# Patient Record
Sex: FEMALE | Race: WHITE | Employment: OTHER | URBAN - METROPOLITAN AREA
[De-identification: names, ages, dates, MRNs, and addresses within clinical notes are randomized per-mention and may not be internally consistent; named-entity substitution may affect disease eponyms.]

---

## 2017-11-27 PROBLEM — I25.10 CORONARY ARTERY DISEASE INVOLVING NATIVE CORONARY ARTERY OF NATIVE HEART WITHOUT ANGINA PECTORIS: Status: ACTIVE | Noted: 2017-11-27

## 2017-11-27 PROBLEM — I50.32 CHRONIC DIASTOLIC CONGESTIVE HEART FAILURE (HCC): Status: ACTIVE | Noted: 2017-11-27

## 2018-03-19 ENCOUNTER — HOSPITAL ENCOUNTER (OUTPATIENT)
Dept: CARDIAC REHAB | Age: 68
Setting detail: THERAPIES SERIES
Discharge: HOME | End: 2018-03-19
Payer: MEDICARE

## 2018-03-19 PROCEDURE — 93798 PHYS/QHP OP CAR RHAB W/ECG: CPT

## 2018-03-21 ENCOUNTER — HOSPITAL ENCOUNTER (OUTPATIENT)
Dept: CARDIAC REHAB | Age: 68
Setting detail: THERAPIES SERIES
Discharge: HOME | End: 2018-03-21
Payer: MEDICARE

## 2018-03-21 PROCEDURE — 93798 PHYS/QHP OP CAR RHAB W/ECG: CPT

## 2018-03-28 ENCOUNTER — HOSPITAL ENCOUNTER (OUTPATIENT)
Age: 68
Discharge: HOME | End: 2018-03-28
Payer: MEDICARE

## 2018-03-28 LAB
ANION GAP SERPL CALCULATED.3IONS-SCNC: 12 MMOL/L (ref 7–16)
BUN BLDV-MCNC: 29 MG/DL (ref 8–23)
CALCIUM SERPL-MCNC: 9.8 MG/DL (ref 8.6–10.2)
CHLORIDE BLD-SCNC: 103 MMOL/L (ref 98–107)
CO2: 27 MMOL/L (ref 22–29)
CREAT SERPL-MCNC: 1.1 MG/DL (ref 0.5–1)
CREATININE URINE: 38 MG/DL (ref 29–226)
GFR AFRICAN AMERICAN: >60
GFR NON-AFRICAN AMERICAN: 50 ML/MIN/1.73
GLUCOSE BLD-MCNC: 82 MG/DL (ref 74–109)
HBA1C MFR BLD: 8.7 % (ref 4.8–5.9)
HCT VFR BLD CALC: 40.5 % (ref 34–48)
HEMOGLOBIN: 12.8 G/DL (ref 11.5–15.5)
MCH RBC QN AUTO: 27.5 PG (ref 26–35)
MCHC RBC AUTO-ENTMCNC: 31.6 % (ref 32–34.5)
MCV RBC AUTO: 87.1 FL (ref 80–99.9)
MICROALBUMIN UR-MCNC: <12 MG/L
MICROALBUMIN/CREAT UR-RTO: ABNORMAL (ref 0–30)
PARATHYROID HORMONE INTACT: 32 PG/ML (ref 15–65)
PDW BLD-RTO: 15.9 FL (ref 11.5–15)
PLATELET # BLD: 247 E9/L (ref 130–450)
PMV BLD AUTO: 10 FL (ref 7–12)
POTASSIUM SERPL-SCNC: 4.5 MMOL/L (ref 3.5–5)
RBC # BLD: 4.65 E12/L (ref 3.5–5.5)
SODIUM BLD-SCNC: 142 MMOL/L (ref 132–146)
VITAMIN D 25-HYDROXY: 29 NG/ML (ref 30–100)
WBC # BLD: 8.7 E9/L (ref 4.5–11.5)

## 2018-03-28 PROCEDURE — 83036 HEMOGLOBIN GLYCOSYLATED A1C: CPT

## 2018-03-28 PROCEDURE — 85027 COMPLETE CBC AUTOMATED: CPT

## 2018-03-28 PROCEDURE — 82570 ASSAY OF URINE CREATININE: CPT

## 2018-03-28 PROCEDURE — 82306 VITAMIN D 25 HYDROXY: CPT

## 2018-03-28 PROCEDURE — 82044 UR ALBUMIN SEMIQUANTITATIVE: CPT

## 2018-03-28 PROCEDURE — 83970 ASSAY OF PARATHORMONE: CPT

## 2018-03-28 PROCEDURE — 80048 BASIC METABOLIC PNL TOTAL CA: CPT

## 2018-03-28 PROCEDURE — 36415 COLL VENOUS BLD VENIPUNCTURE: CPT

## 2018-04-30 ENCOUNTER — HOSPITAL ENCOUNTER (OUTPATIENT)
Dept: CARDIAC REHAB | Age: 68
Setting detail: THERAPIES SERIES
Discharge: HOME | End: 2018-04-30
Payer: MEDICARE

## 2018-05-24 ENCOUNTER — OFFICE VISIT (OUTPATIENT)
Dept: CARDIOLOGY CLINIC | Age: 68
End: 2018-05-24
Payer: MEDICARE

## 2018-05-24 VITALS
RESPIRATION RATE: 20 BRPM | WEIGHT: 225 LBS | DIASTOLIC BLOOD PRESSURE: 62 MMHG | HEART RATE: 70 BPM | SYSTOLIC BLOOD PRESSURE: 136 MMHG | HEIGHT: 65 IN | BODY MASS INDEX: 37.49 KG/M2

## 2018-05-24 DIAGNOSIS — I25.10 CORONARY ARTERY DISEASE INVOLVING NATIVE CORONARY ARTERY OF NATIVE HEART WITHOUT ANGINA PECTORIS: Primary | ICD-10-CM

## 2018-05-24 DIAGNOSIS — I10 ESSENTIAL HYPERTENSION: ICD-10-CM

## 2018-05-24 DIAGNOSIS — Z95.1 S/P CABG (CORONARY ARTERY BYPASS GRAFT): ICD-10-CM

## 2018-05-24 PROCEDURE — G8427 DOCREV CUR MEDS BY ELIG CLIN: HCPCS | Performed by: INTERNAL MEDICINE

## 2018-05-24 PROCEDURE — 1123F ACP DISCUSS/DSCN MKR DOCD: CPT | Performed by: INTERNAL MEDICINE

## 2018-05-24 PROCEDURE — 3017F COLORECTAL CA SCREEN DOC REV: CPT | Performed by: INTERNAL MEDICINE

## 2018-05-24 PROCEDURE — G8400 PT W/DXA NO RESULTS DOC: HCPCS | Performed by: INTERNAL MEDICINE

## 2018-05-24 PROCEDURE — 4040F PNEUMOC VAC/ADMIN/RCVD: CPT | Performed by: INTERNAL MEDICINE

## 2018-05-24 PROCEDURE — G8598 ASA/ANTIPLAT THER USED: HCPCS | Performed by: INTERNAL MEDICINE

## 2018-05-24 PROCEDURE — 93000 ELECTROCARDIOGRAM COMPLETE: CPT | Performed by: INTERNAL MEDICINE

## 2018-05-24 PROCEDURE — G8417 CALC BMI ABV UP PARAM F/U: HCPCS | Performed by: INTERNAL MEDICINE

## 2018-05-24 PROCEDURE — 1090F PRES/ABSN URINE INCON ASSESS: CPT | Performed by: INTERNAL MEDICINE

## 2018-05-24 PROCEDURE — 1036F TOBACCO NON-USER: CPT | Performed by: INTERNAL MEDICINE

## 2018-05-24 PROCEDURE — 99213 OFFICE O/P EST LOW 20 MIN: CPT | Performed by: INTERNAL MEDICINE

## 2018-06-04 RX ORDER — LISINOPRIL 2.5 MG/1
2.5 TABLET ORAL DAILY
Qty: 90 TABLET | Refills: 3 | Status: SHIPPED | OUTPATIENT
Start: 2018-06-04 | End: 2018-06-11 | Stop reason: SDUPTHER

## 2018-06-04 RX ORDER — PRAVASTATIN SODIUM 40 MG
40 TABLET ORAL NIGHTLY
Qty: 90 TABLET | Refills: 3 | Status: SHIPPED | OUTPATIENT
Start: 2018-06-04 | End: 2018-06-11 | Stop reason: SDUPTHER

## 2018-06-11 RX ORDER — PRAVASTATIN SODIUM 40 MG
40 TABLET ORAL NIGHTLY
Qty: 90 TABLET | Refills: 3 | Status: SHIPPED | OUTPATIENT
Start: 2018-06-11 | End: 2019-07-26 | Stop reason: SDUPTHER

## 2018-06-11 RX ORDER — LISINOPRIL 2.5 MG/1
2.5 TABLET ORAL DAILY
Qty: 90 TABLET | Refills: 3 | Status: SHIPPED | OUTPATIENT
Start: 2018-06-11 | End: 2019-03-21 | Stop reason: SDUPTHER

## 2018-06-26 LAB — PAP SMEAR, EXTERNAL: NEGATIVE

## 2018-07-12 ENCOUNTER — APPOINTMENT (OUTPATIENT)
Dept: GENERAL RADIOLOGY | Age: 68
End: 2018-07-12
Payer: COMMERCIAL

## 2018-07-12 ENCOUNTER — HOSPITAL ENCOUNTER (OUTPATIENT)
Dept: CARDIOLOGY | Age: 68
Discharge: HOME | End: 2018-07-12
Payer: COMMERCIAL

## 2018-07-12 ENCOUNTER — HOSPITAL ENCOUNTER (EMERGENCY)
Age: 68
Discharge: HOME | End: 2018-07-12
Payer: COMMERCIAL

## 2018-07-12 VITALS
WEIGHT: 225 LBS | HEART RATE: 71 BPM | BODY MASS INDEX: 37.49 KG/M2 | SYSTOLIC BLOOD PRESSURE: 124 MMHG | HEIGHT: 65 IN | DIASTOLIC BLOOD PRESSURE: 73 MMHG | OXYGEN SATURATION: 96 % | TEMPERATURE: 97.2 F | RESPIRATION RATE: 16 BRPM
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
## 2018-07-12 DIAGNOSIS — I10 ESSENTIAL HYPERTENSION: ICD-10-CM

## 2018-07-12 DIAGNOSIS — I25.10 CORONARY ARTERY DISEASE INVOLVING NATIVE CORONARY ARTERY OF NATIVE HEART WITHOUT ANGINA PECTORIS: ICD-10-CM

## 2018-07-12 DIAGNOSIS — S46.912A SHOULDER STRAIN, LEFT, INITIAL ENCOUNTER: Primary | ICD-10-CM

## 2018-07-12 DIAGNOSIS — Z95.1 S/P CABG (CORONARY ARTERY BYPASS GRAFT): ICD-10-CM

## 2018-07-12 LAB
LV EF: 55 %
LVEF MODALITY: NORMAL

## 2018-07-12 PROCEDURE — 99283 EMERGENCY DEPT VISIT LOW MDM: CPT

## 2018-07-12 PROCEDURE — 71101 X-RAY EXAM UNILAT RIBS/CHEST: CPT

## 2018-07-12 PROCEDURE — 73030 X-RAY EXAM OF SHOULDER: CPT

## 2018-07-12 PROCEDURE — 6370000000 HC RX 637 (ALT 250 FOR IP): Performed by: NURSE PRACTITIONER

## 2018-07-12 PROCEDURE — 93306 TTE W/DOPPLER COMPLETE: CPT

## 2018-07-12 RX ORDER — HYDROCODONE BITARTRATE AND ACETAMINOPHEN 5; 325 MG/1; MG/1
1 TABLET ORAL ONCE
Status: COMPLETED | OUTPATIENT
Start: 2018-07-12 | End: 2018-07-12

## 2018-07-12 RX ORDER — HYDROCODONE BITARTRATE AND ACETAMINOPHEN 5; 325 MG/1; MG/1
1 TABLET ORAL EVERY 8 HOURS PRN
Qty: 12 TABLET | Refills: 0 | Status: SHIPPED | OUTPATIENT
Start: 2018-07-12 | End: 2018-07-15

## 2018-07-12 RX ADMIN — HYDROCODONE BITARTRATE AND ACETAMINOPHEN 1 TABLET: 5; 325 TABLET ORAL at 09:42

## 2018-07-12 ASSESSMENT — PAIN DESCRIPTION - FREQUENCY: FREQUENCY: CONTINUOUS

## 2018-07-12 ASSESSMENT — PAIN SCALES - GENERAL
PAINLEVEL_OUTOF10: 8
PAINLEVEL_OUTOF10: 8

## 2018-07-12 ASSESSMENT — PAIN DESCRIPTION - PAIN TYPE: TYPE: ACUTE PAIN

## 2018-07-12 ASSESSMENT — PAIN DESCRIPTION - PROGRESSION: CLINICAL_PROGRESSION: GRADUALLY WORSENING

## 2018-07-12 ASSESSMENT — PAIN DESCRIPTION - DESCRIPTORS: DESCRIPTORS: ACHING

## 2018-07-12 ASSESSMENT — PAIN DESCRIPTION - ORIENTATION: ORIENTATION: LEFT

## 2018-07-16 ENCOUNTER — TELEPHONE (OUTPATIENT)
Dept: CARDIOLOGY CLINIC | Age: 68
End: 2018-07-16

## 2018-08-28 ENCOUNTER — HOSPITAL ENCOUNTER (OUTPATIENT)
Dept: GENERAL RADIOLOGY | Age: 68
Discharge: HOME | End: 2018-08-30
Payer: COMMERCIAL

## 2018-08-28 DIAGNOSIS — C50.911 MALIGNANT NEOPLASM OF RIGHT FEMALE BREAST, UNSPECIFIED ESTROGEN RECEPTOR STATUS, UNSPECIFIED SITE OF BREAST (HCC): ICD-10-CM

## 2018-08-28 PROCEDURE — G0279 TOMOSYNTHESIS, MAMMO: HCPCS

## 2018-09-10 ENCOUNTER — HOSPITAL ENCOUNTER (OUTPATIENT)
Age: 68
Discharge: HOME | End: 2018-09-10
Payer: COMMERCIAL

## 2018-09-10 LAB
ANION GAP SERPL CALCULATED.3IONS-SCNC: 12 MMOL/L (ref 7–16)
BASOPHILS ABSOLUTE: 0.04 E9/L (ref 0–0.2)
BASOPHILS RELATIVE PERCENT: 0.5 % (ref 0–2)
BUN BLDV-MCNC: 19 MG/DL (ref 8–23)
CALCIUM SERPL-MCNC: 9.7 MG/DL (ref 8.6–10.2)
CHLORIDE BLD-SCNC: 97 MMOL/L (ref 98–107)
CHOLESTEROL, TOTAL: 159 MG/DL (ref 0–199)
CO2: 31 MMOL/L (ref 22–29)
CREAT SERPL-MCNC: 1.3 MG/DL (ref 0.5–1)
EOSINOPHILS ABSOLUTE: 0.05 E9/L (ref 0.05–0.5)
EOSINOPHILS RELATIVE PERCENT: 0.6 % (ref 0–6)
GFR AFRICAN AMERICAN: 50
GFR NON-AFRICAN AMERICAN: 41 ML/MIN/1.73
GLUCOSE BLD-MCNC: 177 MG/DL (ref 74–109)
HBA1C MFR BLD: 9 % (ref 4–5.6)
HCT VFR BLD CALC: 45 % (ref 34–48)
HDLC SERPL-MCNC: 51 MG/DL
HEMOGLOBIN: 14.3 G/DL (ref 11.5–15.5)
IMMATURE GRANULOCYTES #: 0.04 E9/L
IMMATURE GRANULOCYTES %: 0.5 % (ref 0–5)
LDL CHOLESTEROL CALCULATED: 73 MG/DL (ref 0–99)
LYMPHOCYTES ABSOLUTE: 1.26 E9/L (ref 1.5–4)
LYMPHOCYTES RELATIVE PERCENT: 14.3 % (ref 20–42)
MCH RBC QN AUTO: 28.9 PG (ref 26–35)
MCHC RBC AUTO-ENTMCNC: 31.8 % (ref 32–34.5)
MCV RBC AUTO: 90.9 FL (ref 80–99.9)
MONOCYTES ABSOLUTE: 0.59 E9/L (ref 0.1–0.95)
MONOCYTES RELATIVE PERCENT: 6.7 % (ref 2–12)
NEUTROPHILS ABSOLUTE: 6.85 E9/L (ref 1.8–7.3)
NEUTROPHILS RELATIVE PERCENT: 77.4 % (ref 43–80)
PARATHYROID HORMONE INTACT: 63 PG/ML (ref 15–65)
PDW BLD-RTO: 13.4 FL (ref 11.5–15)
PHOSPHORUS: 3.2 MG/DL (ref 2.5–4.5)
PLATELET # BLD: 251 E9/L (ref 130–450)
PMV BLD AUTO: 10.2 FL (ref 7–12)
POTASSIUM SERPL-SCNC: 4.2 MMOL/L (ref 3.5–5)
RBC # BLD: 4.95 E12/L (ref 3.5–5.5)
SODIUM BLD-SCNC: 140 MMOL/L (ref 132–146)
TRIGL SERPL-MCNC: 174 MG/DL (ref 0–149)
VITAMIN D 25-HYDROXY: 36 NG/ML (ref 30–100)
VLDLC SERPL CALC-MCNC: 35 MG/DL
WBC # BLD: 8.8 E9/L (ref 4.5–11.5)

## 2018-09-10 PROCEDURE — 36415 COLL VENOUS BLD VENIPUNCTURE: CPT

## 2018-09-10 PROCEDURE — 80061 LIPID PANEL: CPT

## 2018-09-10 PROCEDURE — 85025 COMPLETE CBC W/AUTO DIFF WBC: CPT

## 2018-09-10 PROCEDURE — 80048 BASIC METABOLIC PNL TOTAL CA: CPT

## 2018-09-10 PROCEDURE — 83036 HEMOGLOBIN GLYCOSYLATED A1C: CPT

## 2018-09-10 PROCEDURE — 82306 VITAMIN D 25 HYDROXY: CPT

## 2018-09-10 PROCEDURE — 83970 ASSAY OF PARATHORMONE: CPT

## 2018-09-10 PROCEDURE — 84100 ASSAY OF PHOSPHORUS: CPT

## 2018-12-08 RX ORDER — FUROSEMIDE 20 MG/1
20 TABLET ORAL DAILY
Qty: 30 TABLET | Refills: 11 | Status: SHIPPED | OUTPATIENT
Start: 2018-12-08 | End: 2019-09-06 | Stop reason: SDUPTHER

## 2018-12-08 RX ORDER — METOPROLOL TARTRATE 50 MG/1
50 TABLET, FILM COATED ORAL 2 TIMES DAILY
Qty: 60 TABLET | Refills: 11 | Status: SHIPPED | OUTPATIENT
Start: 2018-12-08 | End: 2019-12-06 | Stop reason: SDUPTHER

## 2018-12-08 RX ORDER — POTASSIUM CHLORIDE 750 MG/1
10 TABLET, EXTENDED RELEASE ORAL EVERY OTHER DAY
Qty: 15 TABLET | Refills: 11 | Status: SHIPPED | OUTPATIENT
Start: 2018-12-08 | End: 2019-10-29 | Stop reason: SDUPTHER

## 2019-02-01 ENCOUNTER — HOSPITAL ENCOUNTER (OUTPATIENT)
Age: 69
Discharge: HOME | End: 2019-02-01
Payer: COMMERCIAL

## 2019-02-01 LAB
ANION GAP SERPL CALCULATED.3IONS-SCNC: 11 MMOL/L (ref 7–16)
BASOPHILS ABSOLUTE: 0.06 E9/L (ref 0–0.2)
BASOPHILS RELATIVE PERCENT: 0.8 % (ref 0–2)
BUN BLDV-MCNC: 16 MG/DL (ref 8–23)
CALCIUM SERPL-MCNC: 9.6 MG/DL (ref 8.6–10.2)
CHLORIDE BLD-SCNC: 98 MMOL/L (ref 98–107)
CO2: 32 MMOL/L (ref 22–29)
CREAT SERPL-MCNC: 1.2 MG/DL (ref 0.5–1)
EOSINOPHILS ABSOLUTE: 0.1 E9/L (ref 0.05–0.5)
EOSINOPHILS RELATIVE PERCENT: 1.3 % (ref 0–6)
GFR AFRICAN AMERICAN: 54
GFR NON-AFRICAN AMERICAN: 45 ML/MIN/1.73
GLUCOSE BLD-MCNC: 180 MG/DL (ref 74–99)
HBA1C MFR BLD: 10.1 % (ref 4–5.6)
HCT VFR BLD CALC: 41.5 % (ref 34–48)
HEMOGLOBIN: 13.4 G/DL (ref 11.5–15.5)
IMMATURE GRANULOCYTES #: 0.08 E9/L
IMMATURE GRANULOCYTES %: 1 % (ref 0–5)
LYMPHOCYTES ABSOLUTE: 1.41 E9/L (ref 1.5–4)
LYMPHOCYTES RELATIVE PERCENT: 18.4 % (ref 20–42)
MCH RBC QN AUTO: 29.3 PG (ref 26–35)
MCHC RBC AUTO-ENTMCNC: 32.3 % (ref 32–34.5)
MCV RBC AUTO: 90.8 FL (ref 80–99.9)
MONOCYTES ABSOLUTE: 0.67 E9/L (ref 0.1–0.95)
MONOCYTES RELATIVE PERCENT: 8.7 % (ref 2–12)
NEUTROPHILS ABSOLUTE: 5.35 E9/L (ref 1.8–7.3)
NEUTROPHILS RELATIVE PERCENT: 69.8 % (ref 43–80)
PDW BLD-RTO: 13.2 FL (ref 11.5–15)
PHOSPHORUS: 3.1 MG/DL (ref 2.5–4.5)
PLATELET # BLD: 238 E9/L (ref 130–450)
PMV BLD AUTO: 10 FL (ref 7–12)
POTASSIUM SERPL-SCNC: 4.4 MMOL/L (ref 3.5–5)
RBC # BLD: 4.57 E12/L (ref 3.5–5.5)
SODIUM BLD-SCNC: 141 MMOL/L (ref 132–146)
WBC # BLD: 7.7 E9/L (ref 4.5–11.5)

## 2019-02-01 PROCEDURE — 83036 HEMOGLOBIN GLYCOSYLATED A1C: CPT

## 2019-02-01 PROCEDURE — 84100 ASSAY OF PHOSPHORUS: CPT

## 2019-02-01 PROCEDURE — 80048 BASIC METABOLIC PNL TOTAL CA: CPT

## 2019-02-01 PROCEDURE — 36415 COLL VENOUS BLD VENIPUNCTURE: CPT

## 2019-02-01 PROCEDURE — 85025 COMPLETE CBC W/AUTO DIFF WBC: CPT

## 2019-03-21 RX ORDER — LISINOPRIL 2.5 MG/1
2.5 TABLET ORAL DAILY
Qty: 30 TABLET | Refills: 11 | Status: SHIPPED | OUTPATIENT
Start: 2019-03-21 | End: 2020-03-04 | Stop reason: SDUPTHER

## 2019-05-31 ENCOUNTER — OFFICE VISIT (OUTPATIENT)
Dept: CARDIOLOGY CLINIC | Age: 69
End: 2019-05-31
Payer: COMMERCIAL

## 2019-05-31 VITALS
HEIGHT: 65 IN | SYSTOLIC BLOOD PRESSURE: 100 MMHG | BODY MASS INDEX: 37.99 KG/M2 | RESPIRATION RATE: 16 BRPM | DIASTOLIC BLOOD PRESSURE: 70 MMHG | WEIGHT: 228 LBS | HEART RATE: 69 BPM

## 2019-05-31 DIAGNOSIS — I25.10 CORONARY ARTERY DISEASE INVOLVING NATIVE CORONARY ARTERY OF NATIVE HEART WITHOUT ANGINA PECTORIS: Primary | ICD-10-CM

## 2019-05-31 DIAGNOSIS — I50.32 CHRONIC DIASTOLIC CONGESTIVE HEART FAILURE (HCC): ICD-10-CM

## 2019-05-31 DIAGNOSIS — I10 ESSENTIAL HYPERTENSION: ICD-10-CM

## 2019-05-31 PROCEDURE — G8599 NO ASA/ANTIPLAT THER USE RNG: HCPCS | Performed by: INTERNAL MEDICINE

## 2019-05-31 PROCEDURE — G8417 CALC BMI ABV UP PARAM F/U: HCPCS | Performed by: INTERNAL MEDICINE

## 2019-05-31 PROCEDURE — G8400 PT W/DXA NO RESULTS DOC: HCPCS | Performed by: INTERNAL MEDICINE

## 2019-05-31 PROCEDURE — G8427 DOCREV CUR MEDS BY ELIG CLIN: HCPCS | Performed by: INTERNAL MEDICINE

## 2019-05-31 PROCEDURE — 1036F TOBACCO NON-USER: CPT | Performed by: INTERNAL MEDICINE

## 2019-05-31 PROCEDURE — 3017F COLORECTAL CA SCREEN DOC REV: CPT | Performed by: INTERNAL MEDICINE

## 2019-05-31 PROCEDURE — 99213 OFFICE O/P EST LOW 20 MIN: CPT | Performed by: INTERNAL MEDICINE

## 2019-05-31 PROCEDURE — 1123F ACP DISCUSS/DSCN MKR DOCD: CPT | Performed by: INTERNAL MEDICINE

## 2019-05-31 PROCEDURE — 1090F PRES/ABSN URINE INCON ASSESS: CPT | Performed by: INTERNAL MEDICINE

## 2019-05-31 PROCEDURE — 4040F PNEUMOC VAC/ADMIN/RCVD: CPT | Performed by: INTERNAL MEDICINE

## 2019-05-31 PROCEDURE — 93000 ELECTROCARDIOGRAM COMPLETE: CPT | Performed by: INTERNAL MEDICINE

## 2019-05-31 RX ORDER — MIRABEGRON 25 MG/1
25 TABLET, FILM COATED, EXTENDED RELEASE ORAL DAILY
Refills: 0 | COMMUNITY
Start: 2019-05-24 | End: 2022-11-02

## 2019-05-31 RX ORDER — CITALOPRAM 30 MG/1
30 CAPSULE ORAL DAILY
COMMUNITY
Start: 2019-04-04 | End: 2022-11-02

## 2019-05-31 NOTE — PROGRESS NOTES
[facility] Cardiology  MD Dillon Bahena MD Sherley Crystal, MD Rusty Painter. MD Sobia Verdugo   1950  Gloria Martin MD     Mrs. Christal Chavez was in the outpatient office on 5/31/2019  for follow up of her CAD. This 46-year-old obese female had CABG x 2 by Dr. Sondra Maza in October 2017 in the setting of her non-STEMI. Rosa Jose Post operative course was complicated by respiratory failure and acute kidney injury. She presented to our office today for a 12-month follow-up visit. Echocardiogram last year revealing normal EF. She walks with boots on both lower extremities secondary to Charcot Joints. She uses a wheelchair to ambulate long distances. With routine activity, she notes no chest pain or shortness of breath. She denies any dizziness, presyncope or syncope. no recent hospitalizations or emergency room visits.      Past medical history:   1. Obesity. BMI: 37.  2. Allergies to penicillin. 3. 10 pack year smoking history. Quit 12/1/2017. 4. Dyslipidemia. 5. Hypertension. 6. Diabetes with peripheral neuropathy. 7. Bilateral Charcot foot. Wears braces on both lower extremities while ambulating. Is otherwise wheelchair bound. 8. Depression. 9. History of breast CA, S/P lumpectomy with lymph node resection and radiation. 2015. 10. Legally blind. 11. Hypothyroidism. 12. Echocardiogram, 3/1/2016, EF 65%. Stage 1 diastolic dysfunction, trace MR.    13. Lexiscan MPS, 1/3/2016, normal perfusion. EF 70%. 14. Admission with non STEMI 10/19/2017. 15. Left heart catheterization, Dr. Gabbie Latif, 10/22/2017. Left main 40% calcific distal stenosis. LAD 95% calcific ostial stenosis and 95% proximal stenosis, circumflex 90% proximal stenosis, RCA dominant, mid 60% stenosis, EF 60%. 16. CABG x 2, Dr. Sondra Maza, 10/23/2017, LIMA to the LAD, SVG to the OM. 17. Post procedure course complicated by respiratory failure, acute kidney injury.   Creatinine peaked at inversions that are chronic. Mrs. Alvarez Pulse presented to our office for follow up of her CAD. She notes no anginal complaints. Her Charcot joints are not very functional, but she is able to ambulate with no significant limiting complaints. She notes compliance with her medical therapy. She requires no further cardiac workup or change in her medications. Her medical therapies as follows:    OP Meds:  Prior to Admission medications    Medication Sig Start Date End Date Taking? Authorizing Provider   MYRBETRIQ 25 MG TB24 Take by mouth daily  5/24/19  Yes Historical Provider, MD   LYRICA 75 MG capsule Take 75 mg by mouth daily. 5/3/19  Yes Historical Provider, MD   citalopram (CELEXA) 40 MG tablet Take 40 mg by mouth daily  4/4/19  Yes Historical Provider, MD   lisinopril (PRINIVIL;ZESTRIL) 2.5 MG tablet Take 1 tablet by mouth daily 3/21/19  Yes Simran Mathis MD   metoprolol tartrate (LOPRESSOR) 50 MG tablet Take 1 tablet by mouth 2 times daily 12/8/18  Yes Simran Mathis MD   furosemide (LASIX) 20 MG tablet Take 1 tablet by mouth daily 12/8/18  Yes Simran Mathis MD   potassium chloride (KLOR-CON M) 10 MEQ extended release tablet Take 1 tablet by mouth every other day 12/8/18  Yes Simran Mathis MD   pravastatin (PRAVACHOL) 40 MG tablet Take 1 tablet by mouth nightly 6/11/18  Yes Simran Mathis MD   ezetimibe (ZETIA) 10 MG tablet Take 1 tablet by mouth daily 12/13/17  Yes Simran Mathis MD   aspirin EC 81 MG EC tablet Take 1 tablet by mouth nightly LD 3/15/2015 12/13/17  Yes Simran Mathis MD   insulin aspart protamine-insulin aspart (NOVOLOG MIX 70/30 FLEXPEN) (70-30) 100 UNIT/ML injection Inject 60 Units into the skin 2 times daily (with meals)    Yes Historical Provider, MD   Liraglutide (VICTOZA) 18 MG/3ML SOPN SC injection Inject 1.8 mg into the skin daily. 7/21/14  Yes Sarah Clinton MD   cyanocobalamin ([facility] VITAMIN B12) 1000 MCG tablet Take 1 tablet by mouth daily.  4/18/14  Yes

## 2019-07-11 ENCOUNTER — HOSPITAL ENCOUNTER (OUTPATIENT)
Age: 69
Discharge: HOME | End: 2019-07-13
Payer: COMMERCIAL

## 2019-07-11 PROCEDURE — 87070 CULTURE OTHR SPECIMN AEROBIC: CPT

## 2019-07-11 PROCEDURE — 87205 SMEAR GRAM STAIN: CPT

## 2019-07-13 LAB — GRAM STAIN ORDERABLE: NORMAL

## 2019-07-14 LAB
ORGANISM: ABNORMAL
WOUND/ABSCESS: ABNORMAL
WOUND/ABSCESS: ABNORMAL

## 2019-07-26 RX ORDER — EZETIMIBE 10 MG/1
10 TABLET ORAL DAILY
Qty: 30 TABLET | Refills: 11 | OUTPATIENT
Start: 2019-07-26 | End: 2019-09-06 | Stop reason: SDUPTHER

## 2019-07-26 RX ORDER — ASPIRIN 81 MG/1
81 TABLET ORAL NIGHTLY
Qty: 30 TABLET | Refills: 11 | OUTPATIENT
Start: 2019-07-26 | End: 2022-01-12 | Stop reason: SDUPTHER

## 2019-07-26 RX ORDER — PRAVASTATIN SODIUM 40 MG
40 TABLET ORAL NIGHTLY
Qty: 90 TABLET | Refills: 3 | OUTPATIENT
Start: 2019-07-26 | End: 2019-09-06 | Stop reason: SDUPTHER

## 2019-08-13 ENCOUNTER — HOSPITAL ENCOUNTER (OUTPATIENT)
Age: 69
Discharge: HOME | End: 2019-08-13
Payer: COMMERCIAL

## 2019-08-13 LAB
ALBUMIN SERPL-MCNC: 3.9 G/DL (ref 3.5–5.2)
ALP BLD-CCNC: 54 U/L (ref 35–104)
ALT SERPL-CCNC: 16 U/L (ref 0–32)
ANION GAP SERPL CALCULATED.3IONS-SCNC: 13 MMOL/L (ref 7–16)
AST SERPL-CCNC: 15 U/L (ref 0–31)
BASOPHILS ABSOLUTE: 0.04 E9/L (ref 0–0.2)
BASOPHILS RELATIVE PERCENT: 0.6 % (ref 0–2)
BILIRUB SERPL-MCNC: 0.4 MG/DL (ref 0–1.2)
BUN BLDV-MCNC: 23 MG/DL (ref 8–23)
CALCIUM SERPL-MCNC: 9.7 MG/DL (ref 8.6–10.2)
CHLORIDE BLD-SCNC: 102 MMOL/L (ref 98–107)
CHOLESTEROL, TOTAL: 155 MG/DL (ref 0–199)
CO2: 28 MMOL/L (ref 22–29)
CREAT SERPL-MCNC: 1.1 MG/DL (ref 0.5–1)
EOSINOPHILS ABSOLUTE: 0.09 E9/L (ref 0.05–0.5)
EOSINOPHILS RELATIVE PERCENT: 1.3 % (ref 0–6)
GFR AFRICAN AMERICAN: 60
GFR NON-AFRICAN AMERICAN: 50 ML/MIN/1.73
GLUCOSE BLD-MCNC: 239 MG/DL (ref 74–99)
HBA1C MFR BLD: 9.2 % (ref 4–5.6)
HCT VFR BLD CALC: 41.7 % (ref 34–48)
HDLC SERPL-MCNC: 46 MG/DL
HEMOGLOBIN: 13.6 G/DL (ref 11.5–15.5)
IMMATURE GRANULOCYTES #: 0.03 E9/L
IMMATURE GRANULOCYTES %: 0.4 % (ref 0–5)
LDL CHOLESTEROL CALCULATED: 70 MG/DL (ref 0–99)
LYMPHOCYTES ABSOLUTE: 1.12 E9/L (ref 1.5–4)
LYMPHOCYTES RELATIVE PERCENT: 16.7 % (ref 20–42)
MCH RBC QN AUTO: 29.4 PG (ref 26–35)
MCHC RBC AUTO-ENTMCNC: 32.6 % (ref 32–34.5)
MCV RBC AUTO: 90.3 FL (ref 80–99.9)
MONOCYTES ABSOLUTE: 0.43 E9/L (ref 0.1–0.95)
MONOCYTES RELATIVE PERCENT: 6.4 % (ref 2–12)
NEUTROPHILS ABSOLUTE: 4.99 E9/L (ref 1.8–7.3)
NEUTROPHILS RELATIVE PERCENT: 74.6 % (ref 43–80)
PARATHYROID HORMONE INTACT: 37 PG/ML (ref 15–65)
PDW BLD-RTO: 13.2 FL (ref 11.5–15)
PHOSPHORUS: 2.4 MG/DL (ref 2.5–4.5)
PLATELET # BLD: 210 E9/L (ref 130–450)
PMV BLD AUTO: 9.9 FL (ref 7–12)
POTASSIUM SERPL-SCNC: 3.9 MMOL/L (ref 3.5–5)
RBC # BLD: 4.62 E12/L (ref 3.5–5.5)
SODIUM BLD-SCNC: 143 MMOL/L (ref 132–146)
TOTAL PROTEIN: 6.9 G/DL (ref 6.4–8.3)
TRIGL SERPL-MCNC: 196 MG/DL (ref 0–149)
VITAMIN D 25-HYDROXY: 30 NG/ML (ref 30–100)
VLDLC SERPL CALC-MCNC: 39 MG/DL
WBC # BLD: 6.7 E9/L (ref 4.5–11.5)

## 2019-08-13 PROCEDURE — 82306 VITAMIN D 25 HYDROXY: CPT

## 2019-08-13 PROCEDURE — 36415 COLL VENOUS BLD VENIPUNCTURE: CPT

## 2019-08-13 PROCEDURE — 85025 COMPLETE CBC W/AUTO DIFF WBC: CPT

## 2019-08-13 PROCEDURE — 83970 ASSAY OF PARATHORMONE: CPT

## 2019-08-13 PROCEDURE — 80061 LIPID PANEL: CPT

## 2019-08-13 PROCEDURE — 83036 HEMOGLOBIN GLYCOSYLATED A1C: CPT

## 2019-08-13 PROCEDURE — 80053 COMPREHEN METABOLIC PANEL: CPT

## 2019-08-13 PROCEDURE — 84100 ASSAY OF PHOSPHORUS: CPT

## 2019-09-06 RX ORDER — FUROSEMIDE 20 MG/1
20 TABLET ORAL DAILY
Qty: 30 TABLET | Refills: 11 | Status: SHIPPED
Start: 2019-09-06 | End: 2022-11-02

## 2019-09-06 RX ORDER — EZETIMIBE 10 MG/1
10 TABLET ORAL DAILY
Qty: 30 TABLET | Refills: 11 | Status: SHIPPED
Start: 2019-09-06 | End: 2022-01-12 | Stop reason: SDUPTHER

## 2019-09-06 RX ORDER — PRAVASTATIN SODIUM 40 MG
40 TABLET ORAL NIGHTLY
Qty: 90 TABLET | Refills: 3 | Status: SHIPPED
Start: 2019-09-06 | End: 2022-01-12 | Stop reason: SDUPTHER

## 2019-10-25 ENCOUNTER — HOSPITAL ENCOUNTER (OUTPATIENT)
Dept: GENERAL RADIOLOGY | Age: 69
Discharge: HOME | End: 2019-10-27
Payer: COMMERCIAL

## 2019-10-25 DIAGNOSIS — C50.011 MALIGNANT NEOPLASM OF NIPPLE OF RIGHT BREAST IN FEMALE, UNSPECIFIED ESTROGEN RECEPTOR STATUS (HCC): ICD-10-CM

## 2019-10-25 PROCEDURE — 77063 BREAST TOMOSYNTHESIS BI: CPT

## 2019-10-29 RX ORDER — POTASSIUM CHLORIDE 750 MG/1
10 TABLET, EXTENDED RELEASE ORAL EVERY OTHER DAY
Qty: 15 TABLET | Refills: 11 | Status: SHIPPED
Start: 2019-10-29 | End: 2020-11-16 | Stop reason: SDUPTHER

## 2019-11-11 ENCOUNTER — HOSPITAL ENCOUNTER (OUTPATIENT)
Dept: GENERAL RADIOLOGY | Age: 69
Discharge: HOME | End: 2019-11-13
Payer: COMMERCIAL

## 2019-11-11 ENCOUNTER — APPOINTMENT (OUTPATIENT)
Dept: GENERAL RADIOLOGY | Age: 69
End: 2019-11-11
Payer: COMMERCIAL

## 2019-11-11 DIAGNOSIS — R92.8 ABNORMAL MAMMOGRAM OF RIGHT BREAST: ICD-10-CM

## 2019-11-11 PROCEDURE — 77065 DX MAMMO INCL CAD UNI: CPT

## 2019-11-20 ENCOUNTER — HOSPITAL ENCOUNTER (OUTPATIENT)
Dept: GENERAL RADIOLOGY | Age: 69
Discharge: HOME | End: 2019-11-22
Payer: COMMERCIAL

## 2019-11-20 ENCOUNTER — HOSPITAL ENCOUNTER (OUTPATIENT)
Age: 69
Discharge: HOME | End: 2019-11-22
Payer: COMMERCIAL

## 2019-11-20 DIAGNOSIS — R52 PAIN: ICD-10-CM

## 2019-11-20 PROCEDURE — 72110 X-RAY EXAM L-2 SPINE 4/>VWS: CPT

## 2019-11-27 ENCOUNTER — OFFICE VISIT (OUTPATIENT)
Dept: SURGERY | Age: 69
End: 2019-11-27
Payer: COMMERCIAL

## 2019-11-27 VITALS
HEIGHT: 65 IN | OXYGEN SATURATION: 91 % | TEMPERATURE: 97.7 F | RESPIRATION RATE: 18 BRPM | WEIGHT: 238 LBS | HEART RATE: 78 BPM | BODY MASS INDEX: 39.65 KG/M2 | DIASTOLIC BLOOD PRESSURE: 67 MMHG | SYSTOLIC BLOOD PRESSURE: 117 MMHG

## 2019-11-27 DIAGNOSIS — R92.0 MICROCALCIFICATION OF RIGHT BREAST ON MAMMOGRAM: Primary | ICD-10-CM

## 2019-11-27 PROCEDURE — G8400 PT W/DXA NO RESULTS DOC: HCPCS | Performed by: SURGERY

## 2019-11-27 PROCEDURE — G8427 DOCREV CUR MEDS BY ELIG CLIN: HCPCS | Performed by: SURGERY

## 2019-11-27 PROCEDURE — G8417 CALC BMI ABV UP PARAM F/U: HCPCS | Performed by: SURGERY

## 2019-11-27 PROCEDURE — 1123F ACP DISCUSS/DSCN MKR DOCD: CPT | Performed by: SURGERY

## 2019-11-27 PROCEDURE — G8484 FLU IMMUNIZE NO ADMIN: HCPCS | Performed by: SURGERY

## 2019-11-27 PROCEDURE — 4040F PNEUMOC VAC/ADMIN/RCVD: CPT | Performed by: SURGERY

## 2019-11-27 PROCEDURE — 99204 OFFICE O/P NEW MOD 45 MIN: CPT | Performed by: SURGERY

## 2019-11-27 PROCEDURE — 1036F TOBACCO NON-USER: CPT | Performed by: SURGERY

## 2019-11-27 PROCEDURE — 3017F COLORECTAL CA SCREEN DOC REV: CPT | Performed by: SURGERY

## 2019-11-27 PROCEDURE — G8598 ASA/ANTIPLAT THER USED: HCPCS | Performed by: SURGERY

## 2019-11-27 PROCEDURE — 1090F PRES/ABSN URINE INCON ASSESS: CPT | Performed by: SURGERY

## 2019-11-27 RX ORDER — PEN NEEDLE, DIABETIC 31 GX5/16"
NEEDLE, DISPOSABLE MISCELLANEOUS
Refills: 0 | COMMUNITY
Start: 2019-11-11

## 2019-11-27 RX ORDER — CYCLOSPORINE 0.5 MG/ML
EMULSION OPHTHALMIC
Refills: 0 | COMMUNITY
Start: 2019-10-10

## 2019-11-27 RX ORDER — INSULIN ASPART 100 [IU]/ML
INJECTION, SOLUTION INTRAVENOUS; SUBCUTANEOUS
Refills: 0 | COMMUNITY
Start: 2019-10-07 | End: 2022-02-16 | Stop reason: ALTCHOICE

## 2019-12-06 RX ORDER — METOPROLOL TARTRATE 50 MG/1
50 TABLET, FILM COATED ORAL 2 TIMES DAILY
Qty: 60 TABLET | Refills: 3 | Status: SHIPPED
Start: 2019-12-06 | End: 2020-04-02

## 2020-01-23 ENCOUNTER — HOSPITAL ENCOUNTER (OUTPATIENT)
Age: 70
Discharge: HOME | End: 2020-01-23
Payer: COMMERCIAL

## 2020-01-23 LAB
ANION GAP SERPL CALCULATED.3IONS-SCNC: 14 MMOL/L (ref 7–16)
BASOPHILS ABSOLUTE: 0.04 E9/L (ref 0–0.2)
BASOPHILS RELATIVE PERCENT: 0.5 % (ref 0–2)
BUN BLDV-MCNC: 22 MG/DL (ref 8–23)
CALCIUM SERPL-MCNC: 9.8 MG/DL (ref 8.6–10.2)
CHLORIDE BLD-SCNC: 101 MMOL/L (ref 98–107)
CHOLESTEROL, TOTAL: 149 MG/DL (ref 0–199)
CO2: 27 MMOL/L (ref 22–29)
CREAT SERPL-MCNC: 1.2 MG/DL (ref 0.5–1)
EOSINOPHILS ABSOLUTE: 0.06 E9/L (ref 0.05–0.5)
EOSINOPHILS RELATIVE PERCENT: 0.8 % (ref 0–6)
GFR AFRICAN AMERICAN: 54
GFR NON-AFRICAN AMERICAN: 45 ML/MIN/1.73
GLUCOSE BLD-MCNC: 220 MG/DL (ref 74–99)
HBA1C MFR BLD: 8.6 % (ref 4–5.6)
HCT VFR BLD CALC: 44.9 % (ref 34–48)
HDLC SERPL-MCNC: 51 MG/DL
HEMOGLOBIN: 13.9 G/DL (ref 11.5–15.5)
IMMATURE GRANULOCYTES #: 0.02 E9/L
IMMATURE GRANULOCYTES %: 0.3 % (ref 0–5)
LDL CHOLESTEROL CALCULATED: 64 MG/DL (ref 0–99)
LYMPHOCYTES ABSOLUTE: 1.54 E9/L (ref 1.5–4)
LYMPHOCYTES RELATIVE PERCENT: 20.8 % (ref 20–42)
MCH RBC QN AUTO: 28.7 PG (ref 26–35)
MCHC RBC AUTO-ENTMCNC: 31 % (ref 32–34.5)
MCV RBC AUTO: 92.8 FL (ref 80–99.9)
MONOCYTES ABSOLUTE: 0.62 E9/L (ref 0.1–0.95)
MONOCYTES RELATIVE PERCENT: 8.4 % (ref 2–12)
NEUTROPHILS ABSOLUTE: 5.14 E9/L (ref 1.8–7.3)
NEUTROPHILS RELATIVE PERCENT: 69.2 % (ref 43–80)
PDW BLD-RTO: 13.3 FL (ref 11.5–15)
PHOSPHORUS: 2.8 MG/DL (ref 2.5–4.5)
PLATELET # BLD: 206 E9/L (ref 130–450)
PMV BLD AUTO: 9.9 FL (ref 7–12)
POTASSIUM SERPL-SCNC: 4 MMOL/L (ref 3.5–5)
RBC # BLD: 4.84 E12/L (ref 3.5–5.5)
SODIUM BLD-SCNC: 142 MMOL/L (ref 132–146)
TRIGL SERPL-MCNC: 170 MG/DL (ref 0–149)
VITAMIN D 25-HYDROXY: 24 NG/ML (ref 30–100)
VLDLC SERPL CALC-MCNC: 34 MG/DL
WBC # BLD: 7.4 E9/L (ref 4.5–11.5)

## 2020-01-23 PROCEDURE — 80048 BASIC METABOLIC PNL TOTAL CA: CPT

## 2020-01-23 PROCEDURE — 80061 LIPID PANEL: CPT

## 2020-01-23 PROCEDURE — 85025 COMPLETE CBC W/AUTO DIFF WBC: CPT

## 2020-01-23 PROCEDURE — 83036 HEMOGLOBIN GLYCOSYLATED A1C: CPT

## 2020-01-23 PROCEDURE — 36415 COLL VENOUS BLD VENIPUNCTURE: CPT

## 2020-01-23 PROCEDURE — 84100 ASSAY OF PHOSPHORUS: CPT

## 2020-01-23 PROCEDURE — 82306 VITAMIN D 25 HYDROXY: CPT

## 2020-03-04 RX ORDER — LISINOPRIL 2.5 MG/1
2.5 TABLET ORAL DAILY
Qty: 30 TABLET | Refills: 2 | Status: SHIPPED
Start: 2020-03-04 | End: 2020-05-22 | Stop reason: SDUPTHER

## 2020-04-02 RX ORDER — METOPROLOL TARTRATE 50 MG/1
TABLET, FILM COATED ORAL
Qty: 60 TABLET | Refills: 5 | Status: SHIPPED
Start: 2020-04-02 | End: 2020-09-17 | Stop reason: SDUPTHER

## 2020-05-11 ENCOUNTER — HOSPITAL ENCOUNTER (OUTPATIENT)
Dept: GENERAL RADIOLOGY | Age: 70
Discharge: HOME | End: 2020-05-13
Payer: COMMERCIAL

## 2020-05-11 PROCEDURE — G0279 TOMOSYNTHESIS, MAMMO: HCPCS

## 2020-05-20 ENCOUNTER — OFFICE VISIT (OUTPATIENT)
Dept: SURGERY | Age: 70
End: 2020-05-20
Payer: COMMERCIAL

## 2020-05-20 VITALS
WEIGHT: 239 LBS | RESPIRATION RATE: 14 BRPM | SYSTOLIC BLOOD PRESSURE: 112 MMHG | HEIGHT: 61 IN | TEMPERATURE: 97.6 F | DIASTOLIC BLOOD PRESSURE: 57 MMHG | HEART RATE: 75 BPM | BODY MASS INDEX: 45.12 KG/M2

## 2020-05-20 PROCEDURE — 1090F PRES/ABSN URINE INCON ASSESS: CPT | Performed by: SURGERY

## 2020-05-20 PROCEDURE — G8427 DOCREV CUR MEDS BY ELIG CLIN: HCPCS | Performed by: SURGERY

## 2020-05-20 PROCEDURE — 1123F ACP DISCUSS/DSCN MKR DOCD: CPT | Performed by: SURGERY

## 2020-05-20 PROCEDURE — 99214 OFFICE O/P EST MOD 30 MIN: CPT | Performed by: SURGERY

## 2020-05-20 PROCEDURE — 4040F PNEUMOC VAC/ADMIN/RCVD: CPT | Performed by: SURGERY

## 2020-05-20 PROCEDURE — G8400 PT W/DXA NO RESULTS DOC: HCPCS | Performed by: SURGERY

## 2020-05-20 PROCEDURE — G8417 CALC BMI ABV UP PARAM F/U: HCPCS | Performed by: SURGERY

## 2020-05-20 PROCEDURE — 1036F TOBACCO NON-USER: CPT | Performed by: SURGERY

## 2020-05-20 PROCEDURE — 3017F COLORECTAL CA SCREEN DOC REV: CPT | Performed by: SURGERY

## 2020-05-20 NOTE — PROGRESS NOTES
Patient's Name/Date of Birth: Niru Mcbride / 1950    Date: 5/20/2020    PCP: Toney Cardenas MD    Chief Complaint   Patient presents with    Other     breast check mammogram 5/11/12. denies nipple discharge, lumps, discomfort        HPI:  FU right breast microcalcifications. Hx of rt breast invasive ca, s/p rt breast lumpectomy with radiation 11 years ago. Patient with no complaints. Repeat mammogram showed stable microcalcifications. probably benign. Patient's medications, allergies, past medical, surgical, social and family histories were reviewed and updated as appropriate. Allergies   Allergen Reactions    Penicillins      Unknown reaction tolerates cefazolin       Past Medical History:   Diagnosis Date    Cancer [facility])     breast ca, right    Charcot foot due to diabetes mellitus ([address] 1/29/2012    Charcot foot due to diabetes mellitus ([address]     right, wears boot.  Chronic diastolic congestive heart failure ([address] 11/27/2017    Depression     Essential hypertension 5/24/2018    Hyperlipidemia     Hypertension     Osteomyelitis of foot, left, acute (HCC)     Other disorders of kidney and ureter in diseases classified elsewhere     S/P CABG (coronary artery bypass graft) 5/24/2018    Thyroid disease     Type II or unspecified type diabetes mellitus without mention of complication, not stated as uncontrolled     Ulcer of foot ([address]     right, not infected        Past Surgical History:   Procedure Laterality Date    BREAST LUMPECTOMY      right breast 2009, with lymph nodes.    [address]    COLONOSCOPY  2011    COLONOSCOPY N/A 3/20/15    COLONOSCOPY    CORONARY ARTERY BYPASS GRAFT  10/23/2017    x2   calvin    DIAGNOSTIC CARDIAC CATH LAB PROCEDURE      EYE SURGERY  2009    several ,cataracts,legally blind    EYE SURGERY      FOOT SURGERY  03/05/2016    ulcer, bone debridement    OTHER SURGICAL HISTORY Left 3/2/16    bone biopsy left foot        Social History (ZYLOPRIM) 100 MG tablet Take 100 mg by mouth daily.  therapeutic multivitamin-minerals (THERAGRAN-M) tablet Take 1 tablet by mouth daily. LD 3/18/2015      citalopram (CELEXA) 20 MG tablet Take 20 mg by mouth daily Instructed to take with sip water am of procedure      latanoprost (XALATAN) 0.005 % ophthalmic solution Place 1 drop into both eyes nightly. No current facility-administered medications for this visit. Review of Systems  Constitutional: negative  Eyes: negative  Ears, nose, mouth, throat, and face: negative  Respiratory: negative  Cardiovascular: negative  Gastrointestinal: negative  Genitourinary:negative  Integument/breast: negative  Hematologic/lymphatic: negative  Musculoskeletal:negative  Neurological: negative  Allergic/Immunologic: negative    Physical exam:  BP (!) 112/57 (Site: Left Upper Arm, Position: Sitting, Cuff Size: Large Adult)   Pulse 75   Temp 97.6 °F (36.4 °C) (Oral)   Resp 14   Ht 5' 1\" (1.549 m)   Wt 239 lb (108.4 kg)   BMI 45.16 kg/m²   General appearance: no acute distress  Head:NCAT, EOMI, PERRLA, conjunctiva pink  Neck: no masses, supple  Lungs: CTABL  Heart: RRR  Abdomen: soft, nondistended, nontender, no guarding, no peritoneal signs, normoactive bowel sounds  Extremities:no edema  Breast exam: no palpable masses,  no nipple discharge. Neuro exam: normal  Skin: no lesions, no rashes    Assessment/Plan:  .repeat mammogram in 6 months    Return in about 6 months (around 11/20/2020).     Spike Page MD      Send copy of H&P to PCP, Bryan Reed MD

## 2020-05-22 RX ORDER — LISINOPRIL 2.5 MG/1
2.5 TABLET ORAL DAILY
Qty: 30 TABLET | Refills: 2 | Status: SHIPPED
Start: 2020-05-22 | End: 2020-08-27 | Stop reason: SDUPTHER

## 2020-05-26 ENCOUNTER — TELEPHONE (OUTPATIENT)
Dept: CARDIOLOGY CLINIC | Age: 70
End: 2020-05-26

## 2020-06-01 ENCOUNTER — VIRTUAL VISIT (OUTPATIENT)
Dept: CARDIOLOGY CLINIC | Age: 70
End: 2020-06-01
Payer: COMMERCIAL

## 2020-06-01 VITALS — HEIGHT: 65 IN | BODY MASS INDEX: 37.99 KG/M2 | WEIGHT: 228 LBS

## 2020-06-01 PROCEDURE — 99441 PR PHYS/QHP TELEPHONE EVALUATION 5-10 MIN: CPT | Performed by: INTERNAL MEDICINE

## 2020-06-01 ASSESSMENT — ENCOUNTER SYMPTOMS
COUGH: 0
BLOOD IN STOOL: 0
NAUSEA: 0
SHORTNESS OF BREATH: 0
BACK PAIN: 1
ABDOMINAL PAIN: 0
VOMITING: 0
CONSTIPATION: 0
DIARRHEA: 0
WHEEZING: 0

## 2020-06-01 NOTE — PROGRESS NOTES
physical therapy. She denies chest discomfort, dyspnea, dyspnea on exertion, palpitations, dizziness, syncope, lower extremity edema, orthopnea or PND. Review of Systems:   Review of Systems   Constitutional: Negative for chills, fatigue and fever. HENT: Negative for nosebleeds. Respiratory: Negative for cough, shortness of breath and wheezing. Gastrointestinal: Negative for abdominal pain, blood in stool, constipation, diarrhea, nausea and vomiting. Genitourinary: Negative for dysuria and hematuria. Musculoskeletal: Positive for back pain. Negative for joint swelling and myalgias. Neurological: Negative for syncope and light-headedness. Psychiatric/Behavioral: The patient is not nervous/anxious. Due to this being a TeleHealth encounter, evaluation of the following organ systems is limited: Vitals/Constitutional/EENT/Resp/CV/GI//MS/Neuro/Skin/Heme-Lymph-Imm. Past Medical History:  Past Medical History:   Diagnosis Date    Cancer [facility])     breast ca, right    Charcot foot due to diabetes mellitus [address] 1/29/2012    Charcot foot due to diabetes mellitus ([address]     right, wears boot.  Chronic diastolic congestive heart failure ([address] 11/27/2017    Depression     Essential hypertension 5/24/2018    Hyperlipidemia     Hypertension     Osteomyelitis of foot, left, acute (HCC)     Other disorders of kidney and ureter in diseases classified elsewhere     S/P CABG (coronary artery bypass graft) 5/24/2018    Thyroid disease     Type II or unspecified type diabetes mellitus without mention of complication, not stated as uncontrolled     Ulcer of foot ([address]     right, not infected       Past Surgical History:  Past Surgical History:   Procedure Laterality Date    BREAST LUMPECTOMY      right breast 2009, with lymph nodes.    [address]    COLONOSCOPY  2011    COLONOSCOPY N/A 3/20/15    COLONOSCOPY    CORONARY ARTERY BYPASS GRAFT  10/23/2017    x2   David Parker    DIAGNOSTIC CARDIAC CATH LAB PROCEDURE      EYE SURGERY  2009    several ,cataracts,legally blind    EYE SURGERY      FOOT SURGERY  2016    ulcer, bone debridement    OTHER SURGICAL HISTORY Left 3/2/16    bone biopsy left foot       Family History:  Family History   Problem Relation Age of Onset    Diabetes Mother     High Blood Pressure Mother     High Cholesterol Mother     Diabetes Father     High Blood Pressure Father     High Cholesterol Father     High Blood Pressure Maternal Aunt     High Blood Pressure Maternal Uncle        Social History:  Social History     Socioeconomic History    Marital status:      Spouse name: Not on file    Number of children: Not on file    Years of education: Not on file    Highest education level: Not on file   Occupational History    Not on file   Social Needs    Financial resource strain: Not on file    Food insecurity     Worry: Not on file     Inability: Not on file    Transportation needs     Medical: Not on file     Non-medical: Not on file   Tobacco Use    Smoking status: Former Smoker     Packs/day: 1.00     Years: 10.00     Pack years: 10.00     Last attempt to quit: 1987     Years since quittin.5    Smokeless tobacco: Never Used   Substance and Sexual Activity    Alcohol use: No     Alcohol/week: 0.0 standard drinks    Drug use: No    Sexual activity: Never   Lifestyle    Physical activity     Days per week: Not on file     Minutes per session: Not on file    Stress: Not on file   Relationships    Social connections     Talks on phone: Not on file     Gets together: Not on file     Attends Holiness service: Not on file     Active member of club or organization: Not on file     Attends meetings of clubs or organizations: Not on file     Relationship status: Not on file    Intimate partner violence     Fear of current or ex partner: Not on file     Emotionally abused: Not on file     Physically abused: Not on file Forced sexual activity: Not on file   Other Topics Concern    Not on file   Social History Narrative    2 cups coffee/day; no pop       Allergies: Allergies   Allergen Reactions    Penicillins      Unknown reaction tolerates cefazolin       Current Medications:  Current Outpatient Medications   Medication Sig Dispense Refill    lisinopril (PRINIVIL;ZESTRIL) 2.5 MG tablet Take 1 tablet by mouth daily 30 tablet 2    metoprolol tartrate (LOPRESSOR) 50 MG tablet TAKE 1 TABLET BY MOUTH TWICE A DAY 60 tablet 5    RESTASIS 0.05 % ophthalmic emulsion place 1 drop into both eyes twice a day  0    NOVOLOG FLEXPEN 100 UNIT/ML injection pen INJECT 6 UNITS subcutaneously WITH BLOOD SUGAR OVER 250  0    potassium chloride (KLOR-CON M) 10 MEQ extended release tablet Take 1 tablet by mouth every other day 15 tablet 11    ezetimibe (ZETIA) 10 MG tablet Take 1 tablet by mouth daily 30 tablet 11    pravastatin (PRAVACHOL) 40 MG tablet Take 1 tablet by mouth nightly 90 tablet 3    aspirin EC 81 MG EC tablet Take 1 tablet by mouth nightly LD 3/15/2015 30 tablet 11    MYRBETRIQ 25 MG TB24 Take by mouth daily   0    LYRICA 75 MG capsule Take 75 mg by mouth daily.  citalopram (CELEXA) 40 MG tablet Take 40 mg by mouth daily       insulin aspart protamine-insulin aspart (NOVOLOG MIX 70/30 FLEXPEN) (70-30) 100 UNIT/ML injection Inject 74 Units into the skin 2 times daily (with meals)       Liraglutide (VICTOZA) 18 MG/3ML SOPN SC injection Inject 1.8 mg into the skin daily. 9 Pen 0    cyanocobalamin ([facility]S VITAMIN B12) 1000 MCG tablet Take 1 tablet by mouth daily. 90 tablet 0    Calcium Carbonate-Vitamin D (CALTRATE 600+D PO) Take 1 tablet by mouth daily.  latanoprost (XALATAN) 0.005 % ophthalmic solution Place 1 drop into both eyes nightly.  Ascorbic Acid (VITAMIN C) 500 MG tablet Take 500 mg by mouth daily. LD 3/18/2015      allopurinol (ZYLOPRIM) 100 MG tablet Take 100 mg by mouth daily.        

## 2020-08-11 ENCOUNTER — HOSPITAL ENCOUNTER (OUTPATIENT)
Age: 70
Discharge: HOME | End: 2020-08-11
Payer: COMMERCIAL

## 2020-08-11 LAB
ALBUMIN SERPL-MCNC: 3.8 G/DL (ref 3.5–5.2)
ALP BLD-CCNC: 55 U/L (ref 35–104)
ALT SERPL-CCNC: 15 U/L (ref 0–32)
ANION GAP SERPL CALCULATED.3IONS-SCNC: 13 MMOL/L (ref 7–16)
ANION GAP SERPL CALCULATED.3IONS-SCNC: 13 MMOL/L (ref 7–16)
AST SERPL-CCNC: 16 U/L (ref 0–31)
BASOPHILS ABSOLUTE: 0.04 E9/L (ref 0–0.2)
BASOPHILS RELATIVE PERCENT: 0.5 % (ref 0–2)
BILIRUB SERPL-MCNC: 0.4 MG/DL (ref 0–1.2)
BUN BLDV-MCNC: 18 MG/DL (ref 8–23)
BUN BLDV-MCNC: 18 MG/DL (ref 8–23)
CALCIUM SERPL-MCNC: 10 MG/DL (ref 8.6–10.2)
CALCIUM SERPL-MCNC: 10.1 MG/DL (ref 8.6–10.2)
CHLORIDE BLD-SCNC: 100 MMOL/L (ref 98–107)
CHLORIDE BLD-SCNC: 98 MMOL/L (ref 98–107)
CHOLESTEROL, FASTING: 169 MG/DL (ref 0–199)
CO2: 30 MMOL/L (ref 22–29)
CO2: 31 MMOL/L (ref 22–29)
CREAT SERPL-MCNC: 1.2 MG/DL (ref 0.5–1)
CREAT SERPL-MCNC: 1.2 MG/DL (ref 0.5–1)
EOSINOPHILS ABSOLUTE: 0.07 E9/L (ref 0.05–0.5)
EOSINOPHILS RELATIVE PERCENT: 0.8 % (ref 0–6)
GFR AFRICAN AMERICAN: 54
GFR AFRICAN AMERICAN: 54
GFR NON-AFRICAN AMERICAN: 45 ML/MIN/1.73
GFR NON-AFRICAN AMERICAN: 45 ML/MIN/1.73
GLUCOSE BLD-MCNC: 160 MG/DL (ref 74–99)
GLUCOSE BLD-MCNC: 162 MG/DL (ref 74–99)
HBA1C MFR BLD: 9.5 % (ref 4–5.6)
HCT VFR BLD CALC: 42.6 % (ref 34–48)
HDLC SERPL-MCNC: 53 MG/DL
HEMOGLOBIN: 13.7 G/DL (ref 11.5–15.5)
IMMATURE GRANULOCYTES #: 0.04 E9/L
IMMATURE GRANULOCYTES %: 0.5 % (ref 0–5)
LDL CHOLESTEROL CALCULATED: 81 MG/DL (ref 0–99)
LYMPHOCYTES ABSOLUTE: 1.56 E9/L (ref 1.5–4)
LYMPHOCYTES RELATIVE PERCENT: 18.3 % (ref 20–42)
MCH RBC QN AUTO: 29.6 PG (ref 26–35)
MCHC RBC AUTO-ENTMCNC: 32.2 % (ref 32–34.5)
MCV RBC AUTO: 92 FL (ref 80–99.9)
MONOCYTES ABSOLUTE: 0.8 E9/L (ref 0.1–0.95)
MONOCYTES RELATIVE PERCENT: 9.4 % (ref 2–12)
NEUTROPHILS ABSOLUTE: 6 E9/L (ref 1.8–7.3)
NEUTROPHILS RELATIVE PERCENT: 70.5 % (ref 43–80)
PDW BLD-RTO: 13.3 FL (ref 11.5–15)
PHOSPHORUS: 2.5 MG/DL (ref 2.5–4.5)
PLATELET # BLD: 227 E9/L (ref 130–450)
PMV BLD AUTO: 9.8 FL (ref 7–12)
POTASSIUM SERPL-SCNC: 4.2 MMOL/L (ref 3.5–5)
POTASSIUM SERPL-SCNC: 4.3 MMOL/L (ref 3.5–5)
RBC # BLD: 4.63 E12/L (ref 3.5–5.5)
SODIUM BLD-SCNC: 141 MMOL/L (ref 132–146)
SODIUM BLD-SCNC: 144 MMOL/L (ref 132–146)
TOTAL PROTEIN: 6.8 G/DL (ref 6.4–8.3)
TRIGLYCERIDE, FASTING: 173 MG/DL (ref 0–149)
VLDLC SERPL CALC-MCNC: 35 MG/DL
WBC # BLD: 8.5 E9/L (ref 4.5–11.5)

## 2020-08-11 PROCEDURE — 36415 COLL VENOUS BLD VENIPUNCTURE: CPT

## 2020-08-11 PROCEDURE — 80048 BASIC METABOLIC PNL TOTAL CA: CPT

## 2020-08-11 PROCEDURE — 83036 HEMOGLOBIN GLYCOSYLATED A1C: CPT

## 2020-08-11 PROCEDURE — 80061 LIPID PANEL: CPT

## 2020-08-11 PROCEDURE — 85025 COMPLETE CBC W/AUTO DIFF WBC: CPT

## 2020-08-11 PROCEDURE — 80053 COMPREHEN METABOLIC PANEL: CPT

## 2020-08-11 PROCEDURE — 84100 ASSAY OF PHOSPHORUS: CPT

## 2020-08-27 RX ORDER — LISINOPRIL 2.5 MG/1
2.5 TABLET ORAL DAILY
Qty: 30 TABLET | Refills: 2 | Status: SHIPPED
Start: 2020-08-27 | End: 2020-11-16 | Stop reason: SDUPTHER

## 2020-09-09 ENCOUNTER — HOSPITAL ENCOUNTER (OUTPATIENT)
Dept: DIABETES SERVICES | Age: 70
Setting detail: THERAPIES SERIES
Discharge: HOME | End: 2020-09-09
Payer: COMMERCIAL

## 2020-09-09 VITALS — WEIGHT: 235 LBS | HEIGHT: 65 IN | BODY MASS INDEX: 39.15 KG/M2 | TEMPERATURE: 97.7 F

## 2020-09-09 PROCEDURE — 97802 MEDICAL NUTRITION INDIV IN: CPT | Performed by: DIETITIAN, REGISTERED

## 2020-09-09 SDOH — ECONOMIC STABILITY: FOOD INSECURITY: ADDITIONAL INFORMATION: NO

## 2020-09-09 ASSESSMENT — PATIENT HEALTH QUESTIONNAIRE - PHQ9
SUM OF ALL RESPONSES TO PHQ9 QUESTIONS 1 & 2: 1
SUM OF ALL RESPONSES TO PHQ QUESTIONS 1-9: 1
2. FEELING DOWN, DEPRESSED OR HOPELESS: 1
SUM OF ALL RESPONSES TO PHQ QUESTIONS 1-9: 1
1. LITTLE INTEREST OR PLEASURE IN DOING THINGS: 0

## 2020-09-09 NOTE — LETTER
[facility]) - Diabetes Education    2020     Re:     Johnathon Crooks  :  1950    Dear Dr. Poornima Rodriguez; Thank you for referring your patient, Johnathon Crooks, to Diabetes Education Medical Nutrition Therapy. Your patient was here on 20, and the following were addressed:        [x] Nutrition as Related to Diabetes    [x] Carbohydrate Counting     [] Free Food List    [] Combination Foods    [] Fast Foods    [x] Weighing and measuring    [x] Meal Planning    [x] Using Food Labels - Label Reading  [] Diabetes Education Class Schedule    [x] Diet Instruction       [x]  Diet instructed provided on: 1400 calorie meal plan, 3 meals and 1 snack    Upon completion of this session, the following evaluation of your patients progress was made:    ASSESSMENT:  [x]  verbalizes understanding of diet principles  [x]  understands the relationship between diet and health  [x]  identifies proper food choices  [x]  identifies needed diet changes  [x]  expresses intentions to comply with diet guidelines  [x]  able to provide correct answers when asked    GOAL:  [x]  to follow individual meal plan  [x]  to weigh and measure food portions  [x]  to call with further questions  []  to attend diabetes education class sessions 1 and/or 2    PATIENT EDUCATION MATERIALS PROVIDED:  [x]  plate carb counting meal plan  [x]  low fat guidelines  [x]  carb counting sheet  [x]  low sodium guidelines  [x]  Other:  Meal Planning food list phillip    PHQ- 9 Depression Screen Score:  1     0-4: Minimal Depression 15-19: Moderately Severe Depression   5-9: Mild Depression  20-27: Severe Depression           10-14: Moderate Depression    COMMENTS:  Pt scheduled follow up appointment on Oct 14th @ 1PM    Patient is encouraged to call with further questions or call and re-schedule other sessions within a year from original date.     Thank you for referring this patient to our program.  Please do not hesitate to call if you have any questions at ( (SEY or ABHIJIT) or [phone] ([address] [place] [address]).         Sincerely,         Registered Dietitian Nutritionists:          [x]  JOHNATHON Wyatt CDE          []   Grisel HUFFMAN  []  JOHNATHON Sauer  []   JOHNATHON Piper CDE          Diabetes

## 2020-09-09 NOTE — PROGRESS NOTES
diabetes medication taken, action & side effects 2    9/9/20 Victoza 1.8 mg daily   Insulin/Injectables  -Name appropriate injection sites; proper storage; supplies needed;  2    9/9/20 Humalog 70/30 78 units before breakfast and 78 units before dinner. Demonstrate proper technique        Monitoring blood glucose, interpreting and using results:   -Identify recommended & personal blood glucose targets & HgbA1C target levels  -State the Importance of logging blood glucose levels for pattern recognition;   -State benefits of reading/using pt generated health data  -Verbalize safe lancet disposal 2    9/9/20 Pt is monitoring occasionally, usually once a day. Provided blood sugar record sheet to take readings to physician appointments   -Demonstrate proper testing technique        Incorporating physical activity into lifestyle:   -State effect of exercise on blood glucose levels;   -State benefits of regular exercise;   -Define safety considerations;  -Describe contraindications/maintenance of activity. 1       Incorporating nutritional management into lifestyle:   -Describe effect of type, amount & timing of food on blood glucose  -Describe methods for preparing and planning healthy meals  Correctly read food labels 1 9/9/20-JA  3 9/9/20 discussed food groups highest in carbohydrate, portion control and timing of meals. Discussed the plate method. Plan personal carbohydrate-controlled meal based on individualized meal plan  Demonstrate CHO counting/portion control  1 9/9/20-JA  3 9/9/20 Instructed on 1400 calorie meal plan, 3 carbohydrate choices at each meal and 1 at night. 7 oz protein, 4 fat servings daily. Provided sample meals and snacks   Developing strategies for problem solving to promote health/change behavior. -Identify 7 self-care behaviors; Personal health risk factors; Benefits, challenges & strategies for behavioral change and set an individualized goal selection.  1    Goal: Consistent carbohydrate at each meal and snack     Identified Barriers to learning/adherence to self management plan:    Physical, Visual and Financial   []  other    Instruction Method:  Lecture/Discussion and Handouts    Supporting Education Materials/Equipment Provided: Meal Plan and Nutritional Packet   []Georgian materials       [] services     []Other:      Encounter Type Date Attended Start Time End Time Comments No Show Dates   Assessment 9/9/2020-FLOR 13:00 13:30       Session 1         Session 2        Session 3         In person Follow-up         Gestational Diabetes         Individual MNT 9/9/2020-FLOR 13:30 14:00     Meter Instrx        Insulin Instrx           Additional Comments: Letter sent to DR via fax, pt scheduled follow up appointment on Oct 14th @ 1 PM    Date:           DSMES Follow-up plan based on patients DSMES goal    Dr Notified by [] EMR []Fax        [x]HgbA1C   []Weight   []Hypertension  []Follow-up with Physician  []Medication compliance   []Plate method/meal plan compliance   []Self-Foot Exam Frequency   []Monitoring Frequency   []Exercise Routine   []Goal Attainment       []Patient lost to follow-up   Notified by []EMR []Fax     Personal Support Plan:      [x] Keep all scheduled doctor appointments   [] Make and keep appointments with specialists (foot, eye, dentist) as recommended   [] Consult my pharmacist about all new medications or to ask any medication questions   [] Get tested for sleep apnea   [] Seek help for:   [] Make an appointment with:   [] Attend smoking cessation classes or call 1-800-QUIT-NOW  [] Attend Diabetes Support Group   [] Use diabetes magazines, books, or credible web-sites like the ADA for more information  [] Increase exercise at home or join an exercise program:   [] Other:

## 2020-09-18 RX ORDER — METOPROLOL TARTRATE 50 MG/1
TABLET, FILM COATED ORAL
Qty: 60 TABLET | Refills: 5 | OUTPATIENT
Start: 2020-09-18 | End: 2021-03-04 | Stop reason: SDUPTHER

## 2020-11-10 LAB
ALBUMIN SERPL-MCNC: NORMAL G/DL
ALP BLD-CCNC: NORMAL U/L
ALT SERPL-CCNC: NORMAL U/L
ANION GAP SERPL CALCULATED.3IONS-SCNC: NORMAL MMOL/L
AST SERPL-CCNC: NORMAL U/L
AVERAGE GLUCOSE: 189
BILIRUB SERPL-MCNC: NORMAL MG/DL
BUN BLDV-MCNC: NORMAL MG/DL
CALCIUM SERPL-MCNC: NORMAL MG/DL
CHLORIDE BLD-SCNC: NORMAL MMOL/L
CHOLESTEROL, TOTAL: NORMAL
CHOLESTEROL/HDL RATIO: NORMAL
CO2: NORMAL
CREAT SERPL-MCNC: NORMAL MG/DL
GFR CALCULATED: NORMAL
GLUCOSE BLD-MCNC: NORMAL MG/DL
HBA1C MFR BLD: 8.2 %
HDLC SERPL-MCNC: NORMAL MG/DL
LDL CHOLESTEROL CALCULATED: NORMAL
NONHDLC SERPL-MCNC: NORMAL MG/DL
POTASSIUM SERPL-SCNC: NORMAL MMOL/L
SODIUM BLD-SCNC: NORMAL MMOL/L
TOTAL PROTEIN: NORMAL
TRIGL SERPL-MCNC: NORMAL MG/DL
VLDLC SERPL CALC-MCNC: NORMAL MG/DL

## 2020-11-16 RX ORDER — POTASSIUM CHLORIDE 750 MG/1
10 TABLET, EXTENDED RELEASE ORAL EVERY OTHER DAY
Qty: 15 TABLET | Refills: 11 | Status: SHIPPED
Start: 2020-11-16 | End: 2022-01-12 | Stop reason: SDUPTHER

## 2020-11-16 RX ORDER — LISINOPRIL 2.5 MG/1
2.5 TABLET ORAL DAILY
Qty: 30 TABLET | Refills: 5 | Status: SHIPPED
Start: 2020-11-16 | End: 2021-04-22 | Stop reason: SDUPTHER

## 2021-01-08 ENCOUNTER — OFFICE VISIT (OUTPATIENT)
Dept: CARDIOLOGY CLINIC | Age: 71
End: 2021-01-08
Payer: COMMERCIAL

## 2021-01-08 VITALS
DIASTOLIC BLOOD PRESSURE: 70 MMHG | RESPIRATION RATE: 16 BRPM | WEIGHT: 235.5 LBS | SYSTOLIC BLOOD PRESSURE: 122 MMHG | BODY MASS INDEX: 39.24 KG/M2 | HEIGHT: 65 IN | HEART RATE: 70 BPM

## 2021-01-08 DIAGNOSIS — I25.10 CORONARY ARTERY DISEASE INVOLVING NATIVE CORONARY ARTERY OF NATIVE HEART WITHOUT ANGINA PECTORIS: Primary | ICD-10-CM

## 2021-01-08 PROCEDURE — G8427 DOCREV CUR MEDS BY ELIG CLIN: HCPCS | Performed by: INTERNAL MEDICINE

## 2021-01-08 PROCEDURE — 93000 ELECTROCARDIOGRAM COMPLETE: CPT | Performed by: INTERNAL MEDICINE

## 2021-01-08 PROCEDURE — 1036F TOBACCO NON-USER: CPT | Performed by: INTERNAL MEDICINE

## 2021-01-08 PROCEDURE — 4040F PNEUMOC VAC/ADMIN/RCVD: CPT | Performed by: INTERNAL MEDICINE

## 2021-01-08 PROCEDURE — G8484 FLU IMMUNIZE NO ADMIN: HCPCS | Performed by: INTERNAL MEDICINE

## 2021-01-08 PROCEDURE — 99213 OFFICE O/P EST LOW 20 MIN: CPT | Performed by: INTERNAL MEDICINE

## 2021-01-08 PROCEDURE — 1090F PRES/ABSN URINE INCON ASSESS: CPT | Performed by: INTERNAL MEDICINE

## 2021-01-08 PROCEDURE — 3017F COLORECTAL CA SCREEN DOC REV: CPT | Performed by: INTERNAL MEDICINE

## 2021-01-08 PROCEDURE — 1123F ACP DISCUSS/DSCN MKR DOCD: CPT | Performed by: INTERNAL MEDICINE

## 2021-01-08 PROCEDURE — G8417 CALC BMI ABV UP PARAM F/U: HCPCS | Performed by: INTERNAL MEDICINE

## 2021-01-08 PROCEDURE — G8400 PT W/DXA NO RESULTS DOC: HCPCS | Performed by: INTERNAL MEDICINE

## 2021-01-08 ASSESSMENT — ENCOUNTER SYMPTOMS
COUGH: 0
VOMITING: 0
BLOOD IN STOOL: 0
ABDOMINAL PAIN: 0
SHORTNESS OF BREATH: 0
BACK PAIN: 1
NAUSEA: 0
WHEEZING: 0
DIARRHEA: 0
CONSTIPATION: 0

## 2021-01-08 NOTE — PROGRESS NOTES
Past Medical History:  Past Medical History:   Diagnosis Date    Cancer [facility])     breast ca, right    Charcot foot due to diabetes mellitus ([facility] 2012    Charcot foot due to diabetes mellitus ([facility] [address]     right, wears boot.  Chronic diastolic congestive heart failure ([address] 2017    Depression     Essential hypertension 2018    Hyperlipidemia     Hypertension     Osteomyelitis of foot, left, acute (HCC)     Other disorders of kidney and ureter in diseases classified elsewhere     S/P CABG (coronary artery bypass graft) 2018    Thyroid disease     Type II or unspecified type diabetes mellitus without mention of complication, not stated as uncontrolled     Ulcer of foot ([facility] [address]     right, not infected       Past Surgical History:  Past Surgical History:   Procedure Laterality Date    BREAST LUMPECTOMY      right breast , with lymph nodes.      SECTION      COLONOSCOPY      COLONOSCOPY N/A 3/20/15    COLONOSCOPY    CORONARY ARTERY BYPASS GRAFT  10/23/2017    x2   [place]    DIAGNOSTIC CARDIAC CATH LAB PROCEDURE      EYE SURGERY      several ,cataracts,legally blind    EYE SURGERY      FOOT SURGERY  2016    ulcer, bone debridement    OTHER SURGICAL HISTORY Left 3/2/16    bone biopsy left foot       Family History:  Family History   Problem Relation Age of Onset    Diabetes Mother     High Blood Pressure Mother     High Cholesterol Mother     Diabetes Father     High Blood Pressure Father     High Cholesterol Father     High Blood Pressure Maternal Aunt     High Blood Pressure Maternal Uncle        Social History:  Social History     Socioeconomic History    Marital status:      Spouse name: Casht Jurist Number of children: 1    Years of education: 15    Highest education level: Not on file   Occupational History    Not on file   Social Needs    Financial resource strain: Not on file    Food insecurity     Worry: Not on file  citalopram (CELEXA) 40 MG tablet Take 40 mg by mouth daily       insulin aspart protamine-insulin aspart (NOVOLOG MIX 70/30 FLEXPEN) (70-30) 100 UNIT/ML injection Inject 78 Units into the skin 2 times daily (with meals)       Liraglutide (VICTOZA) 18 MG/3ML SOPN SC injection Inject 1.8 mg into the skin daily. 9 Pen 0    cyanocobalamin ([employer]S VITAMIN B12) 1000 MCG tablet Take 1 tablet by mouth daily. 90 tablet 0    Calcium Carbonate-Vitamin D (CALTRATE 600+D PO) Take 1 tablet by mouth daily.  latanoprost (XALATAN) 0.005 % ophthalmic solution Place 1 drop into both eyes nightly.  Ascorbic Acid (VITAMIN C) 500 MG tablet Take 500 mg by mouth daily. LD 3/18/2015      allopurinol (ZYLOPRIM) 100 MG tablet Take 100 mg by mouth daily.  citalopram (CELEXA) 20 MG tablet Take 20 mg by mouth daily Instructed to take with sip water am of procedure      potassium chloride (KLOR-CON M) 10 MEQ extended release tablet Take 1 tablet by mouth every other day 15 tablet 11    RESTASIS 0.05 % ophthalmic emulsion place 1 drop into both eyes twice a day  0    pravastatin (PRAVACHOL) 40 MG tablet Take 1 tablet by mouth nightly 90 tablet 3    furosemide (LASIX) 20 MG tablet Take 1 tablet by mouth daily (Patient not taking: Reported on 6/1/2020) 30 tablet 11    therapeutic multivitamin-minerals (THERAGRAN-M) tablet Take 1 tablet by mouth daily. LD 3/18/2015       No current facility-administered medications for this visit. Physical Exam:  Resp 16   Ht 5' 5\" (1.651 m)   Wt 235 lb 8 oz (106.8 kg)   BMI 39.19 kg/m²   Wt Readings from Last 3 Encounters:   01/08/21 235 lb 8 oz (106.8 kg)   09/09/20 235 lb (106.6 kg)   06/01/20 228 lb (103.4 kg)       Physical Exam  Constitutional:       General: She is not in acute distress. Appearance: She is well-developed. She is obese. HENT:      Head: Normocephalic and atraumatic. Neck:      Musculoskeletal: Neck supple. Vascular: No carotid bruit or JVD. Cardiovascular:      Rate and Rhythm: Normal rate and regular rhythm. Heart sounds: No murmur. No friction rub. No gallop. Comments: Distant heart sounds. Pulmonary:      Breath sounds: Normal breath sounds. No wheezing or rales. Chest:      Chest wall: No tenderness. Abdominal:      General: Bowel sounds are normal. There is no distension. Palpations: Abdomen is soft. There is no mass. Tenderness: There is no abdominal tenderness. Comments: No abdominal bruit. Musculoskeletal:      Comments: She is wearing medical boots. I could not assess her pedal pulses. Skin:     General: Skin is warm and dry. Neurological:      Mental Status: She is alert and oriented to person, place, and time.            Laboratory Tests:  Lab Results   Component Value Date    CREATININE 1.2 (H) 08/11/2020    CREATININE 1.2 (H) 08/11/2020    BUN 18 08/11/2020    BUN 18 08/11/2020     08/11/2020     08/11/2020    K 4.2 08/11/2020    K 4.3 08/11/2020    CL 98 08/11/2020     08/11/2020    CO2 30 (H) 08/11/2020    CO2 31 (H) 08/11/2020     Lab Results   Component Value Date    MG 1.8 10/27/2017     Lab Results   Component Value Date    WBC 8.5 08/11/2020    HGB 13.7 08/11/2020    HCT 42.6 08/11/2020    MCV 92.0 08/11/2020     08/11/2020     Lab Results   Component Value Date    ALT 15 08/11/2020    AST 16 08/11/2020    ALKPHOS 55 08/11/2020    BILITOT 0.4 08/11/2020     Lab Results   Component Value Date    CKTOTAL 56 10/19/2017    CKMB 1.9 10/19/2017    TROPONINI 0.07 (H) 10/19/2017    TROPONINI 0.06 (H) 10/18/2017    TROPONINI 0.07 (H) 10/18/2017     Lab Results   Component Value Date    INR 1.2 10/23/2017    INR 1.0 10/22/2017    INR 1.0 01/07/2013    PROTIME 13.4 (H) 10/23/2017    PROTIME 11.1 10/22/2017    PROTIME 11.3 01/07/2013     Lab Results   Component Value Date    TSH 0.487 07/26/2017     Lab Results   Component Value Date    LABA1C 9.5 (H) 08/11/2020     No results

## 2021-02-23 LAB
ALBUMIN SERPL-MCNC: NORMAL G/DL
ALP BLD-CCNC: NORMAL U/L
ALT SERPL-CCNC: NORMAL U/L
ANION GAP SERPL CALCULATED.3IONS-SCNC: NORMAL MMOL/L
AST SERPL-CCNC: NORMAL U/L
AVERAGE GLUCOSE: 183
BILIRUB SERPL-MCNC: NORMAL MG/DL
BUN BLDV-MCNC: NORMAL MG/DL
CALCIUM SERPL-MCNC: NORMAL MG/DL
CHLORIDE BLD-SCNC: NORMAL MMOL/L
CHOLESTEROL, TOTAL: NORMAL
CHOLESTEROL/HDL RATIO: NORMAL
CO2: NORMAL
CREAT SERPL-MCNC: NORMAL MG/DL
GFR CALCULATED: NORMAL
GLUCOSE BLD-MCNC: NORMAL MG/DL
HBA1C MFR BLD: 8 %
HDLC SERPL-MCNC: NORMAL MG/DL
LDL CHOLESTEROL CALCULATED: NORMAL
NONHDLC SERPL-MCNC: NORMAL MG/DL
POTASSIUM SERPL-SCNC: NORMAL MMOL/L
SODIUM BLD-SCNC: NORMAL MMOL/L
TOTAL PROTEIN: NORMAL
TRIGL SERPL-MCNC: NORMAL MG/DL
VLDLC SERPL CALC-MCNC: NORMAL MG/DL

## 2021-03-04 RX ORDER — METOPROLOL TARTRATE 50 MG/1
TABLET, FILM COATED ORAL
Qty: 60 TABLET | Refills: 5 | Status: SHIPPED
Start: 2021-03-04 | End: 2022-01-12 | Stop reason: SDUPTHER

## 2021-03-25 ENCOUNTER — IMMUNIZATION (OUTPATIENT)
Dept: PRIMARY CARE CLINIC | Age: 71
End: 2021-03-25
Payer: COMMERCIAL

## 2021-03-25 PROCEDURE — 0001A COVID-19, PFIZER VACCINE 30MCG/0.3ML DOSE: CPT | Performed by: INTERNAL MEDICINE

## 2021-03-25 PROCEDURE — 91300 COVID-19, PFIZER VACCINE 30MCG/0.3ML DOSE: CPT | Performed by: INTERNAL MEDICINE

## 2021-03-30 ENCOUNTER — HOSPITAL ENCOUNTER (OUTPATIENT)
Dept: GENERAL RADIOLOGY | Age: 71
Discharge: HOME | End: 2021-04-01
Payer: COMMERCIAL

## 2021-03-30 DIAGNOSIS — Z12.31 VISIT FOR SCREENING MAMMOGRAM: ICD-10-CM

## 2021-03-30 PROCEDURE — 77063 BREAST TOMOSYNTHESIS BI: CPT

## 2021-04-13 ENCOUNTER — HOSPITAL ENCOUNTER (OUTPATIENT)
Age: 71
Discharge: HOME | End: 2021-04-13
Payer: COMMERCIAL

## 2021-04-13 LAB
ALBUMIN SERPL-MCNC: 3.8 G/DL (ref 3.5–5.2)
ALP BLD-CCNC: 59 U/L (ref 35–104)
ALT SERPL-CCNC: 15 U/L (ref 0–32)
ANION GAP SERPL CALCULATED.3IONS-SCNC: 11 MMOL/L (ref 7–16)
AST SERPL-CCNC: 14 U/L (ref 0–31)
BASOPHILS ABSOLUTE: 0.06 E9/L (ref 0–0.2)
BASOPHILS RELATIVE PERCENT: 0.7 % (ref 0–2)
BILIRUB SERPL-MCNC: 0.4 MG/DL (ref 0–1.2)
BUN BLDV-MCNC: 18 MG/DL (ref 8–23)
CALCIUM SERPL-MCNC: 9.9 MG/DL (ref 8.6–10.2)
CHLORIDE BLD-SCNC: 103 MMOL/L (ref 98–107)
CHOLESTEROL, TOTAL: 159 MG/DL (ref 0–199)
CO2: 30 MMOL/L (ref 22–29)
CREAT SERPL-MCNC: 1.2 MG/DL (ref 0.5–1)
EOSINOPHILS ABSOLUTE: 0.1 E9/L (ref 0.05–0.5)
EOSINOPHILS RELATIVE PERCENT: 1.1 % (ref 0–6)
GFR AFRICAN AMERICAN: 54
GFR NON-AFRICAN AMERICAN: 45 ML/MIN/1.73
GLUCOSE BLD-MCNC: 41 MG/DL (ref 74–99)
HBA1C MFR BLD: 7.9 % (ref 4–5.6)
HCT VFR BLD CALC: 46.4 % (ref 34–48)
HDLC SERPL-MCNC: 56 MG/DL
HEMOGLOBIN: 14.6 G/DL (ref 11.5–15.5)
IMMATURE GRANULOCYTES #: 0.03 E9/L
IMMATURE GRANULOCYTES %: 0.3 % (ref 0–5)
LDL CHOLESTEROL CALCULATED: 78 MG/DL (ref 0–99)
LYMPHOCYTES ABSOLUTE: 1.56 E9/L (ref 1.5–4)
LYMPHOCYTES RELATIVE PERCENT: 17.7 % (ref 20–42)
MCH RBC QN AUTO: 28.9 PG (ref 26–35)
MCHC RBC AUTO-ENTMCNC: 31.5 % (ref 32–34.5)
MCV RBC AUTO: 91.9 FL (ref 80–99.9)
MONOCYTES ABSOLUTE: 0.84 E9/L (ref 0.1–0.95)
MONOCYTES RELATIVE PERCENT: 9.5 % (ref 2–12)
NEUTROPHILS ABSOLUTE: 6.23 E9/L (ref 1.8–7.3)
NEUTROPHILS RELATIVE PERCENT: 70.7 % (ref 43–80)
PARATHYROID HORMONE INTACT: 36 PG/ML (ref 15–65)
PDW BLD-RTO: 13.2 FL (ref 11.5–15)
PHOSPHORUS: 2.9 MG/DL (ref 2.5–4.5)
PLATELET # BLD: 238 E9/L (ref 130–450)
PMV BLD AUTO: 9.9 FL (ref 7–12)
POTASSIUM SERPL-SCNC: 4.2 MMOL/L (ref 3.5–5)
RBC # BLD: 5.05 E12/L (ref 3.5–5.5)
SODIUM BLD-SCNC: 144 MMOL/L (ref 132–146)
TOTAL PROTEIN: 7 G/DL (ref 6.4–8.3)
TRIGL SERPL-MCNC: 124 MG/DL (ref 0–149)
VITAMIN D 25-HYDROXY: 31 NG/ML (ref 30–100)
VLDLC SERPL CALC-MCNC: 25 MG/DL
WBC # BLD: 8.8 E9/L (ref 4.5–11.5)

## 2021-04-13 PROCEDURE — 36415 COLL VENOUS BLD VENIPUNCTURE: CPT

## 2021-04-13 PROCEDURE — 85025 COMPLETE CBC W/AUTO DIFF WBC: CPT

## 2021-04-13 PROCEDURE — 80053 COMPREHEN METABOLIC PANEL: CPT

## 2021-04-13 PROCEDURE — 80061 LIPID PANEL: CPT

## 2021-04-13 PROCEDURE — 84100 ASSAY OF PHOSPHORUS: CPT

## 2021-04-13 PROCEDURE — 83970 ASSAY OF PARATHORMONE: CPT

## 2021-04-13 PROCEDURE — 83036 HEMOGLOBIN GLYCOSYLATED A1C: CPT

## 2021-04-13 PROCEDURE — 82306 VITAMIN D 25 HYDROXY: CPT

## 2021-04-19 ENCOUNTER — IMMUNIZATION (OUTPATIENT)
Dept: PRIMARY CARE CLINIC | Age: 71
End: 2021-04-19
Payer: COMMERCIAL

## 2021-04-19 PROCEDURE — 0002A COVID-19, PFIZER VACCINE 30MCG/0.3ML DOSE: CPT | Performed by: INTERNAL MEDICINE

## 2021-04-19 PROCEDURE — 91300 COVID-19, PFIZER VACCINE 30MCG/0.3ML DOSE: CPT | Performed by: INTERNAL MEDICINE

## 2021-04-22 RX ORDER — LISINOPRIL 2.5 MG/1
2.5 TABLET ORAL DAILY
Qty: 30 TABLET | Refills: 5 | Status: SHIPPED
Start: 2021-04-22 | End: 2021-12-01 | Stop reason: SDUPTHER

## 2021-10-05 LAB
ALBUMIN SERPL-MCNC: NORMAL G/DL
ALP BLD-CCNC: NORMAL U/L
ALT SERPL-CCNC: NORMAL U/L
ANION GAP SERPL CALCULATED.3IONS-SCNC: NORMAL MMOL/L
AST SERPL-CCNC: NORMAL U/L
AVERAGE GLUCOSE: 166
BILIRUB SERPL-MCNC: NORMAL MG/DL
BUN BLDV-MCNC: NORMAL MG/DL
CALCIUM SERPL-MCNC: NORMAL MG/DL
CHLORIDE BLD-SCNC: NORMAL MMOL/L
CHOLESTEROL, TOTAL: NORMAL
CHOLESTEROL/HDL RATIO: NORMAL
CO2: NORMAL
CREAT SERPL-MCNC: NORMAL MG/DL
CREATININE, URINE: 91.8
GFR CALCULATED: NORMAL
GLUCOSE BLD-MCNC: NORMAL MG/DL
HBA1C MFR BLD: 7.4 %
HDLC SERPL-MCNC: NORMAL MG/DL
LDL CHOLESTEROL CALCULATED: NORMAL
MICROALBUMIN/CREAT 24H UR: 63.8 MG/G{CREAT}
MICROALBUMIN/CREAT UR-RTO: 69
NONHDLC SERPL-MCNC: NORMAL MG/DL
POTASSIUM SERPL-SCNC: NORMAL MMOL/L
SODIUM BLD-SCNC: NORMAL MMOL/L
TOTAL PROTEIN: NORMAL
TRIGL SERPL-MCNC: NORMAL MG/DL
URIC ACID: NORMAL
VITAMIN D 25-HYDROXY: NORMAL
VITAMIN D2, 25 HYDROXY: NORMAL
VITAMIN D3,25 HYDROXY: NORMAL
VLDLC SERPL CALC-MCNC: NORMAL MG/DL

## 2021-10-21 ENCOUNTER — HOSPITAL ENCOUNTER (OUTPATIENT)
Age: 71
Discharge: HOME | End: 2021-10-21
Payer: COMMERCIAL

## 2021-10-21 LAB
ANION GAP SERPL CALCULATED.3IONS-SCNC: 10 MMOL/L (ref 7–16)
BUN BLDV-MCNC: 22 MG/DL (ref 6–23)
CALCIUM SERPL-MCNC: 9.2 MG/DL (ref 8.6–10.2)
CHLORIDE BLD-SCNC: 102 MMOL/L (ref 98–107)
CO2: 28 MMOL/L (ref 22–29)
CREAT SERPL-MCNC: 1.1 MG/DL (ref 0.5–1)
GFR AFRICAN AMERICAN: 60
GFR NON-AFRICAN AMERICAN: 49 ML/MIN/1.73
GLUCOSE BLD-MCNC: 69 MG/DL (ref 74–99)
POTASSIUM SERPL-SCNC: 4.4 MMOL/L (ref 3.5–5)
SODIUM BLD-SCNC: 140 MMOL/L (ref 132–146)

## 2021-10-21 PROCEDURE — 36415 COLL VENOUS BLD VENIPUNCTURE: CPT

## 2021-10-21 PROCEDURE — 80048 BASIC METABOLIC PNL TOTAL CA: CPT

## 2021-12-02 RX ORDER — LISINOPRIL 2.5 MG/1
2.5 TABLET ORAL DAILY
Qty: 30 TABLET | Refills: 5 | Status: SHIPPED
Start: 2021-12-02 | End: 2022-11-02

## 2022-01-04 ENCOUNTER — HOSPITAL ENCOUNTER (OUTPATIENT)
Age: 72
Discharge: HOME | End: 2022-01-04
Payer: COMMERCIAL

## 2022-01-04 LAB
ANION GAP SERPL CALCULATED.3IONS-SCNC: 11 MMOL/L (ref 7–16)
BASOPHILS ABSOLUTE: 0.05 E9/L (ref 0–0.2)
BASOPHILS RELATIVE PERCENT: 0.6 % (ref 0–2)
BUN BLDV-MCNC: 21 MG/DL (ref 6–23)
CALCIUM SERPL-MCNC: 9.7 MG/DL (ref 8.6–10.2)
CHLORIDE BLD-SCNC: 101 MMOL/L (ref 98–107)
CO2: 30 MMOL/L (ref 22–29)
CREAT SERPL-MCNC: 1.4 MG/DL (ref 0.5–1)
EOSINOPHILS ABSOLUTE: 0.09 E9/L (ref 0.05–0.5)
EOSINOPHILS RELATIVE PERCENT: 1.1 % (ref 0–6)
GFR AFRICAN AMERICAN: 45
GFR NON-AFRICAN AMERICAN: 37 ML/MIN/1.73
GLUCOSE BLD-MCNC: 83 MG/DL (ref 74–99)
HCT VFR BLD CALC: 41.1 % (ref 34–48)
HEMOGLOBIN: 13.1 G/DL (ref 11.5–15.5)
IMMATURE GRANULOCYTES #: 0.02 E9/L
IMMATURE GRANULOCYTES %: 0.2 % (ref 0–5)
LYMPHOCYTES ABSOLUTE: 1.5 E9/L (ref 1.5–4)
LYMPHOCYTES RELATIVE PERCENT: 18 % (ref 20–42)
MCH RBC QN AUTO: 28.9 PG (ref 26–35)
MCHC RBC AUTO-ENTMCNC: 31.9 % (ref 32–34.5)
MCV RBC AUTO: 90.7 FL (ref 80–99.9)
MONOCYTES ABSOLUTE: 0.9 E9/L (ref 0.1–0.95)
MONOCYTES RELATIVE PERCENT: 10.8 % (ref 2–12)
NEUTROPHILS ABSOLUTE: 5.79 E9/L (ref 1.8–7.3)
NEUTROPHILS RELATIVE PERCENT: 69.3 % (ref 43–80)
PDW BLD-RTO: 13.3 FL (ref 11.5–15)
PHOSPHORUS: 2.9 MG/DL (ref 2.5–4.5)
PLATELET # BLD: 200 E9/L (ref 130–450)
PMV BLD AUTO: 9.9 FL (ref 7–12)
POTASSIUM SERPL-SCNC: 4.3 MMOL/L (ref 3.5–5)
RBC # BLD: 4.53 E12/L (ref 3.5–5.5)
SODIUM BLD-SCNC: 142 MMOL/L (ref 132–146)
WBC # BLD: 8.4 E9/L (ref 4.5–11.5)

## 2022-01-04 PROCEDURE — 36415 COLL VENOUS BLD VENIPUNCTURE: CPT

## 2022-01-04 PROCEDURE — 84100 ASSAY OF PHOSPHORUS: CPT

## 2022-01-04 PROCEDURE — 85025 COMPLETE CBC W/AUTO DIFF WBC: CPT

## 2022-01-04 PROCEDURE — 80048 BASIC METABOLIC PNL TOTAL CA: CPT

## 2022-01-13 RX ORDER — ASPIRIN 81 MG/1
81 TABLET ORAL NIGHTLY
Qty: 90 TABLET | Refills: 3 | Status: SHIPPED
Start: 2022-01-13 | End: 2022-11-02 | Stop reason: SDUPTHER

## 2022-01-13 RX ORDER — METOPROLOL TARTRATE 50 MG/1
TABLET, FILM COATED ORAL
Qty: 180 TABLET | Refills: 3 | Status: SHIPPED
Start: 2022-01-13 | End: 2022-11-02

## 2022-01-13 RX ORDER — PRAVASTATIN SODIUM 40 MG
40 TABLET ORAL NIGHTLY
Qty: 90 TABLET | Refills: 3 | Status: SHIPPED | OUTPATIENT
Start: 2022-01-13

## 2022-01-13 RX ORDER — EZETIMIBE 10 MG/1
10 TABLET ORAL DAILY
Qty: 90 TABLET | Refills: 3 | Status: SHIPPED
Start: 2022-01-13 | End: 2022-11-02 | Stop reason: SDUPTHER

## 2022-01-13 RX ORDER — POTASSIUM CHLORIDE 750 MG/1
10 TABLET, EXTENDED RELEASE ORAL EVERY OTHER DAY
Qty: 45 TABLET | Refills: 3 | Status: SHIPPED | OUTPATIENT
Start: 2022-01-13

## 2022-01-14 LAB
ALBUMIN SERPL-MCNC: NORMAL G/DL
ALP BLD-CCNC: NORMAL U/L
ALT SERPL-CCNC: NORMAL U/L
ANION GAP SERPL CALCULATED.3IONS-SCNC: NORMAL MMOL/L
AST SERPL-CCNC: NORMAL U/L
AVERAGE GLUCOSE: 171
BILIRUB SERPL-MCNC: NORMAL MG/DL
BUN BLDV-MCNC: NORMAL MG/DL
CALCIUM SERPL-MCNC: NORMAL MG/DL
CHLORIDE BLD-SCNC: NORMAL MMOL/L
CHOLESTEROL, TOTAL: NORMAL
CHOLESTEROL/HDL RATIO: NORMAL
CO2: NORMAL
CREAT SERPL-MCNC: NORMAL MG/DL
GFR CALCULATED: NORMAL
GLUCOSE BLD-MCNC: NORMAL MG/DL
HBA1C MFR BLD: 7.6 %
HDLC SERPL-MCNC: NORMAL MG/DL
LDL CHOLESTEROL CALCULATED: NORMAL
NONHDLC SERPL-MCNC: NORMAL MG/DL
POTASSIUM SERPL-SCNC: NORMAL MMOL/L
SARS-COV-2: NORMAL
SODIUM BLD-SCNC: NORMAL MMOL/L
TOTAL PROTEIN: NORMAL
TRIGL SERPL-MCNC: NORMAL MG/DL
VLDLC SERPL CALC-MCNC: NORMAL MG/DL

## 2022-02-09 ENCOUNTER — TELEPHONE (OUTPATIENT)
Dept: SURGERY | Age: 72
End: 2022-02-09

## 2022-02-09 ENCOUNTER — OFFICE VISIT (OUTPATIENT)
Dept: SURGERY | Age: 72
End: 2022-02-09
Payer: MEDICARE

## 2022-02-09 VITALS
HEIGHT: 66 IN | HEART RATE: 73 BPM | DIASTOLIC BLOOD PRESSURE: 63 MMHG | RESPIRATION RATE: 16 BRPM | SYSTOLIC BLOOD PRESSURE: 114 MMHG | BODY MASS INDEX: 38.25 KG/M2 | TEMPERATURE: 97.3 F | WEIGHT: 238 LBS

## 2022-02-09 DIAGNOSIS — R10.9 ABDOMINAL PAIN OF MULTIPLE SITES: ICD-10-CM

## 2022-02-09 DIAGNOSIS — R19.4 CHANGE IN BOWEL HABITS: Primary | ICD-10-CM

## 2022-02-09 PROCEDURE — G8417 CALC BMI ABV UP PARAM F/U: HCPCS | Performed by: SURGERY

## 2022-02-09 PROCEDURE — 3017F COLORECTAL CA SCREEN DOC REV: CPT | Performed by: SURGERY

## 2022-02-09 PROCEDURE — 4040F PNEUMOC VAC/ADMIN/RCVD: CPT | Performed by: SURGERY

## 2022-02-09 PROCEDURE — G8400 PT W/DXA NO RESULTS DOC: HCPCS | Performed by: SURGERY

## 2022-02-09 PROCEDURE — 1123F ACP DISCUSS/DSCN MKR DOCD: CPT | Performed by: SURGERY

## 2022-02-09 PROCEDURE — 1036F TOBACCO NON-USER: CPT | Performed by: SURGERY

## 2022-02-09 PROCEDURE — G8484 FLU IMMUNIZE NO ADMIN: HCPCS | Performed by: SURGERY

## 2022-02-09 PROCEDURE — 1090F PRES/ABSN URINE INCON ASSESS: CPT | Performed by: SURGERY

## 2022-02-09 PROCEDURE — 99204 OFFICE O/P NEW MOD 45 MIN: CPT | Performed by: SURGERY

## 2022-02-09 PROCEDURE — G8427 DOCREV CUR MEDS BY ELIG CLIN: HCPCS | Performed by: SURGERY

## 2022-02-09 NOTE — TELEPHONE ENCOUNTER
Prior Authorization Form:      DEMOGRAPHICS:                     Patient Name:  Puja Stewart  Patient :  1950            Insurance:  Payor: [employer] / Plan: Genella Chamber / Product Type: *No Product type* /   Insurance ID Number:    Payor/Plan Subscr  Sex Relation Sub.  Ins. ID Effective Group Num   1. SHAMAR ZAPATA* PERFECTO RIVERA 1950 Female Self [id number]                                    [address]         DIAGNOSIS & PROCEDURE:                       Procedure/Operation: COLONOSCOPY           CPT Code: 95520    Diagnosis:  CHANGE IN BOWEL HABITS     ICD10 Code: R19.4    Location:  [facility]    Surgeon:  Kalen Corbin INFORMATION:                          Date: 22   Time: 9AM             Anesthesia:  MAC/TIVA                                                       Status:  Outpatient        Special Comments:         Electronically signed by Johnson Qureshi MA on 2022 at 2:16 PM

## 2022-02-09 NOTE — PROGRESS NOTES
Patient's Name/Date of Birth: Valdez Cannon / 1950    Date: 2/9/2022    PCP: Jeffrey Johns MD    Chief Complaint   Patient presents with    GI Problem     change in bowel habits       HPI:  Patient seen for evaluation of recent onset of abdominal camping, bloatind and diarrhea. No rectal bleeding. No N&V. Patient's medications, allergies, past medical, surgical, social and family histories were reviewed and updated as appropriate. Allergies   Allergen Reactions    Penicillins      Unknown reaction tolerates cefazolin       Past Medical History:   Diagnosis Date    Cancer [facility])     breast ca, right    Charcot foot due to diabetes mellitus ([address] 1/29/2012    Charcot foot due to diabetes mellitus ([address]     right, wears boot.  Chronic diastolic congestive heart failure ([address] 11/27/2017    Depression     Essential hypertension 5/24/2018    Hyperlipidemia     Hypertension     Osteomyelitis of foot, left, acute (HCC)     Other disorders of kidney and ureter in diseases classified elsewhere     S/P CABG (coronary artery bypass graft) 5/24/2018    Thyroid disease     Type II or unspecified type diabetes mellitus without mention of complication, not stated as uncontrolled     Ulcer of foot ([address]     right, not infected        Past Surgical History:   Procedure Laterality Date    BREAST LUMPECTOMY      right breast 2009, with lymph nodes.    [address]    COLONOSCOPY  2011    COLONOSCOPY N/A 3/20/15    COLONOSCOPY    CORONARY ARTERY BYPASS GRAFT  10/23/2017    x2   [place]    DIAGNOSTIC CARDIAC CATH LAB PROCEDURE      EYE SURGERY  2009    several ,cataracts,legally blind    EYE SURGERY      FOOT SURGERY  03/05/2016    ulcer, bone debridement    OTHER SURGICAL HISTORY Left 3/2/16    bone biopsy left foot        Social History     Tobacco Use    Smoking status: Former Smoker     Packs/day: 1.00     Years: 10.00     Pack years: 10.00     Quit date: 12/1/1987     Years since quittin.2    Smokeless tobacco: Never Used   Substance Use Topics    Alcohol use: No     Alcohol/week: 0.0 standard drinks       Current Outpatient Medications   Medication Sig Dispense Refill    aspirin EC 81 MG EC tablet Take 1 tablet by mouth nightly LD 3/15/2015 90 tablet 3    ezetimibe (ZETIA) 10 MG tablet Take 1 tablet by mouth daily 90 tablet 3    metoprolol tartrate (LOPRESSOR) 50 MG tablet TAKE 1 TABLET BY MOUTH TWICE A  tablet 3    potassium chloride (KLOR-CON M) 10 MEQ extended release tablet Take 1 tablet by mouth every other day 45 tablet 3    pravastatin (PRAVACHOL) 40 MG tablet Take 1 tablet by mouth nightly 90 tablet 3    lisinopril (PRINIVIL;ZESTRIL) 2.5 MG tablet Take 1 tablet by mouth daily 30 tablet 5    RESTASIS 0.05 % ophthalmic emulsion place 1 drop into both eyes twice a day  0    B-D UF III MINI PEN NEEDLES 31G X 5 MM MISC   0    NOVOLOG FLEXPEN 100 UNIT/ML injection pen INJECT 6 UNITS subcutaneously WITH BLOOD SUGAR OVER 250  0    furosemide (LASIX) 20 MG tablet Take 1 tablet by mouth daily 30 tablet 11    MYRBETRIQ 25 MG TB24 Take by mouth daily   0    LYRICA 75 MG capsule Take 75 mg by mouth daily.  citalopram (CELEXA) 40 MG tablet Take 40 mg by mouth daily       insulin aspart protamine-insulin aspart (NOVOLOG MIX 70/30 FLEXPEN) (70-30) 100 UNIT/ML injection Inject 78 Units into the skin 2 times daily (with meals)       Liraglutide (VICTOZA) 18 MG/3ML SOPN SC injection Inject 1.8 mg into the skin daily. 9 Pen 0    cyanocobalamin (CVS VITAMIN B12) 1000 MCG tablet Take 1 tablet by mouth daily. 90 tablet 0    Calcium Carbonate-Vitamin D (CALTRATE 600+D PO) Take 1 tablet by mouth daily.  latanoprost (XALATAN) 0.005 % ophthalmic solution Place 1 drop into both eyes nightly.  Ascorbic Acid (VITAMIN C) 500 MG tablet Take 500 mg by mouth daily. LD 3/18/2015      allopurinol (ZYLOPRIM) 100 MG tablet Take 100 mg by mouth daily.         therapeutic multivitamin-minerals (THERAGRAN-M) tablet Take 1 tablet by mouth daily. LD 3/18/2015      citalopram (CELEXA) 20 MG tablet Take 20 mg by mouth daily Instructed to take with sip water am of procedure       No current facility-administered medications for this visit. Review of Systems  Constitutional: negative  Eyes: negative  Ears, nose, mouth, throat, and face: negative  Respiratory: negative  Cardiovascular: negative  Gastrointestinal: negative  Genitourinary:negative  Integument/breast: negative  Hematologic/lymphatic: negative  Musculoskeletal:negative  Neurological: negative  Allergic/Immunologic: negative    Physical exam:  /63   Pulse 73   Temp 97.3 °F (36.3 °C)   Resp 16   Ht 5' 5.5\" (1.664 m)   Wt 238 lb (108 kg)   BMI 39.00 kg/m²   General appearance: no acute distress  Head:NCAT, EOMI, PERRLA, conjunctiva pink  Neck: no masses, supple  Lungs: CTABL  Heart: RRR  Abdomen: soft, nondistended, nontender, no guarding, no peritoneal signs, normoactive bowel sounds  Extremities:no edema  Neuro exam: normal  Skin: no lesions, no rashes    Assessment/Plan:  .proceed with colonoscopy  The procedure risks, benfits, possible complications and alternative options where explained to the patient, she understands and agrees to proceed with surgery. No follow-ups on file.     Derek Andrews MD      Send copy of H&P to PCP, Jeffrey Johns MD

## 2022-02-10 DIAGNOSIS — J33.9 NASAL POLYPS: Primary | ICD-10-CM

## 2022-02-16 RX ORDER — LANOLIN ALCOHOL/MO/W.PET/CERES
500 CREAM (GRAM) TOPICAL NIGHTLY
COMMUNITY
End: 2022-10-21 | Stop reason: SDUPTHER

## 2022-02-16 RX ORDER — SEMAGLUTIDE 1.34 MG/ML
INJECTION, SOLUTION SUBCUTANEOUS WEEKLY
COMMUNITY
End: 2022-11-02 | Stop reason: SDUPTHER

## 2022-02-16 NOTE — PROGRESS NOTES
Have you been tested for COVID  Yes           Have you been told you were positive for COVID No  Have you had any known exposure to someone that is positive for COVID No  Do you have a cough                   No              Do you have shortness of breath No                 Do you have a sore throat            No                Are you having chills                    No                Are you having muscle aches. No                    Please come to the hospital wearing a mask and have your significant other wear a mask as well. Both of you should check your temperature before leaving to come here,  if it is 100 or higher please call [phone] for instruction.

## 2022-02-16 NOTE — PROGRESS NOTES
[facility] PRE-ADMISSION TESTING INSTRUCTIONS    The Preadmission Testing patient is instructed accordingly using the following criteria (check applicable):    ARRIVAL INSTRUCTIONS:  [x] Parking the day of Surgery is located in the Main Entrance lot. Upon entering the door, make an immediate right to the surgery reception desk    [x] Bring photo ID and insurance card    [] Bring in a copy of Living will or Durable Power of  papers. [x] Please be sure to arrange for responsible adult to provide transportation to and from the hospital    [x] Please arrange for responsible adult to be with you for the 24 hour period post procedure due to having anesthesia      GENERAL INSTRUCTIONS:    [x] Nothing by mouth after midnight, including gum, candy, mints or water    [x] You may brush your teeth, but do not swallow any water    [x] Take medications as instructed with 1-2 oz of water    [x] Stop herbal supplements and vitamins 5 days prior to procedure    [x] Follow preop dosing of blood thinners per physician instructions    [x] Take 1/2 dose of evening insulin, but no insulin after midnight    [x] No oral diabetic medications after midnight    [x] If diabetic and have low blood sugar or feel symptomatic, take 1-2oz apple juice only    [] Bring inhalers day of surgery    [] Bring C-PAP/ Bi-Pap day of surgery    [] Bring urine specimen day of surgery    [x] Shower or bath with soap, lather and rinse well, AM of Surgery, no lotion, powders or creams to surgical site    [x] Follow bowel prep as instructed per surgeon    [x] No tobacco products within 24 hours of surgery     [x] No alcohol or illegal drug use within 24 hours of surgery.     [x] Jewelry, body piercing's, eyeglasses, contact lenses and dentures are not permitted into surgery (bring cases)      [x] Please do not wear any nail polish, make up or hair products on the day of surgery    [x] You can expect a call the business day prior to procedure to notify you if your arrival time changes    [x] If you receive a survey after surgery we would greatly appreciate your comments    [] Parent/guardian of a minor must accompany their child and remain on the premises  the entire time they are under our care     [] Pediatric patients may bring favorite toy, blanket or comfort item with them    [] A caregiver or family member must remain with the patient during their stay if they are mentally handicapped, have dementia, disoriented or unable to use a call light or would be a safety concern if left unattended    [x] Please notify surgeon if you develop any illness between now and time of surgery (cold, cough, sore throat, fever, nausea, vomiting) or any signs of infections  including skin, wounds, and dental.    [x]  The Outpatient Pharmacy is available to fill your prescription here on your day of surgery, ask your preop nurse for details    [] Other instructions    EDUCATIONAL MATERIALS PROVIDED:    [] PAT Preoperative Education Packet/Booklet     [] Medication List    [] Transfusion bracelet applied with instructions    [] Shower with soap, lather and rinse well, and use CHG wipes provided the evening before surgery as instructed    [] Incentive spirometer with instructions

## 2022-02-18 NOTE — PROGRESS NOTES
Spoke with pt. Has history of cad with cabg.  Denies any current issues and states waiting for Dr. Vangie Slaughter office to call and schedule her yearly checkup

## 2022-02-21 ENCOUNTER — ANESTHESIA EVENT (OUTPATIENT)
Dept: ENDOSCOPY | Age: 72
End: 2022-02-21
Payer: MEDICARE

## 2022-02-21 ENCOUNTER — ANESTHESIA (OUTPATIENT)
Dept: ENDOSCOPY | Age: 72
End: 2022-02-21
Payer: MEDICARE

## 2022-02-21 ENCOUNTER — HOSPITAL ENCOUNTER (OUTPATIENT)
Age: 72
Setting detail: OUTPATIENT SURGERY
Discharge: HOME | End: 2022-02-21
Attending: SURGERY | Admitting: SURGERY
Payer: MEDICARE

## 2022-02-21 VITALS
BODY MASS INDEX: 38.25 KG/M2 | RESPIRATION RATE: 16 BRPM | HEART RATE: 72 BPM | TEMPERATURE: 98.1 F | OXYGEN SATURATION: 96 % | HEIGHT: 66 IN | WEIGHT: 238 LBS | SYSTOLIC BLOOD PRESSURE: 142 MMHG | DIASTOLIC BLOOD PRESSURE: 63 MMHG

## 2022-02-21 VITALS
RESPIRATION RATE: 60 BRPM | DIASTOLIC BLOOD PRESSURE: 60 MMHG | SYSTOLIC BLOOD PRESSURE: 125 MMHG | OXYGEN SATURATION: 97 %

## 2022-02-21 LAB — METER GLUCOSE: 155 MG/DL (ref 74–99)

## 2022-02-21 PROCEDURE — 2709999900 HC NON-CHARGEABLE SUPPLY: Performed by: SURGERY

## 2022-02-21 PROCEDURE — 88305 TISSUE EXAM BY PATHOLOGIST: CPT

## 2022-02-21 PROCEDURE — 6360000002 HC RX W HCPCS: Performed by: NURSE ANESTHETIST, CERTIFIED REGISTERED

## 2022-02-21 PROCEDURE — 7100000010 HC PHASE II RECOVERY - FIRST 15 MIN: Performed by: SURGERY

## 2022-02-21 PROCEDURE — 3609010600 HC COLONOSCOPY POLYPECTOMY SNARE/COLD BIOPSY: Performed by: SURGERY

## 2022-02-21 PROCEDURE — 82962 GLUCOSE BLOOD TEST: CPT

## 2022-02-21 PROCEDURE — 3700000001 HC ADD 15 MINUTES (ANESTHESIA): Performed by: SURGERY

## 2022-02-21 PROCEDURE — 2580000003 HC RX 258: Performed by: NURSE ANESTHETIST, CERTIFIED REGISTERED

## 2022-02-21 PROCEDURE — 45385 COLONOSCOPY W/LESION REMOVAL: CPT | Performed by: SURGERY

## 2022-02-21 PROCEDURE — 3700000000 HC ANESTHESIA ATTENDED CARE: Performed by: SURGERY

## 2022-02-21 PROCEDURE — 2500000003 HC RX 250 WO HCPCS: Performed by: NURSE ANESTHETIST, CERTIFIED REGISTERED

## 2022-02-21 PROCEDURE — 7100000011 HC PHASE II RECOVERY - ADDTL 15 MIN: Performed by: SURGERY

## 2022-02-21 RX ORDER — PROPOFOL 10 MG/ML
INJECTION, EMULSION INTRAVENOUS PRN
Status: DISCONTINUED | OUTPATIENT
Start: 2022-02-21 | End: 2022-02-21 | Stop reason: SDUPTHER

## 2022-02-21 RX ORDER — SODIUM CHLORIDE 0.9 % (FLUSH) 0.9 %
5-40 SYRINGE (ML) INJECTION EVERY 12 HOURS SCHEDULED
Status: DISCONTINUED | OUTPATIENT
Start: 2022-02-21 | End: 2022-02-21 | Stop reason: HOSPADM

## 2022-02-21 RX ORDER — SODIUM CHLORIDE 9 MG/ML
25 INJECTION, SOLUTION INTRAVENOUS PRN
Status: DISCONTINUED | OUTPATIENT
Start: 2022-02-21 | End: 2022-02-21 | Stop reason: HOSPADM

## 2022-02-21 RX ORDER — SODIUM CHLORIDE 9 MG/ML
INJECTION, SOLUTION INTRAVENOUS CONTINUOUS PRN
Status: DISCONTINUED | OUTPATIENT
Start: 2022-02-21 | End: 2022-02-21 | Stop reason: SDUPTHER

## 2022-02-21 RX ORDER — SODIUM CHLORIDE 0.9 % (FLUSH) 0.9 %
5-40 SYRINGE (ML) INJECTION PRN
Status: DISCONTINUED | OUTPATIENT
Start: 2022-02-21 | End: 2022-02-21 | Stop reason: HOSPADM

## 2022-02-21 RX ORDER — SODIUM CHLORIDE 9 MG/ML
INJECTION, SOLUTION INTRAVENOUS CONTINUOUS
Status: DISCONTINUED | OUTPATIENT
Start: 2022-02-21 | End: 2022-02-21 | Stop reason: HOSPADM

## 2022-02-21 RX ADMIN — SODIUM CHLORIDE: 9 INJECTION, SOLUTION INTRAVENOUS at 09:09

## 2022-02-21 RX ADMIN — GLUCAGON HYDROCHLORIDE 1 MG: KIT at 09:19

## 2022-02-21 RX ADMIN — PROPOFOL 240 MG: 10 INJECTION, EMULSION INTRAVENOUS at 09:13

## 2022-02-21 ASSESSMENT — PAIN SCALES - GENERAL
PAINLEVEL_OUTOF10: 0

## 2022-02-21 ASSESSMENT — PAIN - FUNCTIONAL ASSESSMENT: PAIN_FUNCTIONAL_ASSESSMENT: 0-10

## 2022-02-21 NOTE — BRIEF OP NOTE
Brief Postoperative Note      Patient: Autumn Sanchez  YOB: 1950  MRN: [id number]    Date of Procedure: 2/21/2022    Pre-Op Diagnosis: CHANGE IN BOWEL HABITS    Post-Op Diagnosis: Same       Procedure(s):  COLONOSCOPY POLYPECTOMY SNARE/COLD BIOPSY    Surgeon(s):  Shayna Mills MD    Assistant:  * No surgical staff found *    Anesthesia: Monitor Anesthesia Care    Estimated Blood Loss (mL): Minimal    Complications: None    Specimens:   ID Type Source Tests Collected by Time Destination   A : 50 CM POLYPECTOMY Tissue Colon SURGICAL PATHOLOGY Shayna Mills MD 2/21/2022 7309        Implants:  * No implants in log *      Drains: * No LDAs found *    Findings:     Electronically signed by Shayna Mills MD on 2/21/2022 at 9:31 AM

## 2022-02-21 NOTE — H&P
Ander Stevens MD   Physician   Specialty:  General Surgery   Progress Notes       Signed   Encounter Date:  2022                 Signed        Expand All Collapse All           Patient's Name/Date of Birth: Hedy Zimmerman / 1950     Date: 2022     PCP: Berta Dasilva MD          Chief Complaint   Patient presents with    GI Problem       change in bowel habits         HPI:  Patient seen for evaluation of recent onset of abdominal camping, bloatind and diarrhea. No rectal bleeding. No N&V.     Patient's medications, allergies, past medical, surgical, social and family histories were reviewed and updated as appropriate.           Allergies   Allergen Reactions    Penicillins         Unknown reaction tolerates cefazolin         Past Medical History        Past Medical History:   Diagnosis Date    Cancer [facility])       breast ca, right    Charcot foot due to diabetes mellitus ([address] 2012    Charcot foot due to diabetes mellitus ([address]       right, wears boot.  Chronic diastolic congestive heart failure ([address] 2017    Depression      Essential hypertension 2018    Hyperlipidemia      Hypertension      Osteomyelitis of foot, left, acute ([address]      Other disorders of kidney and ureter in diseases classified elsewhere      S/P CABG (coronary artery bypass graft) 2018    Thyroid disease      Type II or unspecified type diabetes mellitus without mention of complication, not stated as uncontrolled      Ulcer of foot ([address]       right, not infected            Past Surgical History         Past Surgical History:   Procedure Laterality Date    BREAST LUMPECTOMY         right breast , with lymph nodes.      SECTION       COLONOSCOPY       COLONOSCOPY N/A 3/20/15     COLONOSCOPY    CORONARY ARTERY BYPASS GRAFT   10/23/2017     x2   [facility]    DIAGNOSTIC CARDIAC CATH LAB PROCEDURE        EYE SURGERY   2009     several ,cataracts,legally blind    EYE SURGERY      FOOT SURGERY   2016     ulcer, bone debridement    OTHER SURGICAL HISTORY Left 3/2/16     bone biopsy left foot            Social History            Tobacco Use    Smoking status: Former Smoker       Packs/day: 1.00       Years: 10.00       Pack years: 10.00       Quit date: 1987       Years since quittin.2    Smokeless tobacco: Never Used   Substance Use Topics    Alcohol use: No       Alcohol/week: 0.0 standard drinks         Current Facility-Administered Medications          Current Outpatient Medications   Medication Sig Dispense Refill    aspirin EC 81 MG EC tablet Take 1 tablet by mouth nightly LD 3/15/2015 90 tablet 3    ezetimibe (ZETIA) 10 MG tablet Take 1 tablet by mouth daily 90 tablet 3    metoprolol tartrate (LOPRESSOR) 50 MG tablet TAKE 1 TABLET BY MOUTH TWICE A  tablet 3    potassium chloride (KLOR-CON M) 10 MEQ extended release tablet Take 1 tablet by mouth every other day 45 tablet 3    pravastatin (PRAVACHOL) 40 MG tablet Take 1 tablet by mouth nightly 90 tablet 3    lisinopril (PRINIVIL;ZESTRIL) 2.5 MG tablet Take 1 tablet by mouth daily 30 tablet 5    RESTASIS 0.05 % ophthalmic emulsion place 1 drop into both eyes twice a day   0    B-D UF III MINI PEN NEEDLES 31G X 5 MM MISC     0    NOVOLOG FLEXPEN 100 UNIT/ML injection pen INJECT 6 UNITS subcutaneously WITH BLOOD SUGAR OVER 250   0    furosemide (LASIX) 20 MG tablet Take 1 tablet by mouth daily 30 tablet 11    MYRBETRIQ 25 MG TB24 Take by mouth daily    0    LYRICA 75 MG capsule Take 75 mg by mouth daily.         citalopram (CELEXA) 40 MG tablet Take 40 mg by mouth daily         insulin aspart protamine-insulin aspart (NOVOLOG MIX 70/30 FLEXPEN) (70-30) 100 UNIT/ML injection Inject 78 Units into the skin 2 times daily (with meals)         Liraglutide (VICTOZA) 18 MG/3ML SOPN SC injection Inject 1.8 mg into the skin daily.  9 Pen 0    cyanocobalamin (CVS VITAMIN B12) 1000 MCG tablet Take 1 tablet by mouth daily. 90 tablet 0    Calcium Carbonate-Vitamin D (CALTRATE 600+D PO) Take 1 tablet by mouth daily.          latanoprost (XALATAN) 0.005 % ophthalmic solution Place 1 drop into both eyes nightly.          Ascorbic Acid (VITAMIN C) 500 MG tablet Take 500 mg by mouth daily. LD 3/18/2015        allopurinol (ZYLOPRIM) 100 MG tablet Take 100 mg by mouth daily.          therapeutic multivitamin-minerals (THERAGRAN-M) tablet Take 1 tablet by mouth daily.  LD 3/18/2015        citalopram (CELEXA) 20 MG tablet Take 20 mg by mouth daily Instructed to take with sip water am of procedure          No current facility-administered medications for this visit.            Review of Systems  Constitutional: negative  Eyes: negative  Ears, nose, mouth, throat, and face: negative  Respiratory: negative  Cardiovascular: negative  Gastrointestinal: negative  Genitourinary:negative  Integument/breast: negative  Hematologic/lymphatic: negative  Musculoskeletal:negative  Neurological: negative  Allergic/Immunologic: negative     Physical exam:  /63   Pulse 73   Temp 97.3 °F (36.3 °C)   Resp 16   Ht 5' 5.5\" (1.664 m)   Wt 238 lb (108 kg)   BMI 39.00 kg/m²   General appearance: no acute distress  Head:NCAT, EOMI, PERRLA, conjunctiva pink  Neck: no masses, supple  Lungs: CTABL  Heart: RRR  Abdomen: soft, nondistended, nontender, no guarding, no peritoneal signs, normoactive bowel sounds  Extremities:no edema  Neuro exam: normal  Skin: no lesions, no rashes     Assessment/Plan:  .proceed with colonoscopy  The procedure risks, benfits, possible complications and alternative options where explained to the patient, she understands and agrees to proceed with surgery.     No follow-ups on file.  Jakob Patel MD        Send copy of H&P to PCP, Jeremy Yanez MD                        Office Visit on 2/9/2022           Office Visit on 2/9/2022                Detailed Report            Note shared with patient        Progress Notes Info    Author Note Status Last Update User Last Update Date/Time   Nayla Mares MD Signed Nayla Mares MD 2/9/2022 11:50 AM     Chart Review Routing History    No routing history on file. Patient's History and Physical was reviewed. Patient examined. There has been no change.

## 2022-02-21 NOTE — ANESTHESIA PRE PROCEDURE
Department of Anesthesiology  Preprocedure Note       Name:  Martín Toribio   Age:  73 y.o.  :  1950                                          MRN:  [id number]         Date:  2022      Surgeon: Santi Bradford):  Rosa Navarrete MD    Procedure: Procedure(s):  COLONOSCOPY DIAGNOSTIC    Medications prior to admission:   Prior to Admission medications    Medication Sig Start Date End Date Taking? Authorizing Provider   insulin lispro protamine & lispro (HUMALOG MIX 75/25) (75-25) 100 UNIT per ML SUSP injection vial Inject 78 Units into the skin 2 times daily (with meals)   Yes Historical Provider, MD   Semaglutide,0.25 or 0.5MG/DOS, (OZEMPIC, 0.25 OR 0.5 MG/DOSE,) 2 MG/1.5ML SOPN Inject into the skin once a week   Yes Historical Provider, MD   niacin (SLO-NIACIN) 500 MG extended release tablet Take 500 mg by mouth nightly   Yes Historical Provider, MD   aspirin EC 81 MG EC tablet Take 1 tablet by mouth nightly LD 3/15/2015  Patient taking differently: Take 81 mg by mouth nightly  22  Yes Indra Iverson MD   ezetimibe (ZETIA) 10 MG tablet Take 1 tablet by mouth daily 22  Yes Indra Iverson MD   metoprolol tartrate (LOPRESSOR) 50 MG tablet TAKE 1 TABLET BY MOUTH TWICE A DAY 22  Yes Indra Iverson MD   potassium chloride (KLOR-CON M) 10 MEQ extended release tablet Take 1 tablet by mouth every other day 22  Yes Indra Iversno MD   pravastatin (PRAVACHOL) 40 MG tablet Take 1 tablet by mouth nightly 22  Yes Indra Iverson MD   lisinopril (PRINIVIL;ZESTRIL) 2.5 MG tablet Take 1 tablet by mouth daily 21  Yes Indra Iverson MD   RESTASIS 0.05 % ophthalmic emulsion place 1 drop into both eyes twice a day 10/10/19  Yes Historical Provider, MD   MYRBETRIQ 25 MG TB24 Take 25 mg by mouth daily  19  Yes Historical Provider, MD   LYRICA 75 MG capsule Take 75 mg by mouth daily.   5/3/19  Yes Historical Provider, MD   citalopram (CELEXA) 40 MG tablet Take 40 mg by mouth daily  19  Yes Historical Provider, MD cyanocobalamin ([employer] VITAMIN B12) 1000 MCG tablet Take 1 tablet by mouth daily. 4/18/14  Yes Barrera Higginbotham MD   Calcium Carbonate-Vitamin D (CALTRATE 600+D PO) Take 1 tablet by mouth daily. Yes Historical Provider, MD   latanoprost (XALATAN) 0.005 % ophthalmic solution Place 1 drop into both eyes nightly. Yes Historical Provider, MD   Ascorbic Acid (VITAMIN C) 500 MG tablet Take 500 mg by mouth daily    Yes Historical Provider, MD   allopurinol (ZYLOPRIM) 100 MG tablet Take 100 mg by mouth daily. Yes Historical Provider, MD   therapeutic multivitamin-minerals (THERAGRAN-M) tablet Take 1 tablet by mouth daily    Yes Historical Provider, MD   B-MEGAN UF III MINI PEN NEEDLES 31G X 5 MM MISC  11/11/19   Historical Provider, MD   furosemide (LASIX) 20 MG tablet Take 1 tablet by mouth daily 9/6/19   Shawna Morin MD       Current medications:    Current Facility-Administered Medications   Medication Dose Route Frequency Provider Last Rate Last Admin    0.9 % sodium chloride infusion   IntraVENous Continuous Annabel Almaraz MD        sodium chloride flush 0.9 % injection 5-40 mL  5-40 mL IntraVENous 2 times per day Annabel Almaraz MD        sodium chloride flush 0.9 % injection 5-40 mL  5-40 mL IntraVENous PRN Annabel Almaraz MD        0.9 % sodium chloride infusion  25 mL IntraVENous PRN Annabel Almaraz MD           Allergies:     Allergies   Allergen Reactions    Penicillins      Unknown reaction tolerates cefazolin       Problem List:    Patient Active Problem List   Diagnosis Code    Hand pain, right M79.641    Hand pain, left M79.642    Chronic kidney disease, stage III (moderate) ([facility]) N18.30    Diabetic foot ulcer with osteomyelitis ([facility] E11.621, E11.69, L97.509, M86.9    Diabetic ulcer of left foot associated with type 2 diabetes mellitus ([facility] [address] E11.621, L97.529    Chest pain R07.9    Coronary artery disease involving native coronary artery of native heart without angina pectoris I25.10    Chronic diastolic congestive heart failure (HCC) I50.32    S/P CABG (coronary artery bypass graft) Z95.1    Essential hypertension I10       Past Medical History:        Diagnosis Date    CAD (coronary artery disease)     Cancer ([facility] [address]     breast ca, right    Charcot foot due to diabetes mellitus ([address] 2012    Charcot foot due to diabetes mellitus ([address]     right, wears boot.  Chronic diastolic congestive heart failure ([address] 2017    Depression     Diarrhea     Essential hypertension 2018    Hyperlipidemia     Hypertension     Thyroid disease     Type II or unspecified type diabetes mellitus without mention of complication, not stated as uncontrolled     Ulcer of foot ([address]     left covered with dry dressing       Past Surgical History:        Procedure Laterality Date    BREAST LUMPECTOMY      right breast , with lymph nodes.    [address]    COLONOSCOPY      COLONOSCOPY N/A 3/20/15    COLONOSCOPY    CORONARY ARTERY BYPASS GRAFT  10/23/2017    x2   [place]    DIAGNOSTIC CARDIAC CATH LAB PROCEDURE      EYE SURGERY      several ,cataracts,legally blind    EYE SURGERY      FOOT SURGERY  2016    ulcer, bone debridement    OTHER SURGICAL HISTORY Left 3/2/16    bone biopsy left foot       Social History:    Social History     Tobacco Use    Smoking status: Former Smoker     Packs/day: 1.00     Years: 10.00     Pack years: 10.00     Quit date: 1987     Years since quittin.2    Smokeless tobacco: Never Used   Substance Use Topics    Alcohol use: No     Alcohol/week: 0.0 standard drinks                                Counseling given: Not Answered      Vital Signs (Current):   Vitals:    22 1528   Weight: 238 lb (108 kg)   Height: 5' 5.5\" (1.664 m)                                              BP Readings from Last 3 Encounters:   22 114/63   21 122/70   20 (!) 112/57       NPO Status: Time of last liquid consumption: 2300 Time of last solid consumption: 0800                        Date of last liquid consumption: 02/20/22                        Date of last solid food consumption: 02/20/22    BMI:   Wt Readings from Last 3 Encounters:   02/16/22 238 lb (108 kg)   02/09/22 238 lb (108 kg)   01/08/21 235 lb 8 oz (106.8 kg)     Body mass index is 39 kg/m². CBC:   Lab Results   Component Value Date    WBC 8.4 01/04/2022    RBC 4.53 01/04/2022    HGB 13.1 01/04/2022    HCT 41.1 01/04/2022    MCV 90.7 01/04/2022    RDW 13.3 01/04/2022     01/04/2022       CMP:   Lab Results   Component Value Date     01/04/2022    K 4.3 01/04/2022     01/04/2022    CO2 30 01/04/2022    BUN 21 01/04/2022    CREATININE 1.4 01/04/2022    GFRAA 45 01/04/2022    LABGLOM 37 01/04/2022    GLUCOSE 83 01/04/2022    GLUCOSE 113 03/07/2012    PROT 7.0 04/13/2021    CALCIUM 9.7 01/04/2022    BILITOT 0.4 04/13/2021    ALKPHOS 59 04/13/2021    AST 14 04/13/2021    ALT 15 04/13/2021       POC Tests: No results for input(s): POCGLU, POCNA, POCK, POCCL, POCBUN, POCHEMO, POCHCT in the last 72 hours.     Coags:   Lab Results   Component Value Date    PROTIME 13.4 10/23/2017    PROTIME 10.0 10/19/2010    INR 1.2 10/23/2017    APTT 32.3 10/23/2017       HCG (If Applicable): No results found for: PREGTESTUR, PREGSERUM, HCG, HCGQUANT     ABGs: No results found for: PHART, PO2ART, MUR7FWU, UDV8SAC, BEART, Q0IZSOFX     Type & Screen (If Applicable):  No results found for: LABABO, LABRH    Drug/Infectious Status (If Applicable):  No results found for: HIV, HEPCAB    COVID-19 Screening (If Applicable): No results found for: COVID19        Anesthesia Evaluation  Patient summary reviewed no history of anesthetic complications:   Airway: Mallampati: III  TM distance: >3 FB   Neck ROM: full  Mouth opening: > = 3 FB Dental:      Comment: Missing teeth  Denies loose teeth    Pulmonary:Negative Pulmonary ROS breath sounds clear to auscultation                             Cardiovascular:    (+) hypertension:, CAD:, CABG/stent:, CHF:, hyperlipidemia        Rhythm: regular             Beta Blocker:  Dose within 24 Hrs         Neuro/Psych:   (+) neuromuscular disease:, depression/anxiety    (-) psychiatric history           GI/Hepatic/Renal:   (+) renal disease:, bowel prep,          ROS comment: Change in bowel habits. Endo/Other:    (+) DiabetesType II DM, using insulin, : arthritis:., .                 Abdominal:             Vascular: negative vascular ROS. Other Findings:           Anesthesia Plan      MAC     ASA 3       Induction: intravenous. MIPS: Prophylactic antiemetics administered. Anesthetic plan and risks discussed with patient. Plan discussed with CRNA. 304 Art Muniz DO   2/21/2022    Chart reviewed, patient seen and agree with above evaluation.   Rex Goldmann, APRN - CRNA

## 2022-02-21 NOTE — ANESTHESIA POSTPROCEDURE EVALUATION
Department of Anesthesiology  Postprocedure Note    Patient: Ilene Chapman  MRN: [id number]  YOB: 1950  Date of evaluation: 2/21/2022  Time:  9:34 AM     Procedure Summary     Date: 02/21/22 Room / Location: [address] / [facility]    Anesthesia Start: 1961 Anesthesia Stop: 3890    Procedure: COLONOSCOPY POLYPECTOMY SNARE/COLD BIOPSY (N/A ) Diagnosis: (CHANGE IN BOWEL HABITS)    Surgeons: Suellen Wilkinson MD Responsible Provider: Tamanna Zimmer DO    Anesthesia Type: MAC ASA Status: 3          Anesthesia Type: MAC    Asher Phase I: Asher Score: 10    Asher Phase II:      Last vitals: Reviewed and per EMR flowsheets.        Anesthesia Post Evaluation    Patient location during evaluation: PACU  Patient participation: complete - patient participated  Level of consciousness: awake and alert  Airway patency: patent  Nausea & Vomiting: no vomiting and no nausea  Complications: no  Cardiovascular status: hemodynamically stable  Respiratory status: room air and spontaneous ventilation  Hydration status: stable

## 2022-02-22 LAB — DIABETIC RETINOPATHY: POSITIVE

## 2022-02-22 NOTE — OP NOTE
[address] [address]                                OPERATIVE REPORT    PATIENT NAME: Marychuy Pinedo                       :        1950  MED REC NO:   [id number]                            ROOM:  ACCOUNT NO:   [de-identified]                           ADMIT DATE: 2022  PROVIDER:     Irma Dave MD    DATE OF PROCEDURE:  2022    PREOPERATIVE DIAGNOSES:  1. Abdominal pain. 2.  Stool incontinence. 3.  Change in bowel habits. POSTOPERATIVE DIAGNOSIS:  Polyp, descending colon at 50 cm. PROCEDURE PERFORMED:  Total colonoscopy with snare polypectomy. SURGEON:  Irma Dave M.D.    ESTIMATED BLOOD LOSS:  Minimal.    INDICATIONS:  A 30-year-old female patient seen in consultation because  of recent change in bowel habits with abdominal cramping and bouts of  diarrhea and constipation as well as urinary and stool incontinence. DESCRIPTION OF PROCEDURE:  With the patient in the left lateral position  on the operating table, after adequate sedation was administered by  Anesthesia, a digital rectal examination and dilatation was performed,  showed evidence of good sphincter tone. There were no palpable masses  and no blood on the examining finger. A standard Olympus colonoscope  was introduced through the anal opening and advanced into the  rectosigmoid. The anus and rectum were carefully examined by  retroflexing the end of the scope and showed minimal internal  hemorrhoids with no ulceration or active bleeding. The scope was  advanced into the sigmoid colon revealing few diverticula with no other  Pathology. At about 50 cm from anal verge, a 1-cm sessile  benign-looking polyp was noted. It was removed with a cold snare and  retrieved.   Remainder of the left colon visualized was normal.   Transverse colon, ascending colon, and cecum as well as ileocecal valve  and appendiceal opening were reached, visualized, and were normal.   Photos were taken. The scope was slowly withdrawn. Further observation  of the colon on the way out revealed no other pathology. The scope was  totally removed. The patient tolerated the procedure well. RECOMMENDATIONS:  1. Low-residue diet. 2.  We will consult Gynecology for possible rectal/cystocele.         Marii Garrett MD    D: 02/21/2022 9:36:33       T: 02/21/2022 9:38:45     [email or web address]  Job#: [id number]     Doc#: [id number]    CC:  MD Shayna Duncan MD

## 2022-03-02 ENCOUNTER — OFFICE VISIT (OUTPATIENT)
Dept: SURGERY | Age: 72
End: 2022-03-02
Payer: COMMERCIAL

## 2022-03-02 VITALS
RESPIRATION RATE: 16 BRPM | HEART RATE: 72 BPM | BODY MASS INDEX: 38.18 KG/M2 | WEIGHT: 233 LBS | SYSTOLIC BLOOD PRESSURE: 134 MMHG | DIASTOLIC BLOOD PRESSURE: 61 MMHG

## 2022-03-02 DIAGNOSIS — D12.5 ADENOMATOUS POLYP OF SIGMOID COLON: ICD-10-CM

## 2022-03-02 DIAGNOSIS — R19.4 CHANGE IN BOWEL HABITS: Primary | ICD-10-CM

## 2022-03-02 PROCEDURE — 3017F COLORECTAL CA SCREEN DOC REV: CPT | Performed by: SURGERY

## 2022-03-02 PROCEDURE — G8417 CALC BMI ABV UP PARAM F/U: HCPCS | Performed by: SURGERY

## 2022-03-02 PROCEDURE — G8427 DOCREV CUR MEDS BY ELIG CLIN: HCPCS | Performed by: SURGERY

## 2022-03-02 PROCEDURE — 99213 OFFICE O/P EST LOW 20 MIN: CPT | Performed by: SURGERY

## 2022-03-02 PROCEDURE — 4040F PNEUMOC VAC/ADMIN/RCVD: CPT | Performed by: SURGERY

## 2022-03-02 PROCEDURE — 1090F PRES/ABSN URINE INCON ASSESS: CPT | Performed by: SURGERY

## 2022-03-02 PROCEDURE — G8400 PT W/DXA NO RESULTS DOC: HCPCS | Performed by: SURGERY

## 2022-03-02 PROCEDURE — 1123F ACP DISCUSS/DSCN MKR DOCD: CPT | Performed by: SURGERY

## 2022-03-02 PROCEDURE — G8484 FLU IMMUNIZE NO ADMIN: HCPCS | Performed by: SURGERY

## 2022-03-02 PROCEDURE — 1036F TOBACCO NON-USER: CPT | Performed by: SURGERY

## 2022-03-02 NOTE — PROGRESS NOTES
Patient's Name/Date of Birth: Anuradha Ort / 1950    Date: 3/2/2022    PCP: Duane Philippe MD    Chief Complaint   Patient presents with    Results     colonoscopy       HPI:  Post-op follow up  Patient presents to the clinic 2 weeks following colonoscopy and polypectomy. Eating a regular diet without difficulty. Bowel movements are Normal. The patient is not having any pain. no problems with eating, bowel movements, voiding, or their wound    Patient's medications, allergies, past medical, surgical, social and family histories were reviewed and updated as appropriate. Review of Systems  Constitutional: negative  Eyes: negative  Ears, nose, mouth, throat, and face: negative  Respiratory: negative  Cardiovascular: negative  Gastrointestinal: negative  Genitourinary:negative  Integument/breast: negative  Hematologic/lymphatic: negative  Musculoskeletal:negative  Neurological: negative  Allergic/Immunologic: negative      Physical Exam:  /61   Pulse 72   Resp 16   Wt 233 lb (105.7 kg)   BMI 38.18 kg/m²   General Appearance: alert and oriented to person, place and time, well-developed and well-nourished, in no acute distress  Oropharynx:  lips, mucosa, and tongue normal; teeth and gums normal   Neck:  no adenopathy, no carotid bruit, no JVD, supple, symmetrical, trachea midline and thyroid not enlarged, symmetric, no tenderness/mass/nodules   Lung:  clear to auscultation bilaterally   Heart:   regular rate and rhythm, S1, S2 normal, no murmur, click, rub or gallop   Abdomen:  soft, non-tender; bowel sounds normal; no masses,  no organomegaly   Extremities:  extremities normal, atraumatic, no cyanosis or edema   Skin:  warm and dry, no hyperpigmentation, vitiligo, or suspicious lesions   Genitourinary:  defer exam     Data Reviewed: surgical pathology results and endoscopy results    Assessment/Plan:  Eduardo Winters was seen today for results.     Diagnoses and all orders for this visit:    Change in bowel habits    Adenomatous polyp of sigmoid colon    High fiber diet    No follow-ups on file.     Katy Islas MD

## 2022-05-13 ENCOUNTER — OFFICE VISIT (OUTPATIENT)
Dept: CARDIOLOGY CLINIC | Age: 72
End: 2022-05-13
Payer: COMMERCIAL

## 2022-05-13 VITALS
WEIGHT: 238.2 LBS | BODY MASS INDEX: 38.28 KG/M2 | RESPIRATION RATE: 14 BRPM | SYSTOLIC BLOOD PRESSURE: 128 MMHG | DIASTOLIC BLOOD PRESSURE: 60 MMHG | HEART RATE: 69 BPM | OXYGEN SATURATION: 93 % | HEIGHT: 66 IN

## 2022-05-13 DIAGNOSIS — I25.10 CORONARY ARTERY DISEASE INVOLVING NATIVE CORONARY ARTERY OF NATIVE HEART WITHOUT ANGINA PECTORIS: Primary | ICD-10-CM

## 2022-05-13 PROCEDURE — 1123F ACP DISCUSS/DSCN MKR DOCD: CPT | Performed by: INTERNAL MEDICINE

## 2022-05-13 PROCEDURE — 99213 OFFICE O/P EST LOW 20 MIN: CPT | Performed by: INTERNAL MEDICINE

## 2022-05-13 PROCEDURE — 4040F PNEUMOC VAC/ADMIN/RCVD: CPT | Performed by: INTERNAL MEDICINE

## 2022-05-13 PROCEDURE — 1090F PRES/ABSN URINE INCON ASSESS: CPT | Performed by: INTERNAL MEDICINE

## 2022-05-13 PROCEDURE — 3017F COLORECTAL CA SCREEN DOC REV: CPT | Performed by: INTERNAL MEDICINE

## 2022-05-13 PROCEDURE — 93000 ELECTROCARDIOGRAM COMPLETE: CPT | Performed by: INTERNAL MEDICINE

## 2022-05-13 PROCEDURE — 1036F TOBACCO NON-USER: CPT | Performed by: INTERNAL MEDICINE

## 2022-05-13 PROCEDURE — G8417 CALC BMI ABV UP PARAM F/U: HCPCS | Performed by: INTERNAL MEDICINE

## 2022-05-13 PROCEDURE — G8427 DOCREV CUR MEDS BY ELIG CLIN: HCPCS | Performed by: INTERNAL MEDICINE

## 2022-05-13 PROCEDURE — G8400 PT W/DXA NO RESULTS DOC: HCPCS | Performed by: INTERNAL MEDICINE

## 2022-05-13 RX ORDER — FESOTERODINE FUMARATE 4 MG/1
4 TABLET, FILM COATED, EXTENDED RELEASE ORAL DAILY
COMMUNITY
End: 2022-11-02

## 2022-05-13 RX ORDER — PANTOPRAZOLE SODIUM 20 MG/1
20 TABLET, DELAYED RELEASE ORAL DAILY
COMMUNITY
End: 2022-10-04

## 2022-05-13 ASSESSMENT — ENCOUNTER SYMPTOMS
NAUSEA: 0
ABDOMINAL PAIN: 0
DIARRHEA: 0
SHORTNESS OF BREATH: 0
COUGH: 0
VOMITING: 0
BACK PAIN: 1
WHEEZING: 0
CONSTIPATION: 0
BLOOD IN STOOL: 0

## 2022-05-13 NOTE — PROGRESS NOTES
OUTPATIENT CARDIOLOGY FOLLOW-UP    HPI:    Name: Flavio Pack    Age: 81 y.o. Primary Care Physician: Juani Metzger MD    Date of Service: 5/13/2022    Chief Complaint:   Chief Complaint   Patient presents with    Coronary Artery Disease     16 mo OV. Denies cardiac complaints.  Congestive Heart Failure    Hypertension    Hyperlipidemia        History of present illness :   68-year-old morbidly obese ex-smoker female comes today for follow-up visit. Was seen at the office on 1/8/2021. She has history of CAD, status post two-vessel CABG in October 2017 with LIMA to LAD and SVG to obtuse marginal branch, surgery complicated by acute kidney injury and respiratory failure, chronic diastolic CHF, hypertension, diabetes, chronic kidney disease, diabetic ulcer of the left foot, depression, legally blind, history of breast cancer, status post lumpectomy, lymph node resection and XRT in 2015, hypothyroidism and bilateral Charcot foot. She is not active due to her Charcot disease. She will with boot in her lower extremities. She denies chest discomfort or dyspnea at her level of activity. She denies palpitations, dizziness or syncope. She denies lower extremity edema. EKG done today revealed sinus rhythm at 69 bpm, left atrial enlargement, low voltage, poor wave progression and nonspecific T wave changes    Review of Systems:   Review of Systems   Constitutional: Negative for chills, fatigue and fever. HENT: Negative for nosebleeds. Respiratory: Negative for cough, shortness of breath and wheezing. She snores at night but denies gasping for air. Gastrointestinal: Negative for abdominal pain, blood in stool, constipation, diarrhea, nausea and vomiting. GERD. Genitourinary: Negative for dysuria and hematuria. Musculoskeletal: Positive for back pain. Negative for joint swelling and myalgias. Neurological: Negative for syncope and light-headedness.    Psychiatric/Behavioral: The patient is not nervous/anxious. Past Medical History:  Past Medical History:   Diagnosis Date    CAD (coronary artery disease)     Cancer ([facility]     breast ca, right    Charcot foot due to diabetes mellitus ([facility] 1/29/2012    Charcot foot due to diabetes mellitus ([address]     right, wears boot.  Chronic diastolic congestive heart failure ([facility] 11/27/2017    Depression     Diarrhea     Essential hypertension 5/24/2018    Hyperlipidemia     Hypertension     Thyroid disease     Type II or unspecified type diabetes mellitus without mention of complication, not stated as uncontrolled     Ulcer of foot ([facility]     left covered with dry dressing       Past Surgical History:  Past Surgical History:   Procedure Laterality Date    BREAST LUMPECTOMY      right breast 2009, with lymph nodes.    [address] 59    COLONOSCOPY  2011    COLONOSCOPY N/A 3/20/15    COLONOSCOPY    COLONOSCOPY N/A 2/21/2022    COLONOSCOPY POLYPECTOMY SNARE/COLD BIOPSY performed by Manny Bonner MD at [address] [place] [address] Dr VALENCIA  10/23/2017    x2   calvin    DIAGNOSTIC CARDIAC CATH LAB PROCEDURE      EYE SURGERY  2009    several ,cataracts,legally blind    EYE SURGERY      FOOT SURGERY  03/05/2016    ulcer, bone debridement    OTHER SURGICAL HISTORY Left 3/2/16    bone biopsy left foot       Family History:  Family History   Problem Relation Age of Onset    Diabetes Mother     High Blood Pressure Mother     High Cholesterol Mother     Diabetes Father     High Blood Pressure Father     High Cholesterol Father     High Blood Pressure Maternal Aunt     High Blood Pressure Maternal Uncle        Social History:  Social History     Socioeconomic History    Marital status:      Spouse name: Seymour Ospina Number of children: 1    Years of education: 15    Highest education level: Not on file   Occupational History    Not on file   Tobacco Use    Smoking status: Former Smoker Packs/day: 1.00     Years: 10.00     Pack years: 10.00     Quit date: 1987     Years since quittin.4    Smokeless tobacco: Never Used   Vaping Use    Vaping Use: Never used   Substance and Sexual Activity    Alcohol use: No     Alcohol/week: 0.0 standard drinks    Drug use: No    Sexual activity: Never   Other Topics Concern    Not on file   Social History Narrative    2 cups coffee/day; no pop     Social Determinants of Health     Financial Resource Strain:     Difficulty of Paying Living Expenses: Not on file   Food Insecurity:     Worried About Running Out of Food in the Last Year: Not on file    Jasen of Food in the Last Year: Not on file   Transportation Needs:     Lack of Transportation (Medical): Not on file    Lack of Transportation (Non-Medical): Not on file   Physical Activity:     Days of Exercise per Week: Not on file    Minutes of Exercise per Session: Not on file   Stress:     Feeling of Stress : Not on file   Social Connections:     Frequency of Communication with Friends and Family: Not on file    Frequency of Social Gatherings with Friends and Family: Not on file    Attends [employer] Services: Not on file    Active Member of [address] [place] [address] [employer] or Organizations: Not on file    Attends Club or Organization Meetings: Not on file    Marital Status: Not on file   Intimate Partner Violence:     Fear of Current or Ex-Partner: Not on file    Emotionally Abused: Not on file    Physically Abused: Not on file    Sexually Abused: Not on file   Housing Stability:     Unable to Pay for Housing in the Last Year: Not on file    Number of [place] in the Last Year: Not on file    Unstable Housing in the Last Year: Not on file       Allergies:   Allergies   Allergen Reactions    Penicillins      Unknown reaction tolerates cefazolin       Current Medications:  Current Outpatient Medications   Medication Sig Dispense Refill    pantoprazole (PROTONIX) 20 MG tablet Take 20 mg by mouth daily  fesoterodine (TOVIAZ) 4 MG TB24 ER tablet Take 4 mg by mouth daily      NONFORMULARY Take 20 mg by mouth daily Vit D 3      insulin lispro protamine & lispro (HUMALOG MIX 75/25) (75-25) 100 UNIT per ML SUSP injection vial Inject 78 Units into the skin 2 times daily (with meals)      Semaglutide,0.25 or 0.5MG/DOS, (OZEMPIC, 0.25 OR 0.5 MG/DOSE,) 2 MG/1.5ML SOPN Inject into the skin once a week      niacin (SLO-NIACIN) 500 MG extended release tablet Take 500 mg by mouth nightly      aspirin EC 81 MG EC tablet Take 1 tablet by mouth nightly LD 3/15/2015 (Patient taking differently: Take 81 mg by mouth nightly ) 90 tablet 3    ezetimibe (ZETIA) 10 MG tablet Take 1 tablet by mouth daily 90 tablet 3    metoprolol tartrate (LOPRESSOR) 50 MG tablet TAKE 1 TABLET BY MOUTH TWICE A DAY (Patient taking differently: daily TAKE 1 TABLET BY MOUTH TWICE A DAY) 180 tablet 3    potassium chloride (KLOR-CON M) 10 MEQ extended release tablet Take 1 tablet by mouth every other day 45 tablet 3    pravastatin (PRAVACHOL) 40 MG tablet Take 1 tablet by mouth nightly 90 tablet 3    lisinopril (PRINIVIL;ZESTRIL) 2.5 MG tablet Take 1 tablet by mouth daily 30 tablet 5    RESTASIS 0.05 % ophthalmic emulsion place 1 drop into both eyes twice a day  0    B-D UF III MINI PEN NEEDLES 31G X 5 MM MISC   0    LYRICA 100 MG capsule Take 100 mg by mouth 2 times daily.  Citalopram Hydrobromide 30 MG CAPS Take 30 mg by mouth daily       cyanocobalamin ([employer]S VITAMIN B12) 1000 MCG tablet Take 1 tablet by mouth daily. 90 tablet 0    Calcium Carbonate-Vitamin D (CALTRATE 600+D PO) Take 1 tablet by mouth daily.  latanoprost (XALATAN) 0.005 % ophthalmic solution Place 1 drop into both eyes nightly.  Ascorbic Acid (VITAMIN C) 500 MG tablet Take 500 mg by mouth daily       allopurinol (ZYLOPRIM) 100 MG tablet Take 100 mg by mouth daily.         therapeutic multivitamin-minerals (THERAGRAN-M) tablet Take 1 tablet by mouth daily       furosemide (LASIX) 20 MG tablet Take 1 tablet by mouth daily (Patient not taking: Reported on 5/13/2022) 30 tablet 11    MYRBETRIQ 25 MG TB24 Take 25 mg by mouth daily   0     No current facility-administered medications for this visit. Physical Exam:  /60 (Site: Left Upper Arm, Position: Sitting, Cuff Size: Medium Adult)   Pulse 69   Resp 14   Ht 5' 5.5\" (1.664 m)   Wt 238 lb 3.2 oz (108 kg)   SpO2 93%   BMI 39.04 kg/m²   Wt Readings from Last 3 Encounters:   05/13/22 238 lb 3.2 oz (108 kg)   03/02/22 233 lb (105.7 kg)   02/21/22 238 lb (108 kg)       Physical Exam  Constitutional:       General: She is not in acute distress. Appearance: She is well-developed. She is obese. HENT:      Head: Normocephalic and atraumatic. Neck:      Vascular: No carotid bruit or JVD. Cardiovascular:      Rate and Rhythm: Normal rate and regular rhythm. Heart sounds: No murmur heard. No friction rub. No gallop. Comments: 1/6 systolic murmur best heard at the second right intercostal space. Pulmonary:      Breath sounds: Normal breath sounds. No wheezing or rales. Chest:      Chest wall: No tenderness. Abdominal:      General: Bowel sounds are normal. There is no distension. Palpations: Abdomen is soft. There is no mass. Tenderness: There is no abdominal tenderness. Comments: No abdominal bruit. Musculoskeletal:      Cervical back: Neck supple. Right lower leg: No edema. Left lower leg: No edema. Skin:     General: Skin is warm and dry. Neurological:      Mental Status: She is alert and oriented to person, place, and time.            Laboratory Tests:  Lab Results   Component Value Date    CREATININE 1.4 (H) 01/04/2022    BUN 21 01/04/2022     01/04/2022    K 4.3 01/04/2022     01/04/2022    CO2 30 (H) 01/04/2022     Lab Results   Component Value Date    MG 1.8 10/27/2017     Lab Results   Component Value Date    WBC 8.4 01/04/2022 HGB 13.1 01/04/2022    HCT 41.1 01/04/2022    MCV 90.7 01/04/2022     01/04/2022     Lab Results   Component Value Date    ALT 15 04/13/2021    AST 14 04/13/2021    ALKPHOS 59 04/13/2021    BILITOT 0.4 04/13/2021     Lab Results   Component Value Date    CKTOTAL 56 10/19/2017    CKMB 1.9 10/19/2017    TROPONINI 0.07 (H) 10/19/2017    TROPONINI 0.06 (H) 10/18/2017    TROPONINI 0.07 (H) 10/18/2017     Lab Results   Component Value Date    INR 1.2 10/23/2017    INR 1.0 10/22/2017    INR 1.0 01/07/2013    PROTIME 13.4 (H) 10/23/2017    PROTIME 11.1 10/22/2017    PROTIME 11.3 01/07/2013     Lab Results   Component Value Date    TSH 0.487 07/26/2017     Lab Results   Component Value Date    LABA1C 7.9 (H) 04/13/2021     No results found for: EAG  Lab Results   Component Value Date    CHOL 159 04/13/2021    CHOL 149 01/23/2020    CHOL 155 08/13/2019     Lab Results   Component Value Date    TRIG 124 04/13/2021    TRIG 170 (H) 01/23/2020    TRIG 196 (H) 08/13/2019     Lab Results   Component Value Date    HDL 56 04/13/2021    HDL 53 08/11/2020    HDL 51 01/23/2020     Lab Results   Component Value Date    LDLCALC 78 04/13/2021    LDLCALC 81 08/11/2020    LDLCALC 64 01/23/2020     Lab Results   Component Value Date    LABVLDL 25 04/13/2021    LABVLDL 35 08/11/2020    LABVLDL 34 01/23/2020       Cardiac Tests:    Echocardiogram done on July 12, 2019:  Absence of significant valvular heart disease. Grade 1 diastolic dysfunction. Ejection fraction 60%. ASSESSMENT / PLAN:  -CAD: Stable with no angina at her level of activity.  -Chronic diastolic CHF: Clinically compensated.    -Hypertension: Controlled. -Diabetes. -Hyperlipidemia: On Pravachol and Zetia. -Chronic kidney disease.  -History of breast cancer, status post surgery and XRT. -Hypothyroidism.  -Charcot disease of the feet. -Depression.  -Legally blind.  -Morbid obesity. Will continue current cardiac medications.   Will follow up at the office in 1 year.       The patient's current medication list, allergies, problem list and results of all previously ordered testing were reviewed at today's visit. {  Doug Mckeon MD , [facility].   [facility]) Cardiology

## 2022-05-27 DIAGNOSIS — C50.011 MALIGNANT NEOPLASM OF NIPPLE OF RIGHT BREAST IN FEMALE, UNSPECIFIED ESTROGEN RECEPTOR STATUS (HCC): ICD-10-CM

## 2022-05-27 DIAGNOSIS — Z12.31 SCREENING MAMMOGRAM FOR HIGH-RISK PATIENT: Primary | ICD-10-CM

## 2022-05-31 ENCOUNTER — HOSPITAL ENCOUNTER (OUTPATIENT)
Dept: GENERAL RADIOLOGY | Age: 72
Discharge: HOME | End: 2022-06-02
Payer: COMMERCIAL

## 2022-05-31 DIAGNOSIS — Z12.31 SCREENING MAMMOGRAM FOR HIGH-RISK PATIENT: ICD-10-CM

## 2022-05-31 DIAGNOSIS — C50.011 MALIGNANT NEOPLASM OF NIPPLE OF RIGHT BREAST IN FEMALE, UNSPECIFIED ESTROGEN RECEPTOR STATUS (HCC): ICD-10-CM

## 2022-05-31 PROCEDURE — 77067 SCR MAMMO BI INCL CAD: CPT

## 2022-07-13 LAB
AVERAGE GLUCOSE: NORMAL
HBA1C MFR BLD: 7 %

## 2022-08-30 ENCOUNTER — HOSPITAL ENCOUNTER (OUTPATIENT)
Age: 72
Discharge: HOME | End: 2022-08-30
Payer: COMMERCIAL

## 2022-08-30 LAB
ALBUMIN SERPL-MCNC: 3.7 G/DL (ref 3.5–5.2)
ALP BLD-CCNC: 51 U/L (ref 35–104)
ALT SERPL-CCNC: 16 U/L (ref 0–32)
ANION GAP SERPL CALCULATED.3IONS-SCNC: 8 MMOL/L (ref 7–16)
AST SERPL-CCNC: 19 U/L (ref 0–31)
BASOPHILS ABSOLUTE: 0.04 E9/L (ref 0–0.2)
BASOPHILS RELATIVE PERCENT: 0.7 % (ref 0–2)
BILIRUB SERPL-MCNC: 0.5 MG/DL (ref 0–1.2)
BUN BLDV-MCNC: 21 MG/DL (ref 6–23)
CALCIUM SERPL-MCNC: 9.4 MG/DL (ref 8.6–10.2)
CHLORIDE BLD-SCNC: 102 MMOL/L (ref 98–107)
CHOLESTEROL, TOTAL: 135 MG/DL (ref 0–199)
CO2: 30 MMOL/L (ref 22–29)
CREAT SERPL-MCNC: 1.2 MG/DL (ref 0.5–1)
CREATININE URINE: 63 MG/DL (ref 29–226)
EOSINOPHILS ABSOLUTE: 0.07 E9/L (ref 0.05–0.5)
EOSINOPHILS RELATIVE PERCENT: 1.2 % (ref 0–6)
GFR AFRICAN AMERICAN: 54
GFR NON-AFRICAN AMERICAN: 44 ML/MIN/1.73
GLUCOSE BLD-MCNC: 112 MG/DL (ref 74–99)
HBA1C MFR BLD: 7.4 % (ref 4–5.6)
HCT VFR BLD CALC: 43.7 % (ref 34–48)
HDLC SERPL-MCNC: 54 MG/DL
HEMOGLOBIN: 14.2 G/DL (ref 11.5–15.5)
IMMATURE GRANULOCYTES #: 0.03 E9/L
IMMATURE GRANULOCYTES %: 0.5 % (ref 0–5)
LDL CHOLESTEROL CALCULATED: 65 MG/DL (ref 0–99)
LYMPHOCYTES ABSOLUTE: 1.03 E9/L (ref 1.5–4)
LYMPHOCYTES RELATIVE PERCENT: 17 % (ref 20–42)
MCH RBC QN AUTO: 30.1 PG (ref 26–35)
MCHC RBC AUTO-ENTMCNC: 32.5 % (ref 32–34.5)
MCV RBC AUTO: 92.6 FL (ref 80–99.9)
MICROALBUMIN UR-MCNC: 379.3 MG/L
MICROALBUMIN/CREAT UR-RTO: 602.1 (ref 0–30)
MONOCYTES ABSOLUTE: 0.53 E9/L (ref 0.1–0.95)
MONOCYTES RELATIVE PERCENT: 8.8 % (ref 2–12)
NEUTROPHILS ABSOLUTE: 4.35 E9/L (ref 1.8–7.3)
NEUTROPHILS RELATIVE PERCENT: 71.8 % (ref 43–80)
PARATHYROID HORMONE INTACT: 42 PG/ML (ref 15–65)
PDW BLD-RTO: 13.6 FL (ref 11.5–15)
PHOSPHORUS: 3.2 MG/DL (ref 2.5–4.5)
PLATELET # BLD: 152 E9/L (ref 130–450)
PMV BLD AUTO: 9.5 FL (ref 7–12)
POTASSIUM SERPL-SCNC: 4.3 MMOL/L (ref 3.5–5)
RBC # BLD: 4.72 E12/L (ref 3.5–5.5)
SODIUM BLD-SCNC: 140 MMOL/L (ref 132–146)
TOTAL PROTEIN: 6.4 G/DL (ref 6.4–8.3)
TRIGL SERPL-MCNC: 78 MG/DL (ref 0–149)
VITAMIN D 25-HYDROXY: 29 NG/ML (ref 30–100)
VLDLC SERPL CALC-MCNC: 16 MG/DL
WBC # BLD: 6.1 E9/L (ref 4.5–11.5)

## 2022-08-30 PROCEDURE — 80053 COMPREHEN METABOLIC PANEL: CPT

## 2022-08-30 PROCEDURE — 83970 ASSAY OF PARATHORMONE: CPT

## 2022-08-30 PROCEDURE — 82306 VITAMIN D 25 HYDROXY: CPT

## 2022-08-30 PROCEDURE — 82570 ASSAY OF URINE CREATININE: CPT

## 2022-08-30 PROCEDURE — 85025 COMPLETE CBC W/AUTO DIFF WBC: CPT

## 2022-08-30 PROCEDURE — 83036 HEMOGLOBIN GLYCOSYLATED A1C: CPT

## 2022-08-30 PROCEDURE — 82044 UR ALBUMIN SEMIQUANTITATIVE: CPT

## 2022-08-30 PROCEDURE — 80061 LIPID PANEL: CPT

## 2022-08-30 PROCEDURE — 84100 ASSAY OF PHOSPHORUS: CPT

## 2022-08-30 PROCEDURE — 36415 COLL VENOUS BLD VENIPUNCTURE: CPT

## 2022-09-22 RX ORDER — FLASH GLUCOSE SENSOR
KIT MISCELLANEOUS
Qty: 2 EACH | Refills: 5 | Status: SHIPPED | OUTPATIENT
Start: 2022-09-22

## 2022-09-22 NOTE — TELEPHONE ENCOUNTER
Refill forPatches for Aris Marco 2 need sent to [facility] on [address]

## 2022-10-04 RX ORDER — PANTOPRAZOLE SODIUM 40 MG/1
TABLET, DELAYED RELEASE ORAL
Qty: 90 TABLET | Refills: 0 | Status: SHIPPED
Start: 2022-10-04 | End: 2022-11-02 | Stop reason: SDUPTHER

## 2022-10-06 RX ORDER — FLUTICASONE PROPIONATE 50 MCG
1 SPRAY, SUSPENSION (ML) NASAL 2 TIMES DAILY
COMMUNITY

## 2022-10-20 RX ORDER — CAL/D3/MAG11/ZINC/COP/MANG/BOR 600 MG-800
TABLET ORAL
COMMUNITY
Start: 2022-07-14 | End: 2022-10-21 | Stop reason: SDUPTHER

## 2022-10-21 RX ORDER — CAL/D3/MAG11/ZINC/COP/MANG/BOR 600 MG-800
1 TABLET ORAL DAILY
Qty: 90 TABLET | Refills: 0 | Status: SHIPPED
Start: 2022-10-21 | End: 2022-10-23 | Stop reason: SDUPTHER

## 2022-10-21 RX ORDER — LANOLIN ALCOHOL/MO/W.PET/CERES
500 CREAM (GRAM) TOPICAL NIGHTLY
Qty: 90 TABLET | Refills: 0 | Status: SHIPPED
Start: 2022-10-21 | End: 2022-10-23 | Stop reason: SDUPTHER

## 2022-10-23 RX ORDER — CAL/D3/MAG11/ZINC/COP/MANG/BOR 600 MG-800
1 TABLET ORAL DAILY
Qty: 90 TABLET | Refills: 0 | Status: SHIPPED | OUTPATIENT
Start: 2022-10-23

## 2022-10-23 RX ORDER — LANOLIN ALCOHOL/MO/W.PET/CERES
500 CREAM (GRAM) TOPICAL NIGHTLY
Qty: 90 TABLET | Refills: 0 | Status: SHIPPED
Start: 2022-10-23 | End: 2022-11-02 | Stop reason: SDUPTHER

## 2022-11-02 ENCOUNTER — OFFICE VISIT (OUTPATIENT)
Dept: PRIMARY CARE CLINIC | Age: 72
End: 2022-11-02
Payer: COMMERCIAL

## 2022-11-02 VITALS
HEIGHT: 66 IN | BODY MASS INDEX: 37.28 KG/M2 | OXYGEN SATURATION: 96 % | HEART RATE: 71 BPM | TEMPERATURE: 97.3 F | WEIGHT: 232 LBS | SYSTOLIC BLOOD PRESSURE: 122 MMHG | DIASTOLIC BLOOD PRESSURE: 58 MMHG

## 2022-11-02 DIAGNOSIS — E11.610 TYPE 2 DIABETES MELLITUS WITH DIABETIC NEUROPATHIC ARTHROPATHY, WITH LONG-TERM CURRENT USE OF INSULIN (HCC): ICD-10-CM

## 2022-11-02 DIAGNOSIS — E78.5 SERUM LIPIDS HIGH: ICD-10-CM

## 2022-11-02 DIAGNOSIS — R39.81 FUNCTIONAL URINARY INCONTINENCE: ICD-10-CM

## 2022-11-02 DIAGNOSIS — K21.9 GASTROESOPHAGEAL REFLUX DISEASE WITHOUT ESOPHAGITIS: ICD-10-CM

## 2022-11-02 DIAGNOSIS — Z95.1 S/P CABG (CORONARY ARTERY BYPASS GRAFT): Primary | ICD-10-CM

## 2022-11-02 DIAGNOSIS — I10 ESSENTIAL HYPERTENSION: ICD-10-CM

## 2022-11-02 DIAGNOSIS — Z79.4 TYPE 2 DIABETES MELLITUS WITH DIABETIC NEUROPATHIC ARTHROPATHY, WITH LONG-TERM CURRENT USE OF INSULIN (HCC): ICD-10-CM

## 2022-11-02 DIAGNOSIS — N18.30 STAGE 3 CHRONIC KIDNEY DISEASE, UNSPECIFIED WHETHER STAGE 3A OR 3B CKD (HCC): ICD-10-CM

## 2022-11-02 DIAGNOSIS — E11.610 CHARCOT FOOT DUE TO DIABETES MELLITUS (HCC): ICD-10-CM

## 2022-11-02 DIAGNOSIS — F34.1 PERSISTENT DEPRESSIVE DISORDER: ICD-10-CM

## 2022-11-02 PROBLEM — E11.618 TYPE 2 DIABETES MELLITUS WITH DIABETIC ARTHROPATHY, WITH LONG-TERM CURRENT USE OF INSULIN (HCC): Status: ACTIVE | Noted: 2022-11-02

## 2022-11-02 PROBLEM — I50.32 CHRONIC DIASTOLIC CONGESTIVE HEART FAILURE (HCC): Status: RESOLVED | Noted: 2017-11-27 | Resolved: 2022-11-02

## 2022-11-02 LAB
ALBUMIN SERPL-MCNC: 3.7 G/DL (ref 3.5–5.2)
ALP BLD-CCNC: 61 U/L (ref 35–104)
ALT SERPL-CCNC: 18 U/L (ref 0–32)
ANION GAP SERPL CALCULATED.3IONS-SCNC: 11 MMOL/L (ref 7–16)
AST SERPL-CCNC: 21 U/L (ref 0–31)
BILIRUB SERPL-MCNC: 0.6 MG/DL (ref 0–1.2)
BUN BLDV-MCNC: 26 MG/DL (ref 6–23)
CALCIUM SERPL-MCNC: 10.1 MG/DL (ref 8.6–10.2)
CHLORIDE BLD-SCNC: 99 MMOL/L (ref 98–107)
CHOLESTEROL, TOTAL: 139 MG/DL (ref 0–199)
CO2: 31 MMOL/L (ref 22–29)
CREAT SERPL-MCNC: 1.2 MG/DL (ref 0.5–1)
CREATININE URINE POCT: ABNORMAL
GFR SERPL CREATININE-BSD FRML MDRD: 49 ML/MIN/1.73
GLUCOSE FASTING: 154 MG/DL (ref 74–99)
HBA1C MFR BLD: 6.9 %
HDLC SERPL-MCNC: 54 MG/DL
LDL CHOLESTEROL CALCULATED: 65 MG/DL (ref 0–99)
MICROALBUMIN/CREAT 24H UR: ABNORMAL MG/G{CREAT}
MICROALBUMIN/CREAT UR-RTO: ABNORMAL
POTASSIUM SERPL-SCNC: 4.3 MMOL/L (ref 3.5–5)
SODIUM BLD-SCNC: 141 MMOL/L (ref 132–146)
TOTAL PROTEIN: 6.4 G/DL (ref 6.4–8.3)
TRIGL SERPL-MCNC: 98 MG/DL (ref 0–149)
VLDLC SERPL CALC-MCNC: 20 MG/DL

## 2022-11-02 PROCEDURE — G8427 DOCREV CUR MEDS BY ELIG CLIN: HCPCS | Performed by: INTERNAL MEDICINE

## 2022-11-02 PROCEDURE — G8400 PT W/DXA NO RESULTS DOC: HCPCS | Performed by: INTERNAL MEDICINE

## 2022-11-02 PROCEDURE — 3017F COLORECTAL CA SCREEN DOC REV: CPT | Performed by: INTERNAL MEDICINE

## 2022-11-02 PROCEDURE — 3051F HG A1C>EQUAL 7.0%<8.0%: CPT | Performed by: INTERNAL MEDICINE

## 2022-11-02 PROCEDURE — 99214 OFFICE O/P EST MOD 30 MIN: CPT | Performed by: INTERNAL MEDICINE

## 2022-11-02 PROCEDURE — 1123F ACP DISCUSS/DSCN MKR DOCD: CPT | Performed by: INTERNAL MEDICINE

## 2022-11-02 PROCEDURE — G8417 CALC BMI ABV UP PARAM F/U: HCPCS | Performed by: INTERNAL MEDICINE

## 2022-11-02 PROCEDURE — 3078F DIAST BP <80 MM HG: CPT | Performed by: INTERNAL MEDICINE

## 2022-11-02 PROCEDURE — 1036F TOBACCO NON-USER: CPT | Performed by: INTERNAL MEDICINE

## 2022-11-02 PROCEDURE — 1090F PRES/ABSN URINE INCON ASSESS: CPT | Performed by: INTERNAL MEDICINE

## 2022-11-02 PROCEDURE — 82044 UR ALBUMIN SEMIQUANTITATIVE: CPT | Performed by: INTERNAL MEDICINE

## 2022-11-02 PROCEDURE — 3074F SYST BP LT 130 MM HG: CPT | Performed by: INTERNAL MEDICINE

## 2022-11-02 PROCEDURE — 2022F DILAT RTA XM EVC RTNOPTHY: CPT | Performed by: INTERNAL MEDICINE

## 2022-11-02 PROCEDURE — G8484 FLU IMMUNIZE NO ADMIN: HCPCS | Performed by: INTERNAL MEDICINE

## 2022-11-02 RX ORDER — CITALOPRAM 30 MG/1
CAPSULE ORAL
COMMUNITY

## 2022-11-02 RX ORDER — LISINOPRIL 40 MG/1
40 TABLET ORAL DAILY
Qty: 90 TABLET | Refills: 0 | Status: SHIPPED | OUTPATIENT
Start: 2022-11-02

## 2022-11-02 RX ORDER — ASCORBIC ACID 500 MG
500 TABLET ORAL DAILY
Qty: 90 TABLET | Refills: 0 | Status: SHIPPED
Start: 2022-11-02 | End: 2022-11-08 | Stop reason: SDUPTHER

## 2022-11-02 RX ORDER — PREGABALIN 100 MG/1
100 CAPSULE ORAL 2 TIMES DAILY
COMMUNITY

## 2022-11-02 RX ORDER — FAMOTIDINE 40 MG/1
40 TABLET, FILM COATED ORAL EVERY EVENING
Qty: 30 TABLET | Refills: 3 | Status: SHIPPED | OUTPATIENT
Start: 2022-11-02

## 2022-11-02 RX ORDER — ASPIRIN 81 MG/1
81 TABLET ORAL NIGHTLY
Qty: 90 TABLET | Refills: 0 | Status: SHIPPED | OUTPATIENT
Start: 2022-11-02

## 2022-11-02 RX ORDER — PANTOPRAZOLE SODIUM 40 MG/1
TABLET, DELAYED RELEASE ORAL
Qty: 90 TABLET | Refills: 0 | Status: CANCELLED | OUTPATIENT
Start: 2022-11-02

## 2022-11-02 RX ORDER — CYANOCOBALAMIN/FOLIC AC/VIT B6 1-2.5-25MG
1 TABLET ORAL
COMMUNITY
Start: 2022-10-11

## 2022-11-02 RX ORDER — PRAVASTATIN SODIUM 40 MG
40 TABLET ORAL NIGHTLY
Qty: 90 TABLET | Refills: 0 | Status: CANCELLED | OUTPATIENT
Start: 2022-11-02

## 2022-11-02 RX ORDER — LANOLIN ALCOHOL/MO/W.PET/CERES
500 CREAM (GRAM) TOPICAL NIGHTLY
Qty: 90 TABLET | Refills: 0 | Status: SHIPPED | OUTPATIENT
Start: 2022-11-02

## 2022-11-02 RX ORDER — PREDNISOLONE ACETATE 10 MG/ML
1 SUSPENSION/ DROPS OPHTHALMIC 2 TIMES DAILY
COMMUNITY

## 2022-11-02 RX ORDER — LANOLIN ALCOHOL/MO/W.PET/CERES
500 CREAM (GRAM) TOPICAL NIGHTLY
Qty: 90 TABLET | Refills: 0 | Status: CANCELLED | OUTPATIENT
Start: 2022-11-02

## 2022-11-02 RX ORDER — ALLOPURINOL 100 MG/1
100 TABLET ORAL DAILY
COMMUNITY

## 2022-11-02 RX ORDER — SEMAGLUTIDE 1.34 MG/ML
0.5 INJECTION, SOLUTION SUBCUTANEOUS WEEKLY
Qty: 4 ADJUSTABLE DOSE PRE-FILLED PEN SYRINGE | Refills: 2 | Status: SHIPPED | OUTPATIENT
Start: 2022-11-02

## 2022-11-02 RX ORDER — FESOTERODINE FUMARATE 4 MG/1
4 TABLET, EXTENDED RELEASE ORAL DAILY
COMMUNITY

## 2022-11-02 RX ORDER — EZETIMIBE 10 MG/1
10 TABLET ORAL DAILY
Qty: 90 TABLET | Refills: 0 | Status: SHIPPED | OUTPATIENT
Start: 2022-11-02

## 2022-11-02 RX ORDER — FLUTICASONE PROPIONATE 50 MCG
1 SPRAY, SUSPENSION (ML) NASAL 2 TIMES DAILY
Qty: 16 G | Refills: 2 | Status: CANCELLED | OUTPATIENT
Start: 2022-11-02

## 2022-11-02 RX ORDER — METOPROLOL TARTRATE 50 MG/1
50 TABLET, FILM COATED ORAL 2 TIMES DAILY
COMMUNITY

## 2022-11-02 RX ORDER — PANTOPRAZOLE SODIUM 40 MG/1
40 TABLET, DELAYED RELEASE ORAL DAILY
Qty: 90 TABLET | Refills: 0 | Status: SHIPPED | OUTPATIENT
Start: 2022-11-02

## 2022-11-02 RX ORDER — LISINOPRIL 2.5 MG/1
2.5 TABLET ORAL DAILY
COMMUNITY

## 2022-11-02 SDOH — ECONOMIC STABILITY: FOOD INSECURITY: WITHIN THE PAST 12 MONTHS, YOU WORRIED THAT YOUR FOOD WOULD RUN OUT BEFORE YOU GOT MONEY TO BUY MORE.: OFTEN TRUE

## 2022-11-02 SDOH — ECONOMIC STABILITY: FOOD INSECURITY: WITHIN THE PAST 12 MONTHS, THE FOOD YOU BOUGHT JUST DIDN'T LAST AND YOU DIDN'T HAVE MONEY TO GET MORE.: OFTEN TRUE

## 2022-11-02 ASSESSMENT — ENCOUNTER SYMPTOMS
TROUBLE SWALLOWING: 0
BLOOD IN STOOL: 0
ABDOMINAL PAIN: 0
SORE THROAT: 0
ABDOMINAL DISTENTION: 0
CONSTIPATION: 0
RHINORRHEA: 0
ALLERGIC/IMMUNOLOGIC NEGATIVE: 1
BACK PAIN: 0
DIARRHEA: 0
VOMITING: 0
NAUSEA: 0
COLOR CHANGE: 0
SINUS PRESSURE: 0

## 2022-11-02 ASSESSMENT — PATIENT HEALTH QUESTIONNAIRE - PHQ9
SUM OF ALL RESPONSES TO PHQ9 QUESTIONS 1 & 2: 2
SUM OF ALL RESPONSES TO PHQ QUESTIONS 1-9: 4
3. TROUBLE FALLING OR STAYING ASLEEP: 1
7. TROUBLE CONCENTRATING ON THINGS, SUCH AS READING THE NEWSPAPER OR WATCHING TELEVISION: 0
SUM OF ALL RESPONSES TO PHQ QUESTIONS 1-9: 4
6. FEELING BAD ABOUT YOURSELF - OR THAT YOU ARE A FAILURE OR HAVE LET YOURSELF OR YOUR FAMILY DOWN: 0
1. LITTLE INTEREST OR PLEASURE IN DOING THINGS: 1
5. POOR APPETITE OR OVEREATING: 0
SUM OF ALL RESPONSES TO PHQ QUESTIONS 1-9: 4
SUM OF ALL RESPONSES TO PHQ QUESTIONS 1-9: 4
9. THOUGHTS THAT YOU WOULD BE BETTER OFF DEAD, OR OF HURTING YOURSELF: 0
10. IF YOU CHECKED OFF ANY PROBLEMS, HOW DIFFICULT HAVE THESE PROBLEMS MADE IT FOR YOU TO DO YOUR WORK, TAKE CARE OF THINGS AT HOME, OR GET ALONG WITH OTHER PEOPLE: 1
2. FEELING DOWN, DEPRESSED OR HOPELESS: 1
4. FEELING TIRED OR HAVING LITTLE ENERGY: 1
8. MOVING OR SPEAKING SO SLOWLY THAT OTHER PEOPLE COULD HAVE NOTICED. OR THE OPPOSITE, BEING SO FIGETY OR RESTLESS THAT YOU HAVE BEEN MOVING AROUND A LOT MORE THAN USUAL: 0

## 2022-11-02 ASSESSMENT — SOCIAL DETERMINANTS OF HEALTH (SDOH): HOW HARD IS IT FOR YOU TO PAY FOR THE VERY BASICS LIKE FOOD, HOUSING, MEDICAL CARE, AND HEATING?: VERY HARD

## 2022-11-02 NOTE — PROGRESS NOTES
Ricky Christopher presents today for follow up of diabetes, HTN, CKD, Peripheral Diabetic Neuropathy,GERD, High chol, Depression, Urinary incontinence    Current Outpatient Medications   Medication Sig Dispense Refill    Semaglutide,0.25 or 0.5MG/DOS, (OZEMPIC, 0.25 OR 0.5 MG/DOSE,) 2 MG/1.5ML SOPN Inject 0.5 mg into the skin once a week 4 Adjustable Dose Pre-filled Pen Syringe 2    lisinopril (PRINIVIL;ZESTRIL) 40 MG tablet Take 1 tablet by mouth daily 90 tablet 0    ezetimibe (ZETIA) 10 MG tablet Take 1 tablet by mouth daily 90 tablet 0    aspirin EC 81 MG EC tablet Take 1 tablet by mouth nightly 90 tablet 0    insulin lispro protamine & lispro (HUMALOG MIX 75/25) (75-25) 100 UNIT per ML SUSP injection vial Inject 78 Units into the skin 2 times daily (with meals) 4 each 2    allopurinol (ZYLOPRIM) 100 MG tablet Take 100 mg by mouth daily      Citalopram Hydrobromide 30 MG CAPS Take by mouth      lisinopril (PRINIVIL;ZESTRIL) 2.5 MG tablet Take 2.5 mg by mouth daily      pregabalin (LYRICA) 100 MG capsule Take 100 mg by mouth 2 times daily.       prednisoLONE acetate (PRED FORTE) 1 % ophthalmic suspension Place 1 drop into both eyes in the morning and at bedtime      fesoterodine (TOVIAZ) 4 MG TB24 ER tablet Take 4 mg by mouth daily      metoprolol tartrate (LOPRESSOR) 50 MG tablet Take 50 mg by mouth 2 times daily      pantoprazole (PROTONIX) 40 MG tablet Take 1 tablet by mouth daily 90 tablet 0    niacin (SLO-NIACIN) 500 MG extended release tablet Take 1 tablet by mouth nightly 90 tablet 0    vitamin C (ASCORBIC ACID) 500 MG tablet Take 1 tablet by mouth daily 90 tablet 0    famotidine (PEPCID) 40 MG tablet Take 1 tablet by mouth every evening 30 tablet 3    Caltrate 600+D Plus Minerals (CALTRATE) 600-800 MG-UNIT TABS tablet Take 1 tablet by mouth daily 90 tablet 0    Continuous Blood Gluc Sensor (FREESTYLE MAGALIS 2 SENSOR) MISC apply 1 SENSOR to back OF UPPER ARM REMOVE AND REPLACE every 14 days use with DEVICE to MONITOR BLOOD SUGAR 2 each 5    NONFORMULARY Take 20 mg by mouth daily Vit D 3      potassium chloride (KLOR-CON M) 10 MEQ extended release tablet Take 1 tablet by mouth every other day 45 tablet 3    pravastatin (PRAVACHOL) 40 MG tablet Take 1 tablet by mouth nightly 90 tablet 3    B-D UF III MINI PEN NEEDLES 31G X 5 MM MISC   0    therapeutic multivitamin-minerals (THERAGRAN-M) tablet Take 1 tablet by mouth daily       FOLBEE 2.5-25-1 MG TABS tablet 1 tablet      fluticasone (FLONASE) 50 MCG/ACT nasal spray 1 spray by Each Nostril route 2 times daily      RESTASIS 0.05 % ophthalmic emulsion place 1 drop into both eyes twice a day (Patient not taking: Reported on 11/2/2022)  0    cyanocobalamin ([employer]S VITAMIN B12) 1000 MCG tablet Take 1 tablet by mouth daily. (Patient not taking: Reported on 11/2/2022) 90 tablet 0    Calcium Carbonate-Vitamin D (CALTRATE 600+D PO) Take 1 tablet by mouth daily. (Patient not taking: Reported on 11/2/2022)       No current facility-administered medications for this visit. Past Medical History:   Diagnosis Date    ASHD (arteriosclerotic heart disease)     CAD (coronary artery disease)     Cancer ([address]     breast ca, right    Charcot foot due to diabetes mellitus ([address] 01/29/2012    Charcot foot due to diabetes mellitus ([address]     right, wears boot. Chronic diastolic congestive heart failure ([address] 11/27/2017    Chronic kidney disease     sees nephrology    Depression     Diabetic peripheral neuropathy ([address]     Diarrhea     Essential hypertension 05/24/2018    GERD (gastroesophageal reflux disease)     History of therapeutic radiation     Hyperlipidemia     Hypertension     Lactose intolerance     Thyroid disease     Type II or unspecified type diabetes mellitus without mention of complication, not stated as uncontrolled     Ulcer of foot ([address]     left covered with dry dressing    Urinary incontinence           Subjective:  As above.   Having low bs in the middle of the night, several times a week. Has been having to cut back on bed time Insulin. Few episodes through the day. Does eat regular meals and tries to follow a diabetic diet. Heartburn in the middle of the nigh. No nausea, vomiting or black stools. Is on GERD med q am. No chest pain or swelling. Review of Systems   Constitutional:  Negative for activity change, appetite change and chills. HENT:  Negative for congestion, ear pain, mouth sores, postnasal drip, rhinorrhea, sinus pressure, sneezing, sore throat and trouble swallowing. Eyes:  Negative for visual disturbance. Cardiovascular:  Negative for chest pain, palpitations and leg swelling. Gastrointestinal:  Negative for abdominal distention, abdominal pain, blood in stool, constipation, diarrhea, nausea and vomiting. Heartburn   Endocrine: Negative for cold intolerance, heat intolerance, polydipsia and polyuria. Genitourinary:  Negative for difficulty urinating, dysuria, flank pain, frequency and urgency. Musculoskeletal:  Negative for arthralgias, back pain, gait problem, neck pain and neck stiffness. Skin: Negative. Negative for color change. Allergic/Immunologic: Negative. Neurological:  Negative for dizziness, tremors, speech difficulty, weakness, light-headedness and headaches. Hematological: Negative. Psychiatric/Behavioral: Negative. Objective:  BP (!) 122/58 (Site: Left Upper Arm, Position: Sitting, Cuff Size: Medium Adult)   Pulse 71   Temp 97.3 °F (36.3 °C)   Ht 5' 5.5\" (1.664 m)   Wt 232 lb (105.2 kg)   SpO2 96%   BMI 38.02 kg/m²      Physical Exam  Constitutional:       Appearance: She is obese. HENT:      Head: Normocephalic. Right Ear: Tympanic membrane and external ear normal. There is no impacted cerumen. Left Ear: Tympanic membrane and external ear normal. There is no impacted cerumen. Nose: Nose normal.      Mouth/Throat:      Pharynx: Oropharynx is clear. No oropharyngeal exudate. Eyes:      Extraocular Movements: Extraocular movements intact. Conjunctiva/sclera: Conjunctivae normal.      Pupils: Pupils are equal, round, and reactive to light. Cardiovascular:      Rate and Rhythm: Normal rate and regular rhythm. Heart sounds: Murmur (janeth lsb and rt 2nd ics) heard. No friction rub. No gallop. Pulmonary:      Effort: Pulmonary effort is normal.      Breath sounds: Normal breath sounds. Abdominal:      General: Bowel sounds are normal. There is no distension. Palpations: Abdomen is soft. There is no mass. Tenderness: There is no abdominal tenderness. There is no guarding or rebound. Musculoskeletal:         General: No swelling, tenderness or deformity. Normal range of motion. Cervical back: Normal range of motion and neck supple. No tenderness. Lymphadenopathy:      Cervical: No cervical adenopathy. Skin:     General: Skin is warm. Coloration: Skin is not pale. Findings: No rash. Neurological:      General: No focal deficit present. Mental Status: She is alert and oriented to person, place, and time. Medications reconciled. Assessment:  Kim was seen today for diabetes. Diagnoses and all orders for this visit:    S/P CABG (coronary artery bypass graft)  Comments:  asymptomatic    Stage 3 chronic kidney disease, unspecified whether stage 3a or 3b CKD ([facility]    Essential hypertension  Comments:  controlled    Type 2 diabetes mellitus with diabetic neuropathic arthropathy, with long-term current use of insulin ([facility])  Comments:  Hypoglycemic episodes in the middle of the night. Decrease insuline to 70 bid, keep dropping by 2 units until bs never drops under 100. Orders:  -     Comprehensive Metabolic Panel, Fasting; Future  -     POCT glycosylated hemoglobin (Hb A1C)  -     POCT Microalbumin    Serum lipids high  Comments: On statin, rechecked today  Orders:  -     LIPID PANEL;  Future    Gastroesophageal reflux disease without esophagitis  Comments:  Heartburn in the middle of the night. Discussed dietary restrictions, started on Pepcid at HS    Charcot foot due to diabetes mellitus ([address]    Persistent depressive disorder  Comments:  Controlled on med    Functional urinary incontinence    Other orders  -     Semaglutide,0.25 or 0.5MG/DOS, (OZEMPIC, 0.25 OR 0.5 MG/DOSE,) 2 MG/1.5ML SOPN; Inject 0.5 mg into the skin once a week  -     lisinopril (PRINIVIL;ZESTRIL) 40 MG tablet; Take 1 tablet by mouth daily  -     ezetimibe (ZETIA) 10 MG tablet; Take 1 tablet by mouth daily  -     aspirin EC 81 MG EC tablet; Take 1 tablet by mouth nightly  -     insulin lispro protamine & lispro (HUMALOG MIX 75/25) (75-25) 100 UNIT per ML SUSP injection vial; Inject 78 Units into the skin 2 times daily (with meals)  -     pantoprazole (PROTONIX) 40 MG tablet; Take 1 tablet by mouth daily  -     niacin (SLO-NIACIN) 500 MG extended release tablet; Take 1 tablet by mouth nightly  -     vitamin C (ASCORBIC ACID) 500 MG tablet; Take 1 tablet by mouth daily  -     famotidine (PEPCID) 40 MG tablet;  Take 1 tablet by mouth every evening

## 2022-11-07 ENCOUNTER — TELEPHONE (OUTPATIENT)
Dept: PRIMARY CARE CLINIC | Age: 72
End: 2022-11-07

## 2022-11-08 RX ORDER — ASCORBIC ACID 500 MG
500 TABLET ORAL DAILY
Qty: 90 TABLET | Refills: 0 | Status: SHIPPED | OUTPATIENT
Start: 2022-11-08

## 2022-11-11 ENCOUNTER — TELEPHONE (OUTPATIENT)
Dept: PRIMARY CARE CLINIC | Age: 72
End: 2022-11-11

## 2022-11-29 ENCOUNTER — TELEPHONE (OUTPATIENT)
Dept: PRIMARY CARE CLINIC | Age: 72
End: 2022-11-29

## 2022-11-29 NOTE — TELEPHONE ENCOUNTER
Patient requesting something be sent into [address] [place] [address] on [address] For cough and drainage. She has tested positive for Covid and is also requesting a prescription for a home Covid test if her insurance will cover it. Thank you.

## 2022-11-30 RX ORDER — BENZONATATE 100 MG/1
100 CAPSULE ORAL 3 TIMES DAILY PRN
Qty: 30 CAPSULE | Refills: 0 | Status: SHIPPED | OUTPATIENT
Start: 2022-11-30 | End: 2022-12-07

## 2022-12-08 NOTE — TELEPHONE ENCOUNTER
Patient requesting a refill of her Humalog 75-25 sent into [facility] on [address] Thank you.

## 2023-01-03 RX ORDER — INSULIN LISPRO 100 [IU]/ML
INJECTION, SUSPENSION SUBCUTANEOUS
Qty: 12 ML | OUTPATIENT
Start: 2023-01-03

## 2023-01-04 ENCOUNTER — TELEPHONE (OUTPATIENT)
Dept: PRIMARY CARE CLINIC | Age: 73
End: 2023-01-04

## 2023-01-10 ENCOUNTER — NURSE ONLY (OUTPATIENT)
Dept: PRIMARY CARE CLINIC | Age: 73
End: 2023-01-10

## 2023-01-10 DIAGNOSIS — R30.0 DYSURIA: Primary | ICD-10-CM

## 2023-01-10 DIAGNOSIS — R30.0 DYSURIA: ICD-10-CM

## 2023-01-10 LAB
BILIRUBIN, POC: ABNORMAL
BLOOD URINE, POC: ABNORMAL
CLARITY, POC: CLEAR
COLOR, POC: YELLOW
GLUCOSE URINE, POC: ABNORMAL
KETONES, POC: ABNORMAL
LEUKOCYTE EST, POC: ABNORMAL
NITRITE, POC: POSITIVE
PH, POC: 5.5
PROTEIN, POC: ABNORMAL
SPECIFIC GRAVITY, POC: 1.02
UROBILINOGEN, POC: ABNORMAL

## 2023-01-10 RX ORDER — SULFAMETHOXAZOLE AND TRIMETHOPRIM 800; 160 MG/1; MG/1
1 TABLET ORAL 2 TIMES DAILY
Qty: 20 TABLET | Refills: 0 | Status: SHIPPED | OUTPATIENT
Start: 2023-01-10 | End: 2023-01-20

## 2023-01-13 LAB
ORGANISM: ABNORMAL
URINE CULTURE, ROUTINE: ABNORMAL

## 2023-01-24 RX ORDER — EZETIMIBE 10 MG/1
TABLET ORAL
Qty: 90 TABLET | Refills: 0 | OUTPATIENT
Start: 2023-01-24

## 2023-01-24 RX ORDER — LISINOPRIL 40 MG/1
TABLET ORAL
Qty: 90 TABLET | Refills: 0 | OUTPATIENT
Start: 2023-01-24

## 2023-02-01 RX ORDER — PRAVASTATIN SODIUM 40 MG
40 TABLET ORAL NIGHTLY
Qty: 90 TABLET | Refills: 3 | Status: SHIPPED | OUTPATIENT
Start: 2023-02-01

## 2023-02-01 RX ORDER — METOPROLOL TARTRATE 50 MG/1
50 TABLET, FILM COATED ORAL 2 TIMES DAILY
Qty: 180 TABLET | Refills: 3 | Status: SHIPPED | OUTPATIENT
Start: 2023-02-01

## 2023-02-01 RX ORDER — EZETIMIBE 10 MG/1
10 TABLET ORAL DAILY
Qty: 90 TABLET | Refills: 3 | Status: SHIPPED | OUTPATIENT
Start: 2023-02-01

## 2023-02-03 ENCOUNTER — OFFICE VISIT (OUTPATIENT)
Dept: PRIMARY CARE CLINIC | Age: 73
End: 2023-02-03
Payer: COMMERCIAL

## 2023-02-03 VITALS
DIASTOLIC BLOOD PRESSURE: 62 MMHG | SYSTOLIC BLOOD PRESSURE: 122 MMHG | HEIGHT: 66 IN | OXYGEN SATURATION: 93 % | BODY MASS INDEX: 37.77 KG/M2 | TEMPERATURE: 97.7 F | WEIGHT: 235 LBS | HEART RATE: 73 BPM

## 2023-02-03 DIAGNOSIS — E11.69 TYPE 2 DIABETES MELLITUS WITH OTHER SPECIFIED COMPLICATION, WITH LONG-TERM CURRENT USE OF INSULIN (HCC): Primary | ICD-10-CM

## 2023-02-03 DIAGNOSIS — J40 BRONCHITIS: ICD-10-CM

## 2023-02-03 DIAGNOSIS — Z79.4 TYPE 2 DIABETES MELLITUS WITH OTHER SPECIFIED COMPLICATION, WITH LONG-TERM CURRENT USE OF INSULIN (HCC): Primary | ICD-10-CM

## 2023-02-03 DIAGNOSIS — E78.00 HIGH CHOLESTEROL: ICD-10-CM

## 2023-02-03 LAB
ALBUMIN SERPL-MCNC: 3.6 G/DL (ref 3.5–5.2)
ALP BLD-CCNC: 48 U/L (ref 35–104)
ALT SERPL-CCNC: 21 U/L (ref 0–32)
ANION GAP SERPL CALCULATED.3IONS-SCNC: 11 MMOL/L (ref 7–16)
AST SERPL-CCNC: 33 U/L (ref 0–31)
BILIRUB SERPL-MCNC: 0.3 MG/DL (ref 0–1.2)
BUN BLDV-MCNC: 27 MG/DL (ref 6–23)
CALCIUM SERPL-MCNC: 9.3 MG/DL (ref 8.6–10.2)
CHLORIDE BLD-SCNC: 100 MMOL/L (ref 98–107)
CHOLESTEROL, TOTAL: 147 MG/DL (ref 0–199)
CO2: 27 MMOL/L (ref 22–29)
CREAT SERPL-MCNC: 1.3 MG/DL (ref 0.5–1)
GFR SERPL CREATININE-BSD FRML MDRD: 44 ML/MIN/1.73
GLUCOSE FASTING: 97 MG/DL (ref 74–99)
HBA1C MFR BLD: 7 %
HDLC SERPL-MCNC: 55 MG/DL
LDL CHOLESTEROL CALCULATED: 73 MG/DL (ref 0–99)
POTASSIUM SERPL-SCNC: 4.4 MMOL/L (ref 3.5–5)
SODIUM BLD-SCNC: 138 MMOL/L (ref 132–146)
TOTAL PROTEIN: 6.3 G/DL (ref 6.4–8.3)
TRIGL SERPL-MCNC: 94 MG/DL (ref 0–149)
VLDLC SERPL CALC-MCNC: 19 MG/DL

## 2023-02-03 PROCEDURE — G8400 PT W/DXA NO RESULTS DOC: HCPCS | Performed by: INTERNAL MEDICINE

## 2023-02-03 PROCEDURE — G8427 DOCREV CUR MEDS BY ELIG CLIN: HCPCS | Performed by: INTERNAL MEDICINE

## 2023-02-03 PROCEDURE — 3078F DIAST BP <80 MM HG: CPT | Performed by: INTERNAL MEDICINE

## 2023-02-03 PROCEDURE — 1090F PRES/ABSN URINE INCON ASSESS: CPT | Performed by: INTERNAL MEDICINE

## 2023-02-03 PROCEDURE — 3074F SYST BP LT 130 MM HG: CPT | Performed by: INTERNAL MEDICINE

## 2023-02-03 PROCEDURE — 2022F DILAT RTA XM EVC RTNOPTHY: CPT | Performed by: INTERNAL MEDICINE

## 2023-02-03 PROCEDURE — 3051F HG A1C>EQUAL 7.0%<8.0%: CPT | Performed by: INTERNAL MEDICINE

## 2023-02-03 PROCEDURE — 1123F ACP DISCUSS/DSCN MKR DOCD: CPT | Performed by: INTERNAL MEDICINE

## 2023-02-03 PROCEDURE — 83036 HEMOGLOBIN GLYCOSYLATED A1C: CPT | Performed by: INTERNAL MEDICINE

## 2023-02-03 PROCEDURE — 3017F COLORECTAL CA SCREEN DOC REV: CPT | Performed by: INTERNAL MEDICINE

## 2023-02-03 PROCEDURE — G8417 CALC BMI ABV UP PARAM F/U: HCPCS | Performed by: INTERNAL MEDICINE

## 2023-02-03 PROCEDURE — G8484 FLU IMMUNIZE NO ADMIN: HCPCS | Performed by: INTERNAL MEDICINE

## 2023-02-03 PROCEDURE — 99214 OFFICE O/P EST MOD 30 MIN: CPT | Performed by: INTERNAL MEDICINE

## 2023-02-03 PROCEDURE — 1036F TOBACCO NON-USER: CPT | Performed by: INTERNAL MEDICINE

## 2023-02-03 RX ORDER — GINGER ROOT/GINGER ROOT EXT 262.5 MG
1 CAPSULE ORAL DAILY
COMMUNITY

## 2023-02-03 RX ORDER — ALBUTEROL SULFATE 90 UG/1
2 AEROSOL, METERED RESPIRATORY (INHALATION) EVERY 6 HOURS PRN
Qty: 18 G | Refills: 3 | Status: SHIPPED | OUTPATIENT
Start: 2023-02-03

## 2023-02-03 RX ORDER — PANTOPRAZOLE SODIUM 40 MG/1
40 TABLET, DELAYED RELEASE ORAL DAILY
Qty: 90 TABLET | Refills: 0 | Status: SHIPPED | OUTPATIENT
Start: 2023-02-03

## 2023-02-03 RX ORDER — AZITHROMYCIN 250 MG/1
250 TABLET, FILM COATED ORAL SEE ADMIN INSTRUCTIONS
Qty: 6 TABLET | Refills: 0 | Status: SHIPPED | OUTPATIENT
Start: 2023-02-03 | End: 2023-02-08

## 2023-02-03 SDOH — ECONOMIC STABILITY: HOUSING INSECURITY
IN THE LAST 12 MONTHS, WAS THERE A TIME WHEN YOU DID NOT HAVE A STEADY PLACE TO SLEEP OR SLEPT IN A SHELTER (INCLUDING NOW)?: NO

## 2023-02-03 SDOH — ECONOMIC STABILITY: FOOD INSECURITY: WITHIN THE PAST 12 MONTHS, YOU WORRIED THAT YOUR FOOD WOULD RUN OUT BEFORE YOU GOT MONEY TO BUY MORE.: OFTEN TRUE

## 2023-02-03 SDOH — ECONOMIC STABILITY: FOOD INSECURITY: WITHIN THE PAST 12 MONTHS, THE FOOD YOU BOUGHT JUST DIDN'T LAST AND YOU DIDN'T HAVE MONEY TO GET MORE.: OFTEN TRUE

## 2023-02-03 SDOH — ECONOMIC STABILITY: INCOME INSECURITY: HOW HARD IS IT FOR YOU TO PAY FOR THE VERY BASICS LIKE FOOD, HOUSING, MEDICAL CARE, AND HEATING?: VERY HARD

## 2023-02-03 ASSESSMENT — PATIENT HEALTH QUESTIONNAIRE - PHQ9
3. TROUBLE FALLING OR STAYING ASLEEP: 1
4. FEELING TIRED OR HAVING LITTLE ENERGY: 3
7. TROUBLE CONCENTRATING ON THINGS, SUCH AS READING THE NEWSPAPER OR WATCHING TELEVISION: 0
8. MOVING OR SPEAKING SO SLOWLY THAT OTHER PEOPLE COULD HAVE NOTICED. OR THE OPPOSITE, BEING SO FIGETY OR RESTLESS THAT YOU HAVE BEEN MOVING AROUND A LOT MORE THAN USUAL: 0
6. FEELING BAD ABOUT YOURSELF - OR THAT YOU ARE A FAILURE OR HAVE LET YOURSELF OR YOUR FAMILY DOWN: 0
SUM OF ALL RESPONSES TO PHQ QUESTIONS 1-9: 5
5. POOR APPETITE OR OVEREATING: 0
1. LITTLE INTEREST OR PLEASURE IN DOING THINGS: 1
2. FEELING DOWN, DEPRESSED OR HOPELESS: 0
SUM OF ALL RESPONSES TO PHQ QUESTIONS 1-9: 5
SUM OF ALL RESPONSES TO PHQ QUESTIONS 1-9: 5
10. IF YOU CHECKED OFF ANY PROBLEMS, HOW DIFFICULT HAVE THESE PROBLEMS MADE IT FOR YOU TO DO YOUR WORK, TAKE CARE OF THINGS AT HOME, OR GET ALONG WITH OTHER PEOPLE: 0
9. THOUGHTS THAT YOU WOULD BE BETTER OFF DEAD, OR OF HURTING YOURSELF: 0
SUM OF ALL RESPONSES TO PHQ9 QUESTIONS 1 & 2: 1
SUM OF ALL RESPONSES TO PHQ QUESTIONS 1-9: 5

## 2023-02-03 NOTE — PROGRESS NOTES
Leo Caldwell presents today for follow up of Diabetes, HTN, High chol, Renal Diasease    Current Outpatient Medications   Medication Sig Dispense Refill    calcium carb-cholecalciferol (HM CALCIUM-VITAMIN D) 600-20 MG-MCG TABS Take 1 tablet by mouth daily      Dulaglutide 0.75 MG/0.5ML SOPN Inject 0.75 mg into the skin once a week 4 Adjustable Dose Pre-filled Pen Syringe 2    pantoprazole (PROTONIX) 40 MG tablet Take 1 tablet by mouth daily 90 tablet 0    insulin lispro protamine & lispro (HUMALOG MIX 75/25) (75-25) 100 UNIT per ML SUSP injection vial Inject 78 Units into the skin 2 times daily (with meals) 4 each 2    albuterol sulfate HFA (PROVENTIL HFA) 108 (90 Base) MCG/ACT inhaler Inhale 2 puffs into the lungs every 6 hours as needed for Wheezing 18 g 3    azithromycin (ZITHROMAX) 250 MG tablet Take 1 tablet by mouth See Admin Instructions for 5 days 500mg on day 1 followed by 250mg on days 2 - 5 6 tablet 0    ezetimibe (ZETIA) 10 MG tablet Take 1 tablet by mouth daily 90 tablet 3    metoprolol tartrate (LOPRESSOR) 50 MG tablet Take 1 tablet by mouth 2 times daily 180 tablet 3    pravastatin (PRAVACHOL) 40 MG tablet Take 1 tablet by mouth nightly 90 tablet 3    vitamin C (ASCORBIC ACID) 500 MG tablet Take 1 tablet by mouth daily 90 tablet 0    aspirin EC 81 MG EC tablet Take 1 tablet by mouth nightly 90 tablet 0    allopurinol (ZYLOPRIM) 100 MG tablet Take 100 mg by mouth daily      Citalopram Hydrobromide 30 MG CAPS Take by mouth      lisinopril (PRINIVIL;ZESTRIL) 2.5 MG tablet Take 2.5 mg by mouth daily      pregabalin (LYRICA) 100 MG capsule Take 100 mg by mouth 2 times daily.       prednisoLONE acetate (PRED FORTE) 1 % ophthalmic suspension Place 1 drop into both eyes in the morning and at bedtime      FOLBEE 2.5-25-1 MG TABS tablet 1 tablet      niacin (SLO-NIACIN) 500 MG extended release tablet Take 1 tablet by mouth nightly 90 tablet 0    Continuous Blood Gluc Sensor (FREESTYLE MAGALIS 2 SENSOR) MISC apply 1 SENSOR to back OF UPPER ARM REMOVE AND REPLACE every 14 days use with DEVICE to MONITOR BLOOD SUGAR 2 each 5    potassium chloride (KLOR-CON M) 10 MEQ extended release tablet Take 1 tablet by mouth every other day 45 tablet 3    B-D UF III MINI PEN NEEDLES 31G X 5 MM MISC   0    therapeutic multivitamin-minerals (THERAGRAN-M) tablet Take 1 tablet by mouth daily       Semaglutide,0.25 or 0.5MG/DOS, (OZEMPIC, 0.25 OR 0.5 MG/DOSE,) 2 MG/1.5ML SOPN Inject 0.5 mg into the skin once a week (Patient not taking: Reported on 2/3/2023) 4 Adjustable Dose Pre-filled Pen Syringe 2    lisinopril (PRINIVIL;ZESTRIL) 40 MG tablet Take 1 tablet by mouth daily (Patient not taking: Reported on 2/3/2023) 90 tablet 0    fesoterodine (TOVIAZ) 4 MG TB24 ER tablet Take 4 mg by mouth daily      famotidine (PEPCID) 40 MG tablet Take 1 tablet by mouth every evening (Patient not taking: Reported on 2/3/2023) 30 tablet 3    Caltrate 600+D Plus Minerals (CALTRATE) 600-800 MG-UNIT TABS tablet Take 1 tablet by mouth daily (Patient not taking: Reported on 2/3/2023) 90 tablet 0    fluticasone (FLONASE) 50 MCG/ACT nasal spray 1 spray by Each Nostril route 2 times daily (Patient not taking: Reported on 2/3/2023)      NONFORMULARY Take 20 mg by mouth daily Vit D 3 (Patient not taking: Reported on 2/3/2023)      RESTASIS 0.05 % ophthalmic emulsion place 1 drop into both eyes twice a day (Patient not taking: No sig reported)  0    cyanocobalamin ([employer] VITAMIN B12) 1000 MCG tablet Take 1 tablet by mouth daily. (Patient not taking: No sig reported) 90 tablet 0    Calcium Carbonate-Vitamin D (CALTRATE 600+D PO) Take 1 tablet by mouth daily. (Patient not taking: No sig reported)       No current facility-administered medications for this visit.       Past Medical History:   Diagnosis Date    ASHD (arteriosclerotic heart disease)     CAD (coronary artery disease)     Cancer ([facility] [address]     breast ca, right    Charcot foot due to diabetes mellitus ([address] 01/29/2012 Charcot foot due to diabetes mellitus ([facility] [address]     right, wears boot. Chronic diastolic congestive heart failure ([facility] [address] 11/27/2017    Chronic kidney disease     sees nephrology    Depression     Diabetic peripheral neuropathy ([facility]     Diarrhea     Essential hypertension 05/24/2018    GERD (gastroesophageal reflux disease)     History of therapeutic radiation     Hyperlipidemia     Hypertension     Lactose intolerance     Thyroid disease     Type II or unspecified type diabetes mellitus without mention of complication, not stated as uncontrolled     Ulcer of foot ([facility]     left covered with dry dressing    Urinary incontinence           Subjective: Went to [facility] due to cough, wheezing. Grandson had a viral infection, Jasper Pne*umo virus,  and Strep throat. On Benzonate and Doxi, today is the 3rd day on meds. Still coughing and has a lot of wheezing. Has no inhalers. No fever, does get short winded going up and down stairs,  Covid test was neg. Is fasting for regular labs. Pharmacy has been out of [facility] so she got samples of Trulicity 1.5 instead, refers bs has been up to 175 since then. Diabetes  Pertinent negatives for hypoglycemia include no dizziness, headaches, speech difficulty or tremors. Pertinent negatives for diabetes include no chest pain, no polydipsia, no polyuria and no weakness. Review of Systems   Constitutional:  Negative for activity change, appetite change and chills. HENT:  Negative for congestion, ear pain, mouth sores, postnasal drip, rhinorrhea, sinus pressure, sneezing, sore throat and trouble swallowing. Eyes:  Negative for visual disturbance. Respiratory:  Positive for cough, chest tightness and wheezing. Cardiovascular:  Negative for chest pain, palpitations and leg swelling. Gastrointestinal:  Negative for abdominal distention, abdominal pain, blood in stool, constipation, diarrhea, nausea and vomiting.    Endocrine: Negative for cold intolerance, heat intolerance, polydipsia and polyuria. Genitourinary:  Negative for difficulty urinating, dysuria, flank pain, frequency and urgency. Musculoskeletal:  Negative for arthralgias, back pain, gait problem, neck pain and neck stiffness. Skin: Negative. Negative for color change. Allergic/Immunologic: Negative. Neurological:  Negative for dizziness, tremors, speech difficulty, weakness, light-headedness and headaches. Hematological: Negative. Psychiatric/Behavioral: Negative. Objective:  /62 (Site: Left Upper Arm, Position: Sitting, Cuff Size: Medium Adult)   Pulse 73   Temp 97.7 °F (36.5 °C)   Ht 5' 5.5\" (1.664 m)   Wt 235 lb (106.6 kg)   SpO2 93%   BMI 38.51 kg/m²      Physical Exam  Vitals reviewed. Constitutional:       Comments: Coughing, wheezing   HENT:      Head: Normocephalic. Right Ear: Tympanic membrane and external ear normal. There is no impacted cerumen. Left Ear: Tympanic membrane and external ear normal. There is no impacted cerumen. Nose: Nose normal.      Mouth/Throat:      Pharynx: Oropharynx is clear. Posterior oropharyngeal erythema present. No oropharyngeal exudate. Eyes:      Extraocular Movements: Extraocular movements intact. Conjunctiva/sclera: Conjunctivae normal.      Pupils: Pupils are equal, round, and reactive to light. Cardiovascular:      Rate and Rhythm: Normal rate and regular rhythm. Heart sounds: No murmur heard. No friction rub. No gallop. Pulmonary:      Effort: Pulmonary effort is normal.      Breath sounds: Normal breath sounds. Abdominal:      General: Bowel sounds are normal. There is no distension. Palpations: Abdomen is soft. There is no mass. Tenderness: There is no abdominal tenderness. There is no guarding or rebound. Musculoskeletal:         General: No swelling, tenderness or deformity. Normal range of motion. Cervical back: Normal range of motion and neck supple. No tenderness. Lymphadenopathy:      Cervical: No cervical adenopathy. Skin:     General: Skin is warm. Coloration: Skin is not pale. Findings: No rash. Neurological:      General: No focal deficit present. Mental Status: She is alert and oriented to person, place, and time. Assessment:  Kim was seen today for diabetes. Diagnoses and all orders for this visit:    Type 2 diabetes mellitus with other specified complication, with long-term current use of insulin ([facility] [address]  Comments:  Did cmp and A1C,  Reminded of dietary restricitons,  Renewed meds  Orders:  -     POCT glycosylated hemoglobin (Hb A1C)    High cholesterol  Comments: On statin, did cmp and lipids  Orders:  -     Comprehensive Metabolic Panel, Fasting; Future  -     LIPID PANEL; Future    Bronchitis  Comments:  Received breathing tx with Albuterol by nebulizer. Some improvement of sxs. prescribed antibiotic and Albuterol inhaler. Other orders  -     Dulaglutide 0.75 MG/0.5ML SOPN; Inject 0.75 mg into the skin once a week  -     pantoprazole (PROTONIX) 40 MG tablet; Take 1 tablet by mouth daily  -     insulin lispro protamine & lispro (HUMALOG MIX 75/25) (75-25) 100 UNIT per ML SUSP injection vial; Inject 78 Units into the skin 2 times daily (with meals)  -     albuterol sulfate HFA (PROVENTIL HFA) 108 (90 Base) MCG/ACT inhaler; Inhale 2 puffs into the lungs every 6 hours as needed for Wheezing  -     azithromycin (ZITHROMAX) 250 MG tablet;  Take 1 tablet by mouth See Admin Instructions for 5 days 500mg on day 1 followed by 250mg on days 2 - 5

## 2023-02-06 RX ORDER — LISINOPRIL 40 MG/1
TABLET ORAL
Qty: 90 TABLET | Refills: 0 | OUTPATIENT
Start: 2023-02-06

## 2023-02-06 ASSESSMENT — ENCOUNTER SYMPTOMS
TROUBLE SWALLOWING: 0
RHINORRHEA: 0
ALLERGIC/IMMUNOLOGIC NEGATIVE: 1
ABDOMINAL DISTENTION: 0
CHEST TIGHTNESS: 1
VOMITING: 0
COUGH: 1
COLOR CHANGE: 0
SINUS PRESSURE: 0
NAUSEA: 0
BLOOD IN STOOL: 0
BACK PAIN: 0
ABDOMINAL PAIN: 0
CONSTIPATION: 0
DIARRHEA: 0
SORE THROAT: 0
WHEEZING: 1

## 2023-02-08 ENCOUNTER — TELEPHONE (OUTPATIENT)
Dept: PRIMARY CARE CLINIC | Age: 73
End: 2023-02-08

## 2023-02-08 DIAGNOSIS — R05.1 ACUTE COUGH: Primary | ICD-10-CM

## 2023-02-08 DIAGNOSIS — R05.2 SUBACUTE COUGH: Primary | ICD-10-CM

## 2023-02-08 NOTE — TELEPHONE ENCOUNTER
Pt called and stated she has taken antibiotic from the [facility] visit on 1/31 and just finished her second antibiotic from 2/3 appointment here. She has had no relief and has still been coughing. She would like to know if there is something more that can be sent for her.   Please advise

## 2023-02-15 ENCOUNTER — OFFICE VISIT (OUTPATIENT)
Dept: PRIMARY CARE CLINIC | Age: 73
End: 2023-02-15
Payer: COMMERCIAL

## 2023-02-15 VITALS
SYSTOLIC BLOOD PRESSURE: 120 MMHG | HEIGHT: 66 IN | DIASTOLIC BLOOD PRESSURE: 56 MMHG | OXYGEN SATURATION: 90 % | TEMPERATURE: 97.5 F | HEART RATE: 73 BPM | RESPIRATION RATE: 18 BRPM | BODY MASS INDEX: 37.77 KG/M2 | WEIGHT: 235 LBS

## 2023-02-15 DIAGNOSIS — I10 ESSENTIAL HYPERTENSION: Primary | ICD-10-CM

## 2023-02-15 DIAGNOSIS — E11.610 TYPE 2 DIABETES MELLITUS WITH DIABETIC NEUROPATHIC ARTHROPATHY, WITH LONG-TERM CURRENT USE OF INSULIN (HCC): ICD-10-CM

## 2023-02-15 DIAGNOSIS — Z79.4 TYPE 2 DIABETES MELLITUS WITH DIABETIC NEUROPATHIC ARTHROPATHY, WITH LONG-TERM CURRENT USE OF INSULIN (HCC): ICD-10-CM

## 2023-02-15 DIAGNOSIS — J40 BRONCHITIS: ICD-10-CM

## 2023-02-15 PROCEDURE — G8427 DOCREV CUR MEDS BY ELIG CLIN: HCPCS | Performed by: INTERNAL MEDICINE

## 2023-02-15 PROCEDURE — G8484 FLU IMMUNIZE NO ADMIN: HCPCS | Performed by: INTERNAL MEDICINE

## 2023-02-15 PROCEDURE — 2022F DILAT RTA XM EVC RTNOPTHY: CPT | Performed by: INTERNAL MEDICINE

## 2023-02-15 PROCEDURE — 1090F PRES/ABSN URINE INCON ASSESS: CPT | Performed by: INTERNAL MEDICINE

## 2023-02-15 PROCEDURE — 3078F DIAST BP <80 MM HG: CPT | Performed by: INTERNAL MEDICINE

## 2023-02-15 PROCEDURE — 3074F SYST BP LT 130 MM HG: CPT | Performed by: INTERNAL MEDICINE

## 2023-02-15 PROCEDURE — G8400 PT W/DXA NO RESULTS DOC: HCPCS | Performed by: INTERNAL MEDICINE

## 2023-02-15 PROCEDURE — 1036F TOBACCO NON-USER: CPT | Performed by: INTERNAL MEDICINE

## 2023-02-15 PROCEDURE — 1123F ACP DISCUSS/DSCN MKR DOCD: CPT | Performed by: INTERNAL MEDICINE

## 2023-02-15 PROCEDURE — 3051F HG A1C>EQUAL 7.0%<8.0%: CPT | Performed by: INTERNAL MEDICINE

## 2023-02-15 PROCEDURE — G8417 CALC BMI ABV UP PARAM F/U: HCPCS | Performed by: INTERNAL MEDICINE

## 2023-02-15 PROCEDURE — 3017F COLORECTAL CA SCREEN DOC REV: CPT | Performed by: INTERNAL MEDICINE

## 2023-02-15 PROCEDURE — 99213 OFFICE O/P EST LOW 20 MIN: CPT | Performed by: INTERNAL MEDICINE

## 2023-02-15 RX ORDER — PREDNISONE 10 MG/1
10 TABLET ORAL
Qty: 15 TABLET | Refills: 0 | Status: SHIPPED | OUTPATIENT
Start: 2023-02-15 | End: 2023-02-20

## 2023-02-15 RX ORDER — GANCICLOVIR 1.5 MG/G
GEL OPHTHALMIC
COMMUNITY
Start: 2023-02-09

## 2023-02-15 RX ORDER — POTASSIUM CHLORIDE 750 MG/1
10 TABLET, EXTENDED RELEASE ORAL EVERY OTHER DAY
Qty: 45 TABLET | Refills: 3 | Status: SHIPPED | OUTPATIENT
Start: 2023-02-15

## 2023-02-15 RX ORDER — BENZONATATE 100 MG/1
100 CAPSULE ORAL 3 TIMES DAILY PRN
Qty: 30 CAPSULE | Refills: 0 | Status: SHIPPED | OUTPATIENT
Start: 2023-02-15 | End: 2023-02-25

## 2023-02-15 ASSESSMENT — ENCOUNTER SYMPTOMS: COUGH: 1

## 2023-02-15 NOTE — PROGRESS NOTES
Jaz Cordero presents today for follow up of cough and chest congestion    Current Outpatient Medications   Medication Sig Dispense Refill    ZIRGAN 0.15 % GEL ophthalmic gel       potassium chloride (KLOR-CON M) 10 MEQ extended release tablet Take 1 tablet by mouth every other day 45 tablet 3    predniSONE (DELTASONE) 10 MG tablet Take 1 tablet by mouth 3 times daily (with meals) for 5 days 15 tablet 0    benzonatate (TESSALON) 100 MG capsule Take 1 capsule by mouth 3 times daily as needed for Cough 30 capsule 0    calcium carb-cholecalciferol 600-20 MG-MCG TABS Take 1 tablet by mouth daily      Dulaglutide 0.75 MG/0.5ML SOPN Inject 0.75 mg into the skin once a week 4 Adjustable Dose Pre-filled Pen Syringe 2    pantoprazole (PROTONIX) 40 MG tablet Take 1 tablet by mouth daily 90 tablet 0    insulin lispro protamine & lispro (HUMALOG MIX 75/25) (75-25) 100 UNIT per ML SUSP injection vial Inject 78 Units into the skin 2 times daily (with meals) 4 each 2    albuterol sulfate HFA (PROVENTIL HFA) 108 (90 Base) MCG/ACT inhaler Inhale 2 puffs into the lungs every 6 hours as needed for Wheezing 18 g 3    ezetimibe (ZETIA) 10 MG tablet Take 1 tablet by mouth daily 90 tablet 3    metoprolol tartrate (LOPRESSOR) 50 MG tablet Take 1 tablet by mouth 2 times daily 180 tablet 3    pravastatin (PRAVACHOL) 40 MG tablet Take 1 tablet by mouth nightly 90 tablet 3    vitamin C (ASCORBIC ACID) 500 MG tablet Take 1 tablet by mouth daily 90 tablet 0    Semaglutide,0.25 or 0.5MG/DOS, (OZEMPIC, 0.25 OR 0.5 MG/DOSE,) 2 MG/1.5ML SOPN Inject 0.5 mg into the skin once a week 4 Adjustable Dose Pre-filled Pen Syringe 2    lisinopril (PRINIVIL;ZESTRIL) 40 MG tablet Take 1 tablet by mouth daily 90 tablet 0    aspirin EC 81 MG EC tablet Take 1 tablet by mouth nightly 90 tablet 0    allopurinol (ZYLOPRIM) 100 MG tablet Take 100 mg by mouth daily      Citalopram Hydrobromide 30 MG CAPS Take by mouth      pregabalin (LYRICA) 100 MG capsule Take 100 mg by mouth 2 times daily. fesoterodine (TOVIAZ) 4 MG TB24 ER tablet Take 4 mg by mouth daily      FOLBEE 2.5-25-1 MG TABS tablet 1 tablet      niacin (SLO-NIACIN) 500 MG extended release tablet Take 1 tablet by mouth nightly 90 tablet 0    famotidine (PEPCID) 40 MG tablet Take 1 tablet by mouth every evening 30 tablet 3    therapeutic multivitamin-minerals (THERAGRAN-M) tablet Take 1 tablet by mouth daily       lisinopril (PRINIVIL;ZESTRIL) 2.5 MG tablet Take 2.5 mg by mouth daily (Patient not taking: Reported on 2/15/2023)      prednisoLONE acetate (PRED FORTE) 1 % ophthalmic suspension Place 1 drop into both eyes in the morning and at bedtime (Patient not taking: Reported on 2/15/2023)      Caltrate 600+D Plus Minerals (CALTRATE) 600-800 MG-UNIT TABS tablet Take 1 tablet by mouth daily (Patient not taking: No sig reported) 90 tablet 0    fluticasone (FLONASE) 50 MCG/ACT nasal spray 1 spray by Each Nostril route 2 times daily (Patient not taking: No sig reported)      Continuous Blood Gluc Sensor (FREESTYLE MAGALIS 2 SENSOR) MISC apply 1 SENSOR to back OF UPPER ARM REMOVE AND REPLACE every 14 days use with DEVICE to MONITOR BLOOD SUGAR 2 each 5    NONFORMULARY Take 20 mg by mouth daily Vit D 3 (Patient not taking: No sig reported)      RESTASIS 0.05 % ophthalmic emulsion place 1 drop into both eyes twice a day (Patient not taking: Reported on 2/15/2023)  0    B-D UF III MINI PEN NEEDLES 31G X 5 MM MISC   0    cyanocobalamin ([employer]S VITAMIN B12) 1000 MCG tablet Take 1 tablet by mouth daily. (Patient not taking: No sig reported) 90 tablet 0    Calcium Carbonate-Vitamin D (CALTRATE 600+D PO) Take 1 tablet by mouth daily. (Patient not taking: No sig reported)       No current facility-administered medications for this visit.       Past Medical History:   Diagnosis Date    ASHD (arteriosclerotic heart disease)     CAD (coronary artery disease)     Cancer ([facility] [address]     breast ca, right    Charcot foot due to diabetes mellitus ([facility]) 01/29/2012    Charcot foot due to diabetes mellitus ([facility])     right, wears boot.    Chronic diastolic congestive heart failure ([facility]) 11/27/2017    Chronic kidney disease     sees nephrology    Depression     Diabetic peripheral neuropathy ([facility])     Diarrhea     Essential hypertension 05/24/2018    GERD (gastroesophageal reflux disease)     History of therapeutic radiation     Hyperlipidemia     Hypertension     Lactose intolerance     Thyroid disease     Type II or unspecified type diabetes mellitus without mention of complication, not stated as uncontrolled     Ulcer of foot ([facility])     left covered with dry dressing    Urinary incontinence           Subjective:  Still coughing, not wheezing.  Phlegm comes up to her throat but can't bring it up.  Cough is worse at night.  Has taken 2 different antibiotics and Mucinex.  Mucinex seems to help, \"just feels like crab\".  Nose is also congested, no fever.  No vomiting or diarrhea. Has been like this for 2-3 weeks.     Cough    Eye Problem      Review of Systems   Respiratory:  Positive for cough.         Objective:  BP (!) 120/56   Pulse 73   Temp 97.5 °F (36.4 °C)   Resp 18   Ht 5' 5.5\" (1.664 m)   Wt 235 lb (106.6 kg)   SpO2 90%   BMI 38.51 kg/m²      Physical Exam  Vitals reviewed.   Constitutional:       Comments: Some hacking cough but no resp distress   HENT:      Head: Normocephalic.      Right Ear: Tympanic membrane and external ear normal. There is no impacted cerumen.      Left Ear: Tympanic membrane and external ear normal. There is no impacted cerumen.      Nose: Nose normal.      Mouth/Throat:      Pharynx: Oropharynx is clear. No oropharyngeal exudate.   Eyes:      Extraocular Movements: Extraocular movements intact.      Conjunctiva/sclera: Conjunctivae normal.      Pupils: Pupils are equal, round, and reactive to light.   Cardiovascular:      Rate and Rhythm: Normal rate and regular rhythm.      Heart sounds: Murmur (janeth lsb and rt 2nd ics)  heard.     No friction rub. No gallop. Pulmonary:      Effort: Pulmonary effort is normal.      Breath sounds: Normal breath sounds. Abdominal:      General: Bowel sounds are normal. There is no distension. Palpations: Abdomen is soft. There is no mass. Tenderness: There is no abdominal tenderness. There is no guarding or rebound. Musculoskeletal:         General: No swelling, tenderness or deformity. Normal range of motion. Cervical back: Normal range of motion and neck supple. No tenderness. Lymphadenopathy:      Cervical: No cervical adenopathy. Skin:     General: Skin is warm. Coloration: Skin is not pale. Findings: No rash. Neurological:      General: No focal deficit present. Mental Status: She is alert and oriented to person, place, and time. Assessment:  Kim was seen today for cough and eye problem. Diagnoses and all orders for this visit:    Essential hypertension  Comments:  Controlled on meds    Type 2 diabetes mellitus with diabetic neuropathic arthropathy, with long-term current use of insulin ([address]  Comments: Well controled since back on GLP1. Monitor bs closely while on Prednisone, call if over 300. Bronchitis  Comments:  Persistent cough, Post infection inlamated airway, added short course of Prednisone and TRelegy qd, call if no better    Other orders  -     potassium chloride (KLOR-CON M) 10 MEQ extended release tablet; Take 1 tablet by mouth every other day  -     predniSONE (DELTASONE) 10 MG tablet; Take 1 tablet by mouth 3 times daily (with meals) for 5 days  -     benzonatate (TESSALON) 100 MG capsule;  Take 1 capsule by mouth 3 times daily as needed for Cough

## 2023-02-20 RX ORDER — LISINOPRIL 40 MG/1
TABLET ORAL
Qty: 90 TABLET | Refills: 0 | Status: SHIPPED | OUTPATIENT
Start: 2023-02-20

## 2023-02-28 RX ORDER — ASCORBIC ACID 500 MG
500 TABLET ORAL DAILY
Qty: 90 TABLET | Refills: 0 | Status: SHIPPED | OUTPATIENT
Start: 2023-02-28

## 2023-02-28 RX ORDER — GINGER ROOT/GINGER ROOT EXT 262.5 MG
1 CAPSULE ORAL DAILY
Qty: 90 TABLET | Refills: 0 | Status: SHIPPED | OUTPATIENT
Start: 2023-02-28

## 2023-03-17 ENCOUNTER — OFFICE VISIT (OUTPATIENT)
Dept: PRIMARY CARE CLINIC | Age: 73
End: 2023-03-17
Payer: COMMERCIAL

## 2023-03-17 VITALS
DIASTOLIC BLOOD PRESSURE: 58 MMHG | BODY MASS INDEX: 38.25 KG/M2 | OXYGEN SATURATION: 98 % | SYSTOLIC BLOOD PRESSURE: 116 MMHG | HEIGHT: 66 IN | WEIGHT: 238 LBS | HEART RATE: 76 BPM

## 2023-03-17 DIAGNOSIS — N92.0 SPOTTING: ICD-10-CM

## 2023-03-17 DIAGNOSIS — N18.30 STAGE 3 CHRONIC KIDNEY DISEASE, UNSPECIFIED WHETHER STAGE 3A OR 3B CKD (HCC): ICD-10-CM

## 2023-03-17 DIAGNOSIS — Z79.4 TYPE 2 DIABETES MELLITUS WITH DIABETIC NEUROPATHIC ARTHROPATHY, WITH LONG-TERM CURRENT USE OF INSULIN (HCC): ICD-10-CM

## 2023-03-17 DIAGNOSIS — R79.89 LOW SERUM VITAMIN D: ICD-10-CM

## 2023-03-17 DIAGNOSIS — E11.610 TYPE 2 DIABETES MELLITUS WITH DIABETIC NEUROPATHIC ARTHROPATHY, WITH LONG-TERM CURRENT USE OF INSULIN (HCC): ICD-10-CM

## 2023-03-17 DIAGNOSIS — J32.1 FRONTAL SINUSITIS, UNSPECIFIED CHRONICITY: Primary | ICD-10-CM

## 2023-03-17 DIAGNOSIS — R31.9 HEMATURIA, UNSPECIFIED TYPE: ICD-10-CM

## 2023-03-17 LAB
Lab: NORMAL
PERFORMING INSTRUMENT: NORMAL
QC PASS/FAIL: NORMAL
SARS-COV-2, POC: NORMAL

## 2023-03-17 PROCEDURE — 1036F TOBACCO NON-USER: CPT | Performed by: INTERNAL MEDICINE

## 2023-03-17 PROCEDURE — 3078F DIAST BP <80 MM HG: CPT | Performed by: INTERNAL MEDICINE

## 2023-03-17 PROCEDURE — 99214 OFFICE O/P EST MOD 30 MIN: CPT | Performed by: INTERNAL MEDICINE

## 2023-03-17 PROCEDURE — 3074F SYST BP LT 130 MM HG: CPT | Performed by: INTERNAL MEDICINE

## 2023-03-17 PROCEDURE — G8427 DOCREV CUR MEDS BY ELIG CLIN: HCPCS | Performed by: INTERNAL MEDICINE

## 2023-03-17 PROCEDURE — 3017F COLORECTAL CA SCREEN DOC REV: CPT | Performed by: INTERNAL MEDICINE

## 2023-03-17 PROCEDURE — 2022F DILAT RTA XM EVC RTNOPTHY: CPT | Performed by: INTERNAL MEDICINE

## 2023-03-17 PROCEDURE — 3051F HG A1C>EQUAL 7.0%<8.0%: CPT | Performed by: INTERNAL MEDICINE

## 2023-03-17 PROCEDURE — G8417 CALC BMI ABV UP PARAM F/U: HCPCS | Performed by: INTERNAL MEDICINE

## 2023-03-17 PROCEDURE — 1090F PRES/ABSN URINE INCON ASSESS: CPT | Performed by: INTERNAL MEDICINE

## 2023-03-17 PROCEDURE — 1123F ACP DISCUSS/DSCN MKR DOCD: CPT | Performed by: INTERNAL MEDICINE

## 2023-03-17 PROCEDURE — G8400 PT W/DXA NO RESULTS DOC: HCPCS | Performed by: INTERNAL MEDICINE

## 2023-03-17 PROCEDURE — 87426 SARSCOV CORONAVIRUS AG IA: CPT | Performed by: INTERNAL MEDICINE

## 2023-03-17 PROCEDURE — G8484 FLU IMMUNIZE NO ADMIN: HCPCS | Performed by: INTERNAL MEDICINE

## 2023-03-17 RX ORDER — HYDROCODONE BITARTRATE AND ACETAMINOPHEN 5; 325 MG/1; MG/1
1 TABLET ORAL EVERY 6 HOURS PRN
COMMUNITY
End: 2023-03-17

## 2023-03-17 RX ORDER — CITALOPRAM 20 MG/1
1 TABLET ORAL DAILY
COMMUNITY
Start: 2023-03-03 | End: 2023-03-17

## 2023-03-17 RX ORDER — ASCORBIC ACID 500 MG
500 TABLET ORAL DAILY
Qty: 90 TABLET | Refills: 0 | Status: SHIPPED | OUTPATIENT
Start: 2023-03-17

## 2023-03-17 RX ORDER — AZITHROMYCIN 250 MG/1
250 TABLET, FILM COATED ORAL SEE ADMIN INSTRUCTIONS
Qty: 6 TABLET | Refills: 0 | Status: SHIPPED | OUTPATIENT
Start: 2023-03-17 | End: 2023-03-22

## 2023-03-17 ASSESSMENT — ENCOUNTER SYMPTOMS
CONSTIPATION: 0
TROUBLE SWALLOWING: 0
NAUSEA: 0
SINUS PRESSURE: 0
SINUS COMPLAINT: 1
SORE THROAT: 0
BACK PAIN: 0
BLOOD IN STOOL: 0
ABDOMINAL DISTENTION: 0
DIARRHEA: 0
RHINORRHEA: 0
ABDOMINAL PAIN: 0
ALLERGIC/IMMUNOLOGIC NEGATIVE: 1
COLOR CHANGE: 0
VOMITING: 0

## 2023-03-17 NOTE — PROGRESS NOTES
Ricky Christopher presents today for follow up of Diabetes, Urinary incontinence, Diabetes Peripheral neuropathy, needs clearance for dental procedures    Current Outpatient Medications   Medication Sig Dispense Refill    HYDROcodone-acetaminophen (NORCO) 5-325 MG per tablet Take 1 tablet by mouth every 6 hours as needed for Pain.       vitamin C (ASCORBIC ACID) 500 MG tablet Take 1 tablet by mouth daily 90 tablet 0    Cholecalciferol (VITAMIN D3) 20 MCG (800 UNIT) TABS Take 1 tablet by mouth daily 90 tablet 0    azithromycin (ZITHROMAX) 250 MG tablet Take 1 tablet by mouth See Admin Instructions for 5 days 500mg on day 1 followed by 250mg on days 2 - 5 6 tablet 0    calcium carb-cholecalciferol 600-20 MG-MCG TABS Take 1 tablet by mouth daily 90 tablet 0    lisinopril (PRINIVIL;ZESTRIL) 40 MG tablet take 1 tablet by mouth once daily 90 tablet 0    ZIRGAN 0.15 % GEL ophthalmic gel       potassium chloride (KLOR-CON M) 10 MEQ extended release tablet Take 1 tablet by mouth every other day 45 tablet 3    Dulaglutide 0.75 MG/0.5ML SOPN Inject 0.75 mg into the skin once a week 4 Adjustable Dose Pre-filled Pen Syringe 2    pantoprazole (PROTONIX) 40 MG tablet Take 1 tablet by mouth daily 90 tablet 0    insulin lispro protamine & lispro (HUMALOG MIX 75/25) (75-25) 100 UNIT per ML SUSP injection vial Inject 78 Units into the skin 2 times daily (with meals) 4 each 2    albuterol sulfate HFA (PROVENTIL HFA) 108 (90 Base) MCG/ACT inhaler Inhale 2 puffs into the lungs every 6 hours as needed for Wheezing 18 g 3    ezetimibe (ZETIA) 10 MG tablet Take 1 tablet by mouth daily 90 tablet 3    metoprolol tartrate (LOPRESSOR) 50 MG tablet Take 1 tablet by mouth 2 times daily 180 tablet 3    pravastatin (PRAVACHOL) 40 MG tablet Take 1 tablet by mouth nightly 90 tablet 3    Semaglutide,0.25 or 0.5MG/DOS, (OZEMPIC, 0.25 OR 0.5 MG/DOSE,) 2 MG/1.5ML SOPN Inject 0.5 mg into the skin once a week 4 Adjustable Dose Pre-filled Pen Syringe 2    aspirin EC 81 MG EC tablet Take 1 tablet by mouth nightly 90 tablet 0    allopurinol (ZYLOPRIM) 100 MG tablet Take 100 mg by mouth daily      Citalopram Hydrobromide 30 MG CAPS Take by mouth      pregabalin (LYRICA) 100 MG capsule Take 100 mg by mouth 2 times daily.  fesoterodine (TOVIAZ) 4 MG TB24 ER tablet Take 4 mg by mouth daily      FOLBEE 2.5-25-1 MG TABS tablet 1 tablet      niacin (SLO-NIACIN) 500 MG extended release tablet Take 1 tablet by mouth nightly 90 tablet 0    famotidine (PEPCID) 40 MG tablet Take 1 tablet by mouth every evening 30 tablet 3    Continuous Blood Gluc Sensor (FREESTYLE MAGALIS 2 SENSOR) MISC apply 1 SENSOR to back OF UPPER ARM REMOVE AND REPLACE every 14 days use with DEVICE to MONITOR BLOOD SUGAR 2 each 5    B-D UF III MINI PEN NEEDLES 31G X 5 MM MISC   0    therapeutic multivitamin-minerals (THERAGRAN-M) tablet Take 1 tablet by mouth daily       lisinopril (PRINIVIL;ZESTRIL) 2.5 MG tablet Take 2.5 mg by mouth daily (Patient not taking: No sig reported)      prednisoLONE acetate (PRED FORTE) 1 % ophthalmic suspension Place 1 drop into both eyes in the morning and at bedtime (Patient not taking: No sig reported)      fluticasone (FLONASE) 50 MCG/ACT nasal spray 1 spray by Each Nostril route 2 times daily (Patient not taking: No sig reported)      NONFORMULARY Take 20 mg by mouth daily Vit D 3 (Patient not taking: No sig reported)      RESTASIS 0.05 % ophthalmic emulsion place 1 drop into both eyes twice a day (Patient not taking: No sig reported)  0    cyanocobalamin ([employer] VITAMIN B12) 1000 MCG tablet Take 1 tablet by mouth daily. (Patient not taking: No sig reported) 90 tablet 0     No current facility-administered medications for this visit.       Past Medical History:   Diagnosis Date    ASHD (arteriosclerotic heart disease)     CAD (coronary artery disease)     Cancer ([facility]     breast ca, right    Charcot foot due to diabetes mellitus ([facility] 01/29/2012    Charcot foot due to diabetes mellitus ([facility]     right, wears boot.  Chronic diastolic congestive heart failure ([facility] 11/27/2017    Chronic kidney disease     sees nephrology    Depression     Diabetic peripheral neuropathy ([facility]     Diarrhea     Essential hypertension 05/24/2018    GERD (gastroesophageal reflux disease)     History of therapeutic radiation     Hyperlipidemia     Hypertension     Lactose intolerance     Thyroid disease     Type II or unspecified type diabetes mellitus without mention of complication, not stated as uncontrolled     Ulcer of foot ([facility]     left covered with dry dressing    Urinary incontinence           Subjective:  AS above. Needs clearance for dental procedure. Has been having some spotting on her Depends, also had some blood in the urine at the urologist. He is doing the needed renal/urological work up but wants us to refer her for GYN exam.  BS is not coming down from 170. Does feel like she has a Sinus Infection so wonders if that's why. Frontal headache, sinus congestion. No fever, cough or sob. Sinus Problem  Associated symptoms include congestion. Pertinent negatives include no chills, ear pain, headaches, neck pain, sinus pressure, sneezing or sore throat. Review of Systems   Constitutional:  Negative for activity change, appetite change and chills. HENT:  Positive for congestion. Negative for ear pain, mouth sores, postnasal drip, rhinorrhea, sinus pressure, sneezing, sore throat and trouble swallowing. Frontal pain   Eyes:  Negative for visual disturbance. Cardiovascular:  Negative for chest pain, palpitations and leg swelling. Gastrointestinal:  Negative for abdominal distention, abdominal pain, blood in stool, constipation, diarrhea, nausea and vomiting. Endocrine: Negative for cold intolerance, heat intolerance, polydipsia and polyuria.    Genitourinary:  Negative for difficulty urinating, dysuria, flank pain, frequency and urgency. Spotting   Musculoskeletal:  Negative for arthralgias, back pain, gait problem, neck pain and neck stiffness. Skin: Negative. Negative for color change. Allergic/Immunologic: Negative. Neurological:  Negative for dizziness, tremors, speech difficulty, weakness, light-headedness and headaches. Hematological: Negative. Psychiatric/Behavioral: Negative. Objective:  BP (!) 116/58 (Site: Left Upper Arm, Position: Sitting, Cuff Size: Medium Adult)   Pulse 76   Ht 5' 5.5\" (1.664 m)   Wt 238 lb (108 kg)   SpO2 98%   BMI 39.00 kg/m²      Physical Exam  Vitals reviewed. HENT:      Head: Normocephalic. Comments: Frontal and bilateral maxillary tenderness to touch     Right Ear: Tympanic membrane and external ear normal. There is no impacted cerumen. Left Ear: Tympanic membrane and external ear normal. There is no impacted cerumen. Nose: Congestion present. Mouth/Throat:      Pharynx: Oropharynx is clear. Posterior oropharyngeal erythema present. No oropharyngeal exudate. Eyes:      Extraocular Movements: Extraocular movements intact. Conjunctiva/sclera: Conjunctivae normal.      Pupils: Pupils are equal, round, and reactive to light. Cardiovascular:      Rate and Rhythm: Normal rate and regular rhythm. Heart sounds: Murmur (janeth lsb and rt 2nd ics) heard. No friction rub. No gallop. Pulmonary:      Effort: Pulmonary effort is normal.      Breath sounds: Normal breath sounds. Abdominal:      General: Bowel sounds are normal. There is no distension. Palpations: Abdomen is soft. There is no mass. Tenderness: There is no abdominal tenderness. There is no guarding or rebound. Musculoskeletal:         General: No swelling, tenderness or deformity. Normal range of motion. Cervical back: Normal range of motion and neck supple. No tenderness. Lymphadenopathy:      Cervical: No cervical adenopathy.    Skin:     General: Skin is warm. Coloration: Skin is not pale. Findings: No rash. Neurological:      General: No focal deficit present. Mental Status: She is alert and oriented to person, place, and time. Assessment:  Kim was seen today for referral - general and sinus problem. Diagnoses and all orders for this visit:    Frontal sinusitis, unspecified chronicity  Comments:  Z pack, checkd for COVID since all family members are sick. Hematuria, unspecified type  Comments:  Seeing urology, under workup. Spotting  Comments:  Referred to GYN    Type 2 diabetes mellitus with diabetic neuropathic arthropathy, with long-term current use of insulin ([facility] [facility]  Comments:  Slightly elevation, due to Sinus Infection? Told to keep close monitoring of it. Stage 3 chronic kidney disease, unspecified whether stage 3a or 3b CKD ([facility]  Comments:  sees nephrology    Other orders  -     vitamin C (ASCORBIC ACID) 500 MG tablet; Take 1 tablet by mouth daily  -     Cholecalciferol (VITAMIN D3) 20 MCG (800 UNIT) TABS; Take 1 tablet by mouth daily  -     POCT COVID-19, Antigen  -     azithromycin (ZITHROMAX) 250 MG tablet;  Take 1 tablet by mouth See Admin Instructions for 5 days 500mg on day 1 followed by 250mg on days 2 - 5

## 2023-03-29 ENCOUNTER — OFFICE VISIT (OUTPATIENT)
Dept: SURGERY | Age: 73
End: 2023-03-29

## 2023-03-29 VITALS
BODY MASS INDEX: 38.09 KG/M2 | DIASTOLIC BLOOD PRESSURE: 72 MMHG | SYSTOLIC BLOOD PRESSURE: 152 MMHG | HEIGHT: 66 IN | HEART RATE: 72 BPM | WEIGHT: 237 LBS | RESPIRATION RATE: 16 BRPM

## 2023-03-29 DIAGNOSIS — N64.52 BLOODY DISCHARGE FROM RIGHT NIPPLE: Primary | ICD-10-CM

## 2023-03-29 DIAGNOSIS — E66.01 SEVERE OBESITY (BMI 35.0-35.9 WITH COMORBIDITY) (HCC): ICD-10-CM

## 2023-03-29 DIAGNOSIS — Z85.3 HX: BREAST CANCER: ICD-10-CM

## 2023-03-29 NOTE — PROGRESS NOTES
US  The procedure risks, benfits, possible complications and alternative options where explained to the patient, he understands and agrees to proceed with surgery. No follow-ups on file.     Mikki Prabhakar MD      Send copy of H&P to PCP, Rocio Mcnamara MD

## 2023-03-30 RX ORDER — ASPIRIN 81 MG/1
81 TABLET ORAL NIGHTLY
Qty: 90 TABLET | Refills: 0 | Status: SHIPPED | OUTPATIENT
Start: 2023-03-30

## 2023-04-03 ENCOUNTER — HOSPITAL ENCOUNTER (OUTPATIENT)
Dept: GENERAL RADIOLOGY | Age: 73
Discharge: HOME | End: 2023-04-05
Payer: COMMERCIAL

## 2023-04-03 VITALS — WEIGHT: 228 LBS | HEIGHT: 67 IN | BODY MASS INDEX: 35.79 KG/M2

## 2023-04-03 DIAGNOSIS — N64.52 BLOODY DISCHARGE FROM RIGHT NIPPLE: ICD-10-CM

## 2023-04-03 DIAGNOSIS — Z85.3 HX: BREAST CANCER: ICD-10-CM

## 2023-04-03 PROCEDURE — 76642 ULTRASOUND BREAST LIMITED: CPT

## 2023-04-03 PROCEDURE — G0279 TOMOSYNTHESIS, MAMMO: HCPCS

## 2023-04-03 RX ORDER — PANTOPRAZOLE SODIUM 40 MG/1
TABLET, DELAYED RELEASE ORAL
Qty: 90 TABLET | Refills: 0 | Status: SHIPPED | OUTPATIENT
Start: 2023-04-03

## 2023-04-11 DIAGNOSIS — R92.8 ABNORMAL MAMMOGRAM OF RIGHT BREAST: ICD-10-CM

## 2023-04-11 DIAGNOSIS — N63.10 MASS OF RIGHT BREAST, UNSPECIFIED QUADRANT: Primary | ICD-10-CM

## 2023-04-19 ENCOUNTER — OFFICE VISIT (OUTPATIENT)
Dept: SURGERY | Age: 73
End: 2023-04-19
Payer: COMMERCIAL

## 2023-04-19 VITALS
HEART RATE: 70 BPM | RESPIRATION RATE: 18 BRPM | SYSTOLIC BLOOD PRESSURE: 119 MMHG | BODY MASS INDEX: 37.67 KG/M2 | WEIGHT: 240 LBS | HEIGHT: 67 IN | DIASTOLIC BLOOD PRESSURE: 52 MMHG

## 2023-04-19 DIAGNOSIS — C50.211 MALIGNANT NEOPLASM OF UPPER-INNER QUADRANT OF RIGHT FEMALE BREAST, UNSPECIFIED ESTROGEN RECEPTOR STATUS (HCC): Primary | ICD-10-CM

## 2023-04-19 PROCEDURE — G8417 CALC BMI ABV UP PARAM F/U: HCPCS | Performed by: SURGERY

## 2023-04-19 PROCEDURE — 1123F ACP DISCUSS/DSCN MKR DOCD: CPT | Performed by: SURGERY

## 2023-04-19 PROCEDURE — 99214 OFFICE O/P EST MOD 30 MIN: CPT | Performed by: SURGERY

## 2023-04-19 PROCEDURE — G8400 PT W/DXA NO RESULTS DOC: HCPCS | Performed by: SURGERY

## 2023-04-19 PROCEDURE — 3074F SYST BP LT 130 MM HG: CPT | Performed by: SURGERY

## 2023-04-19 PROCEDURE — 1036F TOBACCO NON-USER: CPT | Performed by: SURGERY

## 2023-04-19 PROCEDURE — 3078F DIAST BP <80 MM HG: CPT | Performed by: SURGERY

## 2023-04-19 PROCEDURE — 3017F COLORECTAL CA SCREEN DOC REV: CPT | Performed by: SURGERY

## 2023-04-19 PROCEDURE — 1090F PRES/ABSN URINE INCON ASSESS: CPT | Performed by: SURGERY

## 2023-04-19 PROCEDURE — G8427 DOCREV CUR MEDS BY ELIG CLIN: HCPCS | Performed by: SURGERY

## 2023-04-19 NOTE — PROGRESS NOTES
Patient's Name/Date of Birth: Catrachito Bradley / 1950    Date: 4/19/2023    PCP: Raghavendra Ho MD    Chief Complaint   Patient presents with    Results     Fl/u breast biopsy        HPI:  FU right breast core biopsy. Results consistent with invasive carcinama    Patient's medications, allergies, past medical, surgical, social and family histories were reviewed and updated as appropriate. Allergies   Allergen Reactions    Penicillins      Unknown reaction tolerates cefazolin       Past Medical History:   Diagnosis Date    ASHD (arteriosclerotic heart disease)     Breast cancer ([address]     rt 2009    CAD (coronary artery disease)     Cancer ([address]     breast ca, right    Charcot foot due to diabetes mellitus ([address] 01/29/2012    Charcot foot due to diabetes mellitus ([address]     right, wears boot. Chronic diastolic congestive heart failure ([address] 11/27/2017    Chronic kidney disease     sees nephrology    Depression     Diabetic peripheral neuropathy ([address]     Diarrhea     Essential hypertension 05/24/2018    GERD (gastroesophageal reflux disease)     History of therapeutic radiation     Hyperlipidemia     Hypertension     Lactose intolerance     Thyroid disease     Type II or unspecified type diabetes mellitus without mention of complication, not stated as uncontrolled     Ulcer of foot ([address]     left covered with dry dressing    Urinary incontinence         Past Surgical History:   Procedure Laterality Date    BREAST BIOPSY      2011    BREAST LUMPECTOMY      right breast 2009, with lymph nodes.     [address]    COLONOSCOPY  2011    COLONOSCOPY N/A 03/20/2015    COLONOSCOPY    COLONOSCOPY N/A 02/21/2022    COLONOSCOPY POLYPECTOMY SNARE/COLD BIOPSY performed by Chucho Piedra MD at [address] [place]  10/23/2017    x2   calvin    DIAGNOSTIC CARDIAC CATH LAB PROCEDURE      EYE SURGERY  2009    several ,cataracts,legally blind    EYE SURGERY      FOOT SURGERY  03/05/2016

## 2023-04-20 ENCOUNTER — CLINICAL DOCUMENTATION (OUTPATIENT)
Dept: GENERAL RADIOLOGY | Age: 73
End: 2023-04-20

## 2023-04-22 ENCOUNTER — HOSPITAL ENCOUNTER (OUTPATIENT)
Age: 73
Discharge: HOME | End: 2023-04-22
Payer: COMMERCIAL

## 2023-04-22 DIAGNOSIS — C50.211 MALIGNANT NEOPLASM OF UPPER-INNER QUADRANT OF RIGHT FEMALE BREAST, UNSPECIFIED ESTROGEN RECEPTOR STATUS (HCC): ICD-10-CM

## 2023-04-22 LAB
ALBUMIN SERPL-MCNC: 3.6 G/DL (ref 3.5–5.2)
ALP SERPL-CCNC: 49 U/L (ref 35–104)
ALT SERPL-CCNC: 14 U/L (ref 0–32)
ANION GAP SERPL CALCULATED.3IONS-SCNC: 12 MMOL/L (ref 7–16)
AST SERPL-CCNC: 19 U/L (ref 0–31)
BASOPHILS # BLD: 0.05 E9/L (ref 0–0.2)
BASOPHILS NFR BLD: 0.7 % (ref 0–2)
BILIRUB SERPL-MCNC: 0.4 MG/DL (ref 0–1.2)
BUN SERPL-MCNC: 27 MG/DL (ref 6–23)
CALCIUM SERPL-MCNC: 9.6 MG/DL (ref 8.6–10.2)
CEA SERPL-MCNC: 1.4 NG/ML (ref 0–5.2)
CHLORIDE SERPL-SCNC: 102 MMOL/L (ref 98–107)
CHOLESTEROL, TOTAL: 151 MG/DL (ref 0–199)
CO2 SERPL-SCNC: 27 MMOL/L (ref 22–29)
CREAT SERPL-MCNC: 1.4 MG/DL (ref 0.5–1)
CREAT UR-MCNC: 82 MG/DL (ref 29–226)
EOSINOPHIL # BLD: 0.08 E9/L (ref 0.05–0.5)
EOSINOPHIL NFR BLD: 1.1 % (ref 0–6)
ERYTHROCYTE [DISTWIDTH] IN BLOOD BY AUTOMATED COUNT: 13.2 FL (ref 11.5–15)
GLUCOSE SERPL-MCNC: 127 MG/DL (ref 74–99)
HBA1C MFR BLD: 6.9 % (ref 4–5.6)
HCT VFR BLD AUTO: 44.4 % (ref 34–48)
HDLC SERPL-MCNC: 54 MG/DL
HGB BLD-MCNC: 14 G/DL (ref 11.5–15.5)
IMM GRANULOCYTES # BLD: 0.04 E9/L
IMM GRANULOCYTES NFR BLD: 0.6 % (ref 0–5)
LDLC SERPL CALC-MCNC: 67 MG/DL (ref 0–99)
LYMPHOCYTES # BLD: 1.35 E9/L (ref 1.5–4)
LYMPHOCYTES NFR BLD: 18.7 % (ref 20–42)
MCH RBC QN AUTO: 29.5 PG (ref 26–35)
MCHC RBC AUTO-ENTMCNC: 31.5 % (ref 32–34.5)
MCV RBC AUTO: 93.5 FL (ref 80–99.9)
MICROALBUMIN UR-MCNC: 433.8 MG/L
MICROALBUMIN/CREAT UR-RTO: 529 (ref 0–30)
MONOCYTES # BLD: 0.76 E9/L (ref 0.1–0.95)
MONOCYTES NFR BLD: 10.5 % (ref 2–12)
NEUTROPHILS # BLD: 4.95 E9/L (ref 1.8–7.3)
NEUTS SEG NFR BLD: 68.4 % (ref 43–80)
PHOSPHATE SERPL-MCNC: 3.6 MG/DL (ref 2.5–4.5)
PLATELET # BLD AUTO: 184 E9/L (ref 130–450)
PMV BLD AUTO: 9.7 FL (ref 7–12)
POTASSIUM SERPL-SCNC: 4.6 MMOL/L (ref 3.5–5)
PROT SERPL-MCNC: 6.2 G/DL (ref 6.4–8.3)
PTH-INTACT SERPL-MCNC: 42 PG/ML (ref 15–65)
RBC # BLD AUTO: 4.75 E12/L (ref 3.5–5.5)
SODIUM SERPL-SCNC: 141 MMOL/L (ref 132–146)
TRIGL SERPL-MCNC: 149 MG/DL (ref 0–149)
VITAMIN D 25-HYDROXY: 29 NG/ML (ref 30–100)
VLDLC SERPL CALC-MCNC: 30 MG/DL
WBC # BLD: 7.2 E9/L (ref 4.5–11.5)

## 2023-04-22 PROCEDURE — 36415 COLL VENOUS BLD VENIPUNCTURE: CPT

## 2023-04-22 PROCEDURE — 82378 CARCINOEMBRYONIC ANTIGEN: CPT

## 2023-04-25 ENCOUNTER — HOSPITAL ENCOUNTER (OUTPATIENT)
Age: 73
Discharge: HOME | End: 2023-04-27
Payer: COMMERCIAL

## 2023-04-25 ENCOUNTER — HOSPITAL ENCOUNTER (OUTPATIENT)
Dept: GENERAL RADIOLOGY | Age: 73
Discharge: HOME | End: 2023-04-27
Payer: COMMERCIAL

## 2023-04-25 DIAGNOSIS — C50.211 MALIGNANT NEOPLASM OF UPPER-INNER QUADRANT OF RIGHT FEMALE BREAST, UNSPECIFIED ESTROGEN RECEPTOR STATUS (HCC): ICD-10-CM

## 2023-04-25 PROCEDURE — 71046 X-RAY EXAM CHEST 2 VIEWS: CPT

## 2023-04-28 ENCOUNTER — HOSPITAL ENCOUNTER (OUTPATIENT)
Age: 73
Discharge: HOME | End: 2023-04-30

## 2023-04-28 PROCEDURE — 88112 CYTOPATH CELL ENHANCE TECH: CPT

## 2023-04-28 PROCEDURE — 88305 TISSUE EXAM BY PATHOLOGIST: CPT

## 2023-04-28 RX ORDER — PANTOPRAZOLE SODIUM 40 MG/1
TABLET, DELAYED RELEASE ORAL
Qty: 90 TABLET | Refills: 0 | OUTPATIENT
Start: 2023-04-28

## 2023-05-01 RX ORDER — DULAGLUTIDE 0.75 MG/.5ML
INJECTION, SOLUTION SUBCUTANEOUS
Qty: 2 ML | Refills: 2 | Status: SHIPPED | OUTPATIENT
Start: 2023-05-01

## 2023-05-02 ENCOUNTER — APPOINTMENT (OUTPATIENT)
Dept: GENERAL RADIOLOGY | Age: 73
End: 2023-05-02
Payer: COMMERCIAL

## 2023-05-02 ENCOUNTER — APPOINTMENT (OUTPATIENT)
Dept: ULTRASOUND IMAGING | Age: 73
End: 2023-05-02
Payer: COMMERCIAL

## 2023-05-02 ENCOUNTER — HOSPITAL ENCOUNTER (EMERGENCY)
Age: 73
Discharge: HOME | End: 2023-05-02
Attending: EMERGENCY MEDICINE
Payer: COMMERCIAL

## 2023-05-02 ENCOUNTER — HOSPITAL ENCOUNTER (OUTPATIENT)
Dept: MRI IMAGING | Age: 73
Discharge: HOME | End: 2023-05-04

## 2023-05-02 VITALS
HEART RATE: 63 BPM | OXYGEN SATURATION: 97 % | BODY MASS INDEX: 36.96 KG/M2 | SYSTOLIC BLOOD PRESSURE: 153 MMHG | DIASTOLIC BLOOD PRESSURE: 60 MMHG | TEMPERATURE: 97.8 F | RESPIRATION RATE: 16 BRPM | WEIGHT: 236 LBS

## 2023-05-02 DIAGNOSIS — M79.605 LEFT LEG PAIN: Primary | ICD-10-CM

## 2023-05-02 DIAGNOSIS — M79.89 LEG SWELLING: ICD-10-CM

## 2023-05-02 DIAGNOSIS — C50.211 MALIGNANT NEOPLASM OF UPPER-INNER QUADRANT OF RIGHT FEMALE BREAST, UNSPECIFIED ESTROGEN RECEPTOR STATUS (HCC): ICD-10-CM

## 2023-05-02 PROCEDURE — 93971 EXTREMITY STUDY: CPT

## 2023-05-02 PROCEDURE — 73630 X-RAY EXAM OF FOOT: CPT

## 2023-05-02 PROCEDURE — 73610 X-RAY EXAM OF ANKLE: CPT

## 2023-05-02 PROCEDURE — 99284 EMERGENCY DEPT VISIT MOD MDM: CPT

## 2023-05-02 ASSESSMENT — PAIN SCALES - GENERAL: PAINLEVEL_OUTOF10: 4

## 2023-05-02 ASSESSMENT — PAIN DESCRIPTION - ORIENTATION: ORIENTATION: LEFT

## 2023-05-02 ASSESSMENT — PAIN DESCRIPTION - LOCATION: LOCATION: LEG

## 2023-05-02 ASSESSMENT — PAIN - FUNCTIONAL ASSESSMENT: PAIN_FUNCTIONAL_ASSESSMENT: 0-10

## 2023-05-02 NOTE — ED TRIAGE NOTES
FIRST PROVIDER CONTACT ASSESSMENT NOTE       Department of Emergency Medicine                 First Provider Note            23  5:25 PM EDT    Date of Encounter: No admission date for patient encounter. Patient Name: Cyndi Cassidy  : 1950  MRN: [id number]    Chief Complaint: Leg Pain (Left leg pain/swelling x1 week. Sent by prakash to r/o DVT)      History of Present Illness:   Cyndi Cassidy is a 81 y.o. female who presents to the ED for possible DVT. Patient is here for left leg pain. She just found out her breast cancer is returned. Denies fall or trauma. Focused Physical Exam:  VS:    ED Triage Vitals   BP Temp Temp src Pulse Resp SpO2 Height Weight   -- -- -- -- -- -- -- --        Physical Ex: Constitutional: Alert and non-toxic. Medical History:  has a past medical history of ASHD (arteriosclerotic heart disease), Breast cancer ([address] CAD (coronary artery disease), Cancer ([address] Charcot foot due to diabetes mellitus ([address] Charcot foot due to diabetes mellitus ([address] Chronic diastolic congestive heart failure ([address] Chronic kidney disease, Depression, Diabetic peripheral neuropathy ([address] Diarrhea, Essential hypertension, GERD (gastroesophageal reflux disease), History of therapeutic radiation, Hyperlipidemia, Hypertension, Lactose intolerance, Thyroid disease, Type II or unspecified type diabetes mellitus without mention of complication, not stated as uncontrolled, Ulcer of foot ([address] and Urinary incontinence. Surgical History:  has a past surgical history that includes Colonoscopy ();  section (); Eye surgery (); Breast lumpectomy; Colonoscopy (N/A, 2015); other surgical history (Left, 2016); Foot surgery (2016); eye surgery; Coronary artery bypass graft (10/23/2017); Diagnostic Cardiac Cath Lab Procedure; Colonoscopy (N/A, 2022);  Breast biopsy; US BREAST BIOPSY W LOC DEVICE 1ST LESION RIGHT (Right, 2023); and US BREAST BIOPSY W LOC

## 2023-05-02 NOTE — PROGRESS NOTES
Unable to perform mri, the patient had a difficult time laying on her stomach. She was unable to lay completely flat enough to be able to fit into the scanner. Pt was instructed to contact her ordering physician.

## 2023-05-03 ENCOUNTER — TELEPHONE (OUTPATIENT)
Dept: SURGERY | Age: 73
End: 2023-05-03

## 2023-05-03 ENCOUNTER — TELEPHONE (OUTPATIENT)
Dept: PRIMARY CARE CLINIC | Age: 73
End: 2023-05-03

## 2023-05-03 ENCOUNTER — OFFICE VISIT (OUTPATIENT)
Dept: PRIMARY CARE CLINIC | Age: 73
End: 2023-05-03
Payer: COMMERCIAL

## 2023-05-03 VITALS
WEIGHT: 236 LBS | OXYGEN SATURATION: 88 % | HEIGHT: 67 IN | TEMPERATURE: 98 F | SYSTOLIC BLOOD PRESSURE: 98 MMHG | BODY MASS INDEX: 37.04 KG/M2 | HEART RATE: 67 BPM | DIASTOLIC BLOOD PRESSURE: 48 MMHG

## 2023-05-03 DIAGNOSIS — C50.911 CARCINOMA OF RIGHT FEMALE BREAST, UNSPECIFIED ESTROGEN RECEPTOR STATUS, UNSPECIFIED SITE OF BREAST (HCC): Primary | ICD-10-CM

## 2023-05-03 DIAGNOSIS — R79.89 LOW SERUM VITAMIN D: ICD-10-CM

## 2023-05-03 DIAGNOSIS — C50.211 MALIGNANT NEOPLASM OF UPPER-INNER QUADRANT OF RIGHT FEMALE BREAST, UNSPECIFIED ESTROGEN RECEPTOR STATUS (HCC): Primary | ICD-10-CM

## 2023-05-03 DIAGNOSIS — Z79.4 TYPE 2 DIABETES MELLITUS WITH DIABETIC NEUROPATHIC ARTHROPATHY, WITH LONG-TERM CURRENT USE OF INSULIN (HCC): ICD-10-CM

## 2023-05-03 DIAGNOSIS — Z95.1 S/P CABG (CORONARY ARTERY BYPASS GRAFT): ICD-10-CM

## 2023-05-03 DIAGNOSIS — N18.30 STAGE 3 CHRONIC KIDNEY DISEASE, UNSPECIFIED WHETHER STAGE 3A OR 3B CKD (HCC): ICD-10-CM

## 2023-05-03 DIAGNOSIS — E11.610 TYPE 2 DIABETES MELLITUS WITH DIABETIC NEUROPATHIC ARTHROPATHY, WITH LONG-TERM CURRENT USE OF INSULIN (HCC): ICD-10-CM

## 2023-05-03 DIAGNOSIS — E78.5 SERUM LIPIDS HIGH: ICD-10-CM

## 2023-05-03 PROCEDURE — 3078F DIAST BP <80 MM HG: CPT | Performed by: INTERNAL MEDICINE

## 2023-05-03 PROCEDURE — 1090F PRES/ABSN URINE INCON ASSESS: CPT | Performed by: INTERNAL MEDICINE

## 2023-05-03 PROCEDURE — 3074F SYST BP LT 130 MM HG: CPT | Performed by: INTERNAL MEDICINE

## 2023-05-03 PROCEDURE — 99214 OFFICE O/P EST MOD 30 MIN: CPT | Performed by: INTERNAL MEDICINE

## 2023-05-03 PROCEDURE — 1036F TOBACCO NON-USER: CPT | Performed by: INTERNAL MEDICINE

## 2023-05-03 PROCEDURE — 3017F COLORECTAL CA SCREEN DOC REV: CPT | Performed by: INTERNAL MEDICINE

## 2023-05-03 PROCEDURE — G8400 PT W/DXA NO RESULTS DOC: HCPCS | Performed by: INTERNAL MEDICINE

## 2023-05-03 PROCEDURE — G8417 CALC BMI ABV UP PARAM F/U: HCPCS | Performed by: INTERNAL MEDICINE

## 2023-05-03 PROCEDURE — 1123F ACP DISCUSS/DSCN MKR DOCD: CPT | Performed by: INTERNAL MEDICINE

## 2023-05-03 PROCEDURE — 2022F DILAT RTA XM EVC RTNOPTHY: CPT | Performed by: INTERNAL MEDICINE

## 2023-05-03 PROCEDURE — G8427 DOCREV CUR MEDS BY ELIG CLIN: HCPCS | Performed by: INTERNAL MEDICINE

## 2023-05-03 PROCEDURE — 3044F HG A1C LEVEL LT 7.0%: CPT | Performed by: INTERNAL MEDICINE

## 2023-05-03 RX ORDER — INSULIN LISPRO 100 [IU]/ML
78 INJECTION, SUSPENSION SUBCUTANEOUS 2 TIMES DAILY
COMMUNITY
Start: 2023-04-07 | End: 2023-05-03

## 2023-05-03 RX ORDER — FAMOTIDINE 40 MG/1
40 TABLET, FILM COATED ORAL EVERY EVENING
Qty: 30 TABLET | Refills: 3 | Status: SHIPPED | OUTPATIENT
Start: 2023-05-03

## 2023-05-03 RX ORDER — CITALOPRAM 20 MG/1
1 TABLET ORAL DAILY
COMMUNITY
Start: 2023-04-28 | End: 2023-05-03

## 2023-05-03 RX ORDER — LANOLIN ALCOHOL/MO/W.PET/CERES
500 CREAM (GRAM) TOPICAL NIGHTLY
Qty: 90 TABLET | Refills: 0 | Status: SHIPPED | OUTPATIENT
Start: 2023-05-03

## 2023-05-03 RX ORDER — HYDROCODONE BITARTRATE AND ACETAMINOPHEN 5; 325 MG/1; MG/1
1 TABLET ORAL 2 TIMES DAILY
COMMUNITY
Start: 2023-03-29

## 2023-05-03 RX ORDER — GINGER ROOT/GINGER ROOT EXT 262.5 MG
1 CAPSULE ORAL DAILY
Qty: 90 TABLET | Refills: 0 | Status: SHIPPED | OUTPATIENT
Start: 2023-05-03

## 2023-05-03 RX ORDER — ASCORBIC ACID 500 MG
500 TABLET ORAL DAILY
Qty: 90 TABLET | Refills: 0 | Status: SHIPPED | OUTPATIENT
Start: 2023-05-03

## 2023-05-03 ASSESSMENT — ENCOUNTER SYMPTOMS
BACK PAIN: 0
VOMITING: 0
BLOOD IN STOOL: 0
NAUSEA: 0
SINUS PRESSURE: 0
ALLERGIC/IMMUNOLOGIC NEGATIVE: 1
ABDOMINAL DISTENTION: 0
ABDOMINAL PAIN: 0
CONSTIPATION: 0
DIARRHEA: 0
RHINORRHEA: 0
SORE THROAT: 0
COLOR CHANGE: 0
TROUBLE SWALLOWING: 0

## 2023-05-03 NOTE — TELEPHONE ENCOUNTER
Pt returned call and advised per Dr Dian Smith that she will be referred to Dr Yves Jimenez for further evaluation. Message routed to Rene.

## 2023-05-03 NOTE — PROGRESS NOTES
Conjunctiva/sclera: Conjunctivae normal.      Pupils: Pupils are equal, round, and reactive to light. Cardiovascular:      Rate and Rhythm: Normal rate and regular rhythm. Heart sounds: No murmur heard. No friction rub. No gallop. Pulmonary:      Effort: Pulmonary effort is normal.      Breath sounds: Normal breath sounds. Abdominal:      General: Bowel sounds are normal. There is no distension. Palpations: Abdomen is soft. There is no mass. Tenderness: There is no abdominal tenderness. There is no guarding or rebound. Musculoskeletal:         General: Tenderness (left heel and plantar area) present. No swelling or deformity. Normal range of motion. Cervical back: Normal range of motion and neck supple. No tenderness. Lymphadenopathy:      Cervical: No cervical adenopathy. Skin:     General: Skin is warm. Coloration: Skin is not pale. Findings: No rash. Neurological:      General: No focal deficit present. Mental Status: She is alert and oriented to person, place, and time. Assessment:  Kim was seen today for follow-up. Diagnoses and all orders for this visit:    Carcinoma of right female breast, unspecified estrogen receptor status, unspecified site of breast [facility])  Comments:  New Invasive Ductal Carcinoma, seeing surgeon and oncologist, pending MRI. Stage 3 chronic kidney disease, unspecified whether stage 3a or 3b CKD ([facility]  Comments:  sees nephrology, stable  Orders:  -     vitamin C (ASCORBIC ACID) 500 MG tablet;  Take 1 tablet by mouth daily    Low serum vitamin D  Comments:  Cont supplement  Orders:  -     Cholecalciferol (VITAMIN D3) 20 MCG (800 UNIT) TABS; Take 1 tablet by mouth daily    Type 2 diabetes mellitus with diabetic neuropathic arthropathy, with long-term current use of insulin (HCC)  Comments:  Great Control, A1C at 6.8, cont same    Serum lipids high  Comments:  Controlled on statin, cont same, reminded of dietary

## 2023-05-03 NOTE — ED NOTES
Reviewed discharge information. Patient verbalized understanding of paperwork, medication, and care instructions. Patient denied any questions.       Yamilet Salmeron RN  05/02/23 0948

## 2023-05-03 NOTE — ED PROVIDER NOTES
[address]      Pt Name: Rashi Hameed  MRN: [id number]  [place] 1950  Date of evaluation: 5/2/2023  Provider: Cassandra Cunningham DO  PCP: Donna Dickey MD  Note Started: 8:07 PM EDT 5/2/23    CHIEF COMPLAINT       Chief Complaint   Patient presents with    Leg Pain     Left leg pain/swelling x1 week. Sent by [place] to r/o DVT       HISTORY OF PRESENT ILLNESS: 1 or more Elements   History From: Patient  Limitations to history : None    Rashi Hameed is a 81 y.o. female who presents to the ED due to left leg pain. Patient has bilateral Charcot foot and wears braces on both feet. She states that she has had increased lower extremity edema on the left side over the past week. She denies any associated erythema but states tenderness mainly to the foot and ankle area. She notes that she was supposed to see her podiatrist today but was unable to as she had other obligations. She now is scheduled to see him on Thursday. She denies any associated chest pain or shortness of breath. Denies any recent fever or chills. Denies any recent travel or prolonged immobility. Nursing Notes were all reviewed and agreed with or any disagreements were addressed in the HPI. REVIEW OF SYSTEMS :    Positives and Pertinent negatives as per HPI. SURGICAL HISTORY     Past Surgical History:   Procedure Laterality Date    BREAST BIOPSY      2011    BREAST LUMPECTOMY      right breast 2009, with lymph nodes.     [address]    COLONOSCOPY  2011    COLONOSCOPY N/A 03/20/2015    COLONOSCOPY    COLONOSCOPY N/A 02/21/2022    COLONOSCOPY POLYPECTOMY SNARE/COLD BIOPSY performed by Jenifer Rodriguez MD at [address] [place]  10/23/2017    x2   calvin    DIAGNOSTIC CARDIAC CATH LAB PROCEDURE      EYE SURGERY  2009    several ,cataracts,legally blind    EYE SURGERY      FOOT SURGERY  03/05/2016    ulcer, bone

## 2023-05-03 NOTE — TELEPHONE ENCOUNTER
Patient could not complete Breast MRI due to SOB. Per Dr Dian Smith will refer to Dr Yves Jimenez for further recommendations. Left message for patient to call office.

## 2023-05-03 NOTE — TELEPHONE ENCOUNTER
Harley Alba, pharmacist called and is needing some clarification on a prescription that was sent into [employer]. She can be contacted at [phone]. Thank you.

## 2023-05-09 ENCOUNTER — HOSPITAL ENCOUNTER (OUTPATIENT)
Dept: CT IMAGING | Age: 73
Discharge: HOME | End: 2023-05-11
Payer: COMMERCIAL

## 2023-05-09 DIAGNOSIS — C50.211 MALIGNANT NEOPLASM OF UPPER-INNER QUADRANT OF RIGHT FEMALE BREAST, UNSPECIFIED ESTROGEN RECEPTOR STATUS (HCC): ICD-10-CM

## 2023-05-09 PROCEDURE — 74177 CT ABD & PELVIS W/CONTRAST: CPT

## 2023-05-09 PROCEDURE — 2580000003 HC RX 258: Performed by: RADIOLOGY

## 2023-05-09 PROCEDURE — 6360000004 HC RX CONTRAST MEDICATION: Performed by: RADIOLOGY

## 2023-05-09 RX ORDER — SODIUM CHLORIDE 0.9 % (FLUSH) 0.9 %
5-40 SYRINGE (ML) INJECTION 2 TIMES DAILY
Status: DISCONTINUED | OUTPATIENT
Start: 2023-05-09 | End: 2023-05-12 | Stop reason: HOSPADM

## 2023-05-09 RX ADMIN — IOPAMIDOL 18 ML: 755 INJECTION, SOLUTION INTRAVENOUS at 14:06

## 2023-05-09 RX ADMIN — SODIUM CHLORIDE, PRESERVATIVE FREE 10 ML: 5 INJECTION INTRAVENOUS at 14:06

## 2023-05-09 RX ADMIN — IOPAMIDOL 50 ML: 755 INJECTION, SOLUTION INTRAVENOUS at 14:06

## 2023-05-10 ENCOUNTER — OFFICE VISIT (OUTPATIENT)
Dept: SURGERY | Age: 73
End: 2023-05-10
Payer: COMMERCIAL

## 2023-05-10 VITALS — DIASTOLIC BLOOD PRESSURE: 62 MMHG | SYSTOLIC BLOOD PRESSURE: 132 MMHG | RESPIRATION RATE: 18 BRPM | HEART RATE: 70 BPM

## 2023-05-10 DIAGNOSIS — E66.01 SEVERE OBESITY (BMI 35.0-35.9 WITH COMORBIDITY) (HCC): ICD-10-CM

## 2023-05-10 DIAGNOSIS — C50.211 MALIGNANT NEOPLASM OF UPPER-INNER QUADRANT OF RIGHT FEMALE BREAST, UNSPECIFIED ESTROGEN RECEPTOR STATUS (HCC): Primary | ICD-10-CM

## 2023-05-10 PROCEDURE — G8417 CALC BMI ABV UP PARAM F/U: HCPCS | Performed by: SURGERY

## 2023-05-10 PROCEDURE — 1090F PRES/ABSN URINE INCON ASSESS: CPT | Performed by: SURGERY

## 2023-05-10 PROCEDURE — 1036F TOBACCO NON-USER: CPT | Performed by: SURGERY

## 2023-05-10 PROCEDURE — 1123F ACP DISCUSS/DSCN MKR DOCD: CPT | Performed by: SURGERY

## 2023-05-10 PROCEDURE — G8400 PT W/DXA NO RESULTS DOC: HCPCS | Performed by: SURGERY

## 2023-05-10 PROCEDURE — G8427 DOCREV CUR MEDS BY ELIG CLIN: HCPCS | Performed by: SURGERY

## 2023-05-10 PROCEDURE — 99213 OFFICE O/P EST LOW 20 MIN: CPT | Performed by: SURGERY

## 2023-05-10 PROCEDURE — 3075F SYST BP GE 130 - 139MM HG: CPT | Performed by: SURGERY

## 2023-05-10 PROCEDURE — 3078F DIAST BP <80 MM HG: CPT | Performed by: SURGERY

## 2023-05-10 PROCEDURE — 3017F COLORECTAL CA SCREEN DOC REV: CPT | Performed by: SURGERY

## 2023-05-10 NOTE — PROGRESS NOTES
Patient's Name/Date of Birth: Ricardo Rutherford / 1950    Date: 5/10/2023    PCP: Oskar Pascal MD    Chief Complaint   Patient presents with    Results       HPI:  FU recently dignosed right breast cancer, recurrent. Workup in progress. Patient was unable to tolerate MRI. CT abdomen showed thickened uterus, otherwisw normal. Patient has appointment with oncology next week. She wishes to have bilateral mastectomies    Patient's medications, allergies, past medical, surgical, social and family histories were reviewed and updated as appropriate. Allergies   Allergen Reactions    Penicillins      Unknown reaction tolerates cefazolin       Past Medical History:   Diagnosis Date    ASHD (arteriosclerotic heart disease)     Breast cancer ([address]     rt 2009    CAD (coronary artery disease)     Cancer ([address]     breast ca, right    Charcot foot due to diabetes mellitus ([address] 01/29/2012    Charcot foot due to diabetes mellitus ([address]     right, wears boot. Chronic diastolic congestive heart failure ([address] 11/27/2017    Chronic kidney disease     sees nephrology    Depression     Diabetic peripheral neuropathy ([address]     Diarrhea     Essential hypertension 05/24/2018    GERD (gastroesophageal reflux disease)     History of therapeutic radiation     Hyperlipidemia     Hypertension     Lactose intolerance     Thyroid disease     Type II or unspecified type diabetes mellitus without mention of complication, not stated as uncontrolled     Ulcer of foot ([address]     left covered with dry dressing    Urinary incontinence         Past Surgical History:   Procedure Laterality Date    BREAST BIOPSY      2011    BREAST LUMPECTOMY      right breast 2009, with lymph nodes.     [address]    COLONOSCOPY  2011    COLONOSCOPY N/A 03/20/2015    COLONOSCOPY    COLONOSCOPY N/A 02/21/2022    COLONOSCOPY POLYPECTOMY SNARE/COLD BIOPSY performed by Marce Johnson MD at [address] [place]  10/23/2017    x2

## 2023-05-12 ENCOUNTER — OFFICE VISIT (OUTPATIENT)
Dept: CARDIOLOGY CLINIC | Age: 73
End: 2023-05-12

## 2023-05-12 VITALS
HEART RATE: 65 BPM | RESPIRATION RATE: 18 BRPM | SYSTOLIC BLOOD PRESSURE: 132 MMHG | HEIGHT: 67 IN | DIASTOLIC BLOOD PRESSURE: 62 MMHG | BODY MASS INDEX: 21.66 KG/M2 | WEIGHT: 138 LBS | OXYGEN SATURATION: 91 %

## 2023-05-12 DIAGNOSIS — Z01.818 PREOPERATIVE CLEARANCE: ICD-10-CM

## 2023-05-12 DIAGNOSIS — I25.10 CORONARY ARTERY DISEASE INVOLVING NATIVE CORONARY ARTERY OF NATIVE HEART WITHOUT ANGINA PECTORIS: Primary | ICD-10-CM

## 2023-05-12 ASSESSMENT — ENCOUNTER SYMPTOMS
DIARRHEA: 0
VOMITING: 0
WHEEZING: 0
ABDOMINAL PAIN: 0
BLOOD IN STOOL: 0
COUGH: 0
NAUSEA: 0
CONSTIPATION: 0
BACK PAIN: 1
SHORTNESS OF BREATH: 1

## 2023-05-12 NOTE — PROGRESS NOTES
OUTPATIENT CARDIOLOGY FOLLOW-UP    HPI:    Name: Kvng Gonzalez    Age: 72 y.o. Primary Care Physician: Delicia Carvajal MD    Date of Service: 5/12/2023    Chief Complaint:   Chief Complaint   Patient presents with    Coronary Artery Disease     Ov- Patient has no cardiac complaints        History of present illness :   21-year-old morbidly obese ex-smoker female comes today for 1 year 1 year follow-up visit. She has history of CAD, status post two-vessel CABG in October 2017 with LIMA to LAD and SVG to obtuse marginal branch, surgery complicated by acute kidney injury and respiratory failure, chronic diastolic CHF, hypertension, diabetes, chronic kidney disease, diabetic ulcer of the left foot, depression, legally blind, history of breast cancer, status post lumpectomy, lymph node resection and XRT in 2015, hypothyroidism and bilateral Charcot foot. Patient is not feeling good. She had hematuria approximately 2 months ago. She was diagnosed with recurrence of right breast cancer. She also had fracture to her left fibula. She is not active due to her Charcot disease. She is wearing boots in her lower extremities. She is not active. She denies chest discomfort but has been feeling dyspnea on exertion going up 12 steps. She denies palpitations but feels dizzy at times. She denies syncope. EKG done today revealed sinus rhythm at 65 bpm, left atrial enlargement, diffuse low voltage and anteroseptal myocardial infarction of unknown age. Review of Systems:   Review of Systems   Constitutional:  Negative for chills, fatigue and fever. HENT:  Negative for nosebleeds. Respiratory:  Positive for shortness of breath. Negative for cough and wheezing. Gastrointestinal:  Negative for abdominal pain, blood in stool, constipation, diarrhea, nausea and vomiting. Genitourinary:  Negative for dysuria and hematuria. Musculoskeletal:  Positive for back pain.  Negative for joint swelling and

## 2023-05-17 RX ORDER — LISINOPRIL 40 MG/1
TABLET ORAL
Qty: 90 TABLET | Refills: 0 | Status: SHIPPED | OUTPATIENT
Start: 2023-05-17

## 2023-05-23 ENCOUNTER — TELEPHONE (OUTPATIENT)
Dept: CARDIOLOGY | Age: 73
End: 2023-05-23

## 2023-05-24 ENCOUNTER — OFFICE VISIT (OUTPATIENT)
Dept: SURGERY | Age: 73
End: 2023-05-24
Payer: COMMERCIAL

## 2023-05-24 ENCOUNTER — TELEPHONE (OUTPATIENT)
Dept: SURGERY | Age: 73
End: 2023-05-24

## 2023-05-24 VITALS — SYSTOLIC BLOOD PRESSURE: 138 MMHG | DIASTOLIC BLOOD PRESSURE: 63 MMHG | HEART RATE: 68 BPM

## 2023-05-24 DIAGNOSIS — E66.01 SEVERE OBESITY (BMI 35.0-35.9 WITH COMORBIDITY) (HCC): ICD-10-CM

## 2023-05-24 DIAGNOSIS — C50.211 MALIGNANT NEOPLASM OF UPPER-INNER QUADRANT OF RIGHT FEMALE BREAST, UNSPECIFIED ESTROGEN RECEPTOR STATUS (HCC): Primary | ICD-10-CM

## 2023-05-24 PROCEDURE — 3075F SYST BP GE 130 - 139MM HG: CPT | Performed by: SURGERY

## 2023-05-24 PROCEDURE — 3078F DIAST BP <80 MM HG: CPT | Performed by: SURGERY

## 2023-05-24 PROCEDURE — G8400 PT W/DXA NO RESULTS DOC: HCPCS | Performed by: SURGERY

## 2023-05-24 PROCEDURE — G8420 CALC BMI NORM PARAMETERS: HCPCS | Performed by: SURGERY

## 2023-05-24 PROCEDURE — 1090F PRES/ABSN URINE INCON ASSESS: CPT | Performed by: SURGERY

## 2023-05-24 PROCEDURE — 1123F ACP DISCUSS/DSCN MKR DOCD: CPT | Performed by: SURGERY

## 2023-05-24 PROCEDURE — 3017F COLORECTAL CA SCREEN DOC REV: CPT | Performed by: SURGERY

## 2023-05-24 PROCEDURE — 99214 OFFICE O/P EST MOD 30 MIN: CPT | Performed by: SURGERY

## 2023-05-24 PROCEDURE — 1036F TOBACCO NON-USER: CPT | Performed by: SURGERY

## 2023-05-24 PROCEDURE — G8427 DOCREV CUR MEDS BY ELIG CLIN: HCPCS | Performed by: SURGERY

## 2023-05-24 NOTE — TELEPHONE ENCOUNTER
Prior Authorization Form:      DEMOGRAPHICS:                     Patient Name:  Marii Campbell  Patient :  1950            Insurance:  Payor: Mary Streeter / Plan: Eb Shields / Product Type: *No Product type* /   Insurance ID Number:    Payer/Plan Subscr  Sex Relation Sub. Ins. ID Effective Group Num   1. [employer] MY* PERFECTO RIVERA 1950 Female Self [id number] 3/1/23 [id number]                                   [address]   2.  [place]  1950 Female Self [id number] 3/1/23                                    [address]         DIAGNOSIS & PROCEDURE:                       Procedure/Operation: BILATERIAL BREAST MASTECTOMY           CPT Code: 26491    Diagnosis:  RIGHT BREAST CA    ICD10 Code: V80.011    Location:  [facility]    Surgeon:  Mariaa Walker INFORMATION:                          Date: 2023    Time: 8:00              Anesthesia:  General                                                       Status:  Outpatient        Special Comments:         Electronically signed by Branden Mayorga MA on 2023 at 11:05 AM

## 2023-05-24 NOTE — PROGRESS NOTES
Patient's Name/Date of Birth: Bari George / 1950    Date: 5/24/2023    PCP: Daniela Traore MD    Chief Complaint   Patient presents with    Other       FL/U from Dr Jose Santana       HPI:  Patient recently diagnosed with recurrent right breast cancer. Workup completed. Preop consulation obtained with oncologist. Patient wishes to proceed with bilateral mastectomies. Patient is experiencing blody nipple discharge from right breast    Patient's medications, allergies, past medical, surgical, social and family histories were reviewed and updated as appropriate. Allergies   Allergen Reactions    Penicillins      Unknown reaction tolerates cefazolin       Past Medical History:   Diagnosis Date    ASHD (arteriosclerotic heart disease)     Breast cancer ([address]     rt 2009    CAD (coronary artery disease)     Cancer ([address]     breast ca, right    Charcot foot due to diabetes mellitus ([address] 01/29/2012    Charcot foot due to diabetes mellitus ([address]     right, wears boot. Chronic diastolic congestive heart failure ([address] 11/27/2017    Chronic kidney disease     sees nephrology    Depression     Diabetic peripheral neuropathy ([address]     Diarrhea     Essential hypertension 05/24/2018    GERD (gastroesophageal reflux disease)     History of therapeutic radiation     Hyperlipidemia     Hypertension     Lactose intolerance     Thyroid disease     Type II or unspecified type diabetes mellitus without mention of complication, not stated as uncontrolled     Ulcer of foot ([address]     left covered with dry dressing    Urinary incontinence         Past Surgical History:   Procedure Laterality Date    BREAST BIOPSY      2011    BREAST LUMPECTOMY      right breast 2009, with lymph nodes.     Mendez Allen    COLONOSCOPY  2011    COLONOSCOPY N/A 03/20/2015    COLONOSCOPY    COLONOSCOPY N/A 02/21/2022    COLONOSCOPY POLYPECTOMY SNARE/COLD BIOPSY performed by Zaheer Wynne MD at [facility]

## 2023-05-26 ENCOUNTER — HOSPITAL ENCOUNTER (OUTPATIENT)
Dept: CARDIOLOGY | Age: 73
Discharge: HOME | End: 2023-05-26

## 2023-05-31 ENCOUNTER — TELEPHONE (OUTPATIENT)
Dept: CARDIOLOGY | Age: 73
End: 2023-05-31

## 2023-05-31 ENCOUNTER — TELEPHONE (OUTPATIENT)
Dept: SURGERY | Age: 73
End: 2023-05-31

## 2023-05-31 NOTE — TELEPHONE ENCOUNTER
Spoke w/ patient to confirm stress test for Friday, 5/26/23, starting at 0915. Instructions given and medications reviewed. Patient instructed to check BS the AM of test and to hold off on all DM meds the morning of the test.  All questions answered.

## 2023-05-31 NOTE — TELEPHONE ENCOUNTER
Pt called stating that she is having stress test and echo on 06/02/2023.   Message routed to [facility]

## 2023-06-02 ENCOUNTER — HOSPITAL ENCOUNTER (OUTPATIENT)
Dept: CARDIOLOGY | Age: 73
Discharge: HOME | End: 2023-06-02
Attending: INTERNAL MEDICINE
Payer: COMMERCIAL

## 2023-06-02 VITALS
HEIGHT: 67 IN | BODY MASS INDEX: 21.66 KG/M2 | SYSTOLIC BLOOD PRESSURE: 116 MMHG | RESPIRATION RATE: 16 BRPM | HEART RATE: 70 BPM | DIASTOLIC BLOOD PRESSURE: 74 MMHG | WEIGHT: 138 LBS

## 2023-06-02 DIAGNOSIS — Z01.818 PREOPERATIVE CLEARANCE: ICD-10-CM

## 2023-06-02 DIAGNOSIS — I25.10 CORONARY ARTERY DISEASE INVOLVING NATIVE CORONARY ARTERY OF NATIVE HEART WITHOUT ANGINA PECTORIS: ICD-10-CM

## 2023-06-02 DIAGNOSIS — I25.10 CORONARY ARTERY DISEASE INVOLVING NATIVE CORONARY ARTERY OF NATIVE HEART WITHOUT ANGINA PECTORIS: Primary | ICD-10-CM

## 2023-06-02 LAB
LV EF: 70 %
LVEF MODALITY: NORMAL

## 2023-06-02 PROCEDURE — A9502 TC99M TETROFOSMIN: HCPCS | Performed by: INTERNAL MEDICINE

## 2023-06-02 PROCEDURE — 78452 HT MUSCLE IMAGE SPECT MULT: CPT

## 2023-06-02 PROCEDURE — 2580000003 HC RX 258: Performed by: INTERNAL MEDICINE

## 2023-06-02 PROCEDURE — 93017 CV STRESS TEST TRACING ONLY: CPT

## 2023-06-02 PROCEDURE — 6360000002 HC RX W HCPCS: Performed by: INTERNAL MEDICINE

## 2023-06-02 PROCEDURE — 3430000000 HC RX DIAGNOSTIC RADIOPHARMACEUTICAL: Performed by: INTERNAL MEDICINE

## 2023-06-02 PROCEDURE — 93306 TTE W/DOPPLER COMPLETE: CPT

## 2023-06-02 RX ORDER — CITALOPRAM 20 MG/1
20 TABLET ORAL DAILY
COMMUNITY
Start: 2023-05-26

## 2023-06-02 RX ORDER — SODIUM CHLORIDE 0.9 % (FLUSH) 0.9 %
10 SYRINGE (ML) INJECTION PRN
Status: DISCONTINUED | OUTPATIENT
Start: 2023-06-02 | End: 2023-06-03 | Stop reason: HOSPADM

## 2023-06-02 RX ORDER — PREDNISOLONE ACETATE 10 MG/ML
1 SUSPENSION/ DROPS OPHTHALMIC 2 TIMES DAILY
COMMUNITY

## 2023-06-02 RX ADMIN — REGADENOSON 0.4 MG: 0.08 INJECTION, SOLUTION INTRAVENOUS at 10:36

## 2023-06-02 RX ADMIN — SODIUM CHLORIDE, PRESERVATIVE FREE 10 ML: 5 INJECTION INTRAVENOUS at 09:15

## 2023-06-02 RX ADMIN — TETROFOSMIN 28.1 MILLICURIE: 0.23 INJECTION, POWDER, LYOPHILIZED, FOR SOLUTION INTRAVENOUS at 10:36

## 2023-06-02 RX ADMIN — TETROFOSMIN 8.4 MILLICURIE: 0.23 INJECTION, POWDER, LYOPHILIZED, FOR SOLUTION INTRAVENOUS at 09:15

## 2023-06-02 RX ADMIN — SODIUM CHLORIDE, PRESERVATIVE FREE 10 ML: 5 INJECTION INTRAVENOUS at 10:36

## 2023-06-02 RX ADMIN — SODIUM CHLORIDE, PRESERVATIVE FREE 10 ML: 5 INJECTION INTRAVENOUS at 10:37

## 2023-06-02 NOTE — PROCEDURES
[address] [facility] - [address]. [facility], [address] [place] [address]  [phone]               Pharmacologic Stress Nuclear Gated SPECT Study    Name: ST NELLIE CHRISTOPHER Account Number: [de-identified]    :  1950          Sex: female         Date of Study:  2023    Height: 5' 7\" (170.2 cm)         Weight - Scale: 138 lb (62.6 kg)     Ordering Provider: Kary Nunez MD          PCP: Cierra Lopez MD      Cardiologist: Kary Nunez MD             Interpreting Physician: Les Okeefe MD  _________________________________________________________________________________    Indication:   Preoperative Risk Stratification    Clinical History:   Patient has prior history of coronary artery disease. Resting ECG:    SR with non specific T wave changes ( V1 - V3 )    Procedure:   Pharmacologic stress testing was performed with regadenoson 0.4 mg for 15 seconds. The heart rate was 70 at baseline and addie to 78 beats during the infusion. The blood pressure at baseline was 116/74 and blood pressure at the end of infusion was 110/68. Blood pressure response was normal during the stress procedure. The patient experienced shortness of breath and nausea during the infusion that resolved in recovery. ECG during the infusion did not change. IMAGING: Myocardial perfusion imaging was performed at rest 30-35 minutes following the intravenous injection of 8.4 mCi of (Tc-tetrofosmin) followed by 10 ml of Normal Saline. As per infusion protocol, the patient was injected intravenously with 28.1 mCi of (Tc-tetrofosmin) followed by 10 ml of Normal Saline. Gated post-stress tomographic imaging was performed 45 minutes after stress. FINDINGS: The overall quality of the study was good.      Left ventricular cavity size was noted to be normal.    Rotational analog analysis demonstrated abnormal patient motion, extracardiac radioactivity, soft tissue breast attenuation, and

## 2023-06-06 ENCOUNTER — TELEPHONE (OUTPATIENT)
Dept: SURGERY | Age: 73
End: 2023-06-06

## 2023-06-06 NOTE — TELEPHONE ENCOUNTER
Pt called inquiring about her surgery and was given the date. Pt asked if clearance was received and if she would be admitted after surgery or go home. Please call pt to advise. [phone] Message route to [facility] and Rene.

## 2023-06-15 ENCOUNTER — TELEPHONE (OUTPATIENT)
Dept: CARDIOLOGY CLINIC | Age: 73
End: 2023-06-15

## 2023-06-19 ENCOUNTER — ANESTHESIA EVENT (OUTPATIENT)
Dept: OPERATING ROOM | Age: 73
DRG: 583 | End: 2023-06-19
Payer: COMMERCIAL

## 2023-06-19 DIAGNOSIS — N18.30 STAGE 3 CHRONIC KIDNEY DISEASE, UNSPECIFIED WHETHER STAGE 3A OR 3B CKD (HCC): ICD-10-CM

## 2023-06-19 DIAGNOSIS — R79.89 LOW SERUM VITAMIN D: ICD-10-CM

## 2023-06-19 RX ORDER — DULAGLUTIDE 0.75 MG/.5ML
INJECTION, SOLUTION SUBCUTANEOUS
Qty: 2 ML | Refills: 2 | Status: SHIPPED | OUTPATIENT
Start: 2023-06-19

## 2023-06-19 RX ORDER — LISINOPRIL 40 MG/1
40 TABLET ORAL DAILY
Qty: 90 TABLET | Refills: 0 | Status: SHIPPED | OUTPATIENT
Start: 2023-06-19

## 2023-06-19 RX ORDER — ASCORBIC ACID 500 MG
500 TABLET ORAL DAILY
Qty: 90 TABLET | Refills: 0 | Status: SHIPPED | OUTPATIENT
Start: 2023-06-19

## 2023-06-19 RX ORDER — LANOLIN ALCOHOL/MO/W.PET/CERES
500 CREAM (GRAM) TOPICAL NIGHTLY
Qty: 90 TABLET | Refills: 0 | Status: SHIPPED | OUTPATIENT
Start: 2023-06-19

## 2023-06-19 RX ORDER — ASPIRIN 81 MG/1
81 TABLET, CHEWABLE ORAL DAILY
Qty: 90 TABLET | Refills: 0 | Status: SHIPPED | OUTPATIENT
Start: 2023-06-19

## 2023-06-20 ENCOUNTER — ANESTHESIA (OUTPATIENT)
Dept: OPERATING ROOM | Age: 73
DRG: 583 | End: 2023-06-20
Payer: COMMERCIAL

## 2023-06-20 ENCOUNTER — TELEPHONE (OUTPATIENT)
Dept: PRIMARY CARE CLINIC | Age: 73
End: 2023-06-20

## 2023-06-20 ENCOUNTER — HOSPITAL ENCOUNTER (INPATIENT)
Age: 73
LOS: 2 days | Discharge: HOME | DRG: 583 | End: 2023-06-22
Attending: SURGERY | Admitting: SURGERY
Payer: COMMERCIAL

## 2023-06-20 DIAGNOSIS — C77.9 RIGHT BREAST CANCER WITH MALIGNANT CELLS IN REGIONAL LYMPH NODES NO GREATER THAN 0.2 MM AND NO MORE THAN 200 CELLS (HCC): ICD-10-CM

## 2023-06-20 DIAGNOSIS — C50.911 RIGHT BREAST CANCER WITH MALIGNANT CELLS IN REGIONAL LYMPH NODES NO GREATER THAN 0.2 MM AND NO MORE THAN 200 CELLS (HCC): ICD-10-CM

## 2023-06-20 DIAGNOSIS — Z90.13 S/P BILATERAL MASTECTOMY: Primary | ICD-10-CM

## 2023-06-20 LAB
ANION GAP SERPL CALCULATED.3IONS-SCNC: 8 MMOL/L (ref 7–16)
BUN SERPL-MCNC: 23 MG/DL (ref 6–23)
CALCIUM SERPL-MCNC: 9.5 MG/DL (ref 8.6–10.2)
CHLORIDE SERPL-SCNC: 104 MMOL/L (ref 98–107)
CO2 SERPL-SCNC: 29 MMOL/L (ref 22–29)
CREAT SERPL-MCNC: 1.3 MG/DL (ref 0.5–1)
ERYTHROCYTE [DISTWIDTH] IN BLOOD BY AUTOMATED COUNT: 13.6 FL (ref 11.5–15)
GLUCOSE SERPL-MCNC: 182 MG/DL (ref 74–99)
HCT VFR BLD AUTO: 42.7 % (ref 34–48)
HGB BLD-MCNC: 13.7 G/DL (ref 11.5–15.5)
MCH RBC QN AUTO: 29.8 PG (ref 26–35)
MCHC RBC AUTO-ENTMCNC: 32.1 % (ref 32–34.5)
MCV RBC AUTO: 93 FL (ref 80–99.9)
METER GLUCOSE: 188 MG/DL (ref 74–99)
METER GLUCOSE: 362 MG/DL (ref 74–99)
PLATELET # BLD AUTO: 195 E9/L (ref 130–450)
PMV BLD AUTO: 9.9 FL (ref 7–12)
POTASSIUM SERPL-SCNC: 4.9 MMOL/L (ref 3.5–5)
RBC # BLD AUTO: 4.59 E12/L (ref 3.5–5.5)
SODIUM SERPL-SCNC: 141 MMOL/L (ref 132–146)
WBC # BLD: 9.2 E9/L (ref 4.5–11.5)

## 2023-06-20 PROCEDURE — 6360000002 HC RX W HCPCS: Performed by: ANESTHESIOLOGY

## 2023-06-20 PROCEDURE — 6360000002 HC RX W HCPCS

## 2023-06-20 PROCEDURE — 7100000000 HC PACU RECOVERY - FIRST 15 MIN: Performed by: SURGERY

## 2023-06-20 PROCEDURE — 6370000000 HC RX 637 (ALT 250 FOR IP)

## 2023-06-20 PROCEDURE — 80048 BASIC METABOLIC PNL TOTAL CA: CPT

## 2023-06-20 PROCEDURE — 6370000000 HC RX 637 (ALT 250 FOR IP): Performed by: SURGERY

## 2023-06-20 PROCEDURE — 36415 COLL VENOUS BLD VENIPUNCTURE: CPT

## 2023-06-20 PROCEDURE — 3700000001 HC ADD 15 MINUTES (ANESTHESIA): Performed by: SURGERY

## 2023-06-20 PROCEDURE — 0HTU0ZZ RESECTION OF LEFT BREAST, OPEN APPROACH: ICD-10-PCS | Performed by: SURGERY

## 2023-06-20 PROCEDURE — 2580000003 HC RX 258: Performed by: SURGERY

## 2023-06-20 PROCEDURE — 82962 GLUCOSE BLOOD TEST: CPT

## 2023-06-20 PROCEDURE — 2580000003 HC RX 258

## 2023-06-20 PROCEDURE — 1200000000 HC SEMI PRIVATE

## 2023-06-20 PROCEDURE — 7100000001 HC PACU RECOVERY - ADDTL 15 MIN: Performed by: SURGERY

## 2023-06-20 PROCEDURE — 2709999900 HC NON-CHARGEABLE SUPPLY: Performed by: SURGERY

## 2023-06-20 PROCEDURE — 3700000000 HC ANESTHESIA ATTENDED CARE: Performed by: SURGERY

## 2023-06-20 PROCEDURE — 0HBU0ZZ EXCISION OF LEFT BREAST, OPEN APPROACH: ICD-10-PCS | Performed by: SURGERY

## 2023-06-20 PROCEDURE — 85027 COMPLETE CBC AUTOMATED: CPT

## 2023-06-20 PROCEDURE — 3600000003 HC SURGERY LEVEL 3 BASE: Performed by: SURGERY

## 2023-06-20 PROCEDURE — 2060000000 HC ICU INTERMEDIATE R&B

## 2023-06-20 PROCEDURE — 2500000003 HC RX 250 WO HCPCS

## 2023-06-20 PROCEDURE — 3600000013 HC SURGERY LEVEL 3 ADDTL 15MIN: Performed by: SURGERY

## 2023-06-20 PROCEDURE — 88342 IMHCHEM/IMCYTCHM 1ST ANTB: CPT

## 2023-06-20 PROCEDURE — 6360000002 HC RX W HCPCS: Performed by: SURGERY

## 2023-06-20 PROCEDURE — 88307 TISSUE EXAM BY PATHOLOGIST: CPT

## 2023-06-20 RX ORDER — SODIUM CHLORIDE 0.9 % (FLUSH) 0.9 %
5-40 SYRINGE (ML) INJECTION EVERY 12 HOURS SCHEDULED
Status: DISCONTINUED | OUTPATIENT
Start: 2023-06-20 | End: 2023-06-22 | Stop reason: HOSPADM

## 2023-06-20 RX ORDER — SODIUM CHLORIDE 0.9 % (FLUSH) 0.9 %
5-40 SYRINGE (ML) INJECTION PRN
Status: DISCONTINUED | OUTPATIENT
Start: 2023-06-20 | End: 2023-06-20 | Stop reason: HOSPADM

## 2023-06-20 RX ORDER — SODIUM CHLORIDE 0.9 % (FLUSH) 0.9 %
5-40 SYRINGE (ML) INJECTION EVERY 12 HOURS SCHEDULED
Status: DISCONTINUED | OUTPATIENT
Start: 2023-06-20 | End: 2023-06-20 | Stop reason: HOSPADM

## 2023-06-20 RX ORDER — PROPOFOL 10 MG/ML
INJECTION, EMULSION INTRAVENOUS PRN
Status: DISCONTINUED | OUTPATIENT
Start: 2023-06-20 | End: 2023-06-20 | Stop reason: SDUPTHER

## 2023-06-20 RX ORDER — SODIUM CHLORIDE 9 MG/ML
INJECTION, SOLUTION INTRAVENOUS PRN
Status: DISCONTINUED | OUTPATIENT
Start: 2023-06-20 | End: 2023-06-22 | Stop reason: HOSPADM

## 2023-06-20 RX ORDER — LIDOCAINE HYDROCHLORIDE 20 MG/ML
INJECTION, SOLUTION EPIDURAL; INFILTRATION; INTRACAUDAL; PERINEURAL PRN
Status: DISCONTINUED | OUTPATIENT
Start: 2023-06-20 | End: 2023-06-20 | Stop reason: SDUPTHER

## 2023-06-20 RX ORDER — HYDRALAZINE HYDROCHLORIDE 20 MG/ML
10 INJECTION INTRAMUSCULAR; INTRAVENOUS
Status: DISCONTINUED | OUTPATIENT
Start: 2023-06-20 | End: 2023-06-20 | Stop reason: HOSPADM

## 2023-06-20 RX ORDER — FENTANYL CITRATE 50 UG/ML
INJECTION, SOLUTION INTRAMUSCULAR; INTRAVENOUS PRN
Status: DISCONTINUED | OUTPATIENT
Start: 2023-06-20 | End: 2023-06-20 | Stop reason: SDUPTHER

## 2023-06-20 RX ORDER — MIDAZOLAM HYDROCHLORIDE 1 MG/ML
INJECTION INTRAMUSCULAR; INTRAVENOUS PRN
Status: DISCONTINUED | OUTPATIENT
Start: 2023-06-20 | End: 2023-06-20 | Stop reason: SDUPTHER

## 2023-06-20 RX ORDER — ONDANSETRON 2 MG/ML
4 INJECTION INTRAMUSCULAR; INTRAVENOUS
Status: DISCONTINUED | OUTPATIENT
Start: 2023-06-20 | End: 2023-06-20 | Stop reason: HOSPADM

## 2023-06-20 RX ORDER — ONDANSETRON 4 MG/1
4 TABLET, ORALLY DISINTEGRATING ORAL EVERY 8 HOURS PRN
Status: DISCONTINUED | OUTPATIENT
Start: 2023-06-20 | End: 2023-06-22 | Stop reason: HOSPADM

## 2023-06-20 RX ORDER — INSULIN LISPRO 100 [IU]/ML
0-4 INJECTION, SOLUTION INTRAVENOUS; SUBCUTANEOUS
Status: CANCELLED | OUTPATIENT
Start: 2023-06-20

## 2023-06-20 RX ORDER — DEXAMETHASONE SODIUM PHOSPHATE 4 MG/ML
INJECTION, SOLUTION INTRA-ARTICULAR; INTRALESIONAL; INTRAMUSCULAR; INTRAVENOUS; SOFT TISSUE PRN
Status: DISCONTINUED | OUTPATIENT
Start: 2023-06-20 | End: 2023-06-20 | Stop reason: SDUPTHER

## 2023-06-20 RX ORDER — ONDANSETRON 2 MG/ML
4 INJECTION INTRAMUSCULAR; INTRAVENOUS EVERY 6 HOURS PRN
Status: DISCONTINUED | OUTPATIENT
Start: 2023-06-20 | End: 2023-06-22 | Stop reason: HOSPADM

## 2023-06-20 RX ORDER — METOPROLOL TARTRATE 50 MG/1
50 TABLET, FILM COATED ORAL 2 TIMES DAILY
Status: DISCONTINUED | OUTPATIENT
Start: 2023-06-20 | End: 2023-06-21

## 2023-06-20 RX ORDER — SODIUM CHLORIDE 9 MG/ML
INJECTION, SOLUTION INTRAVENOUS PRN
Status: DISCONTINUED | OUTPATIENT
Start: 2023-06-20 | End: 2023-06-20 | Stop reason: HOSPADM

## 2023-06-20 RX ORDER — ACETAMINOPHEN 325 MG/1
650 TABLET ORAL EVERY 6 HOURS
Status: DISCONTINUED | OUTPATIENT
Start: 2023-06-20 | End: 2023-06-22 | Stop reason: HOSPADM

## 2023-06-20 RX ORDER — ENOXAPARIN SODIUM 100 MG/ML
30 INJECTION SUBCUTANEOUS 2 TIMES DAILY
Status: DISCONTINUED | OUTPATIENT
Start: 2023-06-20 | End: 2023-06-22 | Stop reason: HOSPADM

## 2023-06-20 RX ORDER — SODIUM CHLORIDE 0.9 % (FLUSH) 0.9 %
5-40 SYRINGE (ML) INJECTION PRN
Status: DISCONTINUED | OUTPATIENT
Start: 2023-06-20 | End: 2023-06-22 | Stop reason: HOSPADM

## 2023-06-20 RX ORDER — SODIUM CHLORIDE 9 MG/ML
INJECTION, SOLUTION INTRAVENOUS CONTINUOUS PRN
Status: DISCONTINUED | OUTPATIENT
Start: 2023-06-20 | End: 2023-06-20 | Stop reason: SDUPTHER

## 2023-06-20 RX ORDER — IPRATROPIUM BROMIDE AND ALBUTEROL SULFATE 2.5; .5 MG/3ML; MG/3ML
1 SOLUTION RESPIRATORY (INHALATION)
Status: DISCONTINUED | OUTPATIENT
Start: 2023-06-20 | End: 2023-06-20 | Stop reason: HOSPADM

## 2023-06-20 RX ORDER — MIDAZOLAM HYDROCHLORIDE 2 MG/2ML
2 INJECTION, SOLUTION INTRAMUSCULAR; INTRAVENOUS
Status: DISCONTINUED | OUTPATIENT
Start: 2023-06-20 | End: 2023-06-20 | Stop reason: HOSPADM

## 2023-06-20 RX ORDER — SODIUM CHLORIDE 9 MG/ML
INJECTION, SOLUTION INTRAVENOUS CONTINUOUS
Status: DISCONTINUED | OUTPATIENT
Start: 2023-06-20 | End: 2023-06-21

## 2023-06-20 RX ORDER — ONDANSETRON 2 MG/ML
INJECTION INTRAMUSCULAR; INTRAVENOUS PRN
Status: DISCONTINUED | OUTPATIENT
Start: 2023-06-20 | End: 2023-06-20 | Stop reason: SDUPTHER

## 2023-06-20 RX ORDER — OXYCODONE HYDROCHLORIDE 5 MG/1
5 TABLET ORAL EVERY 4 HOURS PRN
Status: DISCONTINUED | OUTPATIENT
Start: 2023-06-20 | End: 2023-06-22 | Stop reason: HOSPADM

## 2023-06-20 RX ORDER — OXYCODONE HYDROCHLORIDE 5 MG/1
10 TABLET ORAL EVERY 4 HOURS PRN
Status: DISCONTINUED | OUTPATIENT
Start: 2023-06-20 | End: 2023-06-22 | Stop reason: HOSPADM

## 2023-06-20 RX ORDER — ROCURONIUM BROMIDE 10 MG/ML
INJECTION, SOLUTION INTRAVENOUS PRN
Status: DISCONTINUED | OUTPATIENT
Start: 2023-06-20 | End: 2023-06-20 | Stop reason: SDUPTHER

## 2023-06-20 RX ORDER — INSULIN LISPRO 100 [IU]/ML
0-4 INJECTION, SOLUTION INTRAVENOUS; SUBCUTANEOUS NIGHTLY
Status: CANCELLED | OUTPATIENT
Start: 2023-06-20

## 2023-06-20 RX ORDER — LABETALOL HYDROCHLORIDE 5 MG/ML
10 INJECTION, SOLUTION INTRAVENOUS
Status: DISCONTINUED | OUTPATIENT
Start: 2023-06-20 | End: 2023-06-20 | Stop reason: HOSPADM

## 2023-06-20 RX ADMIN — FENTANYL CITRATE 50 MCG: 50 INJECTION, SOLUTION INTRAMUSCULAR; INTRAVENOUS at 11:36

## 2023-06-20 RX ADMIN — FENTANYL CITRATE 100 MCG: 50 INJECTION, SOLUTION INTRAMUSCULAR; INTRAVENOUS at 11:04

## 2023-06-20 RX ADMIN — ROCURONIUM BROMIDE 10 MG: 50 INJECTION, SOLUTION INTRAVENOUS at 11:45

## 2023-06-20 RX ADMIN — Medication 10 ML: at 19:55

## 2023-06-20 RX ADMIN — SUGAMMADEX 250 MG: 100 INJECTION, SOLUTION INTRAVENOUS at 13:03

## 2023-06-20 RX ADMIN — ROCURONIUM BROMIDE 40 MG: 50 INJECTION, SOLUTION INTRAVENOUS at 11:04

## 2023-06-20 RX ADMIN — HYDROMORPHONE HYDROCHLORIDE 0.5 MG: 0.5 INJECTION, SOLUTION INTRAMUSCULAR; INTRAVENOUS; SUBCUTANEOUS at 15:16

## 2023-06-20 RX ADMIN — SODIUM CHLORIDE: 9 INJECTION, SOLUTION INTRAVENOUS at 15:00

## 2023-06-20 RX ADMIN — LIDOCAINE HYDROCHLORIDE 60 MG: 20 INJECTION, SOLUTION EPIDURAL; INFILTRATION; INTRACAUDAL; PERINEURAL at 11:04

## 2023-06-20 RX ADMIN — ONDANSETRON 4 MG: 2 INJECTION INTRAMUSCULAR; INTRAVENOUS at 12:53

## 2023-06-20 RX ADMIN — HYDROMORPHONE HYDROCHLORIDE 0.5 MG: 0.5 INJECTION, SOLUTION INTRAMUSCULAR; INTRAVENOUS; SUBCUTANEOUS at 13:32

## 2023-06-20 RX ADMIN — HYDROMORPHONE HYDROCHLORIDE 0.5 MG: 0.5 INJECTION, SOLUTION INTRAMUSCULAR; INTRAVENOUS; SUBCUTANEOUS at 19:55

## 2023-06-20 RX ADMIN — PROPOFOL 150 MG: 10 INJECTION, EMULSION INTRAVENOUS at 11:04

## 2023-06-20 RX ADMIN — DEXAMETHASONE SODIUM PHOSPHATE 10 MG: 4 INJECTION, SOLUTION INTRA-ARTICULAR; INTRALESIONAL; INTRAMUSCULAR; INTRAVENOUS; SOFT TISSUE at 11:13

## 2023-06-20 RX ADMIN — WATER 2000 MG: 1 INJECTION INTRAMUSCULAR; INTRAVENOUS; SUBCUTANEOUS at 11:08

## 2023-06-20 RX ADMIN — SODIUM CHLORIDE: 9 INJECTION, SOLUTION INTRAVENOUS at 10:55

## 2023-06-20 RX ADMIN — HYDROMORPHONE HYDROCHLORIDE 0.5 MG: 0.5 INJECTION, SOLUTION INTRAMUSCULAR; INTRAVENOUS; SUBCUTANEOUS at 13:27

## 2023-06-20 RX ADMIN — HYDROMORPHONE HYDROCHLORIDE 0.5 MG: 0.5 INJECTION, SOLUTION INTRAMUSCULAR; INTRAVENOUS; SUBCUTANEOUS at 13:45

## 2023-06-20 RX ADMIN — ACETAMINOPHEN 650 MG: 325 TABLET ORAL at 19:55

## 2023-06-20 RX ADMIN — METOPROLOL TARTRATE 50 MG: 50 TABLET, FILM COATED ORAL at 19:55

## 2023-06-20 RX ADMIN — MIDAZOLAM 2 MG: 1 INJECTION INTRAMUSCULAR; INTRAVENOUS at 10:55

## 2023-06-20 ASSESSMENT — PAIN DESCRIPTION - LOCATION
LOCATION: BREAST
LOCATION: CHEST;OTHER (COMMENT)
LOCATION: CHEST
LOCATION: BREAST

## 2023-06-20 ASSESSMENT — PAIN DESCRIPTION - DESCRIPTORS
DESCRIPTORS: SHARP
DESCRIPTORS: ACHING
DESCRIPTORS: SHARP
DESCRIPTORS: ACHING
DESCRIPTORS: SHARP
DESCRIPTORS: SHARP

## 2023-06-20 ASSESSMENT — PAIN SCALES - GENERAL
PAINLEVEL_OUTOF10: 9
PAINLEVEL_OUTOF10: 10
PAINLEVEL_OUTOF10: 10
PAINLEVEL_OUTOF10: 8
PAINLEVEL_OUTOF10: 9
PAINLEVEL_OUTOF10: 8

## 2023-06-20 ASSESSMENT — LIFESTYLE VARIABLES: SMOKING_STATUS: 0

## 2023-06-20 ASSESSMENT — PAIN DESCRIPTION - PAIN TYPE
TYPE: SURGICAL PAIN
TYPE: SURGICAL PAIN

## 2023-06-20 ASSESSMENT — PAIN DESCRIPTION - ORIENTATION
ORIENTATION: RIGHT;LEFT
ORIENTATION: LEFT
ORIENTATION: RIGHT;LEFT

## 2023-06-20 ASSESSMENT — PAIN DESCRIPTION - FREQUENCY: FREQUENCY: CONTINUOUS

## 2023-06-20 ASSESSMENT — PAIN DESCRIPTION - ONSET: ONSET: ON-GOING

## 2023-06-20 ASSESSMENT — PAIN - FUNCTIONAL ASSESSMENT: PAIN_FUNCTIONAL_ASSESSMENT: 0-10

## 2023-06-20 NOTE — PROGRESS NOTES
Dr. Fercho To paged regarding home medications needing reordered.  Electronically signed by Kristene Essex, RN on 6/20/2023 at 7:32 PM

## 2023-06-20 NOTE — TELEPHONE ENCOUNTER
Pharmacy called stating the 800 units of VitD3 was not covered for pt- and that we would need to send something else.

## 2023-06-20 NOTE — OP NOTE
Operative Note      Patient: Janelle Richey  YOB: 1950  MRN: [id number]    Date of Procedure: 6/20/2023    Preop Diagnosis: Right breast cancer    Post-Op Diagnosis: Same       Procedure: Left simple mastectomy    Surgeon: Con Jimenez MD    Assistant:   Resident: Jade Corbin DO    Anesthesia: General    Estimated Blood Loss (mL): less than 50      Complications: None    Specimens:   Left breast    Implants:  * No implants in log *      Drains:   Closed/Suction Drain Left Breast Bulb (Active)   Dressing Status New dressing applied 06/20/23 1231       Closed/Suction Drain Right Breast Bulb (Active)   Dressing Status New dressing applied 06/20/23 1231       PROCEDURE NOTE:  The patient was then taken to the operating room and positioned supine on the operating table. The chest and axillae were prepped with ChloraPrep and draped in the usual sterile fashion. The transverse elliptical incisions for the left simple mastectomy were then marked with a surgical marker. These were measured for length to ensure ease of approximation at the end of the procedure. A #15-blade scalpel was used to make the skin incisions. A left mastectomy was then performed by using sharp dissection with electrocautery. Dissection was carried down to the chest wall, both superiorly and inferiorly, leaving sufficient flaps. Dissection was also carried out from just lateral to the sternum to the right axilla. The breast tissue was then removed, along with the clavipectoral fascia. Care was taken to avoid injury to any significant neurovascular structures. Meticulous hemostasis was achieved using a combination of electrocautery. The specimen was removed, marked suture (long=lateral, short=superior). The surgical site was irrigated with sterile saline. Meticulous hemostasis was achieved. One closed-suction drains were placed at the left mastectomy site. It was sutured in place with a nylon drain stitch.  This was

## 2023-06-20 NOTE — PROGRESS NOTES
Nursing Transfer Note    Data:  Summary of patients progress: Dr Dylan Levine mastectomy  Reason for transfer: next level of care    Action:  Explained reason for transfer to Patient and Family. Report given to: Victor Hugo Oconnell, using RN Handoff Navigator.   Mode of transportation: bed    Response:  RN Recommendations: pain mgt, O2 mgt

## 2023-06-20 NOTE — ANESTHESIA PRE PROCEDURE
Department of Anesthesiology  Preprocedure Note       Name:  Angelic Garcia   Age:  72 y.o.  :  1950                                          MRN:  [id number]         Date:  2023      Surgeon: Danette Church):  MD Lucio Mccoy MD    Procedure: Procedure(s):  BILATERAL BREAST MASTECTOMY    Medications prior to admission:   Prior to Admission medications    Medication Sig Start Date End Date Taking? Authorizing Provider   vitamin C (ASCORBIC ACID) 500 MG tablet Take 1 tablet by mouth daily 23   Ajay Eid MD   niacin (SLO-NIACIN) 500 MG extended release tablet Take 1 tablet by mouth nightly 23   Ajay Eid MD   Cholecalciferol (VITAMIN D3) 20 MCG (800 UNIT) TABS Take 1 tablet by mouth daily 23   Ajay Eid MD   insulin lispro protamine & lispro (HUMALOG MIX 75/25) (75-25) 100 UNIT per ML SUSP injection vial Inject 78 Units into the skin 2 times daily (with meals) 23   Ajay Eid MD   Dulaglutide (TRULICITY) 6.35 AE/7.6TZ SOPN inject 0.5 milliliters ( 0.75 milligrams ) subcutaneously every week 23   Ajay Eid MD   lisinopril (PRINIVIL;ZESTRIL) 40 MG tablet Take 1 tablet by mouth daily 23   Ajay Eid MD   aspirin (ASPIRIN CHILDRENS) 81 MG chewable tablet Take 1 tablet by mouth daily 23   Ajay Eid MD   SYSTANE 0.4-0.3 % GEL ophthalmic gel Place 1 drop into both eyes 3 times daily 23   Historical Provider, MD   Varenicline Tartrate (TYRVAYA) 0.03 MG/ACT SOLN 1 spray by Nasal route in the morning and at bedtime    Historical Provider, MD   cycloSPORINE, PF, (CEQUA) 0.09 % SOLN Apply 1 drop to eye in the morning and at bedtime    Historical Provider, MD   citalopram (CELEXA) 20 MG tablet Take 1 tablet by mouth daily 23   Historical Provider, MD   HYDROcodone-acetaminophen (NORCO) 5-325 MG per tablet Take 1 tablet by mouth in the morning and at bedtime.  3/29/23   Historical Provider, MD

## 2023-06-20 NOTE — OP NOTE
[address] [address]                                OPERATIVE REPORT    PATIENT NAME: Fallon Krishnan                       :        1950  MED REC NO:   [id number]                            ROOM:  ACCOUNT NO:   [de-identified]                           ADMIT DATE: 2023  PROVIDER:     Tye Hodges MD    DATE OF PROCEDURE:  2023    PREOPERATIVE DIAGNOSIS:  Recurrent right breast cancer. POSTOPERATIVE DIAGNOSIS:  Recurrent right breast cancer. PROCEDURE PERFORMED:  Right modified radical mastectomy. SURGEON:  Tye Hodges M.D. INDICATIONS:  This is a 68-year-old female patient who previously had  right breast lumpectomy followed by radiation and chemotherapy for  breast cancer about 10 years ago. The patient was recently seen on  consultation because of bloody discharge from the right nipple. Upon  workup, the patient was diagnosed with a recurrent breast cancer, which  was a new primary at a different site of the breast.  After discussion  of therapeutic options with the patient and after referral to Oncology,  a right modified radical mastectomy was recommended. The patient opted  for a bilateral mastectomy at this time and was scheduled for surgery. I performed the right modified radical mastectomy. Dr. Desean Church performed the left simple mastectomy. ESTIMATED BLOOD LOSS:  Minimal.    DESCRIPTION OF PROCEDURE:  With the patient in the supine position on  the operating table, after adequate general endotracheal anesthesia, the  operative areas were prepped with DuraPrep and the patient was draped in  the usual sterile manner. Please refer to the left mastectomy operative  report by Dr. Desean Church, which was performed concomitantly  with my right modified radical mastectomy.     An elliptical transverse skin incision was made over the right breast to  include the nipple

## 2023-06-20 NOTE — INTERVAL H&P NOTE
Update History & Physical    The patient's History and Physical of May 24, 2023 was reviewed with the patient and I examined the patient. There was no change. The surgical site was confirmed by the patient and me. Plan: The risks, benefits, expected outcome, and alternative to the recommended procedure have been discussed with the patient. Patient understands and wants to proceed with the procedure.      Electronically signed by Jade Corbin DO on 6/20/2023 at 10:44 AM

## 2023-06-21 ENCOUNTER — APPOINTMENT (OUTPATIENT)
Dept: ULTRASOUND IMAGING | Age: 73
DRG: 583 | End: 2023-06-21
Attending: SURGERY
Payer: COMMERCIAL

## 2023-06-21 LAB
ANION GAP SERPL CALCULATED.3IONS-SCNC: 11 MMOL/L (ref 7–16)
ANION GAP SERPL CALCULATED.3IONS-SCNC: 13 MMOL/L (ref 7–16)
ANION GAP SERPL CALCULATED.3IONS-SCNC: 7 MMOL/L (ref 7–16)
BASOPHILS # BLD: 0.01 E9/L (ref 0–0.2)
BASOPHILS NFR BLD: 0.1 % (ref 0–2)
BUN SERPL-MCNC: 38 MG/DL (ref 6–23)
BUN SERPL-MCNC: 39 MG/DL (ref 6–23)
BUN SERPL-MCNC: 43 MG/DL (ref 6–23)
CALCIUM SERPL-MCNC: 8.1 MG/DL (ref 8.6–10.2)
CALCIUM SERPL-MCNC: 8.5 MG/DL (ref 8.6–10.2)
CALCIUM SERPL-MCNC: 8.5 MG/DL (ref 8.6–10.2)
CHLORIDE SERPL-SCNC: 102 MMOL/L (ref 98–107)
CHLORIDE SERPL-SCNC: 104 MMOL/L (ref 98–107)
CHLORIDE SERPL-SCNC: 98 MMOL/L (ref 98–107)
CO2 SERPL-SCNC: 22 MMOL/L (ref 22–29)
CO2 SERPL-SCNC: 23 MMOL/L (ref 22–29)
CO2 SERPL-SCNC: 24 MMOL/L (ref 22–29)
CREAT SERPL-MCNC: 1.6 MG/DL (ref 0.5–1)
CREAT SERPL-MCNC: 2 MG/DL (ref 0.5–1)
CREAT SERPL-MCNC: 2.1 MG/DL (ref 0.5–1)
EOSINOPHIL # BLD: 0 E9/L (ref 0.05–0.5)
EOSINOPHIL NFR BLD: 0 % (ref 0–6)
ERYTHROCYTE [DISTWIDTH] IN BLOOD BY AUTOMATED COUNT: 13.5 FL (ref 11.5–15)
GLUCOSE SERPL-MCNC: 277 MG/DL (ref 74–99)
GLUCOSE SERPL-MCNC: 430 MG/DL (ref 74–99)
GLUCOSE SERPL-MCNC: 430 MG/DL (ref 74–99)
HCT VFR BLD AUTO: 37.2 % (ref 34–48)
HGB BLD-MCNC: 11.5 G/DL (ref 11.5–15.5)
IMM GRANULOCYTES # BLD: 0.09 E9/L
IMM GRANULOCYTES NFR BLD: 0.6 % (ref 0–5)
LYMPHOCYTES # BLD: 0.75 E9/L (ref 1.5–4)
LYMPHOCYTES NFR BLD: 5.2 % (ref 20–42)
MCH RBC QN AUTO: 29.1 PG (ref 26–35)
MCHC RBC AUTO-ENTMCNC: 30.9 % (ref 32–34.5)
MCV RBC AUTO: 94.2 FL (ref 80–99.9)
METER GLUCOSE: 238 MG/DL (ref 74–99)
METER GLUCOSE: 249 MG/DL (ref 74–99)
METER GLUCOSE: 345 MG/DL (ref 74–99)
METER GLUCOSE: 369 MG/DL (ref 74–99)
METER GLUCOSE: 412 MG/DL (ref 74–99)
MONOCYTES # BLD: 0.54 E9/L (ref 0.1–0.95)
MONOCYTES NFR BLD: 3.8 % (ref 2–12)
NEUTROPHILS # BLD: 12.97 E9/L (ref 1.8–7.3)
NEUTS SEG NFR BLD: 90.3 % (ref 43–80)
PLATELET # BLD AUTO: 179 E9/L (ref 130–450)
PMV BLD AUTO: 9.9 FL (ref 7–12)
POTASSIUM SERPL-SCNC: 5.4 MMOL/L (ref 3.5–5)
POTASSIUM SERPL-SCNC: 5.5 MMOL/L (ref 3.5–5)
POTASSIUM SERPL-SCNC: 6.8 MMOL/L (ref 3.5–5)
RBC # BLD AUTO: 3.95 E12/L (ref 3.5–5.5)
SODIUM SERPL-SCNC: 132 MMOL/L (ref 132–146)
SODIUM SERPL-SCNC: 135 MMOL/L (ref 132–146)
SODIUM SERPL-SCNC: 137 MMOL/L (ref 132–146)
WBC # BLD: 14.4 E9/L (ref 4.5–11.5)

## 2023-06-21 PROCEDURE — 82962 GLUCOSE BLOOD TEST: CPT

## 2023-06-21 PROCEDURE — 6370000000 HC RX 637 (ALT 250 FOR IP): Performed by: STUDENT IN AN ORGANIZED HEALTH CARE EDUCATION/TRAINING PROGRAM

## 2023-06-21 PROCEDURE — 6370000000 HC RX 637 (ALT 250 FOR IP)

## 2023-06-21 PROCEDURE — 2580000003 HC RX 258

## 2023-06-21 PROCEDURE — 85025 COMPLETE CBC W/AUTO DIFF WBC: CPT

## 2023-06-21 PROCEDURE — 93971 EXTREMITY STUDY: CPT

## 2023-06-21 PROCEDURE — 36415 COLL VENOUS BLD VENIPUNCTURE: CPT

## 2023-06-21 PROCEDURE — 80048 BASIC METABOLIC PNL TOTAL CA: CPT

## 2023-06-21 PROCEDURE — 6360000002 HC RX W HCPCS

## 2023-06-21 PROCEDURE — 1200000000 HC SEMI PRIVATE

## 2023-06-21 PROCEDURE — 93005 ELECTROCARDIOGRAM TRACING: CPT | Performed by: STUDENT IN AN ORGANIZED HEALTH CARE EDUCATION/TRAINING PROGRAM

## 2023-06-21 PROCEDURE — 2700000000 HC OXYGEN THERAPY PER DAY

## 2023-06-21 PROCEDURE — 6360000002 HC RX W HCPCS: Performed by: STUDENT IN AN ORGANIZED HEALTH CARE EDUCATION/TRAINING PROGRAM

## 2023-06-21 RX ORDER — CITALOPRAM 20 MG/1
20 TABLET ORAL DAILY
Status: DISCONTINUED | OUTPATIENT
Start: 2023-06-21 | End: 2023-06-22 | Stop reason: HOSPADM

## 2023-06-21 RX ORDER — DIPHENHYDRAMINE HCL 25 MG
25 TABLET ORAL ONCE
Status: COMPLETED | OUTPATIENT
Start: 2023-06-21 | End: 2023-06-21

## 2023-06-21 RX ORDER — INSULIN GLARGINE 100 [IU]/ML
60 INJECTION, SOLUTION SUBCUTANEOUS NIGHTLY
Status: DISCONTINUED | OUTPATIENT
Start: 2023-06-21 | End: 2023-06-22 | Stop reason: HOSPADM

## 2023-06-21 RX ORDER — METOPROLOL TARTRATE 50 MG/1
50 TABLET, FILM COATED ORAL 2 TIMES DAILY
Status: DISCONTINUED | OUTPATIENT
Start: 2023-06-21 | End: 2023-06-22 | Stop reason: HOSPADM

## 2023-06-21 RX ORDER — LISINOPRIL 20 MG/1
40 TABLET ORAL DAILY
Status: DISCONTINUED | OUTPATIENT
Start: 2023-06-21 | End: 2023-06-22 | Stop reason: HOSPADM

## 2023-06-21 RX ORDER — INSULIN LISPRO 100 [IU]/ML
0-16 INJECTION, SOLUTION INTRAVENOUS; SUBCUTANEOUS
Status: DISCONTINUED | OUTPATIENT
Start: 2023-06-21 | End: 2023-06-22 | Stop reason: HOSPADM

## 2023-06-21 RX ORDER — INSULIN LISPRO 100 [IU]/ML
0-4 INJECTION, SOLUTION INTRAVENOUS; SUBCUTANEOUS NIGHTLY
Status: DISCONTINUED | OUTPATIENT
Start: 2023-06-21 | End: 2023-06-22 | Stop reason: HOSPADM

## 2023-06-21 RX ORDER — INSULIN LISPRO 100 [IU]/ML
0-4 INJECTION, SOLUTION INTRAVENOUS; SUBCUTANEOUS NIGHTLY
Status: DISCONTINUED | OUTPATIENT
Start: 2023-06-21 | End: 2023-06-21

## 2023-06-21 RX ORDER — SODIUM CHLORIDE, SODIUM LACTATE, POTASSIUM CHLORIDE, CALCIUM CHLORIDE 600; 310; 30; 20 MG/100ML; MG/100ML; MG/100ML; MG/100ML
INJECTION, SOLUTION INTRAVENOUS CONTINUOUS
Status: DISCONTINUED | OUTPATIENT
Start: 2023-06-21 | End: 2023-06-21

## 2023-06-21 RX ORDER — DEXTROSE MONOHYDRATE 100 MG/ML
INJECTION, SOLUTION INTRAVENOUS CONTINUOUS PRN
Status: DISCONTINUED | OUTPATIENT
Start: 2023-06-21 | End: 2023-06-22 | Stop reason: HOSPADM

## 2023-06-21 RX ORDER — OXYCODONE HYDROCHLORIDE AND ACETAMINOPHEN 5; 325 MG/1; MG/1
1 TABLET ORAL EVERY 6 HOURS PRN
Qty: 20 TABLET | Refills: 0 | Status: SHIPPED | OUTPATIENT
Start: 2023-06-21 | End: 2023-06-26

## 2023-06-21 RX ORDER — CALCIUM GLUCONATE 94 MG/ML
1000 INJECTION, SOLUTION INTRAVENOUS ONCE
Status: COMPLETED | OUTPATIENT
Start: 2023-06-21 | End: 2023-06-21

## 2023-06-21 RX ORDER — PREGABALIN 50 MG/1
100 CAPSULE ORAL 2 TIMES DAILY
Status: DISCONTINUED | OUTPATIENT
Start: 2023-06-21 | End: 2023-06-22 | Stop reason: HOSPADM

## 2023-06-21 RX ORDER — INSULIN LISPRO 100 [IU]/ML
0-8 INJECTION, SOLUTION INTRAVENOUS; SUBCUTANEOUS
Status: DISCONTINUED | OUTPATIENT
Start: 2023-06-21 | End: 2023-06-21

## 2023-06-21 RX ORDER — INSULIN LISPRO 100 [IU]/ML
8 INJECTION, SOLUTION INTRAVENOUS; SUBCUTANEOUS
Status: DISCONTINUED | OUTPATIENT
Start: 2023-06-21 | End: 2023-06-22 | Stop reason: HOSPADM

## 2023-06-21 RX ADMIN — ACETAMINOPHEN 650 MG: 325 TABLET ORAL at 16:15

## 2023-06-21 RX ADMIN — ACETAMINOPHEN 650 MG: 325 TABLET ORAL at 08:59

## 2023-06-21 RX ADMIN — SODIUM CHLORIDE, POTASSIUM CHLORIDE, SODIUM LACTATE AND CALCIUM CHLORIDE: 600; 310; 30; 20 INJECTION, SOLUTION INTRAVENOUS at 10:50

## 2023-06-21 RX ADMIN — DIPHENHYDRAMINE HCL 25 MG: 25 TABLET ORAL at 11:06

## 2023-06-21 RX ADMIN — LISINOPRIL 40 MG: 20 TABLET ORAL at 08:59

## 2023-06-21 RX ADMIN — INSULIN LISPRO 8 UNITS: 100 INJECTION, SOLUTION INTRAVENOUS; SUBCUTANEOUS at 11:07

## 2023-06-21 RX ADMIN — Medication 10 ML: at 21:35

## 2023-06-21 RX ADMIN — ENOXAPARIN SODIUM 30 MG: 100 INJECTION SUBCUTANEOUS at 09:04

## 2023-06-21 RX ADMIN — ACETAMINOPHEN 650 MG: 325 TABLET ORAL at 21:31

## 2023-06-21 RX ADMIN — CALCIUM GLUCONATE 1000 MG: 98 INJECTION, SOLUTION INTRAVENOUS at 06:13

## 2023-06-21 RX ADMIN — INSULIN LISPRO 8 UNITS: 100 INJECTION, SOLUTION INTRAVENOUS; SUBCUTANEOUS at 05:50

## 2023-06-21 RX ADMIN — PREGABALIN 100 MG: 50 CAPSULE ORAL at 09:00

## 2023-06-21 RX ADMIN — ENOXAPARIN SODIUM 30 MG: 100 INJECTION SUBCUTANEOUS at 21:24

## 2023-06-21 RX ADMIN — OXYCODONE HYDROCHLORIDE 10 MG: 5 TABLET ORAL at 08:59

## 2023-06-21 RX ADMIN — Medication 10 ML: at 09:44

## 2023-06-21 RX ADMIN — CITALOPRAM HYDROBROMIDE 20 MG: 20 TABLET ORAL at 08:59

## 2023-06-21 RX ADMIN — METOPROLOL TARTRATE 50 MG: 50 TABLET, FILM COATED ORAL at 21:24

## 2023-06-21 RX ADMIN — INSULIN LISPRO 8 UNITS: 100 INJECTION, SOLUTION INTRAVENOUS; SUBCUTANEOUS at 16:27

## 2023-06-21 RX ADMIN — INSULIN LISPRO 12 UNITS: 100 INJECTION, SOLUTION INTRAVENOUS; SUBCUTANEOUS at 11:06

## 2023-06-21 RX ADMIN — OXYCODONE HYDROCHLORIDE 10 MG: 5 TABLET ORAL at 00:27

## 2023-06-21 RX ADMIN — INSULIN GLARGINE 60 UNITS: 100 INJECTION, SOLUTION SUBCUTANEOUS at 21:31

## 2023-06-21 RX ADMIN — PREGABALIN 100 MG: 50 CAPSULE ORAL at 21:24

## 2023-06-21 RX ADMIN — INSULIN LISPRO 4 UNITS: 100 INJECTION, SOLUTION INTRAVENOUS; SUBCUTANEOUS at 16:23

## 2023-06-21 RX ADMIN — INSULIN LISPRO 8 UNITS: 100 INJECTION, SOLUTION INTRAVENOUS; SUBCUTANEOUS at 09:05

## 2023-06-21 RX ADMIN — ACETAMINOPHEN 650 MG: 325 TABLET ORAL at 02:30

## 2023-06-21 RX ADMIN — INSULIN LISPRO 16 UNITS: 100 INJECTION, SOLUTION INTRAVENOUS; SUBCUTANEOUS at 09:06

## 2023-06-21 RX ADMIN — METOPROLOL TARTRATE 50 MG: 50 TABLET, FILM COATED ORAL at 09:00

## 2023-06-21 ASSESSMENT — PAIN SCALES - GENERAL
PAINLEVEL_OUTOF10: 7
PAINLEVEL_OUTOF10: 6
PAINLEVEL_OUTOF10: 6
PAINLEVEL_OUTOF10: 7

## 2023-06-21 ASSESSMENT — PAIN DESCRIPTION - FREQUENCY
FREQUENCY: CONTINUOUS
FREQUENCY: INTERMITTENT

## 2023-06-21 ASSESSMENT — PAIN DESCRIPTION - PAIN TYPE
TYPE: SURGICAL PAIN
TYPE: SURGICAL PAIN

## 2023-06-21 ASSESSMENT — PAIN DESCRIPTION - LOCATION
LOCATION: CHEST
LOCATION: BREAST
LOCATION: BREAST;BACK
LOCATION: BREAST

## 2023-06-21 ASSESSMENT — PAIN DESCRIPTION - DESCRIPTORS
DESCRIPTORS: ACHING
DESCRIPTORS: ACHING

## 2023-06-21 ASSESSMENT — PAIN DESCRIPTION - ORIENTATION
ORIENTATION: LEFT;RIGHT

## 2023-06-21 ASSESSMENT — PAIN DESCRIPTION - ONSET
ONSET: ON-GOING
ONSET: ON-GOING

## 2023-06-21 ASSESSMENT — PAIN - FUNCTIONAL ASSESSMENT: PAIN_FUNCTIONAL_ASSESSMENT: ACTIVITIES ARE NOT PREVENTED

## 2023-06-21 NOTE — PROGRESS NOTES
Removed patient's oxygen. O2 saturation was at 96%. When she was dozing off, her O2 saturation dropped to 87%. While awake, it remains above 90%.

## 2023-06-21 NOTE — PROGRESS NOTES
GENERAL SURGERY  DAILY PROGRESS NOTE  6/21/2023    Subjective:  No new complaints or overnight events. Feeling better this morning compared with last night. Required Dilaudid x2 yesterday eventing    Objective:  BP (!) 108/49   Pulse 68   Temp 97.6 °F (36.4 °C) (Oral)   Resp 16   Ht 5' 5\" (1.651 m)   Wt 238 lb (108 kg)   SpO2 93%   BMI 39.61 kg/m²     General Appearance:  awake, alert, oriented, in no acute distress  Skin:  Skin color, texture, turgor normal  Head/face:  NCAT  Eyes:  No gross abnormalities. Sclera nonicteric  Lungs/Chest:  Normal expansion. No respiratory distress. On 4L NC. Surgical dressing present over bilateral breasts. Bilateral MICHEL drains serosanguinous  Heart: Warm throughout. Regular rate   Abdomen:  Soft, no tenderness, obese  Extremities: Extremities warm to touch, pink      I have personally reviewed all relevant labs and imaging.     Assessment/Plan:  72 y.o. female s/p bilateral mastectomy 6/20    ***    Electronically signed by Rabia Pak DO on 6/21/2023 at 5:59 AM

## 2023-06-21 NOTE — PROGRESS NOTES
Message sent to Dr. Jimenez Argue regarding swollen left upper extremity, new orders received will continue to monitor.

## 2023-06-21 NOTE — DISCHARGE INSTRUCTIONS
flowing back into the tubing and the incision. How often do I need to empty my drain? Usually you empty the drain when it is half full. Most find they need to empty the drain 3 times a day- when you wake up in the morning, mid day, and before bed. If the bulb fills up more than this, you may need to empty the drain more often. How much drainage should there be? The amount of drainage may vary from day to day. It should be less each day. If you increase your activity, it may increase the amount of drainage, temporarily. What color should the drainage be? The color will vary. It may go from bright red to pink, then to clear yellow. What do I do with my drainage record? Take it to your follow up visit with your surgeon. May I shower? Yes, 48 hours post-operatively. It may help to secure the drain to an old cloth/robe belt that is around your waist.     When should I call the doctor? Call your doctor if:  You have an elevated temperature (greater than 101 or higher)  The drainage increases or stops suddenly  The drain stitches come loose or break, or if the drain comes out  The area on your skin around the drain gets red, swollen, or painful  The fluid coming out of the drain changes (has pus in it, becomes bright red, or has a bad smell).

## 2023-06-21 NOTE — ACP (ADVANCE CARE PLANNING)
Advance Care Planning   Healthcare Decision Maker:    Primary Decision Maker: Luisito Yayaluke - Child - [phone]    Click here to complete Healthcare Decision Makers including selection of the Healthcare Decision Maker Relationship (ie \"Primary\"). No

## 2023-06-21 NOTE — PLAN OF CARE
Problem: Chronic Conditions and Co-morbidities  Goal: Patient's chronic conditions and co-morbidity symptoms are monitored and maintained or improved  Outcome: Progressing  Flowsheets  Taken 6/20/2023 1917 by Joelle Mattson RN  Care Plan - Patient's Chronic Conditions and Co-Morbidity Symptoms are Monitored and Maintained or Improved: Monitor and assess patient's chronic conditions and comorbid symptoms for stability, deterioration, or improvement  Taken 6/20/2023 1500 by Temo [address] - Patient's Chronic Conditions and Co-Morbidity Symptoms are Monitored and Maintained or Improved: Monitor and assess patient's chronic conditions and comorbid symptoms for stability, deterioration, or improvement     Problem: Discharge Planning  Goal: Discharge to home or other facility with appropriate resources  Outcome: Progressing  Flowsheets  Taken 6/20/2023 1917 by Joelle Mattson RN  Discharge to home or other facility with appropriate resources: Identify barriers to discharge with patient and caregiver  Taken 6/20/2023 1652 by Joelle Mattson RN  Discharge to home or other facility with appropriate resources: Identify barriers to discharge with patient and caregiver  Taken 6/20/2023 1500 by Joelle Mattson RN  Discharge to home or other facility with appropriate resources: Identify barriers to discharge with patient and caregiver     Problem: Pain  Goal: Verbalizes/displays adequate comfort level or baseline comfort level  Outcome: Progressing     Problem: Safety - Adult  Goal: Free from fall injury  Outcome: Progressing     Problem: ABCDS Injury Assessment  Goal: Absence of physical injury  Outcome: Progressing

## 2023-06-21 NOTE — CARE COORDINATION
Met with patient about diagnosis and discharge plan of care. POD#1 bilateral mastectomy. MICHEL drains bilateral. Iv fluids, pain control. Blood sugar control. ISS. Pt lives with family in duplex 1/2 bath on 1st floor. Pt sleeps in recline. Has right ankle fracture and is in a cast. Uses electric wheelchair at home. PCP is Dr Kalyan Teixeira. Plan is home with home care. Referral given to [facility] per pt choice-await acceptance. Orders completed. D/c either later today or am depending on pain control and blood sugars.  Pt has all diabetic supplies at home-o

## 2023-06-21 NOTE — ANESTHESIA POSTPROCEDURE EVALUATION
Department of Anesthesiology  Postprocedure Note    Patient: Avery Youssef  MRN: [id number]  [place]: 1950  Date of evaluation: 6/20/2023      Procedure Summary     Date: 06/20/23 Room / Location: [facility] OR 10 / [facility]    Anesthesia Start: 1055 Anesthesia Stop: 7305    Procedure: BILATERAL  MASTECTOMIES (Bilateral: Breast) Diagnosis:       Right breast cancer with malignant cells in regional lymph nodes no greater than 0.2 mm and no more than 200 cells (HCC)      (Right breast cancer with malignant cells in regional lymph nodes no greater than 0.2 mm and no more than 200 cells ([facility] [C50.911, C77.9])    Surgeons: Justin Olson MD Responsible Provider: Ab Garcia MD    Anesthesia Type: general ASA Status: 3          Anesthesia Type: No value filed.     Asher Phase I: Asher Score: 8    Asher Phase II:        Anesthesia Post Evaluation    Patient location during evaluation: PACU  Patient participation: complete - patient participated  Level of consciousness: awake  Airway patency: patent  Nausea & Vomiting: no nausea and no vomiting  Complications: no  Cardiovascular status: hemodynamically stable  Respiratory status: acceptable  Hydration status: stable

## 2023-06-21 NOTE — PLAN OF CARE
Problem: Chronic Conditions and Co-morbidities  Goal: Patient's chronic conditions and co-morbidity symptoms are monitored and maintained or improved  Outcome: Adequate for Discharge     Problem: Discharge Planning  Goal: Discharge to home or other facility with appropriate resources  Outcome: Adequate for Discharge     Problem: Pain  Goal: Verbalizes/displays adequate comfort level or baseline comfort level  Outcome: Adequate for Discharge     Problem: Safety - Adult  Goal: Free from fall injury  Outcome: Adequate for Discharge     Problem: ABCDS Injury Assessment  Goal: Absence of physical injury  Outcome: Adequate for Discharge

## 2023-06-21 NOTE — PROGRESS NOTES
[facility] Quality Flow/Interdisciplinary Rounds Progress Note        Quality Flow Rounds held on June 21, 2023    Disciplines Attending:  Bedside Nurse, , , and Nursing Unit Leadership    Gayle Jimenez was admitted on 6/20/2023  7:53 AM    Anticipated Discharge Date:       Disposition:    Darwin Score:  Darwin Scale Score: 19    Readmission Risk              Risk of Unplanned Readmission:  22           Discussed patient goal for the day, patient clinical progression, and barriers to discharge.   The following Goal(s) of the Day/Commitment(s) have been identified:   wean O2, monitor BG, drain care, pain mgmt, discharge [address] [facility], RN  June 21, 2023

## 2023-06-21 NOTE — DISCHARGE SUMMARY
Physician Discharge Summary     Patient ID:  Roxana Hutchins  [id number]  72 y.o.  1950    Admit date: 6/20/2023    Discharge date and time: No discharge date for patient encounter. Admitting Physician: Kacy Camejo MD     Admission Diagnoses: Right breast cancer with malignant cells in regional lymph nodes no greater than 0.2 mm and no more than 200 cells (HCC) [C50.911, C77.9]  S/P bilateral mastectomy [Z90.13]    Discharge Diagnoses: Principal Problem:    S/P bilateral mastectomy  Active Problems:    Right breast cancer with malignant cells in regional lymph nodes no greater than 0.2 mm and no more than 200 cells ([address]  Resolved Problems:    * No resolved hospital problems. *      Admission Condition: good    Discharged Condition: stable    Indication for Admission: Bilateral mastectomy    Hospital Course/Procedures/Operation/treatments:   Patient admitted to the hospital on 6/20/2023 for bilateral mastectomy. The patient was recently diagnosed with recurrent right breast cancer. Preop consultation was obtained with oncologist.  She was discharged home on POD 1 in good condition. Pain was controlled and she was tolerating regular diet at the time of discharge. She did require supplemental oxygen postoperatively, but was maintaining acceptable oxygen saturation on room air at the time of discharge. Follow up information and return precautions have been discussed with the patient and included with discharge information. Consults:   IP CONSULT TO HOME CARE NEEDS    Significant Diagnostic Studies:   No results found. Discharge Exam:  GENERAL:  NAD. A&Ox3. LUNGS:  No increased work of breathing. CARDIOVASCULAR: RR  ABDOMEN:  Soft, non-distended, non-tender. No guarding, rigidity, rebound. CHEST: bilateral wounds look good, CDI, w b/l MICHEL drains in and w serosanguineous drainage.      Disposition: home    In process/preliminary results:  Outstanding Order Results       Date and Time Order

## 2023-06-21 NOTE — PROGRESS NOTES
GENERAL SURGERY  DAILY PROGRESS NOTE  6/21/2023    CHIEF COMPLAINT:  No chief complaint on file. SUBJECTIVE:  Patient doing well postoperatively. Denies n/v, tolerating diet. BG very high through the night. Morning potassium initially very elevated over 6, w repeat drawn and resulting 5.5. OBJECTIVE:  BP (!) 133/54   Pulse 69   Temp 97.9 °F (36.6 °C) (Oral)   Resp 16   Ht 5' 5\" (1.651 m)   Wt 238 lb (108 kg)   SpO2 97%   BMI 39.61 kg/m²     GENERAL:  NAD. A&Ox3. LUNGS:  No increased work of breathing. CARDIOVASCULAR: RR  ABDOMEN:  Soft, non-distended, non-tender. No guarding, rigidity, rebound. CHEST: bilateral wounds look good, CDI, w b/l MICHEL drains in and w serosanguineous drainage. ASSESSMENT/PLAN:  72 y.o. female s/p bilateral mastectomy    Patient's insulin adjusted. PM BMP  If patient continues to tolerate diet and has adequate pain control, and has afternoon labs closer to normal values, patient will be ok to discharge and follow up with Dr. Eliana Westfall as an outpatient. [facility] for home drain care    Oscar Melissa DO  Surgery Resident PGY-1  6/21/2023  10:07 AM     I saw and examined the patient. I reviewed the above resident's note. All available labs and pathology were reviewed. All imaging was independently reviewed and interpreted. All provider notes were reviewed. The above was discussed with the patient at length. I agree with the assessment and plan as outlined.     Osvaldo Daly MD  General Surgery

## 2023-06-22 ENCOUNTER — HOSPITAL ENCOUNTER (EMERGENCY)
Age: 73
Discharge: HOME | End: 2023-06-22
Attending: EMERGENCY MEDICINE
Payer: COMMERCIAL

## 2023-06-22 ENCOUNTER — APPOINTMENT (OUTPATIENT)
Dept: GENERAL RADIOLOGY | Age: 73
End: 2023-06-22
Payer: COMMERCIAL

## 2023-06-22 VITALS
BODY MASS INDEX: 39.65 KG/M2 | SYSTOLIC BLOOD PRESSURE: 141 MMHG | RESPIRATION RATE: 16 BRPM | TEMPERATURE: 98.4 F | HEIGHT: 65 IN | DIASTOLIC BLOOD PRESSURE: 52 MMHG | HEART RATE: 72 BPM | WEIGHT: 238 LBS | OXYGEN SATURATION: 95 %

## 2023-06-22 VITALS
RESPIRATION RATE: 18 BRPM | WEIGHT: 238 LBS | HEART RATE: 97 BPM | DIASTOLIC BLOOD PRESSURE: 66 MMHG | OXYGEN SATURATION: 97 % | HEIGHT: 65 IN | BODY MASS INDEX: 39.65 KG/M2 | TEMPERATURE: 97.9 F | SYSTOLIC BLOOD PRESSURE: 161 MMHG

## 2023-06-22 DIAGNOSIS — T85.698A JP DRAIN, BROKEN, INITIAL ENCOUNTER: Primary | ICD-10-CM

## 2023-06-22 LAB
ANION GAP SERPL CALCULATED.3IONS-SCNC: 9 MMOL/L (ref 7–16)
BASOPHILS # BLD: 0.02 E9/L (ref 0–0.2)
BASOPHILS NFR BLD: 0.1 % (ref 0–2)
BUN SERPL-MCNC: 47 MG/DL (ref 6–23)
CALCIUM SERPL-MCNC: 8.9 MG/DL (ref 8.6–10.2)
CHLORIDE SERPL-SCNC: 103 MMOL/L (ref 98–107)
CO2 SERPL-SCNC: 26 MMOL/L (ref 22–29)
CREAT SERPL-MCNC: 1.6 MG/DL (ref 0.5–1)
EOSINOPHIL # BLD: 0 E9/L (ref 0.05–0.5)
EOSINOPHIL NFR BLD: 0 % (ref 0–6)
ERYTHROCYTE [DISTWIDTH] IN BLOOD BY AUTOMATED COUNT: 13.6 FL (ref 11.5–15)
GLUCOSE SERPL-MCNC: 234 MG/DL (ref 74–99)
HCT VFR BLD AUTO: 35.3 % (ref 34–48)
HGB BLD-MCNC: 11.1 G/DL (ref 11.5–15.5)
IMM GRANULOCYTES # BLD: 0.07 E9/L
IMM GRANULOCYTES NFR BLD: 0.5 % (ref 0–5)
LYMPHOCYTES # BLD: 1.19 E9/L (ref 1.5–4)
LYMPHOCYTES NFR BLD: 7.8 % (ref 20–42)
MCH RBC QN AUTO: 29.1 PG (ref 26–35)
MCHC RBC AUTO-ENTMCNC: 31.4 % (ref 32–34.5)
MCV RBC AUTO: 92.7 FL (ref 80–99.9)
METER GLUCOSE: 194 MG/DL (ref 74–99)
MONOCYTES # BLD: 0.98 E9/L (ref 0.1–0.95)
MONOCYTES NFR BLD: 6.4 % (ref 2–12)
NEUTROPHILS # BLD: 13.07 E9/L (ref 1.8–7.3)
NEUTS SEG NFR BLD: 85.2 % (ref 43–80)
PLATELET # BLD AUTO: 175 E9/L (ref 130–450)
PMV BLD AUTO: 10.3 FL (ref 7–12)
POTASSIUM SERPL-SCNC: 5.2 MMOL/L (ref 3.5–5)
RBC # BLD AUTO: 3.81 E12/L (ref 3.5–5.5)
SODIUM SERPL-SCNC: 138 MMOL/L (ref 132–146)
WBC # BLD: 15.3 E9/L (ref 4.5–11.5)

## 2023-06-22 PROCEDURE — 6370000000 HC RX 637 (ALT 250 FOR IP): Performed by: STUDENT IN AN ORGANIZED HEALTH CARE EDUCATION/TRAINING PROGRAM

## 2023-06-22 PROCEDURE — 99283 EMERGENCY DEPT VISIT LOW MDM: CPT

## 2023-06-22 PROCEDURE — 2700000000 HC OXYGEN THERAPY PER DAY

## 2023-06-22 PROCEDURE — 36415 COLL VENOUS BLD VENIPUNCTURE: CPT

## 2023-06-22 PROCEDURE — 80048 BASIC METABOLIC PNL TOTAL CA: CPT

## 2023-06-22 PROCEDURE — 6370000000 HC RX 637 (ALT 250 FOR IP)

## 2023-06-22 PROCEDURE — 6360000002 HC RX W HCPCS

## 2023-06-22 PROCEDURE — 85025 COMPLETE CBC W/AUTO DIFF WBC: CPT

## 2023-06-22 PROCEDURE — 71046 X-RAY EXAM CHEST 2 VIEWS: CPT

## 2023-06-22 PROCEDURE — 82962 GLUCOSE BLOOD TEST: CPT

## 2023-06-22 RX ADMIN — METOPROLOL TARTRATE 50 MG: 50 TABLET, FILM COATED ORAL at 08:44

## 2023-06-22 RX ADMIN — ENOXAPARIN SODIUM 30 MG: 100 INJECTION SUBCUTANEOUS at 08:45

## 2023-06-22 RX ADMIN — CITALOPRAM HYDROBROMIDE 20 MG: 20 TABLET ORAL at 08:45

## 2023-06-22 RX ADMIN — LISINOPRIL 40 MG: 20 TABLET ORAL at 08:45

## 2023-06-22 RX ADMIN — ACETAMINOPHEN 650 MG: 325 TABLET ORAL at 05:54

## 2023-06-22 RX ADMIN — PREGABALIN 100 MG: 50 CAPSULE ORAL at 08:44

## 2023-06-22 RX ADMIN — INSULIN LISPRO 8 UNITS: 100 INJECTION, SOLUTION INTRAVENOUS; SUBCUTANEOUS at 08:45

## 2023-06-22 ASSESSMENT — PAIN DESCRIPTION - DESCRIPTORS
DESCRIPTORS: ACHING
DESCRIPTORS: ACHING

## 2023-06-22 ASSESSMENT — PAIN DESCRIPTION - LOCATION
LOCATION: BREAST
LOCATION: CHEST

## 2023-06-22 ASSESSMENT — PAIN SCALES - GENERAL
PAINLEVEL_OUTOF10: 7
PAINLEVEL_OUTOF10: 5

## 2023-06-22 ASSESSMENT — PAIN DESCRIPTION - ORIENTATION
ORIENTATION: RIGHT
ORIENTATION: RIGHT;LEFT

## 2023-06-22 ASSESSMENT — PAIN - FUNCTIONAL ASSESSMENT: PAIN_FUNCTIONAL_ASSESSMENT: 0-10

## 2023-06-22 NOTE — PROGRESS NOTES
Spoke with Dr. Mohamud Charles concerning the negative ultrasound results for possible DVT for Kim. The patients family had already left and she requested to the hold the discharge until morning to which the attending was agreeable.

## 2023-06-22 NOTE — CARE COORDINATION
Updated plan of care.  Discharge home today with [facility]-notified and orders completed-o

## 2023-06-22 NOTE — PROGRESS NOTES
P Quality Flow/Interdisciplinary Rounds Progress Note        Quality Flow Rounds held on June 22, 2023    Disciplines Attending:  Bedside Nurse, , , and Nursing Unit Leadership    Sophia Mayfield was admitted on 6/20/2023  7:53 AM    Anticipated Discharge Date:  Expected Discharge Date: 06/22/23    Disposition:    Darwin Score:  Darwin Scale Score: 19    Readmission Risk              Risk of Unplanned Readmission:  21           Discussed patient goal for the day, patient clinical progression, and barriers to discharge.   The following Goal(s) of the Day/Commitment(s) have been identified:   Discharge planning      Sherrie Colin RN  June 22, 2023

## 2023-06-23 NOTE — ED PROVIDER NOTES
Darcy Chavez is a 72 y.o. female    HPI  Darcy Chavez is a 72 y.o. female presenting to the ED for Other (Had bilateral mastectomy on Tuesday, just discharged couple of hours ago. Pulled drainage tube out. Not completely out, no longer draining. )    History comes primarily from the patient. Patient presents to the emergency department for concern that her mastectomy drain on the right side has come out of what supposed to be. The patient had a double mastectomy 2 days ago. She had drains placed bilaterally. Today, the patient went to drain the one of the right and when she put it back in it would not maintain suction any longer. She also noticed that the drain tubing is much longer on the right. She attempted to get in touch with her surgeon's office but was unsuccessful. The patient is having some pain at the surgical incision bilaterally. No new pains since the drain seems to have fallen out. ROS  Full review of systems completed. Pertinent positives and negatives per the HPI, unless otherwise stated ROS is negative. Physical Exam  Vitals and nursing note reviewed. Constitutional:       General: She is not in acute distress. Appearance: Normal appearance. She is not ill-appearing. HENT:      Head: Normocephalic and atraumatic. Right Ear: External ear normal.      Left Ear: External ear normal.      Nose: Nose normal. No rhinorrhea. Mouth/Throat:      Mouth: Mucous membranes are moist.      Pharynx: Oropharynx is clear. Eyes:      Extraocular Movements: Extraocular movements intact. Conjunctiva/sclera: Conjunctivae normal.      Pupils: Pupils are equal, round, and reactive to light. Cardiovascular:      Rate and Rhythm: Normal rate and regular rhythm. Pulmonary:      Effort: Pulmonary effort is normal. No respiratory distress. Breath sounds: Normal breath sounds. No wheezing. Abdominal:      General: There is no distension. Palpations: Abdomen is soft.

## 2023-06-24 LAB
EKG ATRIAL RATE: 70 BPM
EKG P AXIS: 47 DEGREES
EKG P-R INTERVAL: 200 MS
EKG Q-T INTERVAL: 402 MS
EKG QRS DURATION: 102 MS
EKG QTC CALCULATION (BAZETT): 434 MS
EKG R AXIS: 26 DEGREES
EKG T AXIS: 49 DEGREES
EKG VENTRICULAR RATE: 70 BPM

## 2023-06-24 PROCEDURE — 93010 ELECTROCARDIOGRAM REPORT: CPT | Performed by: INTERNAL MEDICINE

## 2023-06-25 DIAGNOSIS — E11.9 DIABETES MELLITUS WITHOUT COMPLICATION (HCC): Primary | ICD-10-CM

## 2023-06-26 RX ORDER — FLURBIPROFEN SODIUM 0.3 MG/ML
SOLUTION/ DROPS OPHTHALMIC
Qty: 200 EACH | Refills: 2 | Status: SHIPPED | OUTPATIENT
Start: 2023-06-26

## 2023-06-27 RX ORDER — PANTOPRAZOLE SODIUM 40 MG/1
TABLET, DELAYED RELEASE ORAL
Qty: 90 TABLET | Refills: 0 | OUTPATIENT
Start: 2023-06-27

## 2023-06-28 ENCOUNTER — OFFICE VISIT (OUTPATIENT)
Dept: SURGERY | Age: 73
End: 2023-06-28

## 2023-06-28 VITALS
WEIGHT: 238 LBS | SYSTOLIC BLOOD PRESSURE: 142 MMHG | HEART RATE: 66 BPM | OXYGEN SATURATION: 91 % | DIASTOLIC BLOOD PRESSURE: 68 MMHG | HEIGHT: 65 IN | BODY MASS INDEX: 39.65 KG/M2 | RESPIRATION RATE: 16 BRPM

## 2023-06-28 DIAGNOSIS — E66.01 SEVERE OBESITY (BMI 35.0-35.9 WITH COMORBIDITY) (HCC): ICD-10-CM

## 2023-06-28 DIAGNOSIS — C50.211 MALIGNANT NEOPLASM OF UPPER-INNER QUADRANT OF RIGHT FEMALE BREAST, UNSPECIFIED ESTROGEN RECEPTOR STATUS (HCC): Primary | ICD-10-CM

## 2023-06-28 PROCEDURE — 99024 POSTOP FOLLOW-UP VISIT: CPT | Performed by: SURGERY

## 2023-07-03 ENCOUNTER — TELEPHONE (OUTPATIENT)
Dept: PRIMARY CARE CLINIC | Age: 73
End: 2023-07-03

## 2023-07-03 NOTE — TELEPHONE ENCOUNTER
Pt called stating the pharmacy contacted her about her freestyle patch and a prior auth is needed before she can retrieve it. I advised her that  Is out until Friday.      [facility]

## 2023-07-05 ENCOUNTER — OFFICE VISIT (OUTPATIENT)
Dept: SURGERY | Age: 73
End: 2023-07-05

## 2023-07-05 VITALS
HEIGHT: 65 IN | OXYGEN SATURATION: 94 % | DIASTOLIC BLOOD PRESSURE: 67 MMHG | RESPIRATION RATE: 18 BRPM | SYSTOLIC BLOOD PRESSURE: 126 MMHG | HEART RATE: 61 BPM | TEMPERATURE: 97.9 F | BODY MASS INDEX: 39.61 KG/M2

## 2023-07-05 DIAGNOSIS — C50.211 MALIGNANT NEOPLASM OF UPPER-INNER QUADRANT OF RIGHT FEMALE BREAST, UNSPECIFIED ESTROGEN RECEPTOR STATUS (HCC): ICD-10-CM

## 2023-07-05 DIAGNOSIS — T14.8XXA HEMATOMA: Primary | ICD-10-CM

## 2023-07-05 PROCEDURE — 99024 POSTOP FOLLOW-UP VISIT: CPT | Performed by: SURGERY

## 2023-07-05 NOTE — PROGRESS NOTES
Patient's Name/Date of Birth: Noemi Torres / 1950    Date: 7/5/2023    PCP: Nimo Rowland MD    Chief Complaint   Patient presents with    Post-Op Check     1 week post op for bilateral mastectomy done on 6/20/23. Pt c/o has blister under rt breast d/t surgical bra and has not worn it for few days. Pt also states right side is filling up a lot more since last week. HPI:  S/P bilateral mastectomies. C/O swelling right side mastectomy. Wound healing well. Patient's medications, allergies, past medical, surgical, social and family histories were reviewed and updated as appropriate. Allergies   Allergen Reactions    Penicillins      Unknown reaction tolerates cefazolin       Past Medical History:   Diagnosis Date    ASHD (arteriosclerotic heart disease)     Breast cancer ([address])     rt 2009    CAD (coronary artery disease)     Cancer ([address])     breast ca, right    Charcot foot due to diabetes mellitus ([address]) 01/29/2012    Chronic diastolic congestive heart failure ([address]) 11/27/2017    Chronic kidney disease     sees nephrology    Depression     Diabetic peripheral neuropathy ([address])     Diarrhea     Essential hypertension 05/24/2018    GERD (gastroesophageal reflux disease)     Hyperlipidemia     Hypertension     Lactose intolerance     Thyroid disease     Type II or unspecified type diabetes mellitus without mention of complication, not stated as uncontrolled     Urinary incontinence         Past Surgical History:   Procedure Laterality Date    BREAST BIOPSY      2011    BREAST LUMPECTOMY      right breast 2009, with lymph nodes.     [address] [facility]    COLONOSCOPY  2011    COLONOSCOPY N/A 03/20/2015    COLONOSCOPY    COLONOSCOPY N/A 02/21/2022    COLONOSCOPY POLYPECTOMY SNARE/COLD BIOPSY performed by Tegan Green MD at [facility] GRAFT  10/23/2017    x2   [place]    DIAGNOSTIC CARDIAC CATH LAB PROCEDURE      EYE SURGERY  2009    several ,cataracts,legally blind    EYE

## 2023-07-07 RX ORDER — GINGER ROOT/GINGER ROOT EXT 262.5 MG
1 CAPSULE ORAL DAILY
Qty: 90 TABLET | Refills: 0 | Status: SHIPPED | OUTPATIENT
Start: 2023-07-07

## 2023-07-07 NOTE — TELEPHONE ENCOUNTER
Pt requesting refill on   Calcium 600-20MG- MCG tabs  Sent to [facility]

## 2023-07-07 NOTE — TELEPHONE ENCOUNTER
Patient requesting a prior authorization for her Continuous Blood Gluc Sensor (FREESTYLE MAGALIS 2 SENSOR) [facility] to be sent to [facility] on [address] [place] [address] Avenue. States the pharmacy will not fill without it. Thank you.

## 2023-07-12 ENCOUNTER — PROCEDURE VISIT (OUTPATIENT)
Dept: SURGERY | Age: 73
End: 2023-07-12

## 2023-07-12 VITALS
HEART RATE: 69 BPM | HEIGHT: 65 IN | OXYGEN SATURATION: 93 % | SYSTOLIC BLOOD PRESSURE: 122 MMHG | WEIGHT: 238 LBS | BODY MASS INDEX: 39.65 KG/M2 | TEMPERATURE: 97.3 F | DIASTOLIC BLOOD PRESSURE: 58 MMHG

## 2023-07-12 DIAGNOSIS — T14.8XXA HEMATOMA: Primary | ICD-10-CM

## 2023-07-12 DIAGNOSIS — C50.211 MALIGNANT NEOPLASM OF UPPER-INNER QUADRANT OF RIGHT FEMALE BREAST, UNSPECIFIED ESTROGEN RECEPTOR STATUS (HCC): ICD-10-CM

## 2023-07-12 PROCEDURE — NBSRV NON-BILLABLE SERVICE: Performed by: SURGERY

## 2023-07-12 NOTE — PROGRESS NOTES
Patient's Name/Date of Birth: Sunny Reyes / 1950    Date: 7/12/2023    PCP: Kiki Aguirre MD    Chief Complaint   Patient presents with    Drainage     Breast needs drained       HPI:  Patient with recurrent seroma right mastectomy site. Here for drainage . Denies pain, fever or chillis        Allergies   Allergen Reactions    Penicillins      Unknown reaction tolerates cefazolin       Past Medical History:   Diagnosis Date    ASHD (arteriosclerotic heart disease)     Breast cancer ([address])     rt 2009    CAD (coronary artery disease)     Cancer ([address])     breast ca, right    Charcot foot due to diabetes mellitus ([address]) 01/29/2012    Chronic diastolic congestive heart failure ([address]) 11/27/2017    Chronic kidney disease     sees nephrology    Depression     Diabetic peripheral neuropathy ([address])     Diarrhea     Essential hypertension 05/24/2018    GERD (gastroesophageal reflux disease)     Hyperlipidemia     Hypertension     Lactose intolerance     Thyroid disease     Type II or unspecified type diabetes mellitus without mention of complication, not stated as uncontrolled     Urinary incontinence         Past Surgical History:   Procedure Laterality Date    BREAST BIOPSY      2011    BREAST LUMPECTOMY      right breast 2009, with lymph nodes.     [address] [facility]    COLONOSCOPY  2011    COLONOSCOPY N/A 03/20/2015    COLONOSCOPY    COLONOSCOPY N/A 02/21/2022    COLONOSCOPY POLYPECTOMY SNARE/COLD BIOPSY performed by Nathan No MD at [address] [facility] GRAFT  10/23/2017    x2   [place]    DIAGNOSTIC CARDIAC CATH LAB PROCEDURE      EYE SURGERY  2009    several ,cataracts,legally blind    EYE SURGERY      FOOT SURGERY  03/05/2016    ulcer, bone debridement    MASTECTOMY Bilateral 6/20/2023    BILATERAL  MASTECTOMIES performed by Nathan No MD at [facility] Left 03/02/2016    bone biopsy left foot    US BREAST BIOPSY NEEDLE ADDITIONAL RIGHT Right 4/12/2023    US

## 2023-07-13 RX ORDER — GLUCOSAMINE HCL/CHONDROITIN SU 500-400 MG
CAPSULE ORAL
Qty: 100 STRIP | Refills: 3 | Status: SHIPPED | OUTPATIENT
Start: 2023-07-13

## 2023-07-19 ENCOUNTER — OFFICE VISIT (OUTPATIENT)
Dept: SURGERY | Age: 73
End: 2023-07-19

## 2023-07-19 VITALS
DIASTOLIC BLOOD PRESSURE: 50 MMHG | HEIGHT: 65 IN | SYSTOLIC BLOOD PRESSURE: 128 MMHG | TEMPERATURE: 98 F | BODY MASS INDEX: 39.65 KG/M2 | WEIGHT: 238 LBS | HEART RATE: 68 BPM | OXYGEN SATURATION: 92 %

## 2023-07-19 DIAGNOSIS — T14.8XXA HEMATOMA: Primary | ICD-10-CM

## 2023-07-19 DIAGNOSIS — C50.211 MALIGNANT NEOPLASM OF UPPER-INNER QUADRANT OF RIGHT FEMALE BREAST, UNSPECIFIED ESTROGEN RECEPTOR STATUS (HCC): ICD-10-CM

## 2023-07-19 NOTE — PROGRESS NOTES
Patient's Name/Date of Birth: Son Levine / 1950    Date: 7/19/2023    PCP: Janiya Richard MD    Chief Complaint   Patient presents with    Post-Op Check     Post op breast       HPI:  Patient here for evaluation and management of recurrent seroma right breast mastectomy    Patient's medications, allergies, past medical, surgical, social and family histories were reviewed and updated as appropriate. Allergies   Allergen Reactions    Penicillins      Unknown reaction tolerates cefazolin       Past Medical History:   Diagnosis Date    ASHD (arteriosclerotic heart disease)     Breast cancer ([address])     rt 2009    CAD (coronary artery disease)     Cancer ([address])     breast ca, right    Charcot foot due to diabetes mellitus ([address]) 01/29/2012    Chronic diastolic congestive heart failure ([address]) 11/27/2017    Chronic kidney disease     sees nephrology    Depression     Diabetic peripheral neuropathy ([address])     Diarrhea     Essential hypertension 05/24/2018    GERD (gastroesophageal reflux disease)     Hyperlipidemia     Hypertension     Lactose intolerance     Thyroid disease     Type II or unspecified type diabetes mellitus without mention of complication, not stated as uncontrolled     Urinary incontinence         Past Surgical History:   Procedure Laterality Date    BREAST BIOPSY      2011    BREAST LUMPECTOMY      right breast 2009, with lymph nodes.     [address] [facility]    COLONOSCOPY  2011    COLONOSCOPY N/A 03/20/2015    COLONOSCOPY    COLONOSCOPY N/A 02/21/2022    COLONOSCOPY POLYPECTOMY SNARE/COLD BIOPSY performed by Hugo Maradiaga MD at [address] [place] [address] GRAFT  10/23/2017    x2   [place]    DIAGNOSTIC CARDIAC CATH LAB PROCEDURE      EYE SURGERY  2009    several ,cataracts,legally blind    EYE SURGERY      FOOT SURGERY  03/05/2016    ulcer, bone debridement    MASTECTOMY Bilateral 6/20/2023    BILATERAL  MASTECTOMIES performed by Hugo Maradiaga MD at [facility]

## 2023-07-26 ENCOUNTER — OFFICE VISIT (OUTPATIENT)
Dept: SURGERY | Age: 73
End: 2023-07-26
Payer: COMMERCIAL

## 2023-07-26 VITALS
HEART RATE: 64 BPM | TEMPERATURE: 97.7 F | DIASTOLIC BLOOD PRESSURE: 52 MMHG | HEIGHT: 65 IN | OXYGEN SATURATION: 87 % | WEIGHT: 238 LBS | SYSTOLIC BLOOD PRESSURE: 127 MMHG | BODY MASS INDEX: 39.65 KG/M2

## 2023-07-26 DIAGNOSIS — T14.8XXA HEMATOMA: Primary | ICD-10-CM

## 2023-07-26 PROCEDURE — 10140 I&D HMTMA SEROMA/FLUID COLLJ: CPT | Performed by: SURGERY

## 2023-07-26 NOTE — PROGRESS NOTES
Patient's Name/Date of Birth: Ivonne Pimentel / 1950    Date: 7/26/2023    PCP: Lionel Arevalo MD    Chief Complaint   Patient presents with    Post-Op Check     Post op mastectomy area filling up with fluid again       HPI:  Patient here for recurrent hematoma right mastectomy site    Patient's medications, allergies, past medical, surgical, social and family histories were reviewed and updated as appropriate. Allergies   Allergen Reactions    Penicillins      Unknown reaction tolerates cefazolin       Past Medical History:   Diagnosis Date    ASHD (arteriosclerotic heart disease)     Breast cancer ([address])     rt 2009    CAD (coronary artery disease)     Cancer ([address])     breast ca, right    Charcot foot due to diabetes mellitus ([address]) 01/29/2012    Chronic diastolic congestive heart failure ([address]) 11/27/2017    Chronic kidney disease     sees nephrology    Depression     Diabetic peripheral neuropathy ([address])     Diarrhea     Essential hypertension 05/24/2018    GERD (gastroesophageal reflux disease)     Hyperlipidemia     Hypertension     Lactose intolerance     Thyroid disease     Type II or unspecified type diabetes mellitus without mention of complication, not stated as uncontrolled     Urinary incontinence         Past Surgical History:   Procedure Laterality Date    BREAST BIOPSY      2011    BREAST LUMPECTOMY      right breast 2009, with lymph nodes.     [address] [facility]    COLONOSCOPY  2011    COLONOSCOPY N/A 03/20/2015    COLONOSCOPY    COLONOSCOPY N/A 02/21/2022    COLONOSCOPY POLYPECTOMY SNARE/COLD BIOPSY performed by Sathish Love MD at [address] [facility] GRAFT  10/23/2017    x2   [place]    DIAGNOSTIC CARDIAC CATH LAB PROCEDURE      EYE SURGERY  2009    several ,cataracts,legally blind    EYE SURGERY      FOOT SURGERY  03/05/2016    ulcer, bone debridement    MASTECTOMY Bilateral 6/20/2023    BILATERAL  MASTECTOMIES performed by Sathish Love MD at [facility]

## 2023-08-02 ENCOUNTER — OFFICE VISIT (OUTPATIENT)
Dept: SURGERY | Age: 73
End: 2023-08-02

## 2023-08-02 VITALS
OXYGEN SATURATION: 92 % | DIASTOLIC BLOOD PRESSURE: 57 MMHG | BODY MASS INDEX: 39.65 KG/M2 | SYSTOLIC BLOOD PRESSURE: 118 MMHG | WEIGHT: 238 LBS | TEMPERATURE: 97.9 F | HEIGHT: 65 IN | HEART RATE: 67 BPM

## 2023-08-02 DIAGNOSIS — Z09 POSTOP CHECK: Primary | ICD-10-CM

## 2023-08-02 PROCEDURE — 99024 POSTOP FOLLOW-UP VISIT: CPT | Performed by: SURGERY

## 2023-08-02 NOTE — PROGRESS NOTES
Patient's Name/Date of Birth: Son Levine / 1950    Date: 8/2/2023    PCP: Janiya Richard MD    Chief Complaint   Patient presents with    Post-Op Check     Post op breast        HPI:  FU bilateral mastectomies, post op seroma. Wounds healed well . Left chest with small hematoma, not expanding    Patient's medications, allergies, past medical, surgical, social and family histories were reviewed and updated as appropriate. Allergies   Allergen Reactions    Penicillins      Unknown reaction tolerates cefazolin       Past Medical History:   Diagnosis Date    ASHD (arteriosclerotic heart disease)     Breast cancer ([address])     rt 2009    CAD (coronary artery disease)     Cancer ([address])     breast ca, right    Charcot foot due to diabetes mellitus ([address]) 01/29/2012    Chronic diastolic congestive heart failure ([address]) 11/27/2017    Chronic kidney disease     sees nephrology    Depression     Diabetic peripheral neuropathy ([address])     Diarrhea     Essential hypertension 05/24/2018    GERD (gastroesophageal reflux disease)     Hyperlipidemia     Hypertension     Lactose intolerance     Thyroid disease     Type II or unspecified type diabetes mellitus without mention of complication, not stated as uncontrolled     Urinary incontinence         Past Surgical History:   Procedure Laterality Date    BREAST BIOPSY      2011    BREAST LUMPECTOMY      right breast 2009, with lymph nodes.     [address] [facility]    COLONOSCOPY  2011    COLONOSCOPY N/A 03/20/2015    COLONOSCOPY    COLONOSCOPY N/A 02/21/2022    COLONOSCOPY POLYPECTOMY SNARE/COLD BIOPSY performed by Hugo Maradiaga MD at [address] [place] [address] GRAFT  10/23/2017    x2   [place]    DIAGNOSTIC CARDIAC CATH LAB PROCEDURE      EYE SURGERY  2009    several ,cataracts,legally blind    EYE SURGERY      FOOT SURGERY  03/05/2016    ulcer, bone debridement    MASTECTOMY Bilateral 6/20/2023    BILATERAL  MASTECTOMIES performed by Hugo Maradiaga MD at [address] [place] [address]

## 2023-08-07 RX ORDER — LANOLIN ALCOHOL/MO/W.PET/CERES
500 CREAM (GRAM) TOPICAL NIGHTLY
Qty: 90 TABLET | Refills: 0 | Status: SHIPPED | OUTPATIENT
Start: 2023-08-07

## 2023-08-07 NOTE — TELEPHONE ENCOUNTER
Patient requesting a refill of her niacin (SLO-NIACIN) 500 MG extended release tablet to [facility]. Thank you.

## 2023-08-16 ENCOUNTER — OFFICE VISIT (OUTPATIENT)
Dept: SURGERY | Age: 73
End: 2023-08-16

## 2023-08-16 VITALS
OXYGEN SATURATION: 88 % | HEART RATE: 65 BPM | BODY MASS INDEX: 39.65 KG/M2 | DIASTOLIC BLOOD PRESSURE: 63 MMHG | TEMPERATURE: 97.4 F | SYSTOLIC BLOOD PRESSURE: 129 MMHG | WEIGHT: 238 LBS | HEIGHT: 65 IN

## 2023-08-16 DIAGNOSIS — E66.01 SEVERE OBESITY (BMI 35.0-35.9 WITH COMORBIDITY) (HCC): ICD-10-CM

## 2023-08-16 DIAGNOSIS — T81.49XA POST-OPERATIVE WOUND ABSCESS: Primary | ICD-10-CM

## 2023-08-16 DIAGNOSIS — C50.911 RIGHT BREAST CANCER WITH MALIGNANT CELLS IN REGIONAL LYMPH NODES NO GREATER THAN 0.2 MM AND NO MORE THAN 200 CELLS (HCC): ICD-10-CM

## 2023-08-16 DIAGNOSIS — C77.9 RIGHT BREAST CANCER WITH MALIGNANT CELLS IN REGIONAL LYMPH NODES NO GREATER THAN 0.2 MM AND NO MORE THAN 200 CELLS (HCC): ICD-10-CM

## 2023-08-16 PROCEDURE — 99024 POSTOP FOLLOW-UP VISIT: CPT | Performed by: SURGERY

## 2023-08-16 NOTE — PROGRESS NOTES
Patient's Name/Date of Birth: Kalee Elkins / 1950    Date: 8/16/2023    PCP: Frankey Blackwater, MD    Chief Complaint   Patient presents with    Post-Op Check     Post op check, nausea        HPI:  FU bilateral mastectomies for CA right breast. Patient has had recurrent hematoma/seroma right mastectomy site. C/o more swelling today    Patient's medications, allergies, past medical, surgical, social and family histories were reviewed and updated as appropriate. Allergies   Allergen Reactions    Penicillins      Unknown reaction tolerates cefazolin       Past Medical History:   Diagnosis Date    ASHD (arteriosclerotic heart disease)     Breast cancer ([address])     rt 2009    CAD (coronary artery disease)     Cancer ([address])     breast ca, right    Charcot foot due to diabetes mellitus ([address]) 01/29/2012    Chronic diastolic congestive heart failure ([address]) 11/27/2017    Chronic kidney disease     sees nephrology    Depression     Diabetic peripheral neuropathy ([address])     Diarrhea     Essential hypertension 05/24/2018    GERD (gastroesophageal reflux disease)     Hyperlipidemia     Hypertension     Lactose intolerance     Thyroid disease     Type II or unspecified type diabetes mellitus without mention of complication, not stated as uncontrolled     Urinary incontinence         Past Surgical History:   Procedure Laterality Date    BREAST BIOPSY      2011    BREAST LUMPECTOMY      right breast 2009, with lymph nodes.     [address] [facility]    COLONOSCOPY  2011    COLONOSCOPY N/A 03/20/2015    COLONOSCOPY    COLONOSCOPY N/A 02/21/2022    COLONOSCOPY POLYPECTOMY SNARE/COLD BIOPSY performed by Robert Coley MD at [address] [facility] [address] GRAFT  10/23/2017    x2   [place]    DIAGNOSTIC CARDIAC CATH LAB PROCEDURE      EYE SURGERY  2009    several ,cataracts,legally blind    EYE SURGERY      FOOT SURGERY  03/05/2016    ulcer, bone debridement    MASTECTOMY Bilateral 6/20/2023    BILATERAL  MASTECTOMIES performed

## 2023-08-22 DIAGNOSIS — R79.89 LOW SERUM VITAMIN D: ICD-10-CM

## 2023-08-22 RX ORDER — CHOLECALCIFEROL (VITAMIN D3) 25 MCG
CAPSULE ORAL
Qty: 90 CAPSULE | Refills: 0 | Status: SHIPPED | OUTPATIENT
Start: 2023-08-22

## 2023-08-22 RX ORDER — FAMOTIDINE 40 MG/1
40 TABLET, FILM COATED ORAL EVERY MORNING
Qty: 90 TABLET | Refills: 0 | Status: SHIPPED | OUTPATIENT
Start: 2023-08-22

## 2023-08-25 ENCOUNTER — TELEPHONE (OUTPATIENT)
Dept: PRIMARY CARE CLINIC | Age: 73
End: 2023-08-25

## 2023-08-25 RX ORDER — SULFAMETHOXAZOLE AND TRIMETHOPRIM 800; 160 MG/1; MG/1
1 TABLET ORAL 2 TIMES DAILY
Qty: 14 TABLET | Refills: 0 | Status: SHIPPED | OUTPATIENT
Start: 2023-08-25 | End: 2023-09-01

## 2023-08-25 NOTE — TELEPHONE ENCOUNTER
Patient requesting something be sent into [facility] on [address] [place] [address]. For a UTI. Thank you.

## 2023-09-22 ENCOUNTER — OFFICE VISIT (OUTPATIENT)
Dept: PRIMARY CARE CLINIC | Age: 73
End: 2023-09-22
Payer: COMMERCIAL

## 2023-09-22 VITALS
SYSTOLIC BLOOD PRESSURE: 118 MMHG | HEIGHT: 65 IN | DIASTOLIC BLOOD PRESSURE: 80 MMHG | HEART RATE: 77 BPM | TEMPERATURE: 97 F | WEIGHT: 238 LBS | BODY MASS INDEX: 39.65 KG/M2 | OXYGEN SATURATION: 93 % | RESPIRATION RATE: 16 BRPM

## 2023-09-22 DIAGNOSIS — E78.00 HIGH CHOLESTEROL: ICD-10-CM

## 2023-09-22 DIAGNOSIS — Z90.13 S/P BILATERAL MASTECTOMY: ICD-10-CM

## 2023-09-22 DIAGNOSIS — N18.30 STAGE 3 CHRONIC KIDNEY DISEASE, UNSPECIFIED WHETHER STAGE 3A OR 3B CKD (HCC): ICD-10-CM

## 2023-09-22 DIAGNOSIS — I25.10 CORONARY ARTERY DISEASE INVOLVING NATIVE CORONARY ARTERY OF NATIVE HEART WITHOUT ANGINA PECTORIS: ICD-10-CM

## 2023-09-22 DIAGNOSIS — Z79.4 TYPE 2 DIABETES MELLITUS WITH OTHER SPECIFIED COMPLICATION, WITH LONG-TERM CURRENT USE OF INSULIN (HCC): Primary | ICD-10-CM

## 2023-09-22 DIAGNOSIS — I10 ESSENTIAL HYPERTENSION: ICD-10-CM

## 2023-09-22 DIAGNOSIS — E11.610 CHARCOT FOOT DUE TO DIABETES MELLITUS (HCC): ICD-10-CM

## 2023-09-22 DIAGNOSIS — E11.69 TYPE 2 DIABETES MELLITUS WITH OTHER SPECIFIED COMPLICATION, WITH LONG-TERM CURRENT USE OF INSULIN (HCC): Primary | ICD-10-CM

## 2023-09-22 LAB
ABSOLUTE IMMATURE GRANULOCYTE: 0.05 K/UL (ref 0–0.58)
ALBUMIN SERPL-MCNC: 3.9 G/DL (ref 3.5–5.2)
ALBUMIN SERPL-MCNC: 3.9 G/DL (ref 3.5–5.2)
ALP BLD-CCNC: 57 U/L (ref 35–104)
ALP SERPL-CCNC: 57 U/L (ref 35–104)
ALT SERPL-CCNC: 14 U/L (ref 0–32)
ALT SERPL-CCNC: 14 U/L (ref 0–32)
ANION GAP SERPL CALCULATED.3IONS-SCNC: 11 MMOL/L (ref 7–16)
ANION GAP SERPL CALCULATED.3IONS-SCNC: 11 MMOL/L (ref 7–16)
AST SERPL-CCNC: 21 U/L (ref 0–31)
AST SERPL-CCNC: 21 U/L (ref 0–31)
BASOPHILS # BLD: 0.05 K/UL (ref 0–0.2)
BASOPHILS ABSOLUTE: 0.05 K/UL (ref 0–0.2)
BASOPHILS NFR BLD: 1 % (ref 0–2)
BASOPHILS RELATIVE PERCENT: 1 % (ref 0–2)
BILIRUB SERPL-MCNC: 0.2 MG/DL (ref 0–1.2)
BILIRUB SERPL-MCNC: 0.2 MG/DL (ref 0–1.2)
BUN BLDV-MCNC: 41 MG/DL (ref 6–23)
BUN SERPL-MCNC: 41 MG/DL (ref 6–23)
CALCIUM SERPL-MCNC: 9.6 MG/DL (ref 8.6–10.2)
CALCIUM SERPL-MCNC: 9.6 MG/DL (ref 8.6–10.2)
CHLORIDE BLD-SCNC: 105 MMOL/L (ref 98–107)
CHLORIDE SERPL-SCNC: 105 MMOL/L (ref 98–107)
CHOLEST SERPL-MCNC: 132 MG/DL
CHOLESTEROL: 132 MG/DL
CO2 SERPL-SCNC: 24 MMOL/L (ref 22–29)
CO2: 24 MMOL/L (ref 22–29)
CREAT SERPL-MCNC: 1.5 MG/DL (ref 0.5–1)
CREAT SERPL-MCNC: 1.5 MG/DL (ref 0.5–1)
CREAT UR-MCNC: 103.2 MG/DL (ref 29–226)
CREATININE URINE: 103.2 MG/DL (ref 29–226)
EOSINOPHIL # BLD: 0.06 K/UL (ref 0.05–0.5)
EOSINOPHILS ABSOLUTE: 0.06 K/UL (ref 0.05–0.5)
EOSINOPHILS RELATIVE PERCENT: 1 % (ref 0–6)
EOSINOPHILS RELATIVE PERCENT: 1 % (ref 0–6)
ERYTHROCYTE [DISTWIDTH] IN BLOOD BY AUTOMATED COUNT: 14.4 % (ref 11.5–15)
GFR SERPL CREATININE-BSD FRML MDRD: 36 ML/MIN/1.73M2
GFR SERPL CREATININE-BSD FRML MDRD: 36 ML/MIN/1.73M2
GLUCOSE BLD-MCNC: 119 MG/DL (ref 74–99)
GLUCOSE SERPL-MCNC: 119 MG/DL (ref 74–99)
HBA1C MFR BLD: 6.3 %
HCT VFR BLD AUTO: 37.2 % (ref 34–48)
HCT VFR BLD CALC: 37.2 % (ref 34–48)
HDLC SERPL-MCNC: 48 MG/DL
HDLC SERPL-MCNC: 48 MG/DL
HEMOGLOBIN: 11.1 G/DL (ref 11.5–15.5)
HGB BLD-MCNC: 11.1 G/DL (ref 11.5–15.5)
IMM GRANULOCYTES # BLD AUTO: 0.05 K/UL (ref 0–0.58)
IMM GRANULOCYTES NFR BLD: 1 % (ref 0–5)
IMMATURE GRANULOCYTES: 1 % (ref 0–5)
LDL CHOLESTEROL: 66 MG/DL
LDLC SERPL CALC-MCNC: 66 MG/DL
LYMPHOCYTES ABSOLUTE: 1.44 K/UL (ref 1.5–4)
LYMPHOCYTES NFR BLD: 1.44 K/UL (ref 1.5–4)
LYMPHOCYTES RELATIVE PERCENT: 16 % (ref 20–42)
LYMPHOCYTES RELATIVE PERCENT: 16 % (ref 20–42)
MCH RBC QN AUTO: 27.1 PG (ref 26–35)
MCH RBC QN AUTO: 27.1 PG (ref 26–35)
MCHC RBC AUTO-ENTMCNC: 29.8 G/DL (ref 32–34.5)
MCHC RBC AUTO-ENTMCNC: 29.8 G/DL (ref 32–34.5)
MCV RBC AUTO: 90.7 FL (ref 80–99.9)
MCV RBC AUTO: 90.7 FL (ref 80–99.9)
MICROALBUMIN UR-MCNC: 110 MG/L (ref 0–19)
MICROALBUMIN/CREAT 24H UR: 110 MG/L (ref 0–19)
MICROALBUMIN/CREAT UR-RTO: 107 MCG/MG CREAT (ref 0–30)
MICROALBUMIN/CREAT UR-RTO: 107 MCG/MG CREAT (ref 0–30)
MONOCYTES ABSOLUTE: 0.82 K/UL (ref 0.1–0.95)
MONOCYTES NFR BLD: 0.82 K/UL (ref 0.1–0.95)
MONOCYTES NFR BLD: 9 % (ref 2–12)
MONOCYTES RELATIVE PERCENT: 9 % (ref 2–12)
NEUTROPHILS ABSOLUTE: 6.89 K/UL (ref 1.8–7.3)
NEUTROPHILS NFR BLD: 74 % (ref 43–80)
NEUTROPHILS RELATIVE PERCENT: 74 % (ref 43–80)
NEUTS SEG NFR BLD: 6.89 K/UL (ref 1.8–7.3)
PDW BLD-RTO: 14.4 % (ref 11.5–15)
PHOSPHATE SERPL-MCNC: 3 MG/DL (ref 2.5–4.5)
PHOSPHORUS: 3 MG/DL (ref 2.5–4.5)
PLATELET # BLD AUTO: 280 K/UL (ref 130–450)
PLATELET # BLD: 280 K/UL (ref 130–450)
PMV BLD AUTO: 10.4 FL (ref 7–12)
PMV BLD AUTO: 10.4 FL (ref 7–12)
POTASSIUM SERPL-SCNC: 4.8 MMOL/L (ref 3.5–5)
POTASSIUM SERPL-SCNC: 4.8 MMOL/L (ref 3.5–5)
PROT SERPL-MCNC: 6.7 G/DL (ref 6.4–8.3)
RBC # BLD AUTO: 4.1 M/UL (ref 3.5–5.5)
RBC # BLD: 4.1 M/UL (ref 3.5–5.5)
SODIUM BLD-SCNC: 140 MMOL/L (ref 132–146)
SODIUM SERPL-SCNC: 140 MMOL/L (ref 132–146)
TOTAL PROTEIN: 6.7 G/DL (ref 6.4–8.3)
TRIGL SERPL-MCNC: 91 MG/DL
TRIGL SERPL-MCNC: 91 MG/DL
VLDLC SERPL CALC-MCNC: 18 MG/DL
VLDLC SERPL CALC-MCNC: 18 MG/DL
WBC # BLD: 9.3 K/UL (ref 4.5–11.5)
WBC OTHER # BLD: 9.3 K/UL (ref 4.5–11.5)

## 2023-09-22 PROCEDURE — 3044F HG A1C LEVEL LT 7.0%: CPT | Performed by: INTERNAL MEDICINE

## 2023-09-22 PROCEDURE — G8417 CALC BMI ABV UP PARAM F/U: HCPCS | Performed by: INTERNAL MEDICINE

## 2023-09-22 PROCEDURE — 90694 VACC AIIV4 NO PRSRV 0.5ML IM: CPT | Performed by: INTERNAL MEDICINE

## 2023-09-22 PROCEDURE — 1090F PRES/ABSN URINE INCON ASSESS: CPT | Performed by: INTERNAL MEDICINE

## 2023-09-22 PROCEDURE — 3017F COLORECTAL CA SCREEN DOC REV: CPT | Performed by: INTERNAL MEDICINE

## 2023-09-22 PROCEDURE — 99214 OFFICE O/P EST MOD 30 MIN: CPT | Performed by: INTERNAL MEDICINE

## 2023-09-22 PROCEDURE — G8400 PT W/DXA NO RESULTS DOC: HCPCS | Performed by: INTERNAL MEDICINE

## 2023-09-22 PROCEDURE — G8427 DOCREV CUR MEDS BY ELIG CLIN: HCPCS | Performed by: INTERNAL MEDICINE

## 2023-09-22 PROCEDURE — 1036F TOBACCO NON-USER: CPT | Performed by: INTERNAL MEDICINE

## 2023-09-22 PROCEDURE — G0008 ADMIN INFLUENZA VIRUS VAC: HCPCS | Performed by: INTERNAL MEDICINE

## 2023-09-22 PROCEDURE — 3074F SYST BP LT 130 MM HG: CPT | Performed by: INTERNAL MEDICINE

## 2023-09-22 PROCEDURE — 3078F DIAST BP <80 MM HG: CPT | Performed by: INTERNAL MEDICINE

## 2023-09-22 PROCEDURE — 83036 HEMOGLOBIN GLYCOSYLATED A1C: CPT | Performed by: INTERNAL MEDICINE

## 2023-09-22 PROCEDURE — 2022F DILAT RTA XM EVC RTNOPTHY: CPT | Performed by: INTERNAL MEDICINE

## 2023-09-22 PROCEDURE — 1123F ACP DISCUSS/DSCN MKR DOCD: CPT | Performed by: INTERNAL MEDICINE

## 2023-09-22 RX ORDER — LETROZOLE 2.5 MG/1
2.5 TABLET, FILM COATED ORAL DAILY
COMMUNITY
Start: 2023-09-18

## 2023-09-22 NOTE — PROGRESS NOTES
Annabella Haro presents today for follow up of HTN, Diabetes, ESRD, SP Bilateral total mastectomy    Current Outpatient Medications   Medication Sig Dispense Refill    letrozole (FEMARA) 2.5 MG tablet Take 1 tablet by mouth daily      famotidine (PEPCID) 40 MG tablet Take 1 tablet by mouth every morning 90 tablet 0    VITAMIN D HIGH POTENCY 25 MCG (1000 UT) CAPS take 1 capsule by mouth once daily 90 capsule 0    niacin (SLO-NIACIN) 500 MG extended release tablet Take 1 tablet by mouth nightly 90 tablet 0    blood glucose monitor strips Test 3 times a day & as needed for symptoms of irregular blood glucose. Dispense sufficient amount for indicated testing frequency plus additional to accommodate PRN testing needs. 100 strip 3    calcium carb-cholecalciferol 600-20 MG-MCG TABS Take 1 tablet by mouth daily 90 tablet 0    B-D UF III MINI PEN NEEDLES 31G X 5 MM MISC USE once daily to four times a day 200 each 2    vitamin C (ASCORBIC ACID) 500 MG tablet Take 1 tablet by mouth daily 90 tablet 0    Cholecalciferol (VITAMIN D3) 20 MCG (800 UNIT) TABS Take 1 tablet by mouth daily 90 tablet 0    insulin lispro protamine & lispro (HUMALOG MIX 75/25) (75-25) 100 UNIT per ML SUSP injection vial Inject 78 Units into the skin 2 times daily (with meals) 4 each 2    Dulaglutide (TRULICITY) 3.74 EQ/2.2JL SOPN inject 0.5 milliliters ( 0.75 milligrams ) subcutaneously every week 2 mL 2    lisinopril (PRINIVIL;ZESTRIL) 40 MG tablet Take 1 tablet by mouth daily 90 tablet 0    SYSTANE 0.4-0.3 % GEL ophthalmic gel Place 1 drop into both eyes 3 times daily      Varenicline Tartrate (TYRVAYA) 0.03 MG/ACT SOLN 1 spray by Nasal route in the morning and at bedtime      cycloSPORINE, PF, (CEQUA) 0.09 % SOLN Apply 1 drop to eye in the morning and at bedtime      citalopram (CELEXA) 20 MG tablet Take 1.5 tablets by mouth daily      HYDROcodone-acetaminophen (NORCO) 5-325 MG per tablet Take 1 tablet by mouth in the morning and at bedtime.

## 2023-09-23 LAB
ALBUMIN SERPL-MCNC: 3.8 G/DL (ref 3.5–5.2)
ALBUMIN SERPL-MCNC: 3.8 G/DL (ref 3.5–5.2)
ALP BLD-CCNC: 55 U/L (ref 35–104)
ALP SERPL-CCNC: 55 U/L (ref 35–104)
ALT SERPL-CCNC: 12 U/L (ref 0–32)
ALT SERPL-CCNC: 12 U/L (ref 0–32)
ANION GAP SERPL CALCULATED.3IONS-SCNC: 13 MMOL/L (ref 7–16)
ANION GAP SERPL CALCULATED.3IONS-SCNC: 13 MMOL/L (ref 7–16)
AST SERPL-CCNC: 20 U/L (ref 0–31)
AST SERPL-CCNC: 20 U/L (ref 0–31)
BILIRUB SERPL-MCNC: 0.2 MG/DL (ref 0–1.2)
BILIRUB SERPL-MCNC: 0.2 MG/DL (ref 0–1.2)
BUN BLDV-MCNC: 41 MG/DL (ref 6–23)
BUN SERPL-MCNC: 41 MG/DL (ref 6–23)
CALCIUM SERPL-MCNC: 9.6 MG/DL (ref 8.6–10.2)
CALCIUM SERPL-MCNC: 9.6 MG/DL (ref 8.6–10.2)
CHLORIDE BLD-SCNC: 104 MMOL/L (ref 98–107)
CHLORIDE SERPL-SCNC: 104 MMOL/L (ref 98–107)
CHOLEST SERPL-MCNC: 128 MG/DL
CHOLESTEROL: 128 MG/DL
CO2 SERPL-SCNC: 23 MMOL/L (ref 22–29)
CO2: 23 MMOL/L (ref 22–29)
CREAT SERPL-MCNC: 1.5 MG/DL (ref 0.5–1)
CREAT SERPL-MCNC: 1.5 MG/DL (ref 0.5–1)
GFR SERPL CREATININE-BSD FRML MDRD: 36 ML/MIN/1.73M2
GFR SERPL CREATININE-BSD FRML MDRD: 36 ML/MIN/1.73M2
GLUCOSE FASTING: 115 MG/DL (ref 74–99)
GLUCOSE P FAST SERPL-MCNC: 115 MG/DL (ref 74–99)
HBA1C MFR BLD: 6.8 % (ref 4–5.6)
HBA1C MFR BLD: 6.8 % (ref 4–5.6)
HDLC SERPL-MCNC: 48 MG/DL
HDLC SERPL-MCNC: 48 MG/DL
LDL CHOLESTEROL: 61 MG/DL
LDLC SERPL CALC-MCNC: 61 MG/DL
POTASSIUM SERPL-SCNC: 4.8 MMOL/L (ref 3.5–5)
POTASSIUM SERPL-SCNC: 4.8 MMOL/L (ref 3.5–5)
PROT SERPL-MCNC: 6.7 G/DL (ref 6.4–8.3)
PTH INTACT: 31.7 PG/ML (ref 15–65)
PTH-INTACT SERPL-MCNC: 31.7 PG/ML (ref 15–65)
SODIUM BLD-SCNC: 140 MMOL/L (ref 132–146)
SODIUM SERPL-SCNC: 140 MMOL/L (ref 132–146)
TOTAL PROTEIN: 6.7 G/DL (ref 6.4–8.3)
TRIGL SERPL-MCNC: 95 MG/DL
TRIGL SERPL-MCNC: 95 MG/DL
VLDLC SERPL CALC-MCNC: 19 MG/DL
VLDLC SERPL CALC-MCNC: 19 MG/DL

## 2023-09-25 LAB
1,25(OH)2D3 SERPL-MCNC: 19.4 PG/ML (ref 19.9–79.3)
VITAMIN D 1,25-DIHYDROXY: 19.4 PG/ML (ref 19.9–79.3)

## 2023-09-26 ASSESSMENT — ENCOUNTER SYMPTOMS
DIARRHEA: 0
SORE THROAT: 0
COLOR CHANGE: 0
ABDOMINAL DISTENTION: 0
TROUBLE SWALLOWING: 0
CONSTIPATION: 0
BLOOD IN STOOL: 0
RHINORRHEA: 0
ABDOMINAL PAIN: 0
BACK PAIN: 0
ALLERGIC/IMMUNOLOGIC NEGATIVE: 1
VOMITING: 0
NAUSEA: 0
SINUS PRESSURE: 0
COUGH: 1

## 2023-10-16 DIAGNOSIS — E11.42 DIABETIC PERIPHERAL NEUROPATHY (HCC): Primary | ICD-10-CM

## 2023-10-16 LAB — DIABETIC RETINOPATHY: POSITIVE

## 2023-10-16 NOTE — PROGRESS NOTES
Pended the OT and PT for you just need dx for these so I can fax them to [facility] for her bath modification

## 2023-10-20 ENCOUNTER — TELEPHONE (OUTPATIENT)
Dept: PRIMARY CARE CLINIC | Age: 73
End: 2023-10-20

## 2023-10-20 DIAGNOSIS — H35.82 OCULAR ISCHEMIC SYNDROME: Primary | ICD-10-CM

## 2023-10-20 NOTE — TELEPHONE ENCOUNTER
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  Please advise Dr Suzette Lu

## 2023-10-20 NOTE — TELEPHONE ENCOUNTER
Patient would like for Sage Gates to give her a call back at [phone] to let her know if her paperwork was sent out to the eye doctor. Thank you.

## 2023-10-30 RX ORDER — DULAGLUTIDE 0.75 MG/.5ML
INJECTION, SOLUTION SUBCUTANEOUS
Qty: 2 ML | Refills: 2 | Status: SHIPPED | OUTPATIENT
Start: 2023-10-30

## 2023-11-04 ENCOUNTER — TRANSCRIBE ORDERS (OUTPATIENT)
Dept: ADMINISTRATIVE | Age: 73
End: 2023-11-04

## 2023-11-04 DIAGNOSIS — H35.82 OCULAR ISCHEMIC SYNDROME: Primary | ICD-10-CM

## 2023-11-06 RX ORDER — NIACIN 500 MG/1
500 TABLET, EXTENDED RELEASE ORAL
Qty: 90 TABLET | Refills: 0 | Status: SHIPPED | OUTPATIENT
Start: 2023-11-06

## 2023-11-08 DIAGNOSIS — H34.02 TRANSIENT RETINAL ARTERY OCCLUSION, LEFT EYE: ICD-10-CM

## 2023-11-08 DIAGNOSIS — H34.212 HOLLENHORST PLAQUE, LEFT EYE: Primary | ICD-10-CM

## 2023-11-13 RX ORDER — LISINOPRIL 40 MG/1
40 TABLET ORAL DAILY
Qty: 90 TABLET | Refills: 0 | Status: SHIPPED | OUTPATIENT
Start: 2023-11-13

## 2023-11-27 ENCOUNTER — HOSPITAL ENCOUNTER (OUTPATIENT)
Dept: ULTRASOUND IMAGING | Age: 73
Discharge: HOME | End: 2023-11-29
Payer: COMMERCIAL

## 2023-11-27 DIAGNOSIS — H35.82 OCULAR ISCHEMIC SYNDROME: ICD-10-CM

## 2023-11-27 PROCEDURE — 93880 EXTRACRANIAL BILAT STUDY: CPT

## 2023-11-27 RX ORDER — FAMOTIDINE 40 MG/1
40 TABLET, FILM COATED ORAL EVERY MORNING
Qty: 90 TABLET | Refills: 0 | Status: SHIPPED | OUTPATIENT
Start: 2023-11-27

## 2023-12-01 DIAGNOSIS — I65.23 BILATERAL CAROTID ARTERY STENOSIS: Primary | ICD-10-CM

## 2023-12-09 DIAGNOSIS — R79.89 LOW SERUM VITAMIN D: ICD-10-CM

## 2023-12-11 RX ORDER — CHOLECALCIFEROL (VITAMIN D3) 25 MCG
CAPSULE ORAL
Qty: 90 CAPSULE | Refills: 0 | Status: SHIPPED | OUTPATIENT
Start: 2023-12-11

## 2023-12-11 RX ORDER — PANTOPRAZOLE SODIUM 40 MG/1
TABLET, DELAYED RELEASE ORAL
Qty: 90 TABLET | Refills: 0 | Status: SHIPPED | OUTPATIENT
Start: 2023-12-11

## 2023-12-21 ENCOUNTER — TELEPHONE (OUTPATIENT)
Age: 73
End: 2023-12-21

## 2023-12-22 ENCOUNTER — HOSPITAL ENCOUNTER (OUTPATIENT)
Age: 73
Discharge: HOME | End: 2023-12-24
Attending: INTERNAL MEDICINE
Payer: COMMERCIAL

## 2023-12-22 DIAGNOSIS — H34.02 TRANSIENT RETINAL ARTERY OCCLUSION, LEFT EYE: ICD-10-CM

## 2023-12-22 DIAGNOSIS — H34.212 HOLLENHORST PLAQUE, LEFT EYE: ICD-10-CM

## 2023-12-22 LAB
ECHO AV AREA PEAK VELOCITY: 1.7 CM2
ECHO AV AREA VTI: 1.9 CM2
ECHO AV CUSP MM: 1.7 CM
ECHO AV MEAN GRADIENT: 9 MMHG
ECHO AV MEAN VELOCITY: 1.4 M/S
ECHO AV PEAK GRADIENT: 15 MMHG
ECHO AV PEAK VELOCITY: 2 M/S
ECHO AV VELOCITY RATIO: 0.55
ECHO AV VTI: 45 CM
ECHO LA DIAMETER: 4 CM
ECHO LA VOL A-L A2C: 46 ML (ref 22–52)
ECHO LA VOL A-L A4C: 49 ML (ref 22–52)
ECHO LA VOL MOD A2C: 45 ML (ref 22–52)
ECHO LA VOL MOD A4C: 48 ML (ref 22–52)
ECHO LA VOLUME AREA LENGTH: 49 ML
ECHO LV FRACTIONAL SHORTENING: 35 % (ref 28–44)
ECHO LV INTERNAL DIMENSION DIASTOLIC: 4.8 CM (ref 3.9–5.3)
ECHO LV INTERNAL DIMENSION SYSTOLIC: 3.1 CM
ECHO LV IVSD: 1.3 CM (ref 0.6–0.9)
ECHO LV MASS 2D: 245.7 G (ref 67–162)
ECHO LV POSTERIOR WALL DIASTOLIC: 1.3 CM (ref 0.6–0.9)
ECHO LV RELATIVE WALL THICKNESS RATIO: 0.54
ECHO LVOT AREA: 3.1 CM2
ECHO LVOT AV VTI INDEX: 0.59
ECHO LVOT DIAM: 2 CM
ECHO LVOT MEAN GRADIENT: 2 MMHG
ECHO LVOT PEAK GRADIENT: 4 MMHG
ECHO LVOT PEAK VELOCITY: 1.1 M/S
ECHO LVOT SV: 83.8 ML
ECHO LVOT VTI: 26.7 CM
ECHO MV "A" WAVE DURATION: 107.3 MSEC
ECHO MV A VELOCITY: 0.98 M/S
ECHO MV AREA PHT: 3.4 CM2
ECHO MV AREA VTI: 2.4 CM2
ECHO MV E DECELERATION TIME (DT): 188.9 MS
ECHO MV E VELOCITY: 1.08 M/S
ECHO MV E/A RATIO: 1.1
ECHO MV LVOT VTI INDEX: 1.28
ECHO MV MAX VELOCITY: 1.2 M/S
ECHO MV MEAN GRADIENT: 2 MMHG
ECHO MV MEAN VELOCITY: 0.7 M/S
ECHO MV PEAK GRADIENT: 5 MMHG
ECHO MV PRESSURE HALF TIME (PHT): 63.8 MS
ECHO MV VTI: 34.3 CM
ECHO PV MAX VELOCITY: 0.9 M/S
ECHO PV MEAN GRADIENT: 2 MMHG
ECHO PV MEAN VELOCITY: 0.6 M/S
ECHO PV PEAK GRADIENT: 3 MMHG
ECHO PV VTI: 20.5 CM
ECHO RV INTERNAL DIMENSION: 3.4 CM

## 2023-12-22 PROCEDURE — 93306 TTE W/DOPPLER COMPLETE: CPT

## 2023-12-22 PROCEDURE — 93306 TTE W/DOPPLER COMPLETE: CPT | Performed by: INTERNAL MEDICINE

## 2023-12-26 RX ORDER — POTASSIUM CHLORIDE 750 MG/1
10 TABLET, EXTENDED RELEASE ORAL EVERY OTHER DAY
Qty: 45 TABLET | Refills: 3 | OUTPATIENT
Start: 2023-12-26

## 2024-01-02 ENCOUNTER — TELEPHONE (OUTPATIENT)
Dept: PRIMARY CARE CLINIC | Age: 74
End: 2024-01-02

## 2024-01-02 RX ORDER — BENZONATATE 200 MG/1
200 CAPSULE ORAL 3 TIMES DAILY PRN
Qty: 30 CAPSULE | Refills: 0 | Status: SHIPPED | OUTPATIENT
Start: 2024-01-02 | End: 2024-01-09

## 2024-01-02 NOTE — TELEPHONE ENCOUNTER
Patient phoned c/o a very dry cough x 2 days and drainage. Started when she lost her voice x 1 day; hasn't fully gotten it back. No fever or chils.  Allergic to PCN. Would like [facility] to [facility] on [address]

## 2024-01-08 RX ORDER — POTASSIUM CHLORIDE 750 MG/1
10 TABLET, EXTENDED RELEASE ORAL EVERY OTHER DAY
Qty: 45 TABLET | Refills: 3 | OUTPATIENT
Start: 2024-01-08

## 2024-01-11 ENCOUNTER — TELEPHONE (OUTPATIENT)
Dept: PRIMARY CARE CLINIC | Age: 74
End: 2024-01-11

## 2024-01-11 LAB
ALBUMIN SERPL-MCNC: 3.7 G/DL (ref 3.5–5.2)
ALP SERPL-CCNC: 60 U/L (ref 35–104)
ALT SERPL-CCNC: 8 U/L (ref 0–32)
ANION GAP SERPL CALCULATED.3IONS-SCNC: 11 MMOL/L (ref 7–16)
AST SERPL-CCNC: 15 U/L (ref 0–31)
BILIRUB SERPL-MCNC: 0.2 MG/DL (ref 0–1.2)
BUN SERPL-MCNC: 35 MG/DL (ref 6–23)
CALCIUM SERPL-MCNC: 9.5 MG/DL (ref 8.6–10.2)
CHLORIDE SERPL-SCNC: 106 MMOL/L (ref 98–107)
CO2 SERPL-SCNC: 26 MMOL/L (ref 22–29)
CREAT SERPL-MCNC: 1.2 MG/DL (ref 0.5–1)
GFR SERPL CREATININE-BSD FRML MDRD: 50 ML/MIN/1.73M2
GLUCOSE SERPL-MCNC: 103 MG/DL (ref 74–99)
POTASSIUM SERPL-SCNC: 4.7 MMOL/L (ref 3.5–5)
PROT SERPL-MCNC: 6.6 G/DL (ref 6.4–8.3)
SODIUM SERPL-SCNC: 143 MMOL/L (ref 132–146)

## 2024-01-13 LAB — CANCER AG15-3 SERPL-ACNC: 10 U/ML (ref 0–31)

## 2024-01-23 ENCOUNTER — HOSPITAL ENCOUNTER (OUTPATIENT)
Dept: CT IMAGING | Age: 74
Discharge: HOME | End: 2024-01-25
Attending: INTERNAL MEDICINE
Payer: COMMERCIAL

## 2024-01-23 DIAGNOSIS — I65.23 BILATERAL CAROTID ARTERY STENOSIS: ICD-10-CM

## 2024-01-23 PROCEDURE — 6360000004 HC RX CONTRAST MEDICATION: Performed by: INTERNAL MEDICINE

## 2024-01-23 PROCEDURE — 70498 CT ANGIOGRAPHY NECK: CPT

## 2024-01-23 RX ADMIN — IOPAMIDOL 75 ML: 755 INJECTION, SOLUTION INTRAVENOUS at 10:51

## 2024-01-25 DIAGNOSIS — I65.23 BILATERAL CAROTID ARTERY STENOSIS: Primary | ICD-10-CM

## 2024-01-26 ENCOUNTER — TELEPHONE (OUTPATIENT)
Dept: VASCULAR SURGERY | Age: 74
End: 2024-01-26

## 2024-01-26 DIAGNOSIS — E11.9 DIABETES MELLITUS WITHOUT COMPLICATION (HCC): ICD-10-CM

## 2024-01-26 RX ORDER — PRAVASTATIN SODIUM 40 MG
40 TABLET ORAL NIGHTLY
Qty: 90 TABLET | Refills: 3 | Status: SHIPPED | OUTPATIENT
Start: 2024-01-26

## 2024-01-26 RX ORDER — EZETIMIBE 10 MG/1
10 TABLET ORAL DAILY
Qty: 90 TABLET | Refills: 3 | Status: SHIPPED | OUTPATIENT
Start: 2024-01-26

## 2024-01-26 RX ORDER — FLURBIPROFEN SODIUM 0.3 MG/ML
SOLUTION/ DROPS OPHTHALMIC
Qty: 200 EACH | Refills: 2 | Status: SHIPPED | OUTPATIENT
Start: 2024-01-26

## 2024-01-26 RX ORDER — METOPROLOL TARTRATE 50 MG/1
50 TABLET, FILM COATED ORAL 2 TIMES DAILY
Qty: 180 TABLET | Refills: 3 | Status: SHIPPED | OUTPATIENT
Start: 2024-01-26

## 2024-01-26 NOTE — TELEPHONE ENCOUNTER
Received referral from [facility] for JOSE, left message for patient to schedule appointment to see Dr. Romero.

## 2024-01-29 RX ORDER — NIACIN 500 MG/1
500 TABLET, EXTENDED RELEASE ORAL
Qty: 90 TABLET | Refills: 0 | OUTPATIENT
Start: 2024-01-29

## 2024-01-30 ENCOUNTER — TELEPHONE (OUTPATIENT)
Dept: VASCULAR SURGERY | Age: 74
End: 2024-01-30

## 2024-01-30 ENCOUNTER — OFFICE VISIT (OUTPATIENT)
Dept: PRIMARY CARE CLINIC | Age: 74
End: 2024-01-30
Payer: COMMERCIAL

## 2024-01-30 VITALS — HEART RATE: 72 BPM | OXYGEN SATURATION: 91 % | SYSTOLIC BLOOD PRESSURE: 104 MMHG | DIASTOLIC BLOOD PRESSURE: 48 MMHG

## 2024-01-30 DIAGNOSIS — N18.30 STAGE 3 CHRONIC KIDNEY DISEASE, UNSPECIFIED WHETHER STAGE 3A OR 3B CKD (HCC): ICD-10-CM

## 2024-01-30 DIAGNOSIS — E11.610 TYPE 2 DIABETES MELLITUS WITH DIABETIC NEUROPATHIC ARTHROPATHY, WITH LONG-TERM CURRENT USE OF INSULIN (HCC): ICD-10-CM

## 2024-01-30 DIAGNOSIS — R82.90 URINE ABNORMALITY: ICD-10-CM

## 2024-01-30 DIAGNOSIS — R05.3 CHRONIC COUGH: Primary | ICD-10-CM

## 2024-01-30 DIAGNOSIS — Z79.4 TYPE 2 DIABETES MELLITUS WITH DIABETIC NEUROPATHIC ARTHROPATHY, WITH LONG-TERM CURRENT USE OF INSULIN (HCC): ICD-10-CM

## 2024-01-30 LAB
Lab: NORMAL
PERFORMING INSTRUMENT: NORMAL
QC PASS/FAIL: NORMAL
SARS-COV-2, POC: NORMAL

## 2024-01-30 PROCEDURE — 99214 OFFICE O/P EST MOD 30 MIN: CPT | Performed by: INTERNAL MEDICINE

## 2024-01-30 PROCEDURE — G8400 PT W/DXA NO RESULTS DOC: HCPCS | Performed by: INTERNAL MEDICINE

## 2024-01-30 PROCEDURE — 1090F PRES/ABSN URINE INCON ASSESS: CPT | Performed by: INTERNAL MEDICINE

## 2024-01-30 PROCEDURE — G8484 FLU IMMUNIZE NO ADMIN: HCPCS | Performed by: INTERNAL MEDICINE

## 2024-01-30 PROCEDURE — 3074F SYST BP LT 130 MM HG: CPT | Performed by: INTERNAL MEDICINE

## 2024-01-30 PROCEDURE — 3017F COLORECTAL CA SCREEN DOC REV: CPT | Performed by: INTERNAL MEDICINE

## 2024-01-30 PROCEDURE — 3046F HEMOGLOBIN A1C LEVEL >9.0%: CPT | Performed by: INTERNAL MEDICINE

## 2024-01-30 PROCEDURE — G8427 DOCREV CUR MEDS BY ELIG CLIN: HCPCS | Performed by: INTERNAL MEDICINE

## 2024-01-30 PROCEDURE — G8417 CALC BMI ABV UP PARAM F/U: HCPCS | Performed by: INTERNAL MEDICINE

## 2024-01-30 PROCEDURE — 3078F DIAST BP <80 MM HG: CPT | Performed by: INTERNAL MEDICINE

## 2024-01-30 PROCEDURE — 87426 SARSCOV CORONAVIRUS AG IA: CPT | Performed by: INTERNAL MEDICINE

## 2024-01-30 PROCEDURE — 1036F TOBACCO NON-USER: CPT | Performed by: INTERNAL MEDICINE

## 2024-01-30 PROCEDURE — 1123F ACP DISCUSS/DSCN MKR DOCD: CPT | Performed by: INTERNAL MEDICINE

## 2024-01-30 PROCEDURE — 2022F DILAT RTA XM EVC RTNOPTHY: CPT | Performed by: INTERNAL MEDICINE

## 2024-01-30 RX ORDER — ASCORBIC ACID 500 MG
500 TABLET ORAL DAILY
Qty: 90 TABLET | Refills: 0 | Status: SHIPPED | OUTPATIENT
Start: 2024-01-30

## 2024-01-30 RX ORDER — DOXYCYCLINE HYCLATE 100 MG/1
100 CAPSULE ORAL 2 TIMES DAILY
Qty: 20 CAPSULE | Refills: 0 | Status: SHIPPED | OUTPATIENT
Start: 2024-01-30 | End: 2024-02-09

## 2024-01-30 RX ORDER — CITALOPRAM 20 MG/1
120 TABLET ORAL DAILY
Qty: 120 TABLET | Refills: 0 | Status: SHIPPED | OUTPATIENT
Start: 2024-01-30

## 2024-01-30 RX ORDER — CODEINE PHOSPHATE AND GUAIFENESIN 10; 100 MG/5ML; MG/5ML
5 SOLUTION ORAL 3 TIMES DAILY PRN
Qty: 105 ML | Refills: 0 | Status: SHIPPED | OUTPATIENT
Start: 2024-01-30 | End: 2024-02-06

## 2024-01-30 RX ORDER — METHYLPREDNISOLONE 4 MG/1
TABLET ORAL
Qty: 1 KIT | Refills: 0 | Status: SHIPPED | OUTPATIENT
Start: 2024-01-30 | End: 2024-02-05

## 2024-01-30 RX ORDER — GINGER ROOT/GINGER ROOT EXT 262.5 MG
1 CAPSULE ORAL DAILY
Qty: 90 TABLET | Refills: 0 | Status: SHIPPED | OUTPATIENT
Start: 2024-01-30

## 2024-01-30 ASSESSMENT — ENCOUNTER SYMPTOMS: COUGH: 1

## 2024-01-30 ASSESSMENT — PATIENT HEALTH QUESTIONNAIRE - PHQ9: DEPRESSION UNABLE TO ASSESS: PT REFUSES

## 2024-01-30 NOTE — PROGRESS NOTES
Diarrhea     Essential hypertension 05/24/2018    GERD (gastroesophageal reflux disease)     Hyperlipidemia     Hypertension     Lactose intolerance     Thyroid disease     Type II or unspecified type diabetes mellitus without mention of complication, not stated as uncontrolled     Urinary incontinence           Subjective:  Every bone in her body hurts for past week or so.   Dry cough which she has had off and on but is bad now. NO fever. No sob.  Urine smells bad, wants checked for UTI, keeps having them off and on.     Cough       Review of Systems   Respiratory:  Positive for cough.    Genitourinary:         Bad smelling urine   Musculoskeletal:  Positive for arthralgias.        Diffuse bone pain          Objective:  BP (!) 104/48 (Site: Left Upper Arm, Position: Sitting, Cuff Size: Medium Adult)   Pulse 72   SpO2 91%      Physical Exam  Vitals reviewed.   Constitutional:       Appearance: She is obese.      Comments: Uses walker, has constant dry, hacking cough   HENT:      Head: Normocephalic.      Right Ear: Tympanic membrane and external ear normal. There is no impacted cerumen.      Left Ear: Tympanic membrane and external ear normal. There is no impacted cerumen.      Nose: Nose normal.      Mouth/Throat:      Pharynx: Oropharynx is clear. No oropharyngeal exudate.   Eyes:      Extraocular Movements: Extraocular movements intact.      Conjunctiva/sclera: Conjunctivae normal.      Pupils: Pupils are equal, round, and reactive to light.   Cardiovascular:      Rate and Rhythm: Normal rate and regular rhythm.      Heart sounds: Murmur (janeth lsb and rt 2nd ics) heard.      No friction rub. No gallop.   Pulmonary:      Effort: Pulmonary effort is normal.      Breath sounds: Normal breath sounds.   Abdominal:      General: Bowel sounds are normal. There is no distension.      Palpations: Abdomen is soft. There is no mass.      Tenderness: There is no abdominal tenderness. There is no guarding or rebound.

## 2024-02-02 DIAGNOSIS — E11.621 DIABETIC FOOT ULCER WITH OSTEOMYELITIS (HCC): Primary | ICD-10-CM

## 2024-02-02 DIAGNOSIS — R82.90 FOUL SMELLING URINE: ICD-10-CM

## 2024-02-02 DIAGNOSIS — L97.509 DIABETIC FOOT ULCER WITH OSTEOMYELITIS (HCC): Primary | ICD-10-CM

## 2024-02-02 DIAGNOSIS — R82.998 LEUKOCYTES IN URINE: Primary | ICD-10-CM

## 2024-02-02 DIAGNOSIS — R82.998 LEUKOCYTES IN URINE: ICD-10-CM

## 2024-02-02 DIAGNOSIS — E11.69 DIABETIC FOOT ULCER WITH OSTEOMYELITIS (HCC): Primary | ICD-10-CM

## 2024-02-02 DIAGNOSIS — M86.9 DIABETIC FOOT ULCER WITH OSTEOMYELITIS (HCC): Primary | ICD-10-CM

## 2024-02-02 LAB
BILIRUBIN, POC: ABNORMAL
BLOOD URINE, POC: ABNORMAL
CLARITY, POC: CLEAR
COLOR, POC: YELLOW
GLUCOSE URINE, POC: ABNORMAL
KETONES, POC: ABNORMAL
LEUKOCYTE EST, POC: ABNORMAL
NITRITE, POC: ABNORMAL
PH, POC: 0.5
PROTEIN, POC: ABNORMAL
SPECIFIC GRAVITY, POC: 1.02
UROBILINOGEN, POC: ABNORMAL

## 2024-02-02 PROCEDURE — 81002 URINALYSIS NONAUTO W/O SCOPE: CPT | Performed by: INTERNAL MEDICINE

## 2024-02-04 LAB
CULTURE: ABNORMAL
SPECIMEN DESCRIPTION: ABNORMAL

## 2024-02-05 ENCOUNTER — TELEPHONE (OUTPATIENT)
Dept: PRIMARY CARE CLINIC | Age: 74
End: 2024-02-05

## 2024-02-05 RX ORDER — CIPROFLOXACIN 500 MG/1
500 TABLET, FILM COATED ORAL 2 TIMES DAILY
Qty: 20 TABLET | Refills: 0 | Status: SHIPPED | OUTPATIENT
Start: 2024-02-05 | End: 2024-02-15

## 2024-02-05 NOTE — TELEPHONE ENCOUNTER
Vero from [employer] [employer] called and said the office note that was sent with the lift chair order does not have any details stating the pt needs a lift chair. She wants to know if the Dr can addend the note and fax it back to them so they can get this lift chair to the pt.

## 2024-02-06 ENCOUNTER — OFFICE VISIT (OUTPATIENT)
Dept: VASCULAR SURGERY | Age: 74
End: 2024-02-06
Payer: COMMERCIAL

## 2024-02-06 VITALS — HEART RATE: 83 BPM

## 2024-02-06 DIAGNOSIS — I65.23 BILATERAL CAROTID ARTERY STENOSIS: Primary | ICD-10-CM

## 2024-02-06 PROCEDURE — 1123F ACP DISCUSS/DSCN MKR DOCD: CPT | Performed by: SURGERY

## 2024-02-06 PROCEDURE — 99203 OFFICE O/P NEW LOW 30 MIN: CPT | Performed by: SURGERY

## 2024-02-06 PROCEDURE — G8484 FLU IMMUNIZE NO ADMIN: HCPCS | Performed by: SURGERY

## 2024-02-06 PROCEDURE — 3017F COLORECTAL CA SCREEN DOC REV: CPT | Performed by: SURGERY

## 2024-02-06 PROCEDURE — 1090F PRES/ABSN URINE INCON ASSESS: CPT | Performed by: SURGERY

## 2024-02-06 PROCEDURE — G8427 DOCREV CUR MEDS BY ELIG CLIN: HCPCS | Performed by: SURGERY

## 2024-02-06 PROCEDURE — G8417 CALC BMI ABV UP PARAM F/U: HCPCS | Performed by: SURGERY

## 2024-02-06 PROCEDURE — 1036F TOBACCO NON-USER: CPT | Performed by: SURGERY

## 2024-02-06 PROCEDURE — G8400 PT W/DXA NO RESULTS DOC: HCPCS | Performed by: SURGERY

## 2024-02-06 NOTE — PROGRESS NOTES
Vascular Surgery Outpatient Consultation      Chief Complaint   Patient presents with    Surgical Consult     Carotid stenosis.       Reason for Consult: Artery stenosis    Requesting Physician:  Dr. Quinn    HISTORY OF PRESENT ILLNESS:                The patient is a 73 y.o. female who is referred for evaluation of carotid artery stenosis.  The patient is accompanied by her daughter.  She was seen by ophthalmology who mentioned a plaque in a left retinal branch.  A carotid ultrasound was performed but never reported.  She underwent a CT angiogram of the neck that reported a stenosis involving the right internal carotid artery.  She denies any right eye symptoms or left eye symptoms.  She denies any symptoms of stroke or mini stroke.    Past Medical History:        Diagnosis Date    ASHD (arteriosclerotic heart disease)     Breast cancer (HCC)     rt     CAD (coronary artery disease)     Cancer (HCC)     breast ca, right    Charcot foot due to diabetes mellitus (HCC) 2012    Chronic diastolic congestive heart failure (HCC) 2017    Chronic kidney disease     sees nephrology    Depression     Diabetic peripheral neuropathy (HCC)     Diarrhea     Essential hypertension 2018    GERD (gastroesophageal reflux disease)     History of UTI     Hyperlipidemia     Hypertension     Lactose intolerance     Thyroid disease     Type II or unspecified type diabetes mellitus without mention of complication, not stated as uncontrolled     Urinary incontinence      Past Surgical History:        Procedure Laterality Date    BREAST BIOPSY          BREAST LUMPECTOMY      right breast , with lymph nodes.     SECTION  1979    COLONOSCOPY      COLONOSCOPY N/A 2015    COLONOSCOPY    COLONOSCOPY N/A 2022    COLONOSCOPY POLYPECTOMY SNARE/COLD BIOPSY performed by Tarah Hamm MD at [facility] ENDOSCOPY    CORONARY ARTERY BYPASS GRAFT  10/23/2017    x2   [place]    DIAGNOSTIC CARDIAC CATH LAB

## 2024-02-08 RX ORDER — LISINOPRIL 40 MG/1
40 TABLET ORAL DAILY
Qty: 90 TABLET | Refills: 0 | Status: SHIPPED | OUTPATIENT
Start: 2024-02-08

## 2024-02-08 NOTE — TELEPHONE ENCOUNTER
Patient phoned confused on who gave her the trelegy samples. Is she supposed to stay on it? What reason was she prescribed it? Was it bronchial? Just wanting to know why she is taking it. If she needs to continue it then she needs a refill sent to [facility]  [address]. Please let me know so I can call her back regarding questions .

## 2024-02-19 NOTE — TELEPHONE ENCOUNTER
Patient is still losing her voice and coughing a lot. Better than before but still coughing . Would like a Rx for tesselon perles sent to [facility] on [address]   Patient is also going to call her cancer Dr and see if that pill could be causing her cough.

## 2024-02-20 RX ORDER — FAMOTIDINE 40 MG/1
40 TABLET, FILM COATED ORAL EVERY MORNING
Qty: 90 TABLET | Refills: 0 | Status: SHIPPED | OUTPATIENT
Start: 2024-02-20

## 2024-02-20 RX ORDER — BENZONATATE 100 MG/1
100 CAPSULE ORAL 3 TIMES DAILY PRN
Qty: 30 CAPSULE | Refills: 0 | Status: SHIPPED | OUTPATIENT
Start: 2024-02-20 | End: 2024-03-01

## 2024-02-26 RX ORDER — POTASSIUM CHLORIDE 750 MG/1
10 TABLET, EXTENDED RELEASE ORAL EVERY OTHER DAY
Qty: 45 TABLET | Refills: 3 | Status: SHIPPED | OUTPATIENT
Start: 2024-02-26

## 2024-02-27 LAB — DIABETIC RETINOPATHY: POSITIVE

## 2024-02-29 RX ORDER — PANTOPRAZOLE SODIUM 40 MG/1
TABLET, DELAYED RELEASE ORAL
Qty: 90 TABLET | Refills: 0 | Status: SHIPPED | OUTPATIENT
Start: 2024-02-29

## 2024-02-29 NOTE — TELEPHONE ENCOUNTER
Last Appointment:  1/30/2024  Future Appointments   Date Time Provider Department Center   3/8/2024  1:15 PM Ernestina Quinn MD [facility]   5/13/2024 10:00 AM [facility]   6/19/2024  9:00 AM Ernestina Quinn MD [facility]   2/18/2025  3:00 PM MARITZA CAUSEY VAS US 1 SEYZ CARDIO [facility] Rad/Car   2/18/2025  3:30 PM Clifford Szymanski MD VASC/MED [facility]

## 2024-03-08 ENCOUNTER — OFFICE VISIT (OUTPATIENT)
Dept: PRIMARY CARE CLINIC | Age: 74
End: 2024-03-08
Payer: COMMERCIAL

## 2024-03-08 ENCOUNTER — TELEPHONE (OUTPATIENT)
Dept: PRIMARY CARE CLINIC | Age: 74
End: 2024-03-08

## 2024-03-08 VITALS
TEMPERATURE: 97.3 F | HEART RATE: 70 BPM | WEIGHT: 229 LBS | OXYGEN SATURATION: 95 % | SYSTOLIC BLOOD PRESSURE: 110 MMHG | DIASTOLIC BLOOD PRESSURE: 42 MMHG | HEIGHT: 66 IN | BODY MASS INDEX: 36.8 KG/M2

## 2024-03-08 DIAGNOSIS — I25.10 CORONARY ARTERY DISEASE INVOLVING NATIVE CORONARY ARTERY OF NATIVE HEART WITHOUT ANGINA PECTORIS: ICD-10-CM

## 2024-03-08 DIAGNOSIS — R29.898 WEAKNESS OF BOTH LOWER EXTREMITIES: ICD-10-CM

## 2024-03-08 DIAGNOSIS — Z95.1 S/P CABG (CORONARY ARTERY BYPASS GRAFT): ICD-10-CM

## 2024-03-08 DIAGNOSIS — N18.30 STAGE 3 CHRONIC KIDNEY DISEASE, UNSPECIFIED WHETHER STAGE 3A OR 3B CKD (HCC): ICD-10-CM

## 2024-03-08 DIAGNOSIS — Z79.4 TYPE 2 DIABETES MELLITUS WITH DIABETIC NEUROPATHIC ARTHROPATHY, WITH LONG-TERM CURRENT USE OF INSULIN (HCC): Primary | ICD-10-CM

## 2024-03-08 DIAGNOSIS — E11.610 TYPE 2 DIABETES MELLITUS WITH DIABETIC NEUROPATHIC ARTHROPATHY, WITH LONG-TERM CURRENT USE OF INSULIN (HCC): Primary | ICD-10-CM

## 2024-03-08 LAB — HBA1C MFR BLD: 7.1 %

## 2024-03-08 PROCEDURE — 3074F SYST BP LT 130 MM HG: CPT | Performed by: INTERNAL MEDICINE

## 2024-03-08 PROCEDURE — 3078F DIAST BP <80 MM HG: CPT | Performed by: INTERNAL MEDICINE

## 2024-03-08 PROCEDURE — 2022F DILAT RTA XM EVC RTNOPTHY: CPT | Performed by: INTERNAL MEDICINE

## 2024-03-08 PROCEDURE — 3051F HG A1C>EQUAL 7.0%<8.0%: CPT | Performed by: INTERNAL MEDICINE

## 2024-03-08 PROCEDURE — 99214 OFFICE O/P EST MOD 30 MIN: CPT | Performed by: INTERNAL MEDICINE

## 2024-03-08 PROCEDURE — 3017F COLORECTAL CA SCREEN DOC REV: CPT | Performed by: INTERNAL MEDICINE

## 2024-03-08 PROCEDURE — 1090F PRES/ABSN URINE INCON ASSESS: CPT | Performed by: INTERNAL MEDICINE

## 2024-03-08 PROCEDURE — 83036 HEMOGLOBIN GLYCOSYLATED A1C: CPT | Performed by: INTERNAL MEDICINE

## 2024-03-08 PROCEDURE — G8484 FLU IMMUNIZE NO ADMIN: HCPCS | Performed by: INTERNAL MEDICINE

## 2024-03-08 PROCEDURE — G8427 DOCREV CUR MEDS BY ELIG CLIN: HCPCS | Performed by: INTERNAL MEDICINE

## 2024-03-08 PROCEDURE — G8417 CALC BMI ABV UP PARAM F/U: HCPCS | Performed by: INTERNAL MEDICINE

## 2024-03-08 PROCEDURE — 1036F TOBACCO NON-USER: CPT | Performed by: INTERNAL MEDICINE

## 2024-03-08 PROCEDURE — 1123F ACP DISCUSS/DSCN MKR DOCD: CPT | Performed by: INTERNAL MEDICINE

## 2024-03-08 PROCEDURE — G8400 PT W/DXA NO RESULTS DOC: HCPCS | Performed by: INTERNAL MEDICINE

## 2024-03-08 RX ORDER — METOPROLOL TARTRATE 50 MG/1
50 TABLET, FILM COATED ORAL 2 TIMES DAILY
Qty: 180 TABLET | Refills: 0 | Status: SHIPPED | OUTPATIENT
Start: 2024-03-08

## 2024-03-08 RX ORDER — INSULIN LISPRO 100 [IU]/ML
78 INJECTION, SUSPENSION SUBCUTANEOUS 2 TIMES DAILY WITH MEALS
Qty: 15 ADJUSTABLE DOSE PRE-FILLED PEN SYRINGE | Refills: 1 | Status: SHIPPED | OUTPATIENT
Start: 2024-03-08

## 2024-03-08 RX ORDER — KETOCONAZOLE 20 MG/G
CREAM TOPICAL
COMMUNITY
Start: 2024-03-07

## 2024-03-08 RX ORDER — DULAGLUTIDE 0.75 MG/.5ML
INJECTION, SOLUTION SUBCUTANEOUS
Qty: 2 ML | Refills: 2 | Status: SHIPPED | OUTPATIENT
Start: 2024-03-08

## 2024-03-08 RX ORDER — ASCORBIC ACID 500 MG
500 TABLET ORAL DAILY
Qty: 90 TABLET | Refills: 0 | Status: SHIPPED | OUTPATIENT
Start: 2024-03-08

## 2024-03-08 RX ORDER — ALBUTEROL SULFATE 90 UG/1
2 AEROSOL, METERED RESPIRATORY (INHALATION) EVERY 6 HOURS PRN
COMMUNITY
End: 2024-03-08 | Stop reason: SDUPTHER

## 2024-03-08 RX ORDER — NIACIN 500 MG/1
500 TABLET, EXTENDED RELEASE ORAL NIGHTLY
Qty: 90 TABLET | Refills: 0 | Status: SHIPPED | OUTPATIENT
Start: 2024-03-08

## 2024-03-08 RX ORDER — ALBUTEROL SULFATE 90 UG/1
2 AEROSOL, METERED RESPIRATORY (INHALATION) EVERY 6 HOURS PRN
Qty: 18 G | Refills: 2 | Status: SHIPPED | OUTPATIENT
Start: 2024-03-08

## 2024-03-08 RX ORDER — GINGER ROOT/GINGER ROOT EXT 262.5 MG
1 CAPSULE ORAL DAILY
Qty: 90 TABLET | Refills: 0 | Status: SHIPPED | OUTPATIENT
Start: 2024-03-08

## 2024-03-08 RX ORDER — EZETIMIBE 10 MG/1
10 TABLET ORAL DAILY
Qty: 90 TABLET | Refills: 0 | Status: SHIPPED | OUTPATIENT
Start: 2024-03-08

## 2024-03-08 SDOH — ECONOMIC STABILITY: INCOME INSECURITY: HOW HARD IS IT FOR YOU TO PAY FOR THE VERY BASICS LIKE FOOD, HOUSING, MEDICAL CARE, AND HEATING?: NOT VERY HARD

## 2024-03-08 SDOH — ECONOMIC STABILITY: FOOD INSECURITY: WITHIN THE PAST 12 MONTHS, THE FOOD YOU BOUGHT JUST DIDN'T LAST AND YOU DIDN'T HAVE MONEY TO GET MORE.: OFTEN TRUE

## 2024-03-08 SDOH — ECONOMIC STABILITY: FOOD INSECURITY: WITHIN THE PAST 12 MONTHS, YOU WORRIED THAT YOUR FOOD WOULD RUN OUT BEFORE YOU GOT MONEY TO BUY MORE.: OFTEN TRUE

## 2024-03-08 ASSESSMENT — ENCOUNTER SYMPTOMS
SORE THROAT: 0
VOMITING: 0
CONSTIPATION: 0
BLOOD IN STOOL: 0
COLOR CHANGE: 0
DIARRHEA: 0
ALLERGIC/IMMUNOLOGIC NEGATIVE: 1
ABDOMINAL DISTENTION: 0
NAUSEA: 0
ABDOMINAL PAIN: 0
SINUS PRESSURE: 0
RHINORRHEA: 0
TROUBLE SWALLOWING: 0
BACK PAIN: 1

## 2024-03-08 ASSESSMENT — PATIENT HEALTH QUESTIONNAIRE - PHQ9
2. FEELING DOWN, DEPRESSED OR HOPELESS: 1
8. MOVING OR SPEAKING SO SLOWLY THAT OTHER PEOPLE COULD HAVE NOTICED. OR THE OPPOSITE, BEING SO FIGETY OR RESTLESS THAT YOU HAVE BEEN MOVING AROUND A LOT MORE THAN USUAL: 0
5. POOR APPETITE OR OVEREATING: 0
SUM OF ALL RESPONSES TO PHQ QUESTIONS 1-9: 3
SUM OF ALL RESPONSES TO PHQ QUESTIONS 1-9: 3
9. THOUGHTS THAT YOU WOULD BE BETTER OFF DEAD, OR OF HURTING YOURSELF: 0
SUM OF ALL RESPONSES TO PHQ QUESTIONS 1-9: 3
SUM OF ALL RESPONSES TO PHQ QUESTIONS 1-9: 3
6. FEELING BAD ABOUT YOURSELF - OR THAT YOU ARE A FAILURE OR HAVE LET YOURSELF OR YOUR FAMILY DOWN: 0
4. FEELING TIRED OR HAVING LITTLE ENERGY: 0
7. TROUBLE CONCENTRATING ON THINGS, SUCH AS READING THE NEWSPAPER OR WATCHING TELEVISION: 0
SUM OF ALL RESPONSES TO PHQ9 QUESTIONS 1 & 2: 2
1. LITTLE INTEREST OR PLEASURE IN DOING THINGS: 1
10. IF YOU CHECKED OFF ANY PROBLEMS, HOW DIFFICULT HAVE THESE PROBLEMS MADE IT FOR YOU TO DO YOUR WORK, TAKE CARE OF THINGS AT HOME, OR GET ALONG WITH OTHER PEOPLE: 0
3. TROUBLE FALLING OR STAYING ASLEEP: 1

## 2024-03-08 NOTE — TELEPHONE ENCOUNTER
Called pharmacist back and clarified that it was 78 units not .78 ml. Was right in our system so not sure why it was reading funny when they received it but fixed now on their end.

## 2024-03-08 NOTE — PROGRESS NOTES
daily  -     niacin (NIASPAN) 500 MG extended release tablet; Take 1 tablet by mouth nightly at bedtime.  -     Dulaglutide (TRULICITY) 0.75 MG/0.5ML SOPN; 0.75 weekly

## 2024-03-08 NOTE — TELEPHONE ENCOUNTER
[facility] in [place] requesting a call back to clarify prescription that was sent over for   insulin lispro protamine & lispro (HUMALOG MIX 75/25 KWIKPEN) (75-25) 100 UNIT per ML SUPN injection pen.   Pharmacist states it needs clarified as 78 units instead of .78 ml. Please call them back at [phone]. Thank you.

## 2024-03-13 RX ORDER — AZITHROMYCIN 250 MG/1
TABLET, FILM COATED ORAL
Qty: 6 TABLET | Refills: 0 | Status: SHIPPED | OUTPATIENT
Start: 2024-03-13 | End: 2024-03-23

## 2024-03-13 NOTE — TELEPHONE ENCOUNTER
Pt called in stating she is having sinus drainage, phlegm and is coughing but can't cough up any mucus. It started about 4 days ago. She wants to know if Dr Quinn can prescribe antibiotics to help with her symptoms.     [facility] [address]

## 2024-03-18 DIAGNOSIS — R79.89 LOW SERUM VITAMIN D: ICD-10-CM

## 2024-03-18 RX ORDER — CHOLECALCIFEROL (VITAMIN D3) 25 MCG
CAPSULE ORAL
Qty: 90 CAPSULE | Refills: 0 | Status: SHIPPED | OUTPATIENT
Start: 2024-03-18

## 2024-03-26 DIAGNOSIS — R29.898 WEAKNESS OF BOTH LOWER EXTREMITIES: Primary | ICD-10-CM

## 2024-03-26 DIAGNOSIS — E11.610 CHARCOT FOOT DUE TO DIABETES MELLITUS (HCC): ICD-10-CM

## 2024-03-26 DIAGNOSIS — Z79.4 TYPE 2 DIABETES MELLITUS WITH DIABETIC NEUROPATHY, WITH LONG-TERM CURRENT USE OF INSULIN (HCC): ICD-10-CM

## 2024-03-26 DIAGNOSIS — E11.40 TYPE 2 DIABETES MELLITUS WITH DIABETIC NEUROPATHY, WITH LONG-TERM CURRENT USE OF INSULIN (HCC): ICD-10-CM

## 2024-04-24 ENCOUNTER — OFFICE VISIT (OUTPATIENT)
Dept: SURGERY | Age: 74
End: 2024-04-24
Payer: COMMERCIAL

## 2024-04-24 ENCOUNTER — TELEPHONE (OUTPATIENT)
Dept: SURGERY | Age: 74
End: 2024-04-24

## 2024-04-24 VITALS
HEIGHT: 66 IN | WEIGHT: 229 LBS | BODY MASS INDEX: 36.8 KG/M2 | TEMPERATURE: 97.9 F | OXYGEN SATURATION: 98 % | HEART RATE: 72 BPM | SYSTOLIC BLOOD PRESSURE: 158 MMHG | DIASTOLIC BLOOD PRESSURE: 63 MMHG

## 2024-04-24 DIAGNOSIS — R22.32 MASS OF LEFT AXILLA: Primary | ICD-10-CM

## 2024-04-24 PROCEDURE — 1090F PRES/ABSN URINE INCON ASSESS: CPT | Performed by: SURGERY

## 2024-04-24 PROCEDURE — 3017F COLORECTAL CA SCREEN DOC REV: CPT | Performed by: SURGERY

## 2024-04-24 PROCEDURE — G8400 PT W/DXA NO RESULTS DOC: HCPCS | Performed by: SURGERY

## 2024-04-24 PROCEDURE — 99213 OFFICE O/P EST LOW 20 MIN: CPT | Performed by: SURGERY

## 2024-04-24 PROCEDURE — G8427 DOCREV CUR MEDS BY ELIG CLIN: HCPCS | Performed by: SURGERY

## 2024-04-24 PROCEDURE — 1036F TOBACCO NON-USER: CPT | Performed by: SURGERY

## 2024-04-24 PROCEDURE — 1123F ACP DISCUSS/DSCN MKR DOCD: CPT | Performed by: SURGERY

## 2024-04-24 PROCEDURE — 3077F SYST BP >= 140 MM HG: CPT | Performed by: SURGERY

## 2024-04-24 PROCEDURE — 3078F DIAST BP <80 MM HG: CPT | Performed by: SURGERY

## 2024-04-24 PROCEDURE — G8417 CALC BMI ABV UP PARAM F/U: HCPCS | Performed by: SURGERY

## 2024-04-24 NOTE — TELEPHONE ENCOUNTER
Prior Authorization Form:      DEMOGRAPHICS:                     Patient Name:  Perfecto Zarco  Patient :  1950            Insurance:  Payor: [facility] / Plan: [facility] MEDICAID / Product Type: *No Product type* /   Insurance ID Number:    Payer/Plan Subscr  Sex Relation Sub. Ins. ID Effective Group Num   1. [employer] - * PERFECTO ZARCO 1950 Female Self [id number]                                   [address]         DIAGNOSIS & PROCEDURE:                       Procedure/Operation: excision mass left axilla           CPT Code: 06133    Diagnosis:  mass left axilla    ICD10 Code: n63.32    Location:  [facility]    Surgeon:  Hansel    SCHEDULING INFORMATION:                          Date: 2024    Time: 9:00am              Anesthesia: general                                                       Status:  Outpatient        Special Comments:         Electronically signed by Michelle Andino MA on 2024 at 2:40 PM

## 2024-04-24 NOTE — PROGRESS NOTES
Patient's Name/Date of Birth: Kim Zarco / 1950    Date: 2024    PCP: Ernestina Quinn MD    Chief Complaint   Patient presents with    Surgical Consult     Discuss excess skin removal        HPI:  Patient is s/p bilateral mastectomy for CA. C/O mass right axilla    Patient's medications, allergies, past medical, surgical, social and family histories were reviewed and updated as appropriate.    Allergies   Allergen Reactions    Penicillins      Unknown reaction tolerates cefazolin       Past Medical History:   Diagnosis Date    ASHD (arteriosclerotic heart disease)     Breast cancer (HCC)     rt     CAD (coronary artery disease)     Cancer (HCC)     breast ca, right    Charcot foot due to diabetes mellitus (HCC) 2012    Chronic diastolic congestive heart failure (HCC) 2017    Chronic kidney disease     sees nephrology    Depression     Diabetic peripheral neuropathy (HCC)     Diarrhea     Essential hypertension 2018    GERD (gastroesophageal reflux disease)     History of UTI     Hyperlipidemia     Hypertension     Lactose intolerance     Thyroid disease     Type II or unspecified type diabetes mellitus without mention of complication, not stated as uncontrolled     Urinary incontinence         Past Surgical History:   Procedure Laterality Date    BREAST BIOPSY          BREAST LUMPECTOMY      right breast , with lymph nodes.     SECTION  1979    COLONOSCOPY      COLONOSCOPY N/A 2015    COLONOSCOPY    COLONOSCOPY N/A 2022    COLONOSCOPY POLYPECTOMY SNARE/COLD BIOPSY performed by Tarah Hamm MD at [facility] ENDOSCOPY    CORONARY ARTERY BYPASS GRAFT  10/23/2017    x2   calvin    DIAGNOSTIC CARDIAC CATH LAB PROCEDURE      EYE SURGERY      several ,cataracts,legally blind    EYE SURGERY      FOOT SURGERY  2016    ulcer, bone debridement    MASTECTOMY Bilateral 2023    BILATERAL  MASTECTOMIES performed by Tarah Hamm MD at [facility] OR    OTHER

## 2024-05-07 ENCOUNTER — HOSPITAL ENCOUNTER (OUTPATIENT)
Age: 74
Discharge: HOME | End: 2024-05-07
Payer: COMMERCIAL

## 2024-05-07 LAB
25(OH)D3 SERPL-MCNC: 31.6 NG/ML (ref 30–100)
ALBUMIN SERPL-MCNC: 3.9 G/DL (ref 3.5–5.2)
ALP SERPL-CCNC: 53 U/L (ref 35–104)
ALT SERPL-CCNC: 15 U/L (ref 0–32)
ANION GAP SERPL CALCULATED.3IONS-SCNC: 17 MMOL/L (ref 7–16)
AST SERPL-CCNC: 15 U/L (ref 0–31)
BASOPHILS # BLD: 0.04 K/UL (ref 0–0.2)
BASOPHILS NFR BLD: 0 % (ref 0–2)
BILIRUB SERPL-MCNC: 0.4 MG/DL (ref 0–1.2)
BUN SERPL-MCNC: 29 MG/DL (ref 6–23)
CALCIUM SERPL-MCNC: 9.7 MG/DL (ref 8.6–10.2)
CHLORIDE SERPL-SCNC: 110 MMOL/L (ref 98–107)
CHOLEST SERPL-MCNC: 125 MG/DL
CO2 SERPL-SCNC: 20 MMOL/L (ref 22–29)
CREAT SERPL-MCNC: 1.3 MG/DL (ref 0.5–1)
CREAT UR-MCNC: 109.4 MG/DL (ref 29–226)
EOSINOPHIL # BLD: 0.03 K/UL (ref 0.05–0.5)
EOSINOPHILS RELATIVE PERCENT: 0 % (ref 0–6)
ERYTHROCYTE [DISTWIDTH] IN BLOOD BY AUTOMATED COUNT: 14.6 % (ref 11.5–15)
GFR, ESTIMATED: 46 ML/MIN/1.73M2
GLUCOSE SERPL-MCNC: 120 MG/DL (ref 74–99)
HBA1C MFR BLD: 7.2 % (ref 4–5.6)
HCT VFR BLD AUTO: 40.6 % (ref 34–48)
HDLC SERPL-MCNC: 61 MG/DL
HGB BLD-MCNC: 12.8 G/DL (ref 11.5–15.5)
IMM GRANULOCYTES # BLD AUTO: 0.04 K/UL (ref 0–0.58)
IMM GRANULOCYTES NFR BLD: 0 % (ref 0–5)
LDLC SERPL CALC-MCNC: 43 MG/DL
LYMPHOCYTES NFR BLD: 1.8 K/UL (ref 1.5–4)
LYMPHOCYTES RELATIVE PERCENT: 15 % (ref 20–42)
MCH RBC QN AUTO: 28 PG (ref 26–35)
MCHC RBC AUTO-ENTMCNC: 31.5 G/DL (ref 32–34.5)
MCV RBC AUTO: 88.8 FL (ref 80–99.9)
MICROALBUMIN UR-MCNC: 604 MG/L (ref 0–19)
MICROALBUMIN/CREAT UR-RTO: 552 MCG/MG CREAT (ref 0–30)
MONOCYTES NFR BLD: 1.08 K/UL (ref 0.1–0.95)
MONOCYTES NFR BLD: 9 % (ref 2–12)
NEUTROPHILS NFR BLD: 75 % (ref 43–80)
NEUTS SEG NFR BLD: 8.89 K/UL (ref 1.8–7.3)
PHOSPHATE SERPL-MCNC: 3 MG/DL (ref 2.5–4.5)
PLATELET # BLD AUTO: 225 K/UL (ref 130–450)
PMV BLD AUTO: 10.1 FL (ref 7–12)
POTASSIUM SERPL-SCNC: 4.3 MMOL/L (ref 3.5–5)
PROT SERPL-MCNC: 6.4 G/DL (ref 6.4–8.3)
PTH-INTACT SERPL-MCNC: 44.4 PG/ML (ref 15–65)
RBC # BLD AUTO: 4.57 M/UL (ref 3.5–5.5)
SODIUM SERPL-SCNC: 147 MMOL/L (ref 132–146)
TRIGL SERPL-MCNC: 107 MG/DL
VLDLC SERPL CALC-MCNC: 21 MG/DL
WBC OTHER # BLD: 11.9 K/UL (ref 4.5–11.5)

## 2024-05-07 PROCEDURE — 83970 ASSAY OF PARATHORMONE: CPT

## 2024-05-07 PROCEDURE — 85025 COMPLETE CBC W/AUTO DIFF WBC: CPT

## 2024-05-07 PROCEDURE — 84100 ASSAY OF PHOSPHORUS: CPT

## 2024-05-07 PROCEDURE — 82306 VITAMIN D 25 HYDROXY: CPT

## 2024-05-07 PROCEDURE — 80061 LIPID PANEL: CPT

## 2024-05-07 PROCEDURE — 82043 UR ALBUMIN QUANTITATIVE: CPT

## 2024-05-07 PROCEDURE — 80053 COMPREHEN METABOLIC PANEL: CPT

## 2024-05-07 PROCEDURE — 36415 COLL VENOUS BLD VENIPUNCTURE: CPT

## 2024-05-07 PROCEDURE — 82570 ASSAY OF URINE CREATININE: CPT

## 2024-05-07 PROCEDURE — 83036 HEMOGLOBIN GLYCOSYLATED A1C: CPT

## 2024-05-09 RX ORDER — LISINOPRIL 40 MG/1
40 TABLET ORAL DAILY
Qty: 90 TABLET | Refills: 0 | Status: SHIPPED | OUTPATIENT
Start: 2024-05-09

## 2024-05-13 ENCOUNTER — OFFICE VISIT (OUTPATIENT)
Dept: CARDIOLOGY CLINIC | Age: 74
End: 2024-05-13
Payer: COMMERCIAL

## 2024-05-13 ENCOUNTER — TELEPHONE (OUTPATIENT)
Dept: SURGERY | Age: 74
End: 2024-05-13

## 2024-05-13 VITALS
WEIGHT: 222 LBS | SYSTOLIC BLOOD PRESSURE: 110 MMHG | DIASTOLIC BLOOD PRESSURE: 60 MMHG | RESPIRATION RATE: 18 BRPM | HEART RATE: 71 BPM | BODY MASS INDEX: 35.68 KG/M2 | HEIGHT: 66 IN

## 2024-05-13 DIAGNOSIS — I25.10 CORONARY ARTERY DISEASE INVOLVING NATIVE CORONARY ARTERY OF NATIVE HEART WITHOUT ANGINA PECTORIS: Primary | ICD-10-CM

## 2024-05-13 PROCEDURE — 1036F TOBACCO NON-USER: CPT | Performed by: INTERNAL MEDICINE

## 2024-05-13 PROCEDURE — 1090F PRES/ABSN URINE INCON ASSESS: CPT | Performed by: INTERNAL MEDICINE

## 2024-05-13 PROCEDURE — 99213 OFFICE O/P EST LOW 20 MIN: CPT | Performed by: INTERNAL MEDICINE

## 2024-05-13 PROCEDURE — 3017F COLORECTAL CA SCREEN DOC REV: CPT | Performed by: INTERNAL MEDICINE

## 2024-05-13 PROCEDURE — 3074F SYST BP LT 130 MM HG: CPT | Performed by: INTERNAL MEDICINE

## 2024-05-13 PROCEDURE — G8427 DOCREV CUR MEDS BY ELIG CLIN: HCPCS | Performed by: INTERNAL MEDICINE

## 2024-05-13 PROCEDURE — 93000 ELECTROCARDIOGRAM COMPLETE: CPT | Performed by: INTERNAL MEDICINE

## 2024-05-13 PROCEDURE — G8417 CALC BMI ABV UP PARAM F/U: HCPCS | Performed by: INTERNAL MEDICINE

## 2024-05-13 PROCEDURE — 3078F DIAST BP <80 MM HG: CPT | Performed by: INTERNAL MEDICINE

## 2024-05-13 PROCEDURE — G8400 PT W/DXA NO RESULTS DOC: HCPCS | Performed by: INTERNAL MEDICINE

## 2024-05-13 PROCEDURE — 1123F ACP DISCUSS/DSCN MKR DOCD: CPT | Performed by: INTERNAL MEDICINE

## 2024-05-13 ASSESSMENT — ENCOUNTER SYMPTOMS
VOMITING: 0
DIARRHEA: 0
BACK PAIN: 1
WHEEZING: 0
COUGH: 0
ABDOMINAL PAIN: 0
BLOOD IN STOOL: 0
NAUSEA: 0
SHORTNESS OF BREATH: 1
CONSTIPATION: 0

## 2024-05-13 NOTE — PROGRESS NOTES
OUTPATIENT CARDIOLOGY FOLLOW-UP    HPI:    Name: Kim Zarco    Age: 74 y.o.    Primary Care Physician: Ernestina Quinn MD    Date of Service: 5/13/2024    Chief Complaint:   Chief Complaint   Patient presents with    Coronary Artery Disease     No c/o         History of present illness :   74-year-old morbidly obese ex-smoker female comes today for 1 year 1 year follow-up visit.  She is accompanied by her .  she has history of CAD, status post two-vessel CABG in October 2017 with LIMA to LAD and SVG to obtuse marginal branch, surgery complicated by acute kidney injury and respiratory failure, chronic diastolic CHF, hypertension, diabetes, chronic kidney disease, diabetic ulcer of the left foot, depression, legally blind, history of breast cancer, status post lumpectomy, lymph node resection and XRT in 2015, and status post bilateral mastectomy in June 2023 due to recurrent cancer, hypothyroidism and bilateral Charcot foot.     Patient has not been active due to her bilateral Charcot foot.  She uses a cane and a wheelchair to ambulate.  She goes up 15 steps with no chest discomfort but feels dyspnea on exertion.  She has been complaining of chest pain while going upstairs since her breast surgery.  She denies palpitations, dizziness or syncope.  She denies lower extremity edema.    EKG done today revealed sinus rhythm at 71 bpm, left atrial enlargement, poor R wave progression, T wave inversion in the precordial leads, cannot rule ischemia.    Review of Systems:   Review of Systems   Constitutional:  Negative for chills, fatigue and fever.   HENT:  Negative for nosebleeds.    Respiratory:  Positive for shortness of breath. Negative for cough and wheezing.    Cardiovascular:  Positive for chest pain.   Gastrointestinal:  Negative for abdominal pain, blood in stool, constipation, diarrhea, nausea and vomiting.        GERD.   Genitourinary:  Negative for dysuria and hematuria.   Musculoskeletal:

## 2024-05-13 NOTE — TELEPHONE ENCOUNTER
Alannah called in and canceled her surgery for 5- - she is not feeling well - sates she will call back to latia

## 2024-05-24 RX ORDER — FAMOTIDINE 40 MG/1
40 TABLET, FILM COATED ORAL EVERY MORNING
Qty: 90 TABLET | Refills: 0 | Status: SHIPPED | OUTPATIENT
Start: 2024-05-24

## 2024-05-31 RX ORDER — DULAGLUTIDE 0.75 MG/.5ML
INJECTION, SOLUTION SUBCUTANEOUS
Qty: 2 ML | Refills: 2 | Status: SHIPPED | OUTPATIENT
Start: 2024-05-31

## 2024-05-31 RX ORDER — INSULIN LISPRO 100 [IU]/ML
INJECTION, SUSPENSION SUBCUTANEOUS
Qty: 15 ADJUSTABLE DOSE PRE-FILLED PEN SYRINGE | Refills: 0 | Status: SHIPPED | OUTPATIENT
Start: 2024-05-31

## 2024-05-31 RX ORDER — INSULIN LISPRO 100 [IU]/ML
78 INJECTION, SUSPENSION SUBCUTANEOUS 2 TIMES DAILY WITH MEALS
Qty: 15 ADJUSTABLE DOSE PRE-FILLED PEN SYRINGE | Refills: 1 | Status: SHIPPED | OUTPATIENT
Start: 2024-05-31

## 2024-05-31 NOTE — TELEPHONE ENCOUNTER
Pt called in requesting refill on     Dulaglutide (TRULICITY) 0.75 MG/0.5ML SOPN   insulin lispro protamine & lispro (HUMALOG MIX 75/25 KWIKPEN) (75-25) 100 UNIT per ML SUPN injection pen      [facility] on [address] [place]    Pt only has enough for today

## 2024-06-03 RX ORDER — NIACIN 500 MG/1
500 TABLET, EXTENDED RELEASE ORAL NIGHTLY
Qty: 90 TABLET | Refills: 0 | Status: SHIPPED | OUTPATIENT
Start: 2024-06-03

## 2024-06-03 RX ORDER — PANTOPRAZOLE SODIUM 40 MG/1
TABLET, DELAYED RELEASE ORAL
Qty: 90 TABLET | Refills: 0 | Status: SHIPPED | OUTPATIENT
Start: 2024-06-03

## 2024-06-06 RX ORDER — ASPIRIN 81 MG/1
81 TABLET ORAL DAILY
Qty: 90 TABLET | Refills: 0 | Status: SHIPPED | OUTPATIENT
Start: 2024-06-06

## 2024-06-12 DIAGNOSIS — R79.89 LOW SERUM VITAMIN D: ICD-10-CM

## 2024-06-12 RX ORDER — CHOLECALCIFEROL (VITAMIN D3) 25 MCG
CAPSULE ORAL
Qty: 90 CAPSULE | Refills: 0 | Status: SHIPPED | OUTPATIENT
Start: 2024-06-12

## 2024-06-18 ENCOUNTER — OFFICE VISIT (OUTPATIENT)
Dept: PRIMARY CARE CLINIC | Age: 74
End: 2024-06-18
Payer: COMMERCIAL

## 2024-06-18 VITALS
OXYGEN SATURATION: 96 % | HEART RATE: 72 BPM | DIASTOLIC BLOOD PRESSURE: 88 MMHG | TEMPERATURE: 96.9 F | HEIGHT: 66 IN | SYSTOLIC BLOOD PRESSURE: 152 MMHG | WEIGHT: 224 LBS | BODY MASS INDEX: 36 KG/M2

## 2024-06-18 DIAGNOSIS — E11.610 CHARCOT FOOT DUE TO DIABETES MELLITUS (HCC): ICD-10-CM

## 2024-06-18 DIAGNOSIS — I25.10 CORONARY ARTERY DISEASE INVOLVING NATIVE CORONARY ARTERY OF NATIVE HEART WITHOUT ANGINA PECTORIS: Primary | ICD-10-CM

## 2024-06-18 DIAGNOSIS — R35.0 URINARY FREQUENCY: ICD-10-CM

## 2024-06-18 DIAGNOSIS — I10 ESSENTIAL HYPERTENSION: ICD-10-CM

## 2024-06-18 DIAGNOSIS — N18.32 STAGE 3B CHRONIC KIDNEY DISEASE (HCC): ICD-10-CM

## 2024-06-18 LAB
BILIRUBIN, POC: NORMAL
BLOOD URINE, POC: NORMAL
CLARITY, POC: CLEAR
COLOR, POC: YELLOW
GLUCOSE URINE, POC: NORMAL
KETONES, POC: NORMAL
LEUKOCYTE EST, POC: NORMAL
NITRITE, POC: NORMAL
PH, POC: 5.5
PROTEIN, POC: NORMAL
SPECIFIC GRAVITY, POC: 1.02
UROBILINOGEN, POC: NORMAL

## 2024-06-18 PROCEDURE — 1123F ACP DISCUSS/DSCN MKR DOCD: CPT | Performed by: INTERNAL MEDICINE

## 2024-06-18 PROCEDURE — 81002 URINALYSIS NONAUTO W/O SCOPE: CPT | Performed by: INTERNAL MEDICINE

## 2024-06-18 PROCEDURE — 2022F DILAT RTA XM EVC RTNOPTHY: CPT | Performed by: INTERNAL MEDICINE

## 2024-06-18 PROCEDURE — 1036F TOBACCO NON-USER: CPT | Performed by: INTERNAL MEDICINE

## 2024-06-18 PROCEDURE — 99214 OFFICE O/P EST MOD 30 MIN: CPT | Performed by: INTERNAL MEDICINE

## 2024-06-18 PROCEDURE — 1090F PRES/ABSN URINE INCON ASSESS: CPT | Performed by: INTERNAL MEDICINE

## 2024-06-18 PROCEDURE — 3079F DIAST BP 80-89 MM HG: CPT | Performed by: INTERNAL MEDICINE

## 2024-06-18 PROCEDURE — 3051F HG A1C>EQUAL 7.0%<8.0%: CPT | Performed by: INTERNAL MEDICINE

## 2024-06-18 PROCEDURE — 3077F SYST BP >= 140 MM HG: CPT | Performed by: INTERNAL MEDICINE

## 2024-06-18 PROCEDURE — G8400 PT W/DXA NO RESULTS DOC: HCPCS | Performed by: INTERNAL MEDICINE

## 2024-06-18 PROCEDURE — G8427 DOCREV CUR MEDS BY ELIG CLIN: HCPCS | Performed by: INTERNAL MEDICINE

## 2024-06-18 PROCEDURE — G8417 CALC BMI ABV UP PARAM F/U: HCPCS | Performed by: INTERNAL MEDICINE

## 2024-06-18 PROCEDURE — 3017F COLORECTAL CA SCREEN DOC REV: CPT | Performed by: INTERNAL MEDICINE

## 2024-06-18 RX ORDER — PREGABALIN 50 MG/1
CAPSULE ORAL
COMMUNITY
Start: 2024-06-03

## 2024-06-18 ASSESSMENT — ENCOUNTER SYMPTOMS
BLOOD IN STOOL: 0
DIARRHEA: 0
TROUBLE SWALLOWING: 0
ALLERGIC/IMMUNOLOGIC NEGATIVE: 1
BACK PAIN: 0
SORE THROAT: 0
CONSTIPATION: 0
COLOR CHANGE: 0
ABDOMINAL DISTENTION: 0
RHINORRHEA: 0
VOMITING: 0
SINUS PRESSURE: 0
ABDOMINAL PAIN: 0
NAUSEA: 0

## 2024-06-19 ENCOUNTER — OFFICE VISIT (OUTPATIENT)
Dept: PRIMARY CARE CLINIC | Age: 74
End: 2024-06-19
Payer: COMMERCIAL

## 2024-06-19 VITALS — BODY MASS INDEX: 36 KG/M2 | HEIGHT: 66 IN | WEIGHT: 224 LBS

## 2024-06-19 DIAGNOSIS — N18.30 STAGE 3 CHRONIC KIDNEY DISEASE, UNSPECIFIED WHETHER STAGE 3A OR 3B CKD (HCC): ICD-10-CM

## 2024-06-19 DIAGNOSIS — Z00.00 MEDICARE ANNUAL WELLNESS VISIT, SUBSEQUENT: Primary | ICD-10-CM

## 2024-06-19 DIAGNOSIS — E11.9 DIABETES MELLITUS WITHOUT COMPLICATION (HCC): ICD-10-CM

## 2024-06-19 PROCEDURE — 1123F ACP DISCUSS/DSCN MKR DOCD: CPT | Performed by: INTERNAL MEDICINE

## 2024-06-19 PROCEDURE — 3017F COLORECTAL CA SCREEN DOC REV: CPT | Performed by: INTERNAL MEDICINE

## 2024-06-19 PROCEDURE — G0439 PPPS, SUBSEQ VISIT: HCPCS | Performed by: INTERNAL MEDICINE

## 2024-06-19 PROCEDURE — 3051F HG A1C>EQUAL 7.0%<8.0%: CPT | Performed by: INTERNAL MEDICINE

## 2024-06-19 RX ORDER — ASCORBIC ACID 500 MG
500 TABLET ORAL DAILY
Qty: 90 TABLET | Refills: 0 | Status: SHIPPED | OUTPATIENT
Start: 2024-06-19

## 2024-06-19 RX ORDER — CITALOPRAM 40 MG/1
40 TABLET ORAL DAILY
Qty: 90 TABLET | Refills: 0 | Status: SHIPPED | OUTPATIENT
Start: 2024-06-19

## 2024-06-19 RX ORDER — FLURBIPROFEN SODIUM 0.3 MG/ML
SOLUTION/ DROPS OPHTHALMIC
Qty: 200 EACH | Refills: 2 | Status: SHIPPED | OUTPATIENT
Start: 2024-06-19

## 2024-06-19 RX ORDER — EZETIMIBE 10 MG/1
10 TABLET ORAL DAILY
Qty: 90 TABLET | Refills: 0 | Status: SHIPPED | OUTPATIENT
Start: 2024-06-19

## 2024-06-19 RX ORDER — METOPROLOL TARTRATE 50 MG/1
50 TABLET, FILM COATED ORAL 2 TIMES DAILY
Qty: 180 TABLET | Refills: 0 | Status: SHIPPED | OUTPATIENT
Start: 2024-06-19

## 2024-06-19 RX ORDER — GINGER ROOT/GINGER ROOT EXT 262.5 MG
1 CAPSULE ORAL DAILY
Qty: 90 TABLET | Refills: 0 | Status: SHIPPED | OUTPATIENT
Start: 2024-06-19

## 2024-06-19 ASSESSMENT — PATIENT HEALTH QUESTIONNAIRE - PHQ9
5. POOR APPETITE OR OVEREATING: NOT AT ALL
SUM OF ALL RESPONSES TO PHQ QUESTIONS 1-9: 3
SUM OF ALL RESPONSES TO PHQ9 QUESTIONS 1 & 2: 0
9. THOUGHTS THAT YOU WOULD BE BETTER OFF DEAD, OR OF HURTING YOURSELF: NOT AT ALL
SUM OF ALL RESPONSES TO PHQ QUESTIONS 1-9: 3
3. TROUBLE FALLING OR STAYING ASLEEP: SEVERAL DAYS
7. TROUBLE CONCENTRATING ON THINGS, SUCH AS READING THE NEWSPAPER OR WATCHING TELEVISION: NOT AT ALL
SUM OF ALL RESPONSES TO PHQ QUESTIONS 1-9: 3
4. FEELING TIRED OR HAVING LITTLE ENERGY: SEVERAL DAYS
8. MOVING OR SPEAKING SO SLOWLY THAT OTHER PEOPLE COULD HAVE NOTICED. OR THE OPPOSITE, BEING SO FIGETY OR RESTLESS THAT YOU HAVE BEEN MOVING AROUND A LOT MORE THAN USUAL: NOT AT ALL
SUM OF ALL RESPONSES TO PHQ QUESTIONS 1-9: 3
10. IF YOU CHECKED OFF ANY PROBLEMS, HOW DIFFICULT HAVE THESE PROBLEMS MADE IT FOR YOU TO DO YOUR WORK, TAKE CARE OF THINGS AT HOME, OR GET ALONG WITH OTHER PEOPLE: NOT DIFFICULT AT ALL
2. FEELING DOWN, DEPRESSED OR HOPELESS: NOT AT ALL
6. FEELING BAD ABOUT YOURSELF - OR THAT YOU ARE A FAILURE OR HAVE LET YOURSELF OR YOUR FAMILY DOWN: SEVERAL DAYS

## 2024-06-19 ASSESSMENT — LIFESTYLE VARIABLES
HOW OFTEN DO YOU HAVE A DRINK CONTAINING ALCOHOL: NEVER
HOW MANY STANDARD DRINKS CONTAINING ALCOHOL DO YOU HAVE ON A TYPICAL DAY: PATIENT DOES NOT DRINK

## 2024-06-19 NOTE — PROGRESS NOTES
Medicare Annual Wellness Visit    Kim Zarco is here for Medicare AWV    Assessment & Plan   Medicare annual wellness visit, subsequent  Diabetes mellitus without complication (HCC)  -     Insulin Pen Needle (B-D UF III MINI PEN NEEDLES) 31G X 5 MM MISC; Disp-200 each, R-2, NormalUSE once daily to four times a day  Stage 3 chronic kidney disease, unspecified whether stage 3a or 3b CKD (HCC)  Comments:  sees nephrology, improved, GFR up to 50.  Stay hydrated, monitor, avoid nephrotoxic drugs.  Orders:  -     vitamin C (ASCORBIC ACID) 500 MG tablet; Take 1 tablet by mouth daily, Disp-90 tablet, R-0Normal    Recommendations for Preventive Services Due: see orders and patient instructions/AVS.  Recommended screening schedule for the next 5-10 years is provided to the patient in written form: see Patient Instructions/AVS.     Return for Medicare Annual Wellness Visit in 1 year.     Subjective       Patient's complete Health Risk Assessment and screening values have been reviewed and are found in Flowsheets. The following problems were reviewed today and where indicated follow up appointments were made and/or referrals ordered.    Positive Risk Factor Screenings with Interventions:            Controlled Medication Review:      Today's Pain Level: No data recorded   Opioid Risk: (Low risk score <55) Opioid risk score: 9    Patient is low risk for opioid use disorder or overdose.    Last PDMP Mc as Reviewed:  Review User Review Instant Review Result              Last Controlled Substance Monitoring Documentation      Flowsheet Row Admission (Discharged) from 6/20/2023 in [facility] 7S INTERMDIATE MED SURG   Acute Pain Prescriptions Severe pain not adequately treated with lower dose. filed at 06/21/2023 4652              General HRA Questions:  Select all that apply: (!) Anger    Anger Interventions:  Patient comments: \"just normal stuff with immediate family members\".       Activity, Diet, and Weight:  On average, how many

## 2024-06-28 DIAGNOSIS — Z79.4 TYPE 2 DIABETES MELLITUS WITH DIABETIC NEUROPATHIC ARTHROPATHY, WITH LONG-TERM CURRENT USE OF INSULIN (HCC): ICD-10-CM

## 2024-06-28 DIAGNOSIS — E11.610 TYPE 2 DIABETES MELLITUS WITH DIABETIC NEUROPATHIC ARTHROPATHY, WITH LONG-TERM CURRENT USE OF INSULIN (HCC): ICD-10-CM

## 2024-06-28 NOTE — TELEPHONE ENCOUNTER
Patient requesting a refill of her   Continuous Blood Gluc Sensor (FREESTYLE MAGALIS 2 SENSOR) [facility]   Please send to [facility] on [address] Patient states she is completely out since yesterday. Needs as soon as possible. Thank you.

## 2024-07-23 ENCOUNTER — OFFICE VISIT (OUTPATIENT)
Dept: PRIMARY CARE CLINIC | Age: 74
End: 2024-07-23
Payer: COMMERCIAL

## 2024-07-23 VITALS — BODY MASS INDEX: 37.61 KG/M2 | HEIGHT: 66 IN | WEIGHT: 234 LBS

## 2024-07-23 DIAGNOSIS — E11.42 DIABETIC PERIPHERAL NEUROPATHY (HCC): ICD-10-CM

## 2024-07-23 DIAGNOSIS — E11.610 TYPE 2 DIABETES MELLITUS WITH DIABETIC NEUROPATHIC ARTHROPATHY, WITH LONG-TERM CURRENT USE OF INSULIN (HCC): ICD-10-CM

## 2024-07-23 DIAGNOSIS — N18.30 STAGE 3 CHRONIC KIDNEY DISEASE, UNSPECIFIED WHETHER STAGE 3A OR 3B CKD (HCC): ICD-10-CM

## 2024-07-23 DIAGNOSIS — R82.998 LEUKOCYTES IN URINE: ICD-10-CM

## 2024-07-23 DIAGNOSIS — Z79.4 TYPE 2 DIABETES MELLITUS WITH DIABETIC NEUROPATHIC ARTHROPATHY, WITH LONG-TERM CURRENT USE OF INSULIN (HCC): ICD-10-CM

## 2024-07-23 DIAGNOSIS — M19.041 ARTHRITIS OF BOTH HANDS: ICD-10-CM

## 2024-07-23 DIAGNOSIS — M19.042 ARTHRITIS OF BOTH HANDS: ICD-10-CM

## 2024-07-23 DIAGNOSIS — E11.610 CHARCOT FOOT DUE TO DIABETES MELLITUS (HCC): ICD-10-CM

## 2024-07-23 DIAGNOSIS — R30.0 DYSURIA: Primary | ICD-10-CM

## 2024-07-23 DIAGNOSIS — M54.16 LUMBAR RADICULOPATHY: ICD-10-CM

## 2024-07-23 LAB
BILIRUBIN, POC: ABNORMAL
BLOOD URINE, POC: ABNORMAL
CLARITY, POC: ABNORMAL
COLOR, POC: YELLOW
GLUCOSE URINE, POC: ABNORMAL
KETONES, POC: ABNORMAL
LEUKOCYTE EST, POC: ABNORMAL
NITRITE, POC: ABNORMAL
PH, POC: 5.5
PROTEIN, POC: ABNORMAL
SPECIFIC GRAVITY, POC: 1.02
UROBILINOGEN, POC: ABNORMAL

## 2024-07-23 PROCEDURE — G8427 DOCREV CUR MEDS BY ELIG CLIN: HCPCS | Performed by: INTERNAL MEDICINE

## 2024-07-23 PROCEDURE — 2022F DILAT RTA XM EVC RTNOPTHY: CPT | Performed by: INTERNAL MEDICINE

## 2024-07-23 PROCEDURE — 1123F ACP DISCUSS/DSCN MKR DOCD: CPT | Performed by: INTERNAL MEDICINE

## 2024-07-23 PROCEDURE — 3017F COLORECTAL CA SCREEN DOC REV: CPT | Performed by: INTERNAL MEDICINE

## 2024-07-23 PROCEDURE — 3051F HG A1C>EQUAL 7.0%<8.0%: CPT | Performed by: INTERNAL MEDICINE

## 2024-07-23 PROCEDURE — 99214 OFFICE O/P EST MOD 30 MIN: CPT | Performed by: INTERNAL MEDICINE

## 2024-07-23 PROCEDURE — 1036F TOBACCO NON-USER: CPT | Performed by: INTERNAL MEDICINE

## 2024-07-23 PROCEDURE — 1090F PRES/ABSN URINE INCON ASSESS: CPT | Performed by: INTERNAL MEDICINE

## 2024-07-23 PROCEDURE — G8400 PT W/DXA NO RESULTS DOC: HCPCS | Performed by: INTERNAL MEDICINE

## 2024-07-23 PROCEDURE — G8417 CALC BMI ABV UP PARAM F/U: HCPCS | Performed by: INTERNAL MEDICINE

## 2024-07-23 PROCEDURE — 81002 URINALYSIS NONAUTO W/O SCOPE: CPT | Performed by: INTERNAL MEDICINE

## 2024-07-23 RX ORDER — INSULIN LISPRO 100 [IU]/ML
INJECTION, SUSPENSION SUBCUTANEOUS
OUTPATIENT
Start: 2024-07-23

## 2024-07-23 RX ORDER — EZETIMIBE 10 MG/1
10 TABLET ORAL DAILY
Qty: 90 TABLET | Refills: 0 | Status: SHIPPED
Start: 2024-07-23 | End: 2024-07-26 | Stop reason: SDUPTHER

## 2024-07-23 RX ORDER — CITALOPRAM 40 MG/1
40 TABLET ORAL DAILY
Qty: 90 TABLET | Refills: 0 | Status: SHIPPED | OUTPATIENT
Start: 2024-07-23

## 2024-07-23 RX ORDER — POTASSIUM CHLORIDE 750 MG/1
10 TABLET, EXTENDED RELEASE ORAL EVERY OTHER DAY
Qty: 45 TABLET | Refills: 0 | Status: SHIPPED | OUTPATIENT
Start: 2024-07-23

## 2024-07-23 RX ORDER — ALBUTEROL SULFATE 90 UG/1
2 AEROSOL, METERED RESPIRATORY (INHALATION) EVERY 6 HOURS PRN
Qty: 18 G | Refills: 2 | Status: SHIPPED | OUTPATIENT
Start: 2024-07-23

## 2024-07-23 RX ORDER — LISINOPRIL 40 MG/1
40 TABLET ORAL DAILY
Qty: 90 TABLET | Refills: 0 | Status: SHIPPED
Start: 2024-07-23 | End: 2024-07-26 | Stop reason: SDUPTHER

## 2024-07-23 RX ORDER — METOPROLOL TARTRATE 50 MG/1
50 TABLET, FILM COATED ORAL 2 TIMES DAILY
Qty: 180 TABLET | Refills: 0 | Status: SHIPPED
Start: 2024-07-23 | End: 2024-07-26 | Stop reason: SDUPTHER

## 2024-07-23 RX ORDER — INSULIN LISPRO 100 [IU]/ML
65 INJECTION, SUSPENSION SUBCUTANEOUS 2 TIMES DAILY WITH MEALS
Qty: 15 ADJUSTABLE DOSE PRE-FILLED PEN SYRINGE | Refills: 0 | Status: SHIPPED | OUTPATIENT
Start: 2024-07-23

## 2024-07-23 RX ORDER — FAMOTIDINE 40 MG/1
40 TABLET, FILM COATED ORAL EVERY MORNING
Qty: 90 TABLET | Refills: 0 | Status: SHIPPED | OUTPATIENT
Start: 2024-07-23

## 2024-07-23 RX ORDER — ASCORBIC ACID 500 MG
500 TABLET ORAL DAILY
Qty: 90 TABLET | Refills: 0 | Status: SHIPPED | OUTPATIENT
Start: 2024-07-23

## 2024-07-23 RX ORDER — PANTOPRAZOLE SODIUM 40 MG/1
40 TABLET, DELAYED RELEASE ORAL DAILY
Qty: 90 TABLET | Refills: 0 | Status: SHIPPED | OUTPATIENT
Start: 2024-07-23

## 2024-07-23 RX ORDER — ASPIRIN 81 MG/1
81 TABLET ORAL DAILY
Qty: 90 TABLET | Refills: 0 | Status: SHIPPED | OUTPATIENT
Start: 2024-07-23

## 2024-07-23 RX ORDER — NIACIN 500 MG/1
500 TABLET, EXTENDED RELEASE ORAL NIGHTLY
Qty: 90 TABLET | Refills: 0 | Status: SHIPPED | OUTPATIENT
Start: 2024-07-23

## 2024-07-23 NOTE — PROGRESS NOTES
Kim Zarco presents today for evaluation for a riding wheelchair    Current Outpatient Medications   Medication Sig Dispense Refill    Calcium Carbonate-Vit D-Min (CALCIUM-VITAMIN D-MINERALS) 600-800 MG-UNIT CHEW Take by mouth      albuterol sulfate HFA (VENTOLIN HFA) 108 (90 Base) MCG/ACT inhaler Inhale 2 puffs into the lungs every 6 hours as needed for Wheezing 18 g 2    citalopram (CELEXA) 40 MG tablet Take 1 tablet by mouth daily 90 tablet 0    Continuous Glucose Sensor (FREESTYLE MAGALIS 2 SENSOR) MISC 1 each by Does not apply route every 14 days 1 each 5    dulaglutide (TRULICITY) 1.5 MG/0.5ML SC injection Inject 0.5 mLs into the skin once a week 6 mL 1    famotidine (PEPCID) 40 MG tablet Take 1 tablet by mouth every morning 90 tablet 0    insulin lispro protamine & lispro (HUMALOG MIX 75/25 KWIKPEN) (75-25) 100 UNIT per ML SUPN injection pen Inject 0.65 mLs into the skin 2 times daily (with meals) 15 Adjustable Dose Pre-filled Pen Syringe 0    niacin (NIASPAN) 500 MG extended release tablet Take 1 tablet by mouth nightly at bedtime. 90 tablet 0    pantoprazole (PROTONIX) 40 MG tablet Take 1 tablet by mouth daily 90 tablet 0    potassium chloride (KLOR-CON M) 10 MEQ extended release tablet Take 1 tablet by mouth every other day 45 tablet 0    vitamin C (ASCORBIC ACID) 500 MG tablet Take 1 tablet by mouth daily 90 tablet 0    aspirin 81 MG EC tablet Take 1 tablet by mouth daily 90 tablet 0    Insulin Pen Needle (B-D UF III MINI PEN NEEDLES) 31G X 5 MM MISC USE once daily to four times a day 200 each 2    pregabalin (LYRICA) 50 MG capsule take 1 capsule by mouth twice a day for pain for 30 DAYS      Fesoterodine Fumarate ER 8 MG TB24 Take 1 tablet by mouth daily      letrozole (FEMARA) 2.5 MG tablet Take 1 tablet by mouth daily      HYDROcodone-acetaminophen (NORCO) 5-325 MG per tablet Take 1 tablet by mouth 3 times daily as needed for Pain.      aspirin EC 81 MG EC tablet Take 1 tablet by mouth nightly 90

## 2024-07-26 LAB
CULTURE: ABNORMAL
SPECIMEN DESCRIPTION: ABNORMAL

## 2024-07-26 RX ORDER — EZETIMIBE 10 MG/1
10 TABLET ORAL DAILY
Qty: 90 TABLET | Refills: 3 | Status: SHIPPED | OUTPATIENT
Start: 2024-07-26

## 2024-07-26 RX ORDER — PRAVASTATIN SODIUM 40 MG
40 TABLET ORAL NIGHTLY
Qty: 90 TABLET | Refills: 3 | Status: SHIPPED | OUTPATIENT
Start: 2024-07-26

## 2024-07-26 RX ORDER — METOPROLOL TARTRATE 50 MG/1
50 TABLET, FILM COATED ORAL 2 TIMES DAILY
Qty: 180 TABLET | Refills: 3 | Status: SHIPPED | OUTPATIENT
Start: 2024-07-26

## 2024-07-26 RX ORDER — LISINOPRIL 40 MG/1
40 TABLET ORAL DAILY
Qty: 90 TABLET | Refills: 3 | Status: SHIPPED | OUTPATIENT
Start: 2024-07-26

## 2024-07-30 DIAGNOSIS — N30.01 ACUTE CYSTITIS WITH HEMATURIA: Primary | ICD-10-CM

## 2024-07-30 RX ORDER — CIPROFLOXACIN 250 MG/1
250 TABLET, FILM COATED ORAL 2 TIMES DAILY
Qty: 14 TABLET | Refills: 0 | Status: SHIPPED | OUTPATIENT
Start: 2024-07-30 | End: 2024-08-06

## 2024-08-06 PROBLEM — E11.42 DIABETIC PERIPHERAL NEUROPATHY (HCC): Status: ACTIVE | Noted: 2024-08-06

## 2024-08-06 PROBLEM — M54.16 LUMBAR RADICULOPATHY: Status: ACTIVE | Noted: 2024-08-06

## 2024-08-06 ASSESSMENT — ENCOUNTER SYMPTOMS: BACK PAIN: 1

## 2024-08-18 DIAGNOSIS — N18.30 STAGE 3 CHRONIC KIDNEY DISEASE, UNSPECIFIED WHETHER STAGE 3A OR 3B CKD (HCC): ICD-10-CM

## 2024-08-19 RX ORDER — INSULIN LISPRO 100 [IU]/ML
INJECTION, SUSPENSION SUBCUTANEOUS
OUTPATIENT
Start: 2024-08-19

## 2024-08-19 RX ORDER — POTASSIUM CHLORIDE 750 MG/1
10 TABLET, EXTENDED RELEASE ORAL EVERY OTHER DAY
Qty: 45 TABLET | Refills: 0 | OUTPATIENT
Start: 2024-08-19

## 2024-08-19 RX ORDER — ASCORBIC ACID 500 MG
500 TABLET ORAL DAILY
Qty: 90 TABLET | Refills: 0 | OUTPATIENT
Start: 2024-08-19

## 2024-09-03 ENCOUNTER — OFFICE VISIT (OUTPATIENT)
Dept: PRIMARY CARE CLINIC | Age: 74
End: 2024-09-03
Payer: COMMERCIAL

## 2024-09-03 VITALS — BODY MASS INDEX: 36.16 KG/M2 | WEIGHT: 225 LBS | HEIGHT: 66 IN

## 2024-09-03 DIAGNOSIS — Z79.4 TYPE 2 DIABETES MELLITUS WITH DIABETIC NEPHROPATHY, WITH LONG-TERM CURRENT USE OF INSULIN (HCC): Primary | ICD-10-CM

## 2024-09-03 DIAGNOSIS — R82.998 LEUKOCYTES IN URINE: ICD-10-CM

## 2024-09-03 DIAGNOSIS — R80.9 PROTEINURIA, UNSPECIFIED TYPE: ICD-10-CM

## 2024-09-03 DIAGNOSIS — E11.21 TYPE 2 DIABETES MELLITUS WITH DIABETIC NEPHROPATHY, WITH LONG-TERM CURRENT USE OF INSULIN (HCC): Primary | ICD-10-CM

## 2024-09-03 DIAGNOSIS — E16.2 HYPOGLYCEMIA: ICD-10-CM

## 2024-09-03 LAB
BILIRUBIN, POC: ABNORMAL
BLOOD URINE, POC: ABNORMAL
CLARITY, POC: CLEAR
COLOR, POC: YELLOW
GLUCOSE URINE, POC: ABNORMAL MG/DL
HBA1C MFR BLD: 6.1 %
KETONES, POC: ABNORMAL MG/DL
LEUKOCYTE EST, POC: ABNORMAL
NITRITE, POC: ABNORMAL
PH, POC: 5.5
PROTEIN, POC: ABNORMAL MG/DL
SPECIFIC GRAVITY, POC: 1.02
UROBILINOGEN, POC: ABNORMAL MG/DL

## 2024-09-03 PROCEDURE — G8417 CALC BMI ABV UP PARAM F/U: HCPCS | Performed by: INTERNAL MEDICINE

## 2024-09-03 PROCEDURE — 3017F COLORECTAL CA SCREEN DOC REV: CPT | Performed by: INTERNAL MEDICINE

## 2024-09-03 PROCEDURE — 99214 OFFICE O/P EST MOD 30 MIN: CPT | Performed by: INTERNAL MEDICINE

## 2024-09-03 PROCEDURE — G8400 PT W/DXA NO RESULTS DOC: HCPCS | Performed by: INTERNAL MEDICINE

## 2024-09-03 PROCEDURE — 2022F DILAT RTA XM EVC RTNOPTHY: CPT | Performed by: INTERNAL MEDICINE

## 2024-09-03 PROCEDURE — 1090F PRES/ABSN URINE INCON ASSESS: CPT | Performed by: INTERNAL MEDICINE

## 2024-09-03 PROCEDURE — 83036 HEMOGLOBIN GLYCOSYLATED A1C: CPT | Performed by: INTERNAL MEDICINE

## 2024-09-03 PROCEDURE — 1036F TOBACCO NON-USER: CPT | Performed by: INTERNAL MEDICINE

## 2024-09-03 PROCEDURE — 1123F ACP DISCUSS/DSCN MKR DOCD: CPT | Performed by: INTERNAL MEDICINE

## 2024-09-03 PROCEDURE — 3044F HG A1C LEVEL LT 7.0%: CPT | Performed by: INTERNAL MEDICINE

## 2024-09-03 PROCEDURE — G8427 DOCREV CUR MEDS BY ELIG CLIN: HCPCS | Performed by: INTERNAL MEDICINE

## 2024-09-03 PROCEDURE — 81002 URINALYSIS NONAUTO W/O SCOPE: CPT | Performed by: INTERNAL MEDICINE

## 2024-09-03 RX ORDER — INSULIN GLARGINE 100 [IU]/ML
20 INJECTION, SOLUTION SUBCUTANEOUS NIGHTLY
Qty: 5 ADJUSTABLE DOSE PRE-FILLED PEN SYRINGE | Refills: 3 | Status: SHIPPED | OUTPATIENT
Start: 2024-09-03

## 2024-09-03 ASSESSMENT — ENCOUNTER SYMPTOMS: ROS SKIN COMMENTS: EASY BRUISING

## 2024-09-03 NOTE — PROGRESS NOTES
Kim Zarco presents today for follow up of HTN, Diabetes, ESRD    Current Outpatient Medications   Medication Sig Dispense Refill    insulin glargine (LANTUS SOLOSTAR) 100 UNIT/ML injection pen Inject 20 Units into the skin nightly 5 Adjustable Dose Pre-filled Pen Syringe 3    pravastatin (PRAVACHOL) 40 MG tablet Take 1 tablet by mouth nightly 90 tablet 3    lisinopril (PRINIVIL;ZESTRIL) 40 MG tablet Take 1 tablet by mouth daily 90 tablet 3    metoprolol tartrate (LOPRESSOR) 50 MG tablet Take 1 tablet by mouth 2 times daily 180 tablet 3    ezetimibe (ZETIA) 10 MG tablet Take 1 tablet by mouth daily 90 tablet 3    albuterol sulfate HFA (VENTOLIN HFA) 108 (90 Base) MCG/ACT inhaler Inhale 2 puffs into the lungs every 6 hours as needed for Wheezing 18 g 2    citalopram (CELEXA) 40 MG tablet Take 1 tablet by mouth daily 90 tablet 0    Continuous Glucose Sensor (FREESTYLE MAGALIS 2 SENSOR) MISC 1 each by Does not apply route every 14 days 1 each 5    famotidine (PEPCID) 40 MG tablet Take 1 tablet by mouth every morning 90 tablet 0    niacin (NIASPAN) 500 MG extended release tablet Take 1 tablet by mouth nightly at bedtime. 90 tablet 0    pantoprazole (PROTONIX) 40 MG tablet Take 1 tablet by mouth daily 90 tablet 0    potassium chloride (KLOR-CON M) 10 MEQ extended release tablet Take 1 tablet by mouth every other day 45 tablet 0    vitamin C (ASCORBIC ACID) 500 MG tablet Take 1 tablet by mouth daily 90 tablet 0    Insulin Pen Needle (B-D UF III MINI PEN NEEDLES) 31G X 5 MM MISC USE once daily to four times a day 200 each 2    pregabalin (LYRICA) 50 MG capsule take 1 capsule by mouth twice a day for pain for 30 DAYS      Fesoterodine Fumarate ER 8 MG TB24 Take 1 tablet by mouth daily      letrozole (FEMARA) 2.5 MG tablet Take 1 tablet by mouth daily      Cholecalciferol (VITAMIN D3) 20 MCG (800 UNIT) TABS Take 1 tablet by mouth daily 90 tablet 0    HYDROcodone-acetaminophen (NORCO) 5-325 MG per tablet Take 1 tablet by

## 2024-09-04 LAB
CULTURE: ABNORMAL
SPECIMEN DESCRIPTION: ABNORMAL

## 2024-09-09 ENCOUNTER — TELEPHONE (OUTPATIENT)
Dept: PRIMARY CARE CLINIC | Age: 74
End: 2024-09-09

## 2024-09-09 RX ORDER — INSULIN LISPRO 100 [IU]/ML
8 INJECTION, SOLUTION INTRAVENOUS; SUBCUTANEOUS
Qty: 5 ADJUSTABLE DOSE PRE-FILLED PEN SYRINGE | Refills: 0 | Status: SHIPPED | OUTPATIENT
Start: 2024-09-09

## 2024-09-16 ENCOUNTER — TELEPHONE (OUTPATIENT)
Dept: PRIMARY CARE CLINIC | Age: 74
End: 2024-09-16

## 2024-09-19 DIAGNOSIS — R82.998 LEUKOCYTES IN URINE: Primary | ICD-10-CM

## 2024-09-19 LAB
BILIRUBIN, POC: NORMAL
BLOOD URINE, POC: NORMAL
CLARITY, POC: NORMAL
COLOR, POC: YELLOW
GLUCOSE URINE, POC: NORMAL MG/DL
KETONES, POC: NORMAL MG/DL
LEUKOCYTE EST, POC: NORMAL
NITRITE, POC: NORMAL
PH, POC: 5.5
PROTEIN, POC: 300 MG/DL
SPECIFIC GRAVITY, POC: 1.02
UROBILINOGEN, POC: 0.2 MG/DL

## 2024-09-19 PROCEDURE — 81002 URINALYSIS NONAUTO W/O SCOPE: CPT | Performed by: INTERNAL MEDICINE

## 2024-09-24 ENCOUNTER — TELEPHONE (OUTPATIENT)
Dept: PRIMARY CARE CLINIC | Age: 74
End: 2024-09-24

## 2024-09-25 RX ORDER — LEVOFLOXACIN 500 MG/1
500 TABLET, FILM COATED ORAL DAILY
Qty: 7 TABLET | Refills: 0 | Status: SHIPPED | OUTPATIENT
Start: 2024-09-25 | End: 2024-10-02

## 2024-10-07 DIAGNOSIS — R79.89 LOW SERUM VITAMIN D: ICD-10-CM

## 2024-10-07 RX ORDER — NIACIN 500 MG/1
500 TABLET, EXTENDED RELEASE ORAL
Qty: 90 TABLET | Refills: 0 | OUTPATIENT
Start: 2024-10-07

## 2024-10-07 NOTE — TELEPHONE ENCOUNTER
Patient called in requesting refill on    Cholecalciferol (VITAMIN D3) 20 MCG (800 UNIT) TABS     [facility] [address]

## 2024-10-11 ENCOUNTER — OFFICE VISIT (OUTPATIENT)
Dept: PRIMARY CARE CLINIC | Age: 74
End: 2024-10-11
Payer: COMMERCIAL

## 2024-10-11 VITALS
SYSTOLIC BLOOD PRESSURE: 122 MMHG | HEIGHT: 66 IN | HEART RATE: 67 BPM | WEIGHT: 220 LBS | BODY MASS INDEX: 35.36 KG/M2 | DIASTOLIC BLOOD PRESSURE: 60 MMHG | TEMPERATURE: 97.8 F | OXYGEN SATURATION: 95 %

## 2024-10-11 DIAGNOSIS — R79.89 LOW SERUM VITAMIN D: ICD-10-CM

## 2024-10-11 DIAGNOSIS — E11.610 CHARCOT FOOT DUE TO DIABETES MELLITUS (HCC): ICD-10-CM

## 2024-10-11 DIAGNOSIS — E11.610 TYPE 2 DIABETES MELLITUS WITH DIABETIC NEUROPATHIC ARTHROPATHY, WITH LONG-TERM CURRENT USE OF INSULIN (HCC): ICD-10-CM

## 2024-10-11 DIAGNOSIS — Z79.4 TYPE 2 DIABETES MELLITUS WITH DIABETIC NEUROPATHIC ARTHROPATHY, WITH LONG-TERM CURRENT USE OF INSULIN (HCC): ICD-10-CM

## 2024-10-11 DIAGNOSIS — I25.10 CORONARY ARTERY DISEASE INVOLVING NATIVE CORONARY ARTERY OF NATIVE HEART WITHOUT ANGINA PECTORIS: ICD-10-CM

## 2024-10-11 DIAGNOSIS — I10 ESSENTIAL HYPERTENSION: Primary | ICD-10-CM

## 2024-10-11 DIAGNOSIS — N18.30 STAGE 3 CHRONIC KIDNEY DISEASE, UNSPECIFIED WHETHER STAGE 3A OR 3B CKD (HCC): ICD-10-CM

## 2024-10-11 PROCEDURE — 3017F COLORECTAL CA SCREEN DOC REV: CPT | Performed by: INTERNAL MEDICINE

## 2024-10-11 PROCEDURE — 2022F DILAT RTA XM EVC RTNOPTHY: CPT | Performed by: INTERNAL MEDICINE

## 2024-10-11 PROCEDURE — 3044F HG A1C LEVEL LT 7.0%: CPT | Performed by: INTERNAL MEDICINE

## 2024-10-11 PROCEDURE — 1036F TOBACCO NON-USER: CPT | Performed by: INTERNAL MEDICINE

## 2024-10-11 PROCEDURE — 1123F ACP DISCUSS/DSCN MKR DOCD: CPT | Performed by: INTERNAL MEDICINE

## 2024-10-11 PROCEDURE — G8427 DOCREV CUR MEDS BY ELIG CLIN: HCPCS | Performed by: INTERNAL MEDICINE

## 2024-10-11 PROCEDURE — G8417 CALC BMI ABV UP PARAM F/U: HCPCS | Performed by: INTERNAL MEDICINE

## 2024-10-11 PROCEDURE — 3074F SYST BP LT 130 MM HG: CPT | Performed by: INTERNAL MEDICINE

## 2024-10-11 PROCEDURE — G8484 FLU IMMUNIZE NO ADMIN: HCPCS | Performed by: INTERNAL MEDICINE

## 2024-10-11 PROCEDURE — 1090F PRES/ABSN URINE INCON ASSESS: CPT | Performed by: INTERNAL MEDICINE

## 2024-10-11 PROCEDURE — 99214 OFFICE O/P EST MOD 30 MIN: CPT | Performed by: INTERNAL MEDICINE

## 2024-10-11 PROCEDURE — G8400 PT W/DXA NO RESULTS DOC: HCPCS | Performed by: INTERNAL MEDICINE

## 2024-10-11 PROCEDURE — 3078F DIAST BP <80 MM HG: CPT | Performed by: INTERNAL MEDICINE

## 2024-10-11 RX ORDER — PRAVASTATIN SODIUM 40 MG
40 TABLET ORAL NIGHTLY
Qty: 90 TABLET | Refills: 0 | Status: SHIPPED | OUTPATIENT
Start: 2024-10-11

## 2024-10-11 RX ORDER — EZETIMIBE 10 MG/1
10 TABLET ORAL DAILY
Qty: 90 TABLET | Refills: 0 | Status: SHIPPED | OUTPATIENT
Start: 2024-10-11

## 2024-10-11 RX ORDER — PANTOPRAZOLE SODIUM 40 MG/1
40 TABLET, DELAYED RELEASE ORAL DAILY
Qty: 90 TABLET | Refills: 0 | Status: SHIPPED | OUTPATIENT
Start: 2024-10-11

## 2024-10-11 RX ORDER — CITALOPRAM HYDROBROMIDE 40 MG/1
40 TABLET ORAL DAILY
Qty: 90 TABLET | Refills: 0 | Status: SHIPPED | OUTPATIENT
Start: 2024-10-11

## 2024-10-11 RX ORDER — NIACIN 500 MG/1
500 TABLET, EXTENDED RELEASE ORAL NIGHTLY
Qty: 90 TABLET | Refills: 0 | Status: SHIPPED | OUTPATIENT
Start: 2024-10-11

## 2024-10-11 RX ORDER — ASCORBIC ACID 500 MG
500 TABLET ORAL DAILY
Qty: 90 TABLET | Refills: 0 | Status: SHIPPED | OUTPATIENT
Start: 2024-10-11

## 2024-10-11 RX ORDER — METOPROLOL TARTRATE 50 MG
50 TABLET ORAL 2 TIMES DAILY
Qty: 180 TABLET | Refills: 0 | Status: SHIPPED | OUTPATIENT
Start: 2024-10-11

## 2024-10-11 RX ORDER — CHOLECALCIFEROL (VITAMIN D3) 25 MCG
1 CAPSULE ORAL DAILY
Qty: 90 CAPSULE | Refills: 0 | Status: SHIPPED | OUTPATIENT
Start: 2024-10-11

## 2024-10-11 RX ORDER — DULAGLUTIDE 3 MG/.5ML
3 INJECTION, SOLUTION SUBCUTANEOUS WEEKLY
Qty: 6 ML | Refills: 1 | Status: SHIPPED | OUTPATIENT
Start: 2024-10-11

## 2024-10-11 RX ORDER — LISINOPRIL 40 MG/1
40 TABLET ORAL DAILY
Qty: 90 TABLET | Refills: 0 | Status: SHIPPED | OUTPATIENT
Start: 2024-10-11

## 2024-10-11 RX ORDER — FAMOTIDINE 40 MG/1
40 TABLET, FILM COATED ORAL EVERY MORNING
Qty: 90 TABLET | Refills: 0 | Status: SHIPPED | OUTPATIENT
Start: 2024-10-11

## 2024-10-11 RX ORDER — POTASSIUM CHLORIDE 750 MG/1
10 TABLET, EXTENDED RELEASE ORAL EVERY OTHER DAY
Qty: 45 TABLET | Refills: 0 | Status: SHIPPED | OUTPATIENT
Start: 2024-10-11

## 2024-10-11 ASSESSMENT — ENCOUNTER SYMPTOMS
ALLERGIC/IMMUNOLOGIC NEGATIVE: 1
ABDOMINAL PAIN: 0
SINUS PRESSURE: 0
COLOR CHANGE: 0
DIARRHEA: 0
BACK PAIN: 0
BLOOD IN STOOL: 0
TROUBLE SWALLOWING: 0
RHINORRHEA: 0
ABDOMINAL DISTENTION: 0
VOMITING: 0
SORE THROAT: 0
NAUSEA: 0
CONSTIPATION: 0

## 2024-10-11 NOTE — PROGRESS NOTES
Kim Zarco presents today for follow up of Diabetes, High chol, HTN, Charcot joints    Current Outpatient Medications   Medication Sig Dispense Refill    citalopram (CELEXA) 40 MG tablet Take 1 tablet by mouth daily 90 tablet 0    Continuous Glucose Sensor (FREESTYLE MAGALIS 2 SENSOR) MISC 1 each by Does not apply route every 14 days 1 each 5    ezetimibe (ZETIA) 10 MG tablet Take 1 tablet by mouth daily 90 tablet 0    famotidine (PEPCID) 40 MG tablet Take 1 tablet by mouth every morning 90 tablet 0    lisinopril (PRINIVIL;ZESTRIL) 40 MG tablet Take 1 tablet by mouth daily 90 tablet 0    metoprolol tartrate (LOPRESSOR) 50 MG tablet Take 1 tablet by mouth 2 times daily 180 tablet 0    niacin (NIASPAN) 500 MG extended release tablet Take 1 tablet by mouth nightly at bedtime. 90 tablet 0    pantoprazole (PROTONIX) 40 MG tablet Take 1 tablet by mouth daily 90 tablet 0    potassium chloride (KLOR-CON M) 10 MEQ extended release tablet Take 1 tablet by mouth every other day 45 tablet 0    pravastatin (PRAVACHOL) 40 MG tablet Take 1 tablet by mouth nightly 90 tablet 0    vitamin C (ASCORBIC ACID) 500 MG tablet Take 1 tablet by mouth daily 90 tablet 0    vitamin D (VITAMIN D HIGH POTENCY) 25 MCG (1000 UT) CAPS Take 1 capsule by mouth daily 90 capsule 0    Dulaglutide (TRULICITY) 3 MG/0.5ML SOPN Inject 3 mg into the skin once a week 6 mL 1    insulin lispro, 1 Unit Dial, (HUMALOG KWIKPEN) 100 UNIT/ML SOPN Inject 8 Units into the skin 3 times daily (before meals) (Patient taking differently: Inject 16 Units into the skin 3 times daily (before meals)) 5 Adjustable Dose Pre-filled Pen Syringe 0    insulin glargine (LANTUS SOLOSTAR) 100 UNIT/ML injection pen Inject 20 Units into the skin nightly (Patient taking differently: Inject 30 Units into the skin nightly) 5 Adjustable Dose Pre-filled Pen Syringe 3    albuterol sulfate HFA (VENTOLIN HFA) 108 (90 Base) MCG/ACT inhaler Inhale 2 puffs into the lungs every 6 hours as needed for

## 2024-10-17 ENCOUNTER — TELEPHONE (OUTPATIENT)
Dept: SURGERY | Age: 74
End: 2024-10-17

## 2024-10-23 DIAGNOSIS — Z79.4 TYPE 2 DIABETES MELLITUS WITH OTHER SPECIFIED COMPLICATION, WITH LONG-TERM CURRENT USE OF INSULIN (HCC): Primary | ICD-10-CM

## 2024-10-23 DIAGNOSIS — E11.69 TYPE 2 DIABETES MELLITUS WITH OTHER SPECIFIED COMPLICATION, WITH LONG-TERM CURRENT USE OF INSULIN (HCC): Primary | ICD-10-CM

## 2024-10-23 RX ORDER — INSULIN LISPRO 100 [IU]/ML
16 INJECTION, SOLUTION INTRAVENOUS; SUBCUTANEOUS
Qty: 5 ADJUSTABLE DOSE PRE-FILLED PEN SYRINGE | Refills: 3 | Status: SHIPPED | OUTPATIENT
Start: 2024-10-23

## 2024-10-23 RX ORDER — INSULIN GLARGINE 100 [IU]/ML
30 INJECTION, SOLUTION SUBCUTANEOUS NIGHTLY
Qty: 5 ADJUSTABLE DOSE PRE-FILLED PEN SYRINGE | Refills: 3 | Status: SHIPPED | OUTPATIENT
Start: 2024-10-23

## 2024-10-23 NOTE — TELEPHONE ENCOUNTER
Name of Medication(s) Requested:  Requested Prescriptions     Pending Prescriptions Disp Refills    insulin glargine (LANTUS SOLOSTAR) 100 UNIT/ML injection pen 5 Adjustable Dose Pre-filled Pen Syringe 3     Sig: Inject 20 Units into the skin nightly    insulin lispro, 1 Unit Dial, (HUMALOG KWIKPEN) 100 UNIT/ML SOPN 5 Adjustable Dose Pre-filled Pen Syringe 0     Sig: Inject 8 Units into the skin 3 times daily (before meals)       Medication is on current medication list Yes    Dosage and directions were verified? Yes    Quantity verified: 90 day supply     Pharmacy Verified?  Yes    Last Appointment:  10/11/2024    Future appts:  Future Appointments   Date Time Provider Department Center   11/20/2024 10:15 AM Ernestina Quinn MD CBURG [facility] DEP   2/18/2025  3:00 PM SEY YNG VAS US 1 SEYZ CARDIO [facility] Rad/Car   2/18/2025  3:30 PM Clifford Szymanski MD VASC/MED [facility]   6/20/2025 11:00 AM Ernestina Quinn MD CBURG [facility] DEP        (If no appt send self scheduling link. .REFILLAPPT)  Scheduling request sent?     [] Yes  [x] No    Does patient need updated?  [] Yes  [x] No

## 2024-10-23 NOTE — TELEPHONE ENCOUNTER
Patient requesting a refill of the following.      insulin glargine (LANTUS SOLOSTAR) 100 UNIT/ML injection pen      insulin lispro, 1 Unit Dial, (HUMALOG KWIKPEN) 100 UNIT/ML SOPN    States pharmacy states they do not have any refills left.  Please send to   [facility]/pharmacy #[id number] - [place], [place] - [address]    Thank you.

## 2024-10-29 ENCOUNTER — TELEPHONE (OUTPATIENT)
Dept: PRIMARY CARE CLINIC | Age: 74
End: 2024-10-29
Payer: COMMERCIAL

## 2024-10-29 DIAGNOSIS — R35.0 URINARY FREQUENCY: Primary | ICD-10-CM

## 2024-10-29 PROCEDURE — 81002 URINALYSIS NONAUTO W/O SCOPE: CPT | Performed by: INTERNAL MEDICINE

## 2024-10-29 NOTE — TELEPHONE ENCOUNTER
Patient states she has a UTI and is [facility] [address] Also is requesting a refill of her insulin lispro, 1 Unit Dial, (HUMALOG KWIKPEN) 100 UNIT/ML SOPN   States Zoraida was doing something with it because the pharmacy wanted to give her a vial instead of the pen. States she is giving her last dose to herself at noon. Thank you.

## 2024-10-30 ENCOUNTER — NURSE ONLY (OUTPATIENT)
Dept: PRIMARY CARE CLINIC | Age: 74
End: 2024-10-30

## 2024-10-30 DIAGNOSIS — Z79.4 TYPE 2 DIABETES MELLITUS WITH OTHER SPECIFIED COMPLICATION, WITH LONG-TERM CURRENT USE OF INSULIN (HCC): Primary | ICD-10-CM

## 2024-10-30 DIAGNOSIS — R30.0 DYSURIA: Primary | ICD-10-CM

## 2024-10-30 DIAGNOSIS — N39.0 URINARY TRACT INFECTION WITH HEMATURIA, SITE UNSPECIFIED: ICD-10-CM

## 2024-10-30 DIAGNOSIS — E11.69 TYPE 2 DIABETES MELLITUS WITH OTHER SPECIFIED COMPLICATION, WITH LONG-TERM CURRENT USE OF INSULIN (HCC): Primary | ICD-10-CM

## 2024-10-30 DIAGNOSIS — R31.9 URINARY TRACT INFECTION WITH HEMATURIA, SITE UNSPECIFIED: ICD-10-CM

## 2024-10-30 LAB
BILIRUBIN, POC: ABNORMAL
BLOOD URINE, POC: ABNORMAL
CLARITY, POC: ABNORMAL
COLOR, POC: ABNORMAL
GLUCOSE URINE, POC: ABNORMAL MG/DL
KETONES, POC: ABNORMAL MG/DL
LEUKOCYTE EST, POC: ABNORMAL
NITRITE, POC: ABNORMAL
PH, POC: 5.5
PROTEIN, POC: ABNORMAL MG/DL
SPECIFIC GRAVITY, POC: 1.02
UROBILINOGEN, POC: ABNORMAL MG/DL

## 2024-10-30 RX ORDER — CIPROFLOXACIN 500 MG/1
500 TABLET, FILM COATED ORAL 2 TIMES DAILY
Qty: 20 TABLET | Refills: 0 | Status: SHIPPED | OUTPATIENT
Start: 2024-10-30 | End: 2024-11-09

## 2024-10-30 RX ORDER — INSULIN LISPRO 100 [IU]/ML
INJECTION, SOLUTION INTRAVENOUS; SUBCUTANEOUS
Qty: 15 ADJUSTABLE DOSE PRE-FILLED PEN SYRINGE | Refills: 0 | OUTPATIENT
Start: 2024-10-30

## 2024-10-30 RX ORDER — INSULIN LISPRO 100 [IU]/ML
16 INJECTION, SOLUTION INTRAVENOUS; SUBCUTANEOUS
Qty: 15 ADJUSTABLE DOSE PRE-FILLED PEN SYRINGE | Refills: 0 | Status: SHIPPED | OUTPATIENT
Start: 2024-10-30

## 2024-10-30 NOTE — TELEPHONE ENCOUNTER
Patient brought in urine specimen and we sent Rx and also sent for culture.     Also gave her approval letter for her Humalog Pen.

## 2024-10-30 NOTE — TELEPHONE ENCOUNTER
Name of Medication(s) Requested:  Requested Prescriptions     Pending Prescriptions Disp Refills    HUMALOG KWIKPEN 100 UNIT/ML SOPN [Pharmacy Med Name: HUMALOG 100 UNIT/ML KWIKPEN] 15 Adjustable Dose Pre-filled Pen Syringe 0     Sig: INJECT 8 UNITS INTO THE SKIN 3 TIMES DAILY (BEFORE MEALS)       Medication is on current medication list Yes    Dosage and directions were verified? Yes    Quantity verified: 90 day supply     Pharmacy Verified?  Yes    Last Appointment:  10/11/2024    Future appts:  Future Appointments   Date Time Provider Department Center   11/20/2024 10:15 AM Ernestina Quinn MD CBURG [facility] DEP   2/18/2025  3:00 PM SEY YNG VAS US 1 SEYZ CARDIO [facility] Rad/Car   2/18/2025  3:30 PM Clifford Szymanski MD VASC/MED [facility]   6/20/2025 11:00 AM Ernestina Quinn MD CBURG [facility] DEP        (If no appt send self scheduling link. .REFILLAPPT)  Scheduling request sent?     [] Yes  [x] No    Does patient need updated?  [] Yes  [x] No

## 2024-11-01 LAB
CULTURE: ABNORMAL
SPECIMEN DESCRIPTION: ABNORMAL

## 2024-11-20 ENCOUNTER — OFFICE VISIT (OUTPATIENT)
Dept: PRIMARY CARE CLINIC | Age: 74
End: 2024-11-20

## 2024-11-20 VITALS
TEMPERATURE: 98 F | WEIGHT: 219 LBS | OXYGEN SATURATION: 96 % | SYSTOLIC BLOOD PRESSURE: 110 MMHG | HEIGHT: 66 IN | DIASTOLIC BLOOD PRESSURE: 42 MMHG | HEART RATE: 85 BPM | BODY MASS INDEX: 35.2 KG/M2

## 2024-11-20 DIAGNOSIS — N18.30 STAGE 3 CHRONIC KIDNEY DISEASE, UNSPECIFIED WHETHER STAGE 3A OR 3B CKD (HCC): Primary | ICD-10-CM

## 2024-11-20 DIAGNOSIS — I10 ESSENTIAL HYPERTENSION: ICD-10-CM

## 2024-11-20 DIAGNOSIS — E11.610 TYPE 2 DIABETES MELLITUS WITH DIABETIC NEUROPATHIC ARTHROPATHY, WITH LONG-TERM CURRENT USE OF INSULIN (HCC): ICD-10-CM

## 2024-11-20 DIAGNOSIS — Z79.4 TYPE 2 DIABETES MELLITUS WITH DIABETIC NEUROPATHIC ARTHROPATHY, WITH LONG-TERM CURRENT USE OF INSULIN (HCC): ICD-10-CM

## 2024-11-20 LAB — HBA1C MFR BLD: 7.8 %

## 2024-11-20 RX ORDER — ALLOPURINOL 100 MG/1
100 TABLET ORAL DAILY
Qty: 90 TABLET | Refills: 0 | Status: SHIPPED | OUTPATIENT
Start: 2024-11-20

## 2024-11-20 RX ORDER — ASCORBIC ACID 500 MG
500 TABLET ORAL DAILY
Qty: 90 TABLET | Refills: 0 | Status: SHIPPED | OUTPATIENT
Start: 2024-11-20

## 2024-11-20 NOTE — PROGRESS NOTES
Kim Zarco presents today for follow up of Diabetes, CKD, HTN,     Current Outpatient Medications   Medication Sig Dispense Refill    allopurinol (ZYLOPRIM) 100 MG tablet Take 1 tablet by mouth daily 90 tablet 0    vitamin C (ASCORBIC ACID) 500 MG tablet Take 1 tablet by mouth daily 90 tablet 0    empagliflozin (JARDIANCE) 10 MG tablet Take 1 tablet by mouth daily 90 tablet 0    insulin lispro, 1 Unit Dial, (HUMALOG KWIKPEN) 100 UNIT/ML SOPN Inject 16 Units into the skin 3 times daily (before meals) (Patient taking differently: Inject 18 Units into the skin 3 times daily (before meals)) 15 Adjustable Dose Pre-filled Pen Syringe 0    insulin glargine (LANTUS SOLOSTAR) 100 UNIT/ML injection pen Inject 30 Units into the skin nightly (Patient taking differently: Inject 35 Units into the skin nightly) 5 Adjustable Dose Pre-filled Pen Syringe 3    citalopram (CELEXA) 40 MG tablet Take 1 tablet by mouth daily 90 tablet 0    Continuous Glucose Sensor (FREESTYLE MAGALIS 2 SENSOR) MISC 1 each by Does not apply route every 14 days 1 each 5    ezetimibe (ZETIA) 10 MG tablet Take 1 tablet by mouth daily 90 tablet 0    famotidine (PEPCID) 40 MG tablet Take 1 tablet by mouth every morning 90 tablet 0    lisinopril (PRINIVIL;ZESTRIL) 40 MG tablet Take 1 tablet by mouth daily 90 tablet 0    metoprolol tartrate (LOPRESSOR) 50 MG tablet Take 1 tablet by mouth 2 times daily 180 tablet 0    niacin (NIASPAN) 500 MG extended release tablet Take 1 tablet by mouth nightly at bedtime. 90 tablet 0    pantoprazole (PROTONIX) 40 MG tablet Take 1 tablet by mouth daily 90 tablet 0    potassium chloride (KLOR-CON M) 10 MEQ extended release tablet Take 1 tablet by mouth every other day 45 tablet 0    pravastatin (PRAVACHOL) 40 MG tablet Take 1 tablet by mouth nightly 90 tablet 0    Dulaglutide (TRULICITY) 3 MG/0.5ML SOPN Inject 3 mg into the skin once a week 6 mL 1    Cholecalciferol (VITAMIN D3) 20 MCG (800 UNIT) TABS Take 1 tablet by mouth daily

## 2024-11-22 ENCOUNTER — TELEPHONE (OUTPATIENT)
Dept: PRIMARY CARE CLINIC | Age: 74
End: 2024-11-22

## 2024-11-22 NOTE — TELEPHONE ENCOUNTER
Patient requesting a call back at [phone], she is concerned about taking her   allopurinol (ZYLOPRIM) 100 MG tablet .  States it can affect the kidneys and also wanted to let Dr. Quinn know that she is taking 18 units of her   insulin lispro, 1 Unit Dial, (HUMALOG KWIKPEN) 100 UNIT/ML SOPN    And is taking 35 units of her   insulin glargine (LANTUS SOLOSTAR) 100 UNIT/ML injection pen. Thank you.

## 2024-11-27 ENCOUNTER — TELEPHONE (OUTPATIENT)
Dept: PRIMARY CARE CLINIC | Age: 74
End: 2024-11-27

## 2024-11-27 RX ORDER — CIPROFLOXACIN 500 MG/1
500 TABLET, FILM COATED ORAL 2 TIMES DAILY
Qty: 20 TABLET | Refills: 0 | Status: SHIPPED | OUTPATIENT
Start: 2024-11-27 | End: 2024-12-07

## 2024-11-27 NOTE — TELEPHONE ENCOUNTER
Patient called stating the the medication you put her on for diabetes has given her the worst UTI she has every had. Would like something called in for it .    She also has had diarrhea non stop since yesterday from it also. Would like a call back at [phone] with any suggestions.

## 2024-11-29 ENCOUNTER — TELEPHONE (OUTPATIENT)
Dept: PRIMARY CARE CLINIC | Age: 74
End: 2024-11-29

## 2024-11-29 NOTE — TELEPHONE ENCOUNTER
Name of Medication(s) Requested:  Requested Prescriptions     Pending Prescriptions Disp Refills    HUMALOG KWIKPEN 100 UNIT/ML SOPN [Pharmacy Med Name: HUMALOG 100 UNIT/ML KWIKPEN]       Sig: INJECT 8 UNITS INTO THE SKIN 3 TIMES DAILY (BEFORE MEALS)       Medication is on current medication list Yes    Dosage and directions were verified? Yes    Quantity verified: 90 day supply     Pharmacy Verified?  Yes    Last Appointment:  11/20/2024    Future appts:  Future Appointments   Date Time Provider Department Center   1/15/2025  2:30 PM Ernestina Quinn MD CBURG [facility] E[facility] DEP   2/18/2025  3:00 PM SEY YNG VAS US 1 SEYZ CARDIO [facility] Rad/Car   2/18/2025  3:30 PM Clifford Szymanski MD VASC/MED [facility]   6/20/2025 11:00 AM Ernestina Qunin MD CBURG [facility] E[facility] DEP        (If no appt send self scheduling link. .REFILLAPPT)  Scheduling request sent?     [] Yes  [x] No    Does patient need updated?  [] Yes  [x] No

## 2024-11-29 NOTE — TELEPHONE ENCOUNTER
Patient called in requesting refill on her insulin lispro, 1 Unit Dial, (HUMALOG KWIKPEN) 100 UNIT/ML SOPN 18 unit 3 times daily    [facility] [address]

## 2024-12-02 RX ORDER — INSULIN LISPRO 100 [IU]/ML
18 INJECTION, SOLUTION INTRAVENOUS; SUBCUTANEOUS
Qty: 15 ADJUSTABLE DOSE PRE-FILLED PEN SYRINGE | Refills: 1 | Status: SHIPPED | OUTPATIENT
Start: 2024-12-02

## 2024-12-02 RX ORDER — INSULIN LISPRO 100 [IU]/ML
18 INJECTION, SOLUTION INTRAVENOUS; SUBCUTANEOUS
Qty: 3 ADJUSTABLE DOSE PRE-FILLED PEN SYRINGE | Refills: 3 | Status: SHIPPED
Start: 2024-12-02 | End: 2024-12-02

## 2024-12-02 NOTE — TELEPHONE ENCOUNTER
Patient called in requesting refill on insulin lispro, 1 Unit Dial, (HUMALOG KWIKPEN) 100 UNIT/ML SOPN . She said she takes 18 units 3 times a day. Please send to [facility]. She has been without her insulin since Saturday.

## 2024-12-02 NOTE — TELEPHONE ENCOUNTER
Name of Medication(s) Requested:  Requested Prescriptions     Pending Prescriptions Disp Refills    insulin lispro, 1 Unit Dial, (HUMALOG KWIKPEN) 100 UNIT/ML SOPN 3 Adjustable Dose Pre-filled Pen Syringe 3     Sig: Inject 18 Units into the skin 3 times daily (before meals)       Medication is on current medication list Yes    Dosage and directions were verified? Yes    Quantity verified: 90 day supply     Pharmacy Verified?  Yes    Last Appointment:  11/20/2024    Future appts:  Future Appointments   Date Time Provider Department Center   1/15/2025  2:30 PM Ernestina Quinn MD [facility] ECC DEP   2/18/2025  3:00 PM SEY YNG VAS US 1 SEY[facility] Rad/Car   2/18/2025  3:30 PM Clifford Szymanski MD VASC/MED [facility]   6/20/2025 11:00 AM Ernestina Quinn MD [facility] E[facility] DEP        (If no appt send self scheduling link. .REFILLAPPT)  Scheduling request sent?     [] Yes  [x] No    Does patient need updated?  [] Yes  [x] No

## 2024-12-09 RX ORDER — ALBUTEROL SULFATE 90 UG/1
INHALANT RESPIRATORY (INHALATION)
Qty: 8.5 EACH | Refills: 2 | Status: SHIPPED | OUTPATIENT
Start: 2024-12-09

## 2024-12-09 NOTE — TELEPHONE ENCOUNTER
Name of Medication(s) Requested:  Requested Prescriptions     Pending Prescriptions Disp Refills    albuterol sulfate HFA (PROVENTIL;VENTOLIN;PROAIR) 108 (90 Base) MCG/ACT inhaler [Pharmacy Med Name: ALBUTEROL HFA (PROAIR) INHALER] 8.5 each 2     Sig: INHALE 2 PUFFS INTO THE LUNGS EVERY 6 HOURS AS NEEDED FOR WHEEZE       Medication is on current medication list Yes    Dosage and directions were verified? Yes    Quantity verified: 90 day supply     Pharmacy Verified?  Yes    Last Appointment:  11/20/2024    Future appts:  Future Appointments   Date Time Provider Department Center   1/15/2025  2:30 PM Ernestina Quinn MD [facility] ECC DEP   2/18/2025  3:00 PM SEY YNG VAS US 1 SE[facility] Rad/Car   2/18/2025  3:30 PM Clifford Szymanski MD VASC/MED [facility]   6/20/2025 11:00 AM Ernestina Quinn MD [facility] ECC DEP        (If no appt send self scheduling link. .REFILLAPPT)  Scheduling request sent?     [] Yes  [x] No    Does patient need updated?  [] Yes  [x] No

## 2024-12-16 ENCOUNTER — TELEPHONE (OUTPATIENT)
Dept: PRIMARY CARE CLINIC | Age: 74
End: 2024-12-16

## 2024-12-16 DIAGNOSIS — E11.610 TYPE 2 DIABETES MELLITUS WITH DIABETIC NEUROPATHIC ARTHROPATHY, WITH LONG-TERM CURRENT USE OF INSULIN (HCC): Primary | ICD-10-CM

## 2024-12-16 DIAGNOSIS — Z79.4 TYPE 2 DIABETES MELLITUS WITH DIABETIC NEUROPATHIC ARTHROPATHY, WITH LONG-TERM CURRENT USE OF INSULIN (HCC): Primary | ICD-10-CM

## 2024-12-16 NOTE — TELEPHONE ENCOUNTER
Patient phoned late Friday afternoon saying that she is just not happy with her insulin shots. Her BS has been between 240-250. Only time when it is low is when she hasn't eaten at all and then it is 118. Even with night shot, her BS is high in the mornings before she even eats.

## 2024-12-19 NOTE — TELEPHONE ENCOUNTER
We see that you have an appointment scheduled, given the current COVID-19 pandemic, Dr. Lynn Armstrong  would like to change that visit to a video visit. In order to schedule this appointment you will need a camera-enabled device (smart phone, tablet or computer) and reliable WiFi. Do you wish to schedule this appointment? YES    We want to confirm that, for purposes of billing, this is a virtual visit with your provider for which we will submit a claim for reimbursement with your insurance company. You will be responsible for any copays, coinsurance amounts or other amounts not covered by your insurance company. If you do not accept this, unfortunately we will not be able to schedule a virtual visit with the provider. Do you accept?  YES no concerns

## 2024-12-27 ENCOUNTER — HOSPITAL ENCOUNTER (INPATIENT)
Age: 74
LOS: 7 days | Discharge: HOME | End: 2025-01-03
Attending: EMERGENCY MEDICINE | Admitting: INTERNAL MEDICINE
Payer: COMMERCIAL

## 2024-12-27 ENCOUNTER — APPOINTMENT (OUTPATIENT)
Dept: GENERAL RADIOLOGY | Age: 74
End: 2024-12-27
Payer: COMMERCIAL

## 2024-12-27 DIAGNOSIS — A41.9 SEPSIS DUE TO PNEUMONIA (HCC): ICD-10-CM

## 2024-12-27 DIAGNOSIS — I50.33 ACUTE ON CHRONIC DIASTOLIC CONGESTIVE HEART FAILURE (HCC): ICD-10-CM

## 2024-12-27 DIAGNOSIS — J96.01 ACUTE RESPIRATORY FAILURE WITH HYPOXIA: Primary | ICD-10-CM

## 2024-12-27 DIAGNOSIS — J18.9 SEPSIS DUE TO PNEUMONIA (HCC): ICD-10-CM

## 2024-12-27 DIAGNOSIS — Z72.821 POOR SLEEP HYGIENE: ICD-10-CM

## 2024-12-27 DIAGNOSIS — R06.83 SNORES: ICD-10-CM

## 2024-12-27 LAB
AADO2: 588.8 MMHG
ALBUMIN SERPL-MCNC: 3.4 G/DL (ref 3.5–5.2)
ALP SERPL-CCNC: 52 U/L (ref 35–104)
ALT SERPL-CCNC: 11 U/L (ref 0–32)
ANION GAP SERPL CALCULATED.3IONS-SCNC: 13 MMOL/L (ref 7–16)
AST SERPL-CCNC: 17 U/L (ref 0–31)
B PARAP IS1001 DNA NPH QL NAA+NON-PROBE: NOT DETECTED
B PERT DNA SPEC QL NAA+PROBE: NOT DETECTED
B-OH-BUTYR SERPL-MCNC: 0.44 MMOL/L (ref 0.02–0.27)
B.E.: -3.9 MMOL/L (ref -3–3)
BACTERIA URNS QL MICRO: ABNORMAL
BASOPHILS # BLD: 0.05 K/UL (ref 0–0.2)
BASOPHILS NFR BLD: 1 % (ref 0–2)
BILIRUB SERPL-MCNC: 1.1 MG/DL (ref 0–1.2)
BILIRUB UR QL STRIP: NEGATIVE
BNP SERPL-MCNC: 9459 PG/ML (ref 0–125)
BUN SERPL-MCNC: 36 MG/DL (ref 6–23)
C PNEUM DNA NPH QL NAA+NON-PROBE: NOT DETECTED
CALCIUM SERPL-MCNC: 9.3 MG/DL (ref 8.6–10.2)
CHLORIDE SERPL-SCNC: 98 MMOL/L (ref 98–107)
CLARITY UR: CLEAR
CO2 SERPL-SCNC: 22 MMOL/L (ref 22–29)
COHB: 1 % (ref 0–1.5)
COLOR UR: YELLOW
CREAT SERPL-MCNC: 1.6 MG/DL (ref 0.5–1)
CRITICAL: ABNORMAL
DATE ANALYZED: ABNORMAL
DATE OF COLLECTION: ABNORMAL
EOSINOPHIL # BLD: 0 K/UL (ref 0.05–0.5)
EOSINOPHILS RELATIVE PERCENT: 0 % (ref 0–6)
ERYTHROCYTE [DISTWIDTH] IN BLOOD BY AUTOMATED COUNT: 13.3 % (ref 11.5–15)
FIO2: 100 %
FLUAV RNA NPH QL NAA+NON-PROBE: NOT DETECTED
FLUBV RNA NPH QL NAA+NON-PROBE: NOT DETECTED
GFR, ESTIMATED: 34 ML/MIN/1.73M2
GLUCOSE BLD-MCNC: 393 MG/DL (ref 74–99)
GLUCOSE SERPL-MCNC: 441 MG/DL (ref 74–99)
GLUCOSE UR STRIP-MCNC: >=1000 MG/DL
HADV DNA NPH QL NAA+NON-PROBE: NOT DETECTED
HCO3: 19.5 MMOL/L (ref 22–26)
HCOV 229E RNA NPH QL NAA+NON-PROBE: NOT DETECTED
HCOV HKU1 RNA NPH QL NAA+NON-PROBE: NOT DETECTED
HCOV NL63 RNA NPH QL NAA+NON-PROBE: NOT DETECTED
HCOV OC43 RNA NPH QL NAA+NON-PROBE: NOT DETECTED
HCT VFR BLD AUTO: 39.3 % (ref 34–48)
HGB BLD-MCNC: 12.6 G/DL (ref 11.5–15.5)
HGB UR QL STRIP.AUTO: ABNORMAL
HHB: 6.6 % (ref 0–5)
HMPV RNA NPH QL NAA+NON-PROBE: NOT DETECTED
HPIV1 RNA NPH QL NAA+NON-PROBE: NOT DETECTED
HPIV2 RNA NPH QL NAA+NON-PROBE: NOT DETECTED
HPIV3 RNA NPH QL NAA+NON-PROBE: NOT DETECTED
HPIV4 RNA NPH QL NAA+NON-PROBE: NOT DETECTED
IMM GRANULOCYTES # BLD AUTO: 0.07 K/UL (ref 0–0.58)
IMM GRANULOCYTES NFR BLD: 1 % (ref 0–5)
KETONES UR STRIP-MCNC: ABNORMAL MG/DL
LAB: ABNORMAL
LACTATE BLDV-SCNC: 4.2 MMOL/L (ref 0.5–1.9)
LACTATE BLDV-SCNC: 5.4 MMOL/L (ref 0.5–1.9)
LEUKOCYTE ESTERASE UR QL STRIP: NEGATIVE
LYMPHOCYTES NFR BLD: 0.62 K/UL (ref 1.5–4)
LYMPHOCYTES RELATIVE PERCENT: 6 % (ref 20–42)
Lab: 1525
M PNEUMO DNA NPH QL NAA+NON-PROBE: NOT DETECTED
MCH RBC QN AUTO: 29 PG (ref 26–35)
MCHC RBC AUTO-ENTMCNC: 32.1 G/DL (ref 32–34.5)
MCV RBC AUTO: 90.6 FL (ref 80–99.9)
METHB: 0.2 % (ref 0–1.5)
MODE: ABNORMAL
MONOCYTES NFR BLD: 0.33 K/UL (ref 0.1–0.95)
MONOCYTES NFR BLD: 3 % (ref 2–12)
NEUTROPHILS NFR BLD: 90 % (ref 43–80)
NEUTS SEG NFR BLD: 9.48 K/UL (ref 1.8–7.3)
NITRITE UR QL STRIP: NEGATIVE
O2 SATURATION: 93.3 % (ref 92–98.5)
O2HB: 92.2 % (ref 94–97)
OPERATOR ID: 1023
PATIENT TEMP: 37 C
PCO2: 30.8 MMHG (ref 35–45)
PEEP/CPAP: 8 CMH2O
PFO2: 0.68 MMHG/%
PH BLOOD GAS: 7.42 (ref 7.35–7.45)
PH UR STRIP: 5.5 [PH] (ref 5–9)
PH VENOUS: 7.37 (ref 7.35–7.45)
PLATELET # BLD AUTO: 177 K/UL (ref 130–450)
PMV BLD AUTO: 11 FL (ref 7–12)
PO2: 68.4 MMHG (ref 75–100)
POTASSIUM SERPL-SCNC: 4.6 MMOL/L (ref 3.5–5)
PROCALCITONIN SERPL-MCNC: 9.07 NG/ML (ref 0–0.08)
PROT SERPL-MCNC: 6.4 G/DL (ref 6.4–8.3)
PROT UR STRIP-MCNC: 100 MG/DL
PS: 16 CMH20
RBC # BLD AUTO: 4.34 M/UL (ref 3.5–5.5)
RBC #/AREA URNS HPF: ABNORMAL /HPF
RI(T): 8.61
RSV RNA NPH QL NAA+NON-PROBE: NOT DETECTED
RV+EV RNA NPH QL NAA+NON-PROBE: NOT DETECTED
SARS-COV-2 RNA NPH QL NAA+NON-PROBE: NOT DETECTED
SODIUM SERPL-SCNC: 133 MMOL/L (ref 132–146)
SOURCE, BLOOD GAS: ABNORMAL
SP GR UR STRIP: 1.02 (ref 1–1.03)
SPECIMEN DESCRIPTION: NORMAL
THB: 13.3 G/DL (ref 11.5–16.5)
TIME ANALYZED: 1534
TROPONIN I SERPL HS-MCNC: 52 NG/L (ref 0–9)
TROPONIN I SERPL HS-MCNC: 61 NG/L (ref 0–9)
TROPONIN I SERPL HS-MCNC: 68 NG/L (ref 0–9)
UROBILINOGEN UR STRIP-ACNC: 0.2 EU/DL (ref 0–1)
WBC #/AREA URNS HPF: ABNORMAL /HPF
WBC OTHER # BLD: 10.6 K/UL (ref 4.5–11.5)

## 2024-12-27 PROCEDURE — 94660 CPAP INITIATION&MGMT: CPT

## 2024-12-27 PROCEDURE — 6360000002 HC RX W HCPCS: Performed by: INTERNAL MEDICINE

## 2024-12-27 PROCEDURE — 5A09457 ASSISTANCE WITH RESPIRATORY VENTILATION, 24-96 CONSECUTIVE HOURS, CONTINUOUS POSITIVE AIRWAY PRESSURE: ICD-10-PCS

## 2024-12-27 PROCEDURE — 93005 ELECTROCARDIOGRAM TRACING: CPT

## 2024-12-27 PROCEDURE — 6370000000 HC RX 637 (ALT 250 FOR IP)

## 2024-12-27 PROCEDURE — 96360 HYDRATION IV INFUSION INIT: CPT

## 2024-12-27 PROCEDURE — 85025 COMPLETE CBC W/AUTO DIFF WBC: CPT

## 2024-12-27 PROCEDURE — 96374 THER/PROPH/DIAG INJ IV PUSH: CPT

## 2024-12-27 PROCEDURE — 6360000002 HC RX W HCPCS

## 2024-12-27 PROCEDURE — 96361 HYDRATE IV INFUSION ADD-ON: CPT

## 2024-12-27 PROCEDURE — 87449 NOS EACH ORGANISM AG IA: CPT

## 2024-12-27 PROCEDURE — 2140000000 HC CCU INTERMEDIATE R&B

## 2024-12-27 PROCEDURE — 2580000003 HC RX 258

## 2024-12-27 PROCEDURE — 83880 ASSAY OF NATRIURETIC PEPTIDE: CPT

## 2024-12-27 PROCEDURE — 71045 X-RAY EXAM CHEST 1 VIEW: CPT

## 2024-12-27 PROCEDURE — 6370000000 HC RX 637 (ALT 250 FOR IP): Performed by: INTERNAL MEDICINE

## 2024-12-27 PROCEDURE — 80053 COMPREHEN METABOLIC PANEL: CPT

## 2024-12-27 PROCEDURE — 82947 ASSAY GLUCOSE BLOOD QUANT: CPT

## 2024-12-27 PROCEDURE — 2500000003 HC RX 250 WO HCPCS: Performed by: INTERNAL MEDICINE

## 2024-12-27 PROCEDURE — 84484 ASSAY OF TROPONIN QUANT: CPT

## 2024-12-27 PROCEDURE — 83605 ASSAY OF LACTIC ACID: CPT

## 2024-12-27 PROCEDURE — 99291 CRITICAL CARE FIRST HOUR: CPT

## 2024-12-27 PROCEDURE — 2500000003 HC RX 250 WO HCPCS

## 2024-12-27 PROCEDURE — 0202U NFCT DS 22 TRGT SARS-COV-2: CPT

## 2024-12-27 PROCEDURE — 96375 TX/PRO/DX INJ NEW DRUG ADDON: CPT

## 2024-12-27 PROCEDURE — 87040 BLOOD CULTURE FOR BACTERIA: CPT

## 2024-12-27 PROCEDURE — 87086 URINE CULTURE/COLONY COUNT: CPT

## 2024-12-27 PROCEDURE — 99223 1ST HOSP IP/OBS HIGH 75: CPT | Performed by: INTERNAL MEDICINE

## 2024-12-27 PROCEDURE — 36415 COLL VENOUS BLD VENIPUNCTURE: CPT

## 2024-12-27 PROCEDURE — 2580000003 HC RX 258: Performed by: INTERNAL MEDICINE

## 2024-12-27 PROCEDURE — 82805 BLOOD GASES W/O2 SATURATION: CPT

## 2024-12-27 PROCEDURE — 82010 KETONE BODYS QUAN: CPT

## 2024-12-27 PROCEDURE — 82800 BLOOD PH: CPT

## 2024-12-27 PROCEDURE — 87899 AGENT NOS ASSAY W/OPTIC: CPT

## 2024-12-27 PROCEDURE — 84145 PROCALCITONIN (PCT): CPT

## 2024-12-27 PROCEDURE — 94640 AIRWAY INHALATION TREATMENT: CPT

## 2024-12-27 PROCEDURE — 81001 URINALYSIS AUTO W/SCOPE: CPT

## 2024-12-27 RX ORDER — INSULIN GLARGINE 100 [IU]/ML
35 INJECTION, SOLUTION SUBCUTANEOUS NIGHTLY
Status: DISCONTINUED | OUTPATIENT
Start: 2024-12-27 | End: 2024-12-29

## 2024-12-27 RX ORDER — PRAVASTATIN SODIUM 20 MG
40 TABLET ORAL NIGHTLY
Status: DISCONTINUED | OUTPATIENT
Start: 2024-12-27 | End: 2025-01-04 | Stop reason: HOSPADM

## 2024-12-27 RX ORDER — ACETAMINOPHEN 325 MG/1
650 TABLET ORAL ONCE
Status: DISCONTINUED | OUTPATIENT
Start: 2024-12-27 | End: 2024-12-27

## 2024-12-27 RX ORDER — BUDESONIDE 0.5 MG/2ML
0.5 INHALANT ORAL
Status: DISCONTINUED | OUTPATIENT
Start: 2024-12-27 | End: 2025-01-01

## 2024-12-27 RX ORDER — LETROZOLE 2.5 MG/1
2.5 TABLET, FILM COATED ORAL DAILY
Status: DISCONTINUED | OUTPATIENT
Start: 2024-12-28 | End: 2025-01-04 | Stop reason: HOSPADM

## 2024-12-27 RX ORDER — LANOLIN ALCOHOL/MO/W.PET/CERES
500 CREAM (GRAM) TOPICAL NIGHTLY
Status: DISCONTINUED | OUTPATIENT
Start: 2024-12-27 | End: 2025-01-04 | Stop reason: HOSPADM

## 2024-12-27 RX ORDER — ARFORMOTEROL TARTRATE 15 UG/2ML
15 SOLUTION RESPIRATORY (INHALATION)
Status: DISCONTINUED | OUTPATIENT
Start: 2024-12-27 | End: 2025-01-04 | Stop reason: HOSPADM

## 2024-12-27 RX ORDER — INSULIN LISPRO 100 [IU]/ML
10 INJECTION, SOLUTION INTRAVENOUS; SUBCUTANEOUS ONCE
Status: COMPLETED | OUTPATIENT
Start: 2024-12-27 | End: 2024-12-27

## 2024-12-27 RX ORDER — POTASSIUM CHLORIDE 1500 MG/1
40 TABLET, EXTENDED RELEASE ORAL PRN
Status: DISCONTINUED | OUTPATIENT
Start: 2024-12-27 | End: 2025-01-04 | Stop reason: HOSPADM

## 2024-12-27 RX ORDER — ACETAMINOPHEN 650 MG/1
650 SUPPOSITORY RECTAL EVERY 6 HOURS PRN
Status: DISCONTINUED | OUTPATIENT
Start: 2024-12-27 | End: 2025-01-04 | Stop reason: HOSPADM

## 2024-12-27 RX ORDER — METOPROLOL TARTRATE 50 MG
50 TABLET ORAL 2 TIMES DAILY
Status: DISCONTINUED | OUTPATIENT
Start: 2024-12-27 | End: 2025-01-04 | Stop reason: HOSPADM

## 2024-12-27 RX ORDER — MAGNESIUM SULFATE IN WATER 40 MG/ML
2000 INJECTION, SOLUTION INTRAVENOUS PRN
Status: DISCONTINUED | OUTPATIENT
Start: 2024-12-27 | End: 2025-01-04 | Stop reason: HOSPADM

## 2024-12-27 RX ORDER — 0.9 % SODIUM CHLORIDE 0.9 %
1000 INTRAVENOUS SOLUTION INTRAVENOUS ONCE
Status: COMPLETED | OUTPATIENT
Start: 2024-12-27 | End: 2024-12-27

## 2024-12-27 RX ORDER — ASPIRIN 81 MG/1
81 TABLET ORAL NIGHTLY
Status: DISCONTINUED | OUTPATIENT
Start: 2024-12-27 | End: 2025-01-04 | Stop reason: HOSPADM

## 2024-12-27 RX ORDER — HYDRALAZINE HYDROCHLORIDE 20 MG/ML
10 INJECTION INTRAMUSCULAR; INTRAVENOUS EVERY 6 HOURS PRN
Status: DISCONTINUED | OUTPATIENT
Start: 2024-12-27 | End: 2025-01-04 | Stop reason: HOSPADM

## 2024-12-27 RX ORDER — ONDANSETRON 2 MG/ML
4 INJECTION INTRAMUSCULAR; INTRAVENOUS EVERY 6 HOURS PRN
Status: DISCONTINUED | OUTPATIENT
Start: 2024-12-27 | End: 2025-01-04 | Stop reason: HOSPADM

## 2024-12-27 RX ORDER — INSULIN LISPRO 100 [IU]/ML
18 INJECTION, SOLUTION INTRAVENOUS; SUBCUTANEOUS
Status: DISCONTINUED | OUTPATIENT
Start: 2024-12-27 | End: 2024-12-29

## 2024-12-27 RX ORDER — ASCORBIC ACID 500 MG
500 TABLET ORAL DAILY
Status: DISCONTINUED | OUTPATIENT
Start: 2024-12-28 | End: 2025-01-04 | Stop reason: HOSPADM

## 2024-12-27 RX ORDER — PREGABALIN 50 MG/1
50 CAPSULE ORAL 2 TIMES DAILY
Status: DISCONTINUED | OUTPATIENT
Start: 2024-12-27 | End: 2025-01-04 | Stop reason: HOSPADM

## 2024-12-27 RX ORDER — 0.9 % SODIUM CHLORIDE 0.9 %
1000 INTRAVENOUS SOLUTION INTRAVENOUS ONCE
Status: DISCONTINUED | OUTPATIENT
Start: 2024-12-27 | End: 2024-12-27

## 2024-12-27 RX ORDER — ENOXAPARIN SODIUM 100 MG/ML
30 INJECTION SUBCUTANEOUS 2 TIMES DAILY
Status: DISCONTINUED | OUTPATIENT
Start: 2024-12-27 | End: 2025-01-04 | Stop reason: HOSPADM

## 2024-12-27 RX ORDER — ALLOPURINOL 100 MG/1
100 TABLET ORAL DAILY
Status: DISCONTINUED | OUTPATIENT
Start: 2024-12-28 | End: 2025-01-04 | Stop reason: HOSPADM

## 2024-12-27 RX ORDER — VITAMIN B COMPLEX
1 TABLET ORAL DAILY
Status: DISCONTINUED | OUTPATIENT
Start: 2024-12-28 | End: 2025-01-04 | Stop reason: HOSPADM

## 2024-12-27 RX ORDER — EZETIMIBE 10 MG/1
10 TABLET ORAL DAILY
Status: DISCONTINUED | OUTPATIENT
Start: 2024-12-28 | End: 2025-01-04 | Stop reason: HOSPADM

## 2024-12-27 RX ORDER — POTASSIUM CHLORIDE 7.45 MG/ML
10 INJECTION INTRAVENOUS PRN
Status: DISCONTINUED | OUTPATIENT
Start: 2024-12-27 | End: 2025-01-04 | Stop reason: HOSPADM

## 2024-12-27 RX ORDER — ONDANSETRON 4 MG/1
4 TABLET, ORALLY DISINTEGRATING ORAL EVERY 8 HOURS PRN
Status: DISCONTINUED | OUTPATIENT
Start: 2024-12-27 | End: 2025-01-04 | Stop reason: HOSPADM

## 2024-12-27 RX ORDER — POLYETHYLENE GLYCOL 3350 17 G/17G
17 POWDER, FOR SOLUTION ORAL DAILY PRN
Status: DISCONTINUED | OUTPATIENT
Start: 2024-12-27 | End: 2025-01-04 | Stop reason: HOSPADM

## 2024-12-27 RX ORDER — SODIUM CHLORIDE 9 MG/ML
INJECTION, SOLUTION INTRAVENOUS CONTINUOUS
Status: DISCONTINUED | OUTPATIENT
Start: 2024-12-27 | End: 2024-12-28

## 2024-12-27 RX ORDER — SODIUM CHLORIDE 0.9 % (FLUSH) 0.9 %
5-40 SYRINGE (ML) INJECTION EVERY 12 HOURS SCHEDULED
Status: DISCONTINUED | OUTPATIENT
Start: 2024-12-27 | End: 2025-01-04 | Stop reason: HOSPADM

## 2024-12-27 RX ORDER — POTASSIUM CHLORIDE 750 MG/1
10 TABLET, EXTENDED RELEASE ORAL EVERY OTHER DAY
Status: DISCONTINUED | OUTPATIENT
Start: 2024-12-28 | End: 2025-01-04 | Stop reason: HOSPADM

## 2024-12-27 RX ORDER — SODIUM CHLORIDE 0.9 % (FLUSH) 0.9 %
5-40 SYRINGE (ML) INJECTION PRN
Status: DISCONTINUED | OUTPATIENT
Start: 2024-12-27 | End: 2025-01-04 | Stop reason: HOSPADM

## 2024-12-27 RX ORDER — SODIUM CHLORIDE 9 MG/ML
INJECTION, SOLUTION INTRAVENOUS PRN
Status: DISCONTINUED | OUTPATIENT
Start: 2024-12-27 | End: 2025-01-04 | Stop reason: HOSPADM

## 2024-12-27 RX ORDER — DEXTROSE MONOHYDRATE 100 MG/ML
INJECTION, SOLUTION INTRAVENOUS CONTINUOUS PRN
Status: DISCONTINUED | OUTPATIENT
Start: 2024-12-27 | End: 2025-01-04 | Stop reason: HOSPADM

## 2024-12-27 RX ORDER — CALCIUM CARBONATE 500 MG/1
500 TABLET, CHEWABLE ORAL 3 TIMES DAILY PRN
Status: DISCONTINUED | OUTPATIENT
Start: 2024-12-27 | End: 2025-01-04 | Stop reason: HOSPADM

## 2024-12-27 RX ORDER — GLUCAGON 1 MG/ML
1 KIT INJECTION PRN
Status: DISCONTINUED | OUTPATIENT
Start: 2024-12-27 | End: 2025-01-04 | Stop reason: HOSPADM

## 2024-12-27 RX ORDER — ACETAMINOPHEN 325 MG/1
650 TABLET ORAL EVERY 6 HOURS PRN
Status: DISCONTINUED | OUTPATIENT
Start: 2024-12-27 | End: 2025-01-04 | Stop reason: HOSPADM

## 2024-12-27 RX ORDER — BENZONATATE 100 MG/1
100 CAPSULE ORAL 3 TIMES DAILY PRN
Status: DISCONTINUED | OUTPATIENT
Start: 2024-12-27 | End: 2025-01-04 | Stop reason: HOSPADM

## 2024-12-27 RX ORDER — M-VIT,TX,IRON,MINS/CALC/FOLIC 27MG-0.4MG
1 TABLET ORAL DAILY
Status: DISCONTINUED | OUTPATIENT
Start: 2024-12-28 | End: 2025-01-04 | Stop reason: HOSPADM

## 2024-12-27 RX ORDER — CITALOPRAM HYDROBROMIDE 20 MG/1
40 TABLET ORAL DAILY
Status: DISCONTINUED | OUTPATIENT
Start: 2024-12-28 | End: 2025-01-04 | Stop reason: HOSPADM

## 2024-12-27 RX ORDER — IPRATROPIUM BROMIDE AND ALBUTEROL SULFATE 2.5; .5 MG/3ML; MG/3ML
1 SOLUTION RESPIRATORY (INHALATION)
Status: DISCONTINUED | OUTPATIENT
Start: 2024-12-27 | End: 2025-01-04 | Stop reason: HOSPADM

## 2024-12-27 RX ORDER — LISINOPRIL 20 MG/1
40 TABLET ORAL DAILY
Status: DISCONTINUED | OUTPATIENT
Start: 2024-12-28 | End: 2025-01-04 | Stop reason: HOSPADM

## 2024-12-27 RX ORDER — PANTOPRAZOLE SODIUM 40 MG/1
40 TABLET, DELAYED RELEASE ORAL DAILY
Status: DISCONTINUED | OUTPATIENT
Start: 2024-12-28 | End: 2025-01-04 | Stop reason: HOSPADM

## 2024-12-27 RX ORDER — TROSPIUM CHLORIDE 20 MG/1
20 TABLET, FILM COATED ORAL
Status: DISCONTINUED | OUTPATIENT
Start: 2024-12-28 | End: 2025-01-04 | Stop reason: HOSPADM

## 2024-12-27 RX ORDER — HYDROCODONE BITARTRATE AND ACETAMINOPHEN 5; 325 MG/1; MG/1
1 TABLET ORAL 3 TIMES DAILY PRN
Status: DISCONTINUED | OUTPATIENT
Start: 2024-12-27 | End: 2025-01-04 | Stop reason: HOSPADM

## 2024-12-27 RX ORDER — ACETAMINOPHEN 650 MG/1
650 SUPPOSITORY RECTAL ONCE
Status: COMPLETED | OUTPATIENT
Start: 2024-12-27 | End: 2024-12-27

## 2024-12-27 RX ORDER — FAMOTIDINE 20 MG/1
20 TABLET, FILM COATED ORAL EVERY MORNING
Status: DISCONTINUED | OUTPATIENT
Start: 2024-12-28 | End: 2025-01-04 | Stop reason: HOSPADM

## 2024-12-27 RX ADMIN — INSULIN GLARGINE 35 UNITS: 100 INJECTION, SOLUTION SUBCUTANEOUS at 22:09

## 2024-12-27 RX ADMIN — BUDESONIDE 500 MCG: 0.5 SUSPENSION RESPIRATORY (INHALATION) at 20:35

## 2024-12-27 RX ADMIN — PREGABALIN 50 MG: 50 CAPSULE ORAL at 21:56

## 2024-12-27 RX ADMIN — INSULIN LISPRO 10 UNITS: 100 INJECTION, SOLUTION INTRAVENOUS; SUBCUTANEOUS at 22:09

## 2024-12-27 RX ADMIN — Medication 500 MG: at 23:36

## 2024-12-27 RX ADMIN — HYDROCODONE BITARTRATE AND ACETAMINOPHEN 1 TABLET: 5; 325 TABLET ORAL at 23:36

## 2024-12-27 RX ADMIN — SODIUM CHLORIDE: 9 INJECTION, SOLUTION INTRAVENOUS at 19:00

## 2024-12-27 RX ADMIN — ENOXAPARIN SODIUM 30 MG: 100 INJECTION SUBCUTANEOUS at 21:56

## 2024-12-27 RX ADMIN — ACETAMINOPHEN 650 MG: 650 SUPPOSITORY RECTAL at 15:19

## 2024-12-27 RX ADMIN — SODIUM CHLORIDE 1000 ML: 9 INJECTION, SOLUTION INTRAVENOUS at 15:22

## 2024-12-27 RX ADMIN — DOXYCYCLINE 100 MG: 100 INJECTION, POWDER, LYOPHILIZED, FOR SOLUTION INTRAVENOUS at 17:40

## 2024-12-27 RX ADMIN — WATER 2000 MG: 1 INJECTION INTRAMUSCULAR; INTRAVENOUS; SUBCUTANEOUS at 17:23

## 2024-12-27 RX ADMIN — SODIUM CHLORIDE 1000 ML: 9 INJECTION, SOLUTION INTRAVENOUS at 17:41

## 2024-12-27 RX ADMIN — ARFORMOTEROL TARTRATE 15 MCG: 15 SOLUTION RESPIRATORY (INHALATION) at 20:35

## 2024-12-27 RX ADMIN — SODIUM CHLORIDE, PRESERVATIVE FREE 10 ML: 5 INJECTION INTRAVENOUS at 21:56

## 2024-12-27 RX ADMIN — ASPIRIN 81 MG: 81 TABLET, COATED ORAL at 21:55

## 2024-12-27 RX ADMIN — METOPROLOL TARTRATE 50 MG: 50 TABLET, FILM COATED ORAL at 21:56

## 2024-12-27 RX ADMIN — PRAVASTATIN SODIUM 40 MG: 20 TABLET ORAL at 21:56

## 2024-12-27 RX ADMIN — IPRATROPIUM BROMIDE AND ALBUTEROL SULFATE 1 DOSE: 2.5; .5 SOLUTION RESPIRATORY (INHALATION) at 20:35

## 2024-12-27 ASSESSMENT — PAIN - FUNCTIONAL ASSESSMENT
PAIN_FUNCTIONAL_ASSESSMENT: ACTIVITIES ARE NOT PREVENTED
PAIN_FUNCTIONAL_ASSESSMENT: NONE - DENIES PAIN

## 2024-12-27 ASSESSMENT — PAIN DESCRIPTION - LOCATION: LOCATION: LEG;FOOT

## 2024-12-27 ASSESSMENT — PAIN SCALES - GENERAL
PAINLEVEL_OUTOF10: 0
PAINLEVEL_OUTOF10: 8

## 2024-12-27 ASSESSMENT — PAIN DESCRIPTION - DESCRIPTORS: DESCRIPTORS: ACHING;DISCOMFORT;SORE

## 2024-12-27 NOTE — H&P
PCR Not Detected Not Detected    Coronavirus OC43 PCR Not Detected Not Detected    SARS-CoV-2, PCR Not Detected Not Detected    Human Metapneumovirus PCR Not Detected Not Detected    Rhino/Enterovirus PCR Not Detected Not Detected    Influenza A by PCR Not Detected Not Detected    Influenza B by PCR Not Detected Not Detected    Parainfluenza 1 PCR Not Detected Not Detected    Parainfluenza 2 PCR Not Detected Not Detected    Parainfluenza 3 PCR Not Detected Not Detected    Parainfluenza 4 PCR Not Detected Not Detected    Resp Syncytial Virus PCR Not Detected Not Detected    Bordetella parapertussis by PCR Not Detected Not Detected    B Pertussis by PCR Not Detected Not Detected    Chlamydia pneumoniae By PCR Not Detected Not Detected    Mycoplasma pneumo by PCR Not Detected Not Detected   Urinalysis    Collection Time: 12/27/24  3:15 PM   Result Value Ref Range    Color, UA Yellow Yellow    Turbidity UA Clear Clear    Glucose, Ur >=1000 (A) NEGATIVE mg/dL    Bilirubin, Urine NEGATIVE NEGATIVE    Ketones, Urine TRACE (A) NEGATIVE mg/dL    Specific Gravity, UA 1.025 1.005 - 1.030    Urine Hgb MODERATE (A) NEGATIVE    pH, Urine 5.5 5.0 - 9.0    Protein,  (A) NEGATIVE mg/dL    Urobilinogen, Urine 0.2 0.0 - 1.0 EU/dL    Nitrite, Urine NEGATIVE NEGATIVE    Leukocyte Esterase, Urine NEGATIVE NEGATIVE   Microscopic Urinalysis    Collection Time: 12/27/24  3:15 PM   Result Value Ref Range    WBC, UA 0 TO 5 0 TO 5 /HPF    RBC, UA 0 TO 2 0 TO 2 /HPF    Bacteria, UA (A) None     UNABLE TO DETERMINE PRESENCE OF BACTERIA DUE TO LARGE AMOUNT OF AMORPHOUS SEDIMENT   Blood Gas, Arterial    Collection Time: 12/27/24  3:25 PM   Result Value Ref Range    Date Analyzed 20241227     Time Analyzed 1534     Source: Blood Arterial     pH, Blood Gas 7.420 7.350 - 7.450    PCO2 30.8 (L) 35.0 - 45.0 mmHg    PO2 68.4 (L) 75.0 - 100.0 mmHg    HCO3 19.5 (L) 22.0 - 26.0 mmol/L    B.E. -3.9 (L) -3.0 - 3.0 mmol/L    O2 Sat 93.3 92.0 - 98.5 %     Statin  CKD3- at baseline  History breast cancer  Obesity BMI 37        Routine labs in the morning.  DVT prophylaxis.  Please see orders for further management and care.      Additional work up or/and treatment plan may be added today or thereafter based on clinical progression. I am managing admission portion of patient care. Some medical issues are handled by other specialists. Additional work up and treatment should be done by my colleague hospitalist who assumes care.       Tracee Smith,     5:56 PM  12/27/2024

## 2024-12-27 NOTE — PROGRESS NOTES
Renal Dose Adjustment Policy (Generic)     This patient is on medication that requires renal, weight, and/or indication dose adjustment.      Date Body Weight IBW  Adjusted BW SCr  CrCl Dialysis status   12/27/2024 106.6 kg (235 lb) Ideal body weight: 59.3 kg (130 lb 11.7 oz)  Adjusted ideal body weight: 78.2 kg (172 lb 7 oz) Serum creatinine: 1.6 mg/dL (H) 12/27/24 1510  Estimated creatinine clearance: 39 mL/min (A) N/a       Pharmacy has dose-adjusted the following medication(s):    Date Previous Order Adjusted Order   12/27/2024 Pepcid 40 mg qd Pepcid 20 mg qd       These changes were made per protocol according to the [facility]   Automatic Renal Dose Adjustment Policy.     *Please note this dose may need readjusted if patient's condition changes.    Please contact pharmacy with any questions regarding these changes.    Alex Pond RPH  12/27/2024  5:52 PM

## 2024-12-27 NOTE — ED NOTES
Blood cultures obtained from right AC per policy.  Set one of two drawn by SS RN at this time.        Blood cultures obtained from left hand, per policy.  Set two of two drawn by SS RN at this time.

## 2024-12-27 NOTE — ED PROVIDER NOTES
Or   acetaminophen (TYLENOL) suppository 650 mg (has no administration in time range)   benzocaine-menthol (CEPACOL SORE THROAT) lozenge 1 lozenge (has no administration in time range)   benzonatate (TESSALON) capsule 100 mg (has no administration in time range)   calcium carbonate (TUMS) chewable tablet 500 mg (has no administration in time range)   hydrALAZINE (APRESOLINE) injection 10 mg (has no administration in time range)   melatonin tablet 3 mg (has no administration in time range)   Polyvinyl Alcohol-Povidone PF (REFRESH) 1.4-0.6 % ophthalmic solution 1 drop (has no administration in time range)   sodium chloride (OCEAN, BABY AYR) 0.65 % nasal spray 1 spray (has no administration in time range)   0.9 % sodium chloride infusion ( IntraVENous New Bag 12/27/24 1900)   cefTRIAXone (ROCEPHIN) 2,000 mg in sterile water 20 mL IV syringe (has no administration in time range)   sodium chloride 0.9 % bolus 1,000 mL (0 mLs IntraVENous Stopped 12/27/24 1739)   acetaminophen (TYLENOL) suppository 650 mg (650 mg Rectal Given 12/27/24 1519)   cefTRIAXone (ROCEPHIN) 2,000 mg in sterile water 20 mL IV syringe (2,000 mg IntraVENous Given 12/27/24 1723)   doxycycline (VIBRAMYCIN) 100 mg in sodium chloride 0.9 % 100 mL IVPB ([id number]) (100 mg IntraVENous New Bag 12/27/24 1740)   sodium chloride 0.9 % bolus 1,000 mL (1,000 mLs IntraVENous New Bag 12/27/24 1741)   insulin lispro (HUMALOG,ADMELOG) injection vial 10 Units (10 Units SubCUTAneous Given 12/27/24 2209)         SEP-1 CORE MEASURE DATA      Sepsis Criteria   Severe Sepsis Criteria   Septic Shock Criteria     Must be confirmed or suspected to move forward with diagnosis of sepsis.    Must meet 2:    [x] Temperature > 100.9 F (38.3 C)        or < 96.8 F (36 C)  [] HR > 90  [x] RR > 20  [] WBC > 12 or < 4 or 10% bands      AND:      [x] Infection Confirmed or        Suspected.     Must meet 1:    [] Lactate > 2       or   [] Signs of Organ Dysfunction:    - SBP < 90 or  She was transitioned to BiPAP with improvement.  Her temperature on arrival was 103 degrees.  Sepsis workup was started.  She was administered Solu-Medrol with her history of former smoker and shortness of breath.  Differential diagnosis includes pneumonia, COVID, upper respiratory illness, to name a few.  Patient's labs show no leukocytosis or anemia.  No electrolyte disturbance.  Lactic acid was 5.4, patient was administered IV fluids.  Patient was administered Rocephin and doxycycline for pneumonia coverage.  Discussed with Dr. Smith accepts patient for admission.       CONSULTS: (Who and What was discussed)  None        FINAL IMPRESSION      1. Acute respiratory failure with hypoxia    2. Sepsis due to pneumonia (HCC)          DISPOSITION/PLAN     DISPOSITION Admitted 12/27/2024 05:44:24 PM    DISPOSITION  Disposition: Admit to telemetry  Patient condition is serious    12/27/24, 3:11 PM EST.    Mateus Dunn DO  Emergency Medicine    PATIENT REFERRED TO:  No follow-up provider specified.    DISCHARGE MEDICATIONS:  Current Discharge Medication List          DISCONTINUED MEDICATIONS:  Current Discharge Medication List                 (Please note that portions of this note were completed with a voice recognition program.  Efforts were made to edit the dictations but occasionally words are mis-transcribed.)    Mateus Dunn DO (electronically signed)

## 2024-12-28 LAB
AADO2: 520.4 MMHG
ALBUMIN SERPL-MCNC: 2.5 G/DL (ref 3.5–5.2)
ALP SERPL-CCNC: 40 U/L (ref 35–104)
ALT SERPL-CCNC: 9 U/L (ref 0–32)
ANION GAP SERPL CALCULATED.3IONS-SCNC: 12 MMOL/L (ref 7–16)
AST SERPL-CCNC: 17 U/L (ref 0–31)
B-OH-BUTYR SERPL-MCNC: 0.18 MMOL/L (ref 0.02–0.27)
B.E.: -3.2 MMOL/L (ref -3–3)
BILIRUB SERPL-MCNC: 0.5 MG/DL (ref 0–1.2)
BUN SERPL-MCNC: 45 MG/DL (ref 6–23)
CALCIUM SERPL-MCNC: 8.6 MG/DL (ref 8.6–10.2)
CHLORIDE SERPL-SCNC: 106 MMOL/L (ref 98–107)
CHOLEST SERPL-MCNC: 86 MG/DL
CO2 SERPL-SCNC: 20 MMOL/L (ref 22–29)
COHB: 0 % (ref 0–1.5)
CREAT SERPL-MCNC: 1.5 MG/DL (ref 0.5–1)
CRITICAL: ABNORMAL
DATE ANALYZED: ABNORMAL
DATE OF COLLECTION: ABNORMAL
ERYTHROCYTE [DISTWIDTH] IN BLOOD BY AUTOMATED COUNT: 13.6 % (ref 11.5–15)
FIO2: 90 %
GFR, ESTIMATED: 36 ML/MIN/1.73M2
GLUCOSE BLD-MCNC: 255 MG/DL (ref 74–99)
GLUCOSE BLD-MCNC: 277 MG/DL (ref 74–99)
GLUCOSE BLD-MCNC: 351 MG/DL (ref 74–99)
GLUCOSE BLD-MCNC: 423 MG/DL (ref 74–99)
GLUCOSE SERPL-MCNC: 448 MG/DL (ref 74–99)
HBA1C MFR BLD: 8.5 % (ref 4–5.6)
HCO3: 21.8 MMOL/L (ref 22–26)
HCT VFR BLD AUTO: 33.5 % (ref 34–48)
HDLC SERPL-MCNC: 44 MG/DL
HGB BLD-MCNC: 10.7 G/DL (ref 11.5–15.5)
HHB: 4.9 % (ref 0–5)
L PNEUMO1 AG UR QL IA.RAPID: NEGATIVE
LAB: ABNORMAL
LACTATE BLDV-SCNC: 3 MMOL/L (ref 0.5–2.2)
LACTATE BLDV-SCNC: 3.7 MMOL/L (ref 0.5–2.2)
LACTATE BLDV-SCNC: 3.9 MMOL/L (ref 0.5–2.2)
LACTATE BLDV-SCNC: 5 MMOL/L (ref 0.5–2.2)
LDLC SERPL CALC-MCNC: 22 MG/DL
Lab: 628
MAGNESIUM SERPL-MCNC: 1.5 MG/DL (ref 1.6–2.6)
MCH RBC QN AUTO: 29.1 PG (ref 26–35)
MCHC RBC AUTO-ENTMCNC: 31.9 G/DL (ref 32–34.5)
MCV RBC AUTO: 91 FL (ref 80–99.9)
METHB: 0.1 % (ref 0–1.5)
MICROORGANISM SPEC CULT: NO GROWTH
MODE: ABNORMAL
O2 SATURATION: 95.1 % (ref 92–98.5)
O2HB: 95 % (ref 94–97)
OPERATOR ID: 2593
PATIENT TEMP: 37 C
PCO2: 39.1 MMHG (ref 35–45)
PFO2: 0.9 MMHG/%
PH BLOOD GAS: 7.37 (ref 7.35–7.45)
PHOSPHATE SERPL-MCNC: 2.1 MG/DL (ref 2.5–4.5)
PLATELET # BLD AUTO: 150 K/UL (ref 130–450)
PMV BLD AUTO: 11.3 FL (ref 7–12)
PO2: 81.2 MMHG (ref 75–100)
POTASSIUM SERPL-SCNC: 4.8 MMOL/L (ref 3.5–5)
PROT SERPL-MCNC: 5.5 G/DL (ref 6.4–8.3)
RBC # BLD AUTO: 3.68 M/UL (ref 3.5–5.5)
RI(T): 6.41
S PNEUM AG SPEC QL: NEGATIVE
SERVICE CMNT-IMP: NORMAL
SODIUM SERPL-SCNC: 138 MMOL/L (ref 132–146)
SOURCE, BLOOD GAS: ABNORMAL
SPECIMEN DESCRIPTION: NORMAL
SPECIMEN SOURCE: NORMAL
THB: 11.6 G/DL (ref 11.5–16.5)
TIME ANALYZED: 632
TRIGL SERPL-MCNC: 98 MG/DL
TSH SERPL DL<=0.05 MIU/L-ACNC: 0.22 UIU/ML (ref 0.27–4.2)
VLDLC SERPL CALC-MCNC: 20 MG/DL
WBC OTHER # BLD: 12.2 K/UL (ref 4.5–11.5)

## 2024-12-28 PROCEDURE — 2140000000 HC CCU INTERMEDIATE R&B

## 2024-12-28 PROCEDURE — 80061 LIPID PANEL: CPT

## 2024-12-28 PROCEDURE — 94640 AIRWAY INHALATION TREATMENT: CPT

## 2024-12-28 PROCEDURE — 83605 ASSAY OF LACTIC ACID: CPT

## 2024-12-28 PROCEDURE — 82805 BLOOD GASES W/O2 SATURATION: CPT

## 2024-12-28 PROCEDURE — 82947 ASSAY GLUCOSE BLOOD QUANT: CPT

## 2024-12-28 PROCEDURE — 99232 SBSQ HOSP IP/OBS MODERATE 35: CPT | Performed by: STUDENT IN AN ORGANIZED HEALTH CARE EDUCATION/TRAINING PROGRAM

## 2024-12-28 PROCEDURE — 2500000003 HC RX 250 WO HCPCS: Performed by: INTERNAL MEDICINE

## 2024-12-28 PROCEDURE — 82010 KETONE BODYS QUAN: CPT

## 2024-12-28 PROCEDURE — 6370000000 HC RX 637 (ALT 250 FOR IP): Performed by: STUDENT IN AN ORGANIZED HEALTH CARE EDUCATION/TRAINING PROGRAM

## 2024-12-28 PROCEDURE — 6360000002 HC RX W HCPCS: Performed by: INTERNAL MEDICINE

## 2024-12-28 PROCEDURE — 6370000000 HC RX 637 (ALT 250 FOR IP): Performed by: INTERNAL MEDICINE

## 2024-12-28 PROCEDURE — 80053 COMPREHEN METABOLIC PANEL: CPT

## 2024-12-28 PROCEDURE — 6360000002 HC RX W HCPCS: Performed by: STUDENT IN AN ORGANIZED HEALTH CARE EDUCATION/TRAINING PROGRAM

## 2024-12-28 PROCEDURE — 2580000003 HC RX 258: Performed by: INTERNAL MEDICINE

## 2024-12-28 PROCEDURE — 36415 COLL VENOUS BLD VENIPUNCTURE: CPT

## 2024-12-28 PROCEDURE — 94660 CPAP INITIATION&MGMT: CPT

## 2024-12-28 PROCEDURE — 2500000003 HC RX 250 WO HCPCS: Performed by: STUDENT IN AN ORGANIZED HEALTH CARE EDUCATION/TRAINING PROGRAM

## 2024-12-28 PROCEDURE — 83735 ASSAY OF MAGNESIUM: CPT

## 2024-12-28 PROCEDURE — 85027 COMPLETE CBC AUTOMATED: CPT

## 2024-12-28 PROCEDURE — 83036 HEMOGLOBIN GLYCOSYLATED A1C: CPT

## 2024-12-28 PROCEDURE — 84443 ASSAY THYROID STIM HORMONE: CPT

## 2024-12-28 PROCEDURE — 5A0945A ASSISTANCE WITH RESPIRATORY VENTILATION, 24-96 CONSECUTIVE HOURS, HIGH NASAL FLOW/VELOCITY: ICD-10-PCS

## 2024-12-28 PROCEDURE — 2580000003 HC RX 258: Performed by: STUDENT IN AN ORGANIZED HEALTH CARE EDUCATION/TRAINING PROGRAM

## 2024-12-28 PROCEDURE — 84100 ASSAY OF PHOSPHORUS: CPT

## 2024-12-28 RX ORDER — SODIUM CHLORIDE 9 MG/ML
INJECTION, SOLUTION INTRAVENOUS CONTINUOUS
Status: ACTIVE | OUTPATIENT
Start: 2024-12-28 | End: 2024-12-29

## 2024-12-28 RX ORDER — INSULIN LISPRO 100 [IU]/ML
0-8 INJECTION, SOLUTION INTRAVENOUS; SUBCUTANEOUS
Status: DISCONTINUED | OUTPATIENT
Start: 2024-12-28 | End: 2024-12-29

## 2024-12-28 RX ADMIN — ENOXAPARIN SODIUM 30 MG: 100 INJECTION SUBCUTANEOUS at 08:33

## 2024-12-28 RX ADMIN — SODIUM CHLORIDE, PRESERVATIVE FREE 10 ML: 5 INJECTION INTRAVENOUS at 08:40

## 2024-12-28 RX ADMIN — WATER 1000 MG: 1 INJECTION INTRAMUSCULAR; INTRAVENOUS; SUBCUTANEOUS at 17:12

## 2024-12-28 RX ADMIN — HYDROCODONE BITARTRATE AND ACETAMINOPHEN 1 TABLET: 5; 325 TABLET ORAL at 21:28

## 2024-12-28 RX ADMIN — METOPROLOL TARTRATE 50 MG: 50 TABLET, FILM COATED ORAL at 08:33

## 2024-12-28 RX ADMIN — POTASSIUM CHLORIDE 10 MEQ: 750 TABLET, EXTENDED RELEASE ORAL at 08:35

## 2024-12-28 RX ADMIN — PANTOPRAZOLE SODIUM 40 MG: 40 TABLET, DELAYED RELEASE ORAL at 08:34

## 2024-12-28 RX ADMIN — FAMOTIDINE 20 MG: 20 TABLET, FILM COATED ORAL at 08:34

## 2024-12-28 RX ADMIN — INSULIN LISPRO 18 UNITS: 100 INJECTION, SOLUTION INTRAVENOUS; SUBCUTANEOUS at 09:25

## 2024-12-28 RX ADMIN — SODIUM CHLORIDE, PRESERVATIVE FREE 10 ML: 5 INJECTION INTRAVENOUS at 21:28

## 2024-12-28 RX ADMIN — BUDESONIDE 500 MCG: 0.5 SUSPENSION RESPIRATORY (INHALATION) at 09:16

## 2024-12-28 RX ADMIN — CITALOPRAM HYDROBROMIDE 40 MG: 20 TABLET ORAL at 08:34

## 2024-12-28 RX ADMIN — PRAVASTATIN SODIUM 40 MG: 20 TABLET ORAL at 21:26

## 2024-12-28 RX ADMIN — TROSPIUM CHLORIDE 20 MG: 20 TABLET, FILM COATED ORAL at 05:59

## 2024-12-28 RX ADMIN — ENOXAPARIN SODIUM 30 MG: 100 INJECTION SUBCUTANEOUS at 21:26

## 2024-12-28 RX ADMIN — SODIUM CHLORIDE: 9 INJECTION, SOLUTION INTRAVENOUS at 14:11

## 2024-12-28 RX ADMIN — EZETIMIBE 10 MG: 10 TABLET ORAL at 08:34

## 2024-12-28 RX ADMIN — INSULIN LISPRO 4 UNITS: 100 INJECTION, SOLUTION INTRAVENOUS; SUBCUTANEOUS at 17:11

## 2024-12-28 RX ADMIN — INSULIN LISPRO 4 UNITS: 100 INJECTION, SOLUTION INTRAVENOUS; SUBCUTANEOUS at 21:32

## 2024-12-28 RX ADMIN — HYDROCODONE BITARTRATE AND ACETAMINOPHEN 1 TABLET: 5; 325 TABLET ORAL at 10:57

## 2024-12-28 RX ADMIN — INSULIN LISPRO 8 UNITS: 100 INJECTION, SOLUTION INTRAVENOUS; SUBCUTANEOUS at 09:26

## 2024-12-28 RX ADMIN — PREGABALIN 50 MG: 50 CAPSULE ORAL at 21:28

## 2024-12-28 RX ADMIN — Medication 1000 UNITS: at 08:36

## 2024-12-28 RX ADMIN — Medication 500 MG: at 08:34

## 2024-12-28 RX ADMIN — ASPIRIN 81 MG: 81 TABLET, COATED ORAL at 21:27

## 2024-12-28 RX ADMIN — ARFORMOTEROL TARTRATE 15 MCG: 15 SOLUTION RESPIRATORY (INHALATION) at 20:52

## 2024-12-28 RX ADMIN — IPRATROPIUM BROMIDE AND ALBUTEROL SULFATE 1 DOSE: 2.5; .5 SOLUTION RESPIRATORY (INHALATION) at 09:16

## 2024-12-28 RX ADMIN — INSULIN LISPRO 18 UNITS: 100 INJECTION, SOLUTION INTRAVENOUS; SUBCUTANEOUS at 17:11

## 2024-12-28 RX ADMIN — IPRATROPIUM BROMIDE AND ALBUTEROL SULFATE 1 DOSE: 2.5; .5 SOLUTION RESPIRATORY (INHALATION) at 20:52

## 2024-12-28 RX ADMIN — TROSPIUM CHLORIDE 20 MG: 20 TABLET, FILM COATED ORAL at 17:11

## 2024-12-28 RX ADMIN — INSULIN GLARGINE 35 UNITS: 100 INJECTION, SOLUTION SUBCUTANEOUS at 21:32

## 2024-12-28 RX ADMIN — LISINOPRIL 40 MG: 20 TABLET ORAL at 08:34

## 2024-12-28 RX ADMIN — METOPROLOL TARTRATE 50 MG: 50 TABLET, FILM COATED ORAL at 21:28

## 2024-12-28 RX ADMIN — Medication 1 TABLET: at 08:33

## 2024-12-28 RX ADMIN — ARFORMOTEROL TARTRATE 15 MCG: 15 SOLUTION RESPIRATORY (INHALATION) at 09:16

## 2024-12-28 RX ADMIN — PREGABALIN 50 MG: 50 CAPSULE ORAL at 08:34

## 2024-12-28 RX ADMIN — DOXYCYCLINE 100 MG: 100 INJECTION, POWDER, LYOPHILIZED, FOR SOLUTION INTRAVENOUS at 06:04

## 2024-12-28 RX ADMIN — IPRATROPIUM BROMIDE AND ALBUTEROL SULFATE 1 DOSE: 2.5; .5 SOLUTION RESPIRATORY (INHALATION) at 11:57

## 2024-12-28 RX ADMIN — BUDESONIDE 500 MCG: 0.5 SUSPENSION RESPIRATORY (INHALATION) at 20:52

## 2024-12-28 RX ADMIN — IPRATROPIUM BROMIDE AND ALBUTEROL SULFATE 1 DOSE: 2.5; .5 SOLUTION RESPIRATORY (INHALATION) at 16:02

## 2024-12-28 RX ADMIN — DOXYCYCLINE 100 MG: 100 INJECTION, POWDER, LYOPHILIZED, FOR SOLUTION INTRAVENOUS at 17:12

## 2024-12-28 RX ADMIN — ALLOPURINOL 100 MG: 100 TABLET ORAL at 08:34

## 2024-12-28 RX ADMIN — INSULIN LISPRO 18 UNITS: 100 INJECTION, SOLUTION INTRAVENOUS; SUBCUTANEOUS at 10:58

## 2024-12-28 RX ADMIN — Medication 500 MG: at 21:27

## 2024-12-28 RX ADMIN — LETROZOLE 2.5 MG: 2.5 TABLET ORAL at 08:33

## 2024-12-28 ASSESSMENT — PAIN - FUNCTIONAL ASSESSMENT: PAIN_FUNCTIONAL_ASSESSMENT: ACTIVITIES ARE NOT PREVENTED

## 2024-12-28 ASSESSMENT — PAIN SCALES - GENERAL
PAINLEVEL_OUTOF10: 4
PAINLEVEL_OUTOF10: 6

## 2024-12-28 ASSESSMENT — PAIN DESCRIPTION - DESCRIPTORS: DESCRIPTORS: ACHING;DISCOMFORT;SORE

## 2024-12-28 ASSESSMENT — PAIN DESCRIPTION - LOCATION: LOCATION: GENERALIZED

## 2024-12-28 ASSESSMENT — PAIN DESCRIPTION - ORIENTATION: ORIENTATION: MID;POSTERIOR

## 2024-12-28 NOTE — PLAN OF CARE
Problem: Chronic Conditions and Co-morbidities  Goal: Patient's chronic conditions and co-morbidity symptoms are monitored and maintained or improved  Outcome: Progressing  Flowsheets (Taken 12/27/2024 2005)  Care Plan - Patient's Chronic Conditions and Co-Morbidity Symptoms are Monitored and Maintained or Improved: Monitor and assess patient's chronic conditions and comorbid symptoms for stability, deterioration, or improvement     Problem: Discharge Planning  Goal: Discharge to home or other facility with appropriate resources  Outcome: Progressing  Flowsheets (Taken 12/27/2024 2005)  Discharge to home or other facility with appropriate resources: Identify barriers to discharge with patient and caregiver     Problem: Safety - Adult  Goal: Free from fall injury  12/28/2024 0043 by Shawna Hubbard, RN  Outcome: Progressing  12/27/2024 1926 by Larissa Mari, RN  Outcome: Progressing     Problem: Pain  Goal: Verbalizes/displays adequate comfort level or baseline comfort level  Outcome: Progressing

## 2024-12-28 NOTE — PROGRESS NOTES
4 Eyes Skin Assessment     NAME:  Kim Zarco  YOB: 1950  MEDICAL RECORD NUMBER:  [id number]    The patient is being assessed for  Admission    I agree that at least one RN has performed a thorough Head to Toe Skin Assessment on the patient. ALL assessment sites listed below have been assessed.      Areas assessed by both nurses:    Head, Face, Ears, Shoulders, Back, Chest, Arms, Elbows, Hands, Sacrum. Buttock, Coccyx, Ischium, Legs. Feet and Heels, and Under Medical Devices         Does the Patient have a Wound? No noted wound(s)       Darwin Prevention initiated by RN: No  Wound Care Orders initiated by RN: No    Pressure Injury (Stage 3,4, Unstageable, DTI, NWPT, and Complex wounds) if present, place Wound referral order by RN under : No    New Ostomies, if present place, Ostomy referral order under : No     Nurse 1 eSignature: Electronically signed by Shawna Hubbard RN on 12/27/24 at 11:44 PM EST    **SHARE this note so that the co-signing nurse can place an eSignature**    Nurse 2 eSignature: Electronically signed by Viv Muhammad RN on 12/27/24 at 11:45 PM EST

## 2024-12-28 NOTE — PROGRESS NOTES
Hospitalist Progress Note      SYNOPSIS: Patient admitted on 2024 for Acute hypoxic respiratory failure  Kim Zarco has a past medical history that includes coronary artery disease status post CABG, diabetes, hypertension, CKD, hyperlipidemia  Kim presents ER for shortness of breath for 1 day.  Saturation runs around 60s patient was placed on CPAP received breathing treatment  ER Course  Upon presentation to the ER, routine labwork was performed which revealed BUN 36, creatinine 1.6, lactic acid 5.4, glucose 441, troponin 52.  Imaging results are as outlined below in the Imaging section of this note.     Upon arrival to the ER, patient was 153/65, febrile 103.2, tachycardic 104.  The patient received Tylenol, Rocephin, Doxy, Solu-Medrol, 2 L normal saline in the emergency room and was admitted to [facility].    SUBJECTIVE:  Stable overnight. No other overnight issues reported.   Patient seen and examined at bedside today a.m. patient was compliant with her CPAP, states improvement in her shortness of breath, denies any fever, chills  Patient does not have bilateral lower extremity swelling  Discussed with daughter over phone  Records reviewed.         Temp (24hrs), Av.5 °F (36.9 °C), Min:97.5 °F (36.4 °C), Max:103.2 °F (39.6 °C)    DIET: ADULT DIET; Regular; 3 carb choices (45 gm/meal)  CODE: Full Code    Intake/Output Summary (Last 24 hours) at 2024 1050  Last data filed at 2024 0608  Gross per 24 hour   Intake 420 ml   Output 500 ml   Net -80 ml       Review of Systems  All bolded are positive; please see HPI  General:  Fever, chills, diaphoresis, fatigue, malaise, night sweats, weight loss  Psychological:  Anxiety, disorientation, hallucinations.  ENT:  Epistaxis, headaches, vertigo, visual changes.  Cardiovascular:  Chest pain, irregular heartbeats, palpitations, paroxysmal nocturnal dyspnea.  Respiratory:  Shortness of breath, coughing, sputum production, hemoptysis,  diabetes mellitus  Continue Lantus 35 units nightly with medium dose sliding scale insulin with Humalog 18 units 3 times daily with hypoglycemia protocol  I did discuss with patient, patient does have on and off episode of hypoglycemia, I will consult endocrinology for further recommendation    CKD 3 b  Avoid nephrotoxin  Monitor BMP daily             DVT Prophylaxis [] Lovenox, []  Heparin, [] SCDs, [] Ambulation   GI Prophylaxis [] PPI,  [] H2 Blocker,  [] Carafate,  [] Diet/Tube Feeds   Disposition Patient requires continued admission due to acute hypoxic respiratory failure treatment   MDM [] Low, [] Moderate,[]  High  Patient's risk as above due to        Medications:  REVIEWED DAILY    Infusion Medications    dextrose      sodium chloride      sodium chloride 75 mL/hr at 12/27/24 1900     Scheduled Medications    cefTRIAXone (ROCEPHIN) IV  1,000 mg IntraVENous Q24H    insulin lispro  0-8 Units SubCUTAneous 4x Daily AC & HS    allopurinol  100 mg Oral Daily    aspirin EC  81 mg Oral Nightly    Vitamin D  1 tablet Oral Daily    citalopram  40 mg Oral Daily    ezetimibe  10 mg Oral Daily    famotidine  20 mg Oral QAM    trospium  20 mg Oral BID AC    insulin glargine  35 Units SubCUTAneous Nightly    insulin lispro  18 Units SubCUTAneous TID AC    letrozole  2.5 mg Oral Daily    lisinopril  40 mg Oral Daily    metoprolol tartrate  50 mg Oral BID    niacin  500 mg Oral Nightly    pantoprazole  40 mg Oral Daily    potassium chloride  10 mEq Oral Every Other Day    pravastatin  40 mg Oral Nightly    pregabalin  50 mg Oral BID    therapeutic multivitamin-minerals  1 tablet Oral Daily    vitamin C  500 mg Oral Daily    ipratropium 0.5 mg-albuterol 2.5 mg  1 Dose Inhalation Q4H While awake    doxycycline (VIBRAMYCIN) IV  100 mg IntraVENous Q12H    arformoterol tartrate  15 mcg Nebulization BID RT    budesonide  0.5 mg Nebulization BID RT    sodium chloride flush  5-40 mL IntraVENous 2 times per day    enoxaparin  30 mg

## 2024-12-28 NOTE — PATIENT CARE CONFERENCE
P Quality Flow/Interdisciplinary Rounds Progress Note        Quality Flow Rounds held on December 28, 2024    Disciplines Attending:  Bedside Nurse and Nursing Unit Leadership    Kim Zarco was admitted on 12/27/2024  3:01 PM    Anticipated Discharge Date:       Disposition:    Darwin Score:  Darwin Scale Score: 19    [facility] RISK OF UNPLANNED READMISSION 2.0             16.8 Total Score        Discussed patient goal for the day, patient clinical progression, and barriers to discharge.  The following Goal(s) of the Day/Commitment(s) have been identified:  Diagnostics - Report Results and Labs - Report Results      Irene Dodson RN  December 28, 2024

## 2024-12-28 NOTE — PLAN OF CARE
Problem: Chronic Conditions and Co-morbidities  Goal: Patient's chronic conditions and co-morbidity symptoms are monitored and maintained or improved  12/28/2024 0929 by Yadira Aguilar RN  Outcome: Progressing  12/28/2024 0043 by Shawna Hubbard RN  Outcome: Progressing  Flowsheets (Taken 12/27/2024 2005)  Care Plan - Patient's Chronic Conditions and Co-Morbidity Symptoms are Monitored and Maintained or Improved: Monitor and assess patient's chronic conditions and comorbid symptoms for stability, deterioration, or improvement     Problem: Discharge Planning  Goal: Discharge to home or other facility with appropriate resources  12/28/2024 0929 by Yadira Aguilar RN  Outcome: Progressing  12/28/2024 0043 by Shawna Hubbard RN  Outcome: Progressing  Flowsheets (Taken 12/27/2024 2005)  Discharge to home or other facility with appropriate resources: Identify barriers to discharge with patient and caregiver     Problem: Safety - Adult  Goal: Free from fall injury  12/28/2024 0929 by Yadira Aguilar RN  Outcome: Progressing  12/28/2024 0043 by Shawna Hubbard RN  Outcome: Progressing     Problem: Pain  Goal: Verbalizes/displays adequate comfort level or baseline comfort level  12/28/2024 0929 by Yadira Aguilar RN  Outcome: Progressing  12/28/2024 0043 by Shawna Hubbard RN  Outcome: Progressing

## 2024-12-29 ENCOUNTER — APPOINTMENT (OUTPATIENT)
Dept: CT IMAGING | Age: 74
End: 2024-12-29
Payer: COMMERCIAL

## 2024-12-29 PROBLEM — Z91.119 DIETARY NONCOMPLIANCE: Status: ACTIVE | Noted: 2024-12-29

## 2024-12-29 PROBLEM — E11.65 POORLY CONTROLLED TYPE 2 DIABETES MELLITUS (HCC): Status: ACTIVE | Noted: 2024-12-29

## 2024-12-29 LAB
GLUCOSE BLD-MCNC: 124 MG/DL (ref 74–99)
GLUCOSE BLD-MCNC: 124 MG/DL (ref 74–99)
GLUCOSE BLD-MCNC: 322 MG/DL (ref 74–99)
GLUCOSE BLD-MCNC: 342 MG/DL (ref 74–99)
LACTATE BLDV-SCNC: 1.7 MMOL/L (ref 0.5–2.2)
LACTATE BLDV-SCNC: 2.7 MMOL/L (ref 0.5–2.2)
LACTATE BLDV-SCNC: 2.8 MMOL/L (ref 0.5–2.2)
LACTATE BLDV-SCNC: 2.8 MMOL/L (ref 0.5–2.2)
T4 FREE SERPL-MCNC: 1.1 NG/DL (ref 0.9–1.7)

## 2024-12-29 PROCEDURE — 2140000000 HC CCU INTERMEDIATE R&B

## 2024-12-29 PROCEDURE — 6360000002 HC RX W HCPCS: Performed by: INTERNAL MEDICINE

## 2024-12-29 PROCEDURE — 6370000000 HC RX 637 (ALT 250 FOR IP): Performed by: INTERNAL MEDICINE

## 2024-12-29 PROCEDURE — 6360000002 HC RX W HCPCS: Performed by: NURSE PRACTITIONER

## 2024-12-29 PROCEDURE — 2500000003 HC RX 250 WO HCPCS: Performed by: STUDENT IN AN ORGANIZED HEALTH CARE EDUCATION/TRAINING PROGRAM

## 2024-12-29 PROCEDURE — 71250 CT THORAX DX C-: CPT

## 2024-12-29 PROCEDURE — APPSS60 APP SPLIT SHARED TIME 46-60 MINUTES: Performed by: NURSE PRACTITIONER

## 2024-12-29 PROCEDURE — 6370000000 HC RX 637 (ALT 250 FOR IP): Performed by: STUDENT IN AN ORGANIZED HEALTH CARE EDUCATION/TRAINING PROGRAM

## 2024-12-29 PROCEDURE — 99222 1ST HOSP IP/OBS MODERATE 55: CPT | Performed by: INTERNAL MEDICINE

## 2024-12-29 PROCEDURE — 84439 ASSAY OF FREE THYROXINE: CPT

## 2024-12-29 PROCEDURE — 6360000002 HC RX W HCPCS: Performed by: STUDENT IN AN ORGANIZED HEALTH CARE EDUCATION/TRAINING PROGRAM

## 2024-12-29 PROCEDURE — 83605 ASSAY OF LACTIC ACID: CPT

## 2024-12-29 PROCEDURE — 82947 ASSAY GLUCOSE BLOOD QUANT: CPT

## 2024-12-29 PROCEDURE — 94640 AIRWAY INHALATION TREATMENT: CPT

## 2024-12-29 PROCEDURE — 36415 COLL VENOUS BLD VENIPUNCTURE: CPT

## 2024-12-29 PROCEDURE — 2580000003 HC RX 258: Performed by: INTERNAL MEDICINE

## 2024-12-29 PROCEDURE — 99233 SBSQ HOSP IP/OBS HIGH 50: CPT | Performed by: STUDENT IN AN ORGANIZED HEALTH CARE EDUCATION/TRAINING PROGRAM

## 2024-12-29 PROCEDURE — 2700000000 HC OXYGEN THERAPY PER DAY

## 2024-12-29 PROCEDURE — 94660 CPAP INITIATION&MGMT: CPT

## 2024-12-29 PROCEDURE — 2500000003 HC RX 250 WO HCPCS: Performed by: INTERNAL MEDICINE

## 2024-12-29 RX ORDER — INSULIN GLARGINE 100 [IU]/ML
45 INJECTION, SOLUTION SUBCUTANEOUS NIGHTLY
Status: DISCONTINUED | OUTPATIENT
Start: 2024-12-29 | End: 2024-12-29

## 2024-12-29 RX ORDER — BENZONATATE 100 MG/1
100 CAPSULE ORAL 3 TIMES DAILY
Status: DISCONTINUED | OUTPATIENT
Start: 2024-12-29 | End: 2025-01-04 | Stop reason: HOSPADM

## 2024-12-29 RX ORDER — HYDROCODONE BITARTRATE AND HOMATROPINE METHYLBROMIDE ORAL SOLUTION 5; 1.5 MG/5ML; MG/5ML
5 LIQUID ORAL EVERY 4 HOURS PRN
Status: DISCONTINUED | OUTPATIENT
Start: 2024-12-29 | End: 2025-01-04 | Stop reason: HOSPADM

## 2024-12-29 RX ORDER — INSULIN LISPRO 100 [IU]/ML
20 INJECTION, SOLUTION INTRAVENOUS; SUBCUTANEOUS
Status: DISCONTINUED | OUTPATIENT
Start: 2024-12-29 | End: 2024-12-29

## 2024-12-29 RX ORDER — GUAIFENESIN/DEXTROMETHORPHAN 100-10MG/5
10 SYRUP ORAL EVERY 6 HOURS PRN
Status: DISCONTINUED | OUTPATIENT
Start: 2024-12-29 | End: 2024-12-29

## 2024-12-29 RX ORDER — INSULIN GLARGINE 100 [IU]/ML
48 INJECTION, SOLUTION SUBCUTANEOUS NIGHTLY
Status: DISCONTINUED | OUTPATIENT
Start: 2024-12-29 | End: 2024-12-30

## 2024-12-29 RX ORDER — GUAIFENESIN/DEXTROMETHORPHAN 100-10MG/5
10 SYRUP ORAL EVERY 6 HOURS
Status: DISPENSED | OUTPATIENT
Start: 2024-12-29 | End: 2024-12-31

## 2024-12-29 RX ORDER — INSULIN LISPRO 100 [IU]/ML
0-18 INJECTION, SOLUTION INTRAVENOUS; SUBCUTANEOUS
Status: DISCONTINUED | OUTPATIENT
Start: 2024-12-29 | End: 2024-12-30

## 2024-12-29 RX ORDER — MAGNESIUM SULFATE IN WATER 40 MG/ML
2000 INJECTION, SOLUTION INTRAVENOUS ONCE
Status: COMPLETED | OUTPATIENT
Start: 2024-12-29 | End: 2024-12-29

## 2024-12-29 RX ORDER — INSULIN LISPRO 100 [IU]/ML
16 INJECTION, SOLUTION INTRAVENOUS; SUBCUTANEOUS
Status: DISCONTINUED | OUTPATIENT
Start: 2024-12-29 | End: 2024-12-30

## 2024-12-29 RX ADMIN — INSULIN GLARGINE 48 UNITS: 100 INJECTION, SOLUTION SUBCUTANEOUS at 20:32

## 2024-12-29 RX ADMIN — LISINOPRIL 40 MG: 20 TABLET ORAL at 08:15

## 2024-12-29 RX ADMIN — HYDROCODONE BITARTRATE AND ACETAMINOPHEN 1 TABLET: 5; 325 TABLET ORAL at 20:32

## 2024-12-29 RX ADMIN — ENOXAPARIN SODIUM 30 MG: 100 INJECTION SUBCUTANEOUS at 20:32

## 2024-12-29 RX ADMIN — TROSPIUM CHLORIDE 20 MG: 20 TABLET, FILM COATED ORAL at 05:54

## 2024-12-29 RX ADMIN — MAGNESIUM SULFATE IN WATER 2000 MG: 40 INJECTION, SOLUTION INTRAVENOUS at 14:53

## 2024-12-29 RX ADMIN — IPRATROPIUM BROMIDE AND ALBUTEROL SULFATE 1 DOSE: 2.5; .5 SOLUTION RESPIRATORY (INHALATION) at 20:50

## 2024-12-29 RX ADMIN — ALLOPURINOL 100 MG: 100 TABLET ORAL at 08:16

## 2024-12-29 RX ADMIN — EZETIMIBE 10 MG: 10 TABLET ORAL at 08:17

## 2024-12-29 RX ADMIN — GUAIFENESIN SYRUP AND DEXTROMETHORPHAN 10 ML: 100; 10 SYRUP ORAL at 14:46

## 2024-12-29 RX ADMIN — Medication 1000 UNITS: at 08:16

## 2024-12-29 RX ADMIN — LETROZOLE 2.5 MG: 2.5 TABLET ORAL at 08:16

## 2024-12-29 RX ADMIN — WATER 1000 MG: 1 INJECTION INTRAMUSCULAR; INTRAVENOUS; SUBCUTANEOUS at 16:34

## 2024-12-29 RX ADMIN — PRAVASTATIN SODIUM 40 MG: 20 TABLET ORAL at 20:32

## 2024-12-29 RX ADMIN — Medication 1 TABLET: at 08:16

## 2024-12-29 RX ADMIN — BENZONATATE 100 MG: 100 CAPSULE ORAL at 14:53

## 2024-12-29 RX ADMIN — FAMOTIDINE 20 MG: 20 TABLET, FILM COATED ORAL at 08:16

## 2024-12-29 RX ADMIN — IPRATROPIUM BROMIDE AND ALBUTEROL SULFATE 1 DOSE: 2.5; .5 SOLUTION RESPIRATORY (INHALATION) at 08:02

## 2024-12-29 RX ADMIN — INSULIN LISPRO 20 UNITS: 100 INJECTION, SOLUTION INTRAVENOUS; SUBCUTANEOUS at 11:41

## 2024-12-29 RX ADMIN — HYDROCODONE BITARTRATE AND ACETAMINOPHEN 1 TABLET: 5; 325 TABLET ORAL at 08:23

## 2024-12-29 RX ADMIN — DOXYCYCLINE 100 MG: 100 INJECTION, POWDER, LYOPHILIZED, FOR SOLUTION INTRAVENOUS at 05:53

## 2024-12-29 RX ADMIN — DOXYCYCLINE 100 MG: 100 INJECTION, POWDER, LYOPHILIZED, FOR SOLUTION INTRAVENOUS at 16:35

## 2024-12-29 RX ADMIN — TROSPIUM CHLORIDE 20 MG: 20 TABLET, FILM COATED ORAL at 14:53

## 2024-12-29 RX ADMIN — BENZONATATE 100 MG: 100 CAPSULE ORAL at 14:46

## 2024-12-29 RX ADMIN — Medication 500 MG: at 20:32

## 2024-12-29 RX ADMIN — CITALOPRAM HYDROBROMIDE 40 MG: 20 TABLET ORAL at 08:16

## 2024-12-29 RX ADMIN — BENZONATATE 100 MG: 100 CAPSULE ORAL at 20:33

## 2024-12-29 RX ADMIN — Medication 500 MG: at 08:16

## 2024-12-29 RX ADMIN — BUDESONIDE 500 MCG: 0.5 SUSPENSION RESPIRATORY (INHALATION) at 20:50

## 2024-12-29 RX ADMIN — GUAIFENESIN SYRUP AND DEXTROMETHORPHAN 10 ML: 100; 10 SYRUP ORAL at 11:40

## 2024-12-29 RX ADMIN — METOPROLOL TARTRATE 50 MG: 50 TABLET, FILM COATED ORAL at 08:16

## 2024-12-29 RX ADMIN — INSULIN LISPRO 16 UNITS: 100 INJECTION, SOLUTION INTRAVENOUS; SUBCUTANEOUS at 17:16

## 2024-12-29 RX ADMIN — INSULIN LISPRO 18 UNITS: 100 INJECTION, SOLUTION INTRAVENOUS; SUBCUTANEOUS at 08:15

## 2024-12-29 RX ADMIN — ENOXAPARIN SODIUM 30 MG: 100 INJECTION SUBCUTANEOUS at 08:15

## 2024-12-29 RX ADMIN — METOPROLOL TARTRATE 50 MG: 50 TABLET, FILM COATED ORAL at 20:33

## 2024-12-29 RX ADMIN — PANTOPRAZOLE SODIUM 40 MG: 40 TABLET, DELAYED RELEASE ORAL at 08:16

## 2024-12-29 RX ADMIN — IPRATROPIUM BROMIDE AND ALBUTEROL SULFATE 1 DOSE: 2.5; .5 SOLUTION RESPIRATORY (INHALATION) at 12:32

## 2024-12-29 RX ADMIN — GUAIFENESIN SYRUP AND DEXTROMETHORPHAN 10 ML: 100; 10 SYRUP ORAL at 20:36

## 2024-12-29 RX ADMIN — PREGABALIN 50 MG: 50 CAPSULE ORAL at 08:16

## 2024-12-29 RX ADMIN — INSULIN LISPRO 6 UNITS: 100 INJECTION, SOLUTION INTRAVENOUS; SUBCUTANEOUS at 08:33

## 2024-12-29 RX ADMIN — PREGABALIN 50 MG: 50 CAPSULE ORAL at 20:33

## 2024-12-29 RX ADMIN — SODIUM CHLORIDE, PRESERVATIVE FREE 10 ML: 5 INJECTION INTRAVENOUS at 20:33

## 2024-12-29 RX ADMIN — IPRATROPIUM BROMIDE AND ALBUTEROL SULFATE 1 DOSE: 2.5; .5 SOLUTION RESPIRATORY (INHALATION) at 15:16

## 2024-12-29 RX ADMIN — ARFORMOTEROL TARTRATE 15 MCG: 15 SOLUTION RESPIRATORY (INHALATION) at 20:50

## 2024-12-29 RX ADMIN — BUDESONIDE 500 MCG: 0.5 SUSPENSION RESPIRATORY (INHALATION) at 08:02

## 2024-12-29 RX ADMIN — INSULIN LISPRO 6 UNITS: 100 INJECTION, SOLUTION INTRAVENOUS; SUBCUTANEOUS at 11:40

## 2024-12-29 RX ADMIN — ASPIRIN 81 MG: 81 TABLET, COATED ORAL at 20:32

## 2024-12-29 RX ADMIN — ARFORMOTEROL TARTRATE 15 MCG: 15 SOLUTION RESPIRATORY (INHALATION) at 08:02

## 2024-12-29 ASSESSMENT — PAIN - FUNCTIONAL ASSESSMENT: PAIN_FUNCTIONAL_ASSESSMENT: PREVENTS OR INTERFERES SOME ACTIVE ACTIVITIES AND ADLS

## 2024-12-29 ASSESSMENT — PAIN SCALES - GENERAL
PAINLEVEL_OUTOF10: 5
PAINLEVEL_OUTOF10: 10

## 2024-12-29 ASSESSMENT — PAIN DESCRIPTION - ORIENTATION: ORIENTATION: ANTERIOR;MID;POSTERIOR

## 2024-12-29 ASSESSMENT — PAIN DESCRIPTION - LOCATION: LOCATION: BACK;CHEST

## 2024-12-29 ASSESSMENT — PAIN DESCRIPTION - DESCRIPTORS: DESCRIPTORS: ACHING;DISCOMFORT;SORE

## 2024-12-29 NOTE — CONSULTS
liters HF NC talking with family in no apparent distress. Intermittent coughing.     Please note: past medical records were reviewed per electronic medical record (EMR) - see detailed reports under Past Medical/ Surgical History.   Past Medical/Surgical History:    Chronic T wave inversions  CAD  Pharm Stress Test: 1/3/2016: No stressed induced ischemia; EF 70%   3/1/2016 TTE: EF 65%, Stage I DD, Trace MR, Vegetation can't be excluded.   10/19/2017 NSTEMI.    10/22/2017 [facility] Dr. Ríos, 10/22/2017.  Left main 40% calcific distal stenosis. LAD 95% calcific ostial stenosis and 95% proximal stenosis, circumflex 90% proximal stenosis, RCA dominant, mid 60% stenosis, EF 60%.    10/23/2017 CABG x 2, Dr. Peñaloza: GIFFORD-LAD, SVG-OM.  7/12/19 TTE : normal EF. Stage I DD. No significant VHD. RVSP could not be estimated   6/2/2023 TTE Dr Miller: EF >70%. Normal LV size and thickness. Normal RV size and function. Normal atria. No VHD. Technically limited study. Sclerotic aortic valve.  6/2/2023 Lexiscan MPS: Non-ischemic. EF 70%. NWM.   12/22/2023 TTE Dr Wade : EF 60-65%. Normal LV size and thickness. NWM. .Mildly dilated RV with normal function. Mild AS. AV mean gradient is 9 mmHg. AV peak velocity is 2.0 m/s. AV area by continuity VTI is 1.9 cm2. Normal atria.   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 Mostly wheelchair bound.   L diabetic foot ulcer s/p I&D  11/2024 HgbA1c 7.8  12/28/2024 HgbA1c 8.5  CKD baseline SCr 1.2-1.5  Carotid disease  70-99% LAESIA and 50-69% on LICA on Carotid US 12/1/2023  Depression  2009 R Breast carcinoma s/p lumpectomy and lymph node dissection. + XRT. No chemotherapy  6/2023 Bilateral mastectomies(recurrent breast carcinoma)   16 pack year smoker quit in 1987  GERD  Legally blind after multiple eye surgeries   Uses cane or electric wheelchair.  wears BLE braces. Minimal ambulation  Hypomagnesia   Probable JAIRO    PLAN:  Supplement magnesium and keep it above  Continue ASA, statin, BB, ACEI and Zetia  No indication for cardiac testing  Pneumonia as per primary service +/- other consultants  Cardiology to sign off.  May call if needed  Follow up with Dr Miller (primary cardiologist) as outpatient in 4-6 weeks    Discussed with Dr Miller   Electronically signed by SHELLIE BURNS on 12/29/2024 at 11:51 AM     Patient's chart reviewed with SHELLIE BURNS.  Patient is known to me from my office.  Her current hospitalization, past medical history, medications, EKG, laboratory data, and imaging were all reviewed.  Patient seen and examined independently.  Her physical examination is pertinent for cough during interrogation, obesity, regular heart with 1/6 systolic murmur best heard at second costal space, left-sided basilar crepitations, tender chest to palpation and no lower extremity edema.  Patient was hypoxic on admission and required BiPAP.  He had high-grade fever and tachycardia.  Her EKG revealed sinus tachycardia at 103 bpm with nonspecific T wave changes in V1 through V3.  Her troponin was elevated at 52 and 62.  Her proBNP was 9400.  Chest x-ray reviewed revealed left-sided pneumonia.  I agree with the above assessment and plan made in collaboration with SHELLIE BURNS.    Thank you for allowing me to share in the care of this patient.  Cori Miller MD

## 2024-12-29 NOTE — CONSULTS
[facility]  Department of Internal Medicine  Division of Pulmonary, Critical Care and Sleep Medicine  Consult Note    Levi Morrow DO, MPH, [facility], FACOI, FACP  Allie Charles DO, [facility]  Erich Almendarez MD, MS Radha Connor, APRN-CNP    Patient: Kim Zarco  MRN: [id number]  : 1950    Encounter Time: 1:24 PM     Date of Admission: 2024  3:01 PM    Primary Care Physician: Ernestina Quinn MD    Reason for Consultation: CAP, Hypoxemia      HISTORY OF PRESENT ILLNESS : Kim Zarco 74 y.o. female was seen in consultation regarding the above chief compliant.      PAST MEDICAL HISTORY:  has a past medical history of ASHD (arteriosclerotic heart disease), Breast cancer (HCC), CAD (coronary artery disease), Cancer (HCC), Charcot foot due to diabetes mellitus (HCC), Chronic diastolic congestive heart failure (HCC), Chronic kidney disease, Depression, Diabetic foot ulcer with osteomyelitis (HCC), Diabetic peripheral neuropathy (HCC), Diabetic ulcer of left foot associated with type 2 diabetes mellitus (HCC), Diarrhea, Essential hypertension, GERD (gastroesophageal reflux disease), History of UTI, Hyperlipidemia, Hypertension, Lactose intolerance, Mass of left axilla, Thyroid disease, Type II or unspecified type diabetes mellitus without mention of complication, not stated as uncontrolled, and Urinary incontinence.    SURGICAL HISTORY:  has a past surgical history that includes Colonoscopy ();  section (); Eye surgery (); Breast lumpectomy; Colonoscopy (N/A, 2015); other surgical history (Left, 2016); Foot surgery (2016); eye surgery; Coronary artery bypass graft (10/23/2017); Diagnostic Cardiac Cath Lab Procedure; Colonoscopy (N/A, 2022); Breast biopsy; US BREAST BIOPSY W LOC DEVICE 1ST LESION RIGHT (Right, 2023); US BREAST BIOPSY W LOC DEVICE EACH ADDL LESION RIGHT (Right, 2023); and Mastectomy (Bilateral,  Yes Ernestina Quinn MD   famotidine (PEPCID) 40 MG tablet Take 1 tablet by mouth every morning 10/11/24  Yes Ernestina Quinn MD   lisinopril (PRINIVIL;ZESTRIL) 40 MG tablet Take 1 tablet by mouth daily 10/11/24  Yes Ernestina Quinn MD   metoprolol tartrate (LOPRESSOR) 50 MG tablet Take 1 tablet by mouth 2 times daily 10/11/24  Yes Ernestina Quinn MD   niacin (NIASPAN) 500 MG extended release tablet Take 1 tablet by mouth nightly at bedtime. 10/11/24  Yes Ernestina Quinn MD   pantoprazole (PROTONIX) 40 MG tablet Take 1 tablet by mouth daily 10/11/24  Yes Ernestina Quinn MD   potassium chloride (KLOR-CON M) 10 MEQ extended release tablet Take 1 tablet by mouth every other day 10/11/24  Yes Ernestina Quinn MD   pravastatin (PRAVACHOL) 40 MG tablet Take 1 tablet by mouth nightly 10/11/24  Yes Ernestina Quinn MD   Dulaglutide (TRULICITY) 3 MG/0.5ML SOPN Inject 3 mg into the skin once a week 10/11/24  Yes Ernestina Quinn MD   Cholecalciferol (VITAMIN D3) 20 MCG (800 UNIT) TABS Take 1 tablet by mouth daily 10/8/24  Yes Ernestina Quinn MD   Calcium Carbonate-Vit D-Min (CALCIUM-VITAMIN D-MINERALS) 600-800 MG-UNIT CHEW Take by mouth   Yes Provider, MD [place]   Insulin Pen Needle (B-D UF III MINI PEN NEEDLES) 31G X 5 MM MISC USE once daily to four times a day 6/19/24  Yes Ernestina Quinn MD   fluticasone-umeclidin-vilant (TRELEGY ELLIPTA) 200-62.5-25 MCG/ACT AEPB inhaler Inhale 1 puff into the lungs daily 6/19/24  Yes Ernestina Quinn MD   pregabalin (LYRICA) 50 MG capsule take 1 capsule by mouth twice a day for pain for 30 DAYS 6/3/24  Yes Provider, MD [place]   ketoconazole (NIZORAL) 2 % cream  3/7/24  Yes Provider, Historical, MD   Fesoterodine Fumarate ER 8 MG TB24 Take 1 tablet by mouth daily 2/3/24  Yes Provider, Historical, MD   letrozole (FEMARA) 2.5 MG tablet Take 1 tablet by mouth daily 9/18/23  Yes Provider, Historical, MD   SYSTANE 0.4-0.3 % GEL ophthalmic gel

## 2024-12-29 NOTE — PROGRESS NOTES
Hospitalist Progress Note      SYNOPSIS: Patient admitted on 2024 for Acute hypoxic respiratory failure  Kim Zarco has a past medical history that includes coronary artery disease status post CABG, diabetes, hypertension, CKD, hyperlipidemia  Kim presents ER for shortness of breath for 1 day.  Saturation runs around 60s patient was placed on CPAP received breathing treatment  ER Course  Upon presentation to the ER, routine labwork was performed which revealed BUN 36, creatinine 1.6, lactic acid 5.4, glucose 441, troponin 52.  Imaging results are as outlined below in the Imaging section of this note.     Upon arrival to the ER, patient was 153/65, febrile 103.2, tachycardic 104.  The patient received Tylenol, Rocephin, Doxy, Solu-Medrol, 2 L normal saline in the emergency room and was admitted to [facility].  Patient requiring 10 L of oxygen to maintain saturation CT chest without contrast sent, pulmonology consulted    SUBJECTIVE:  Overnight patient tolerated CPAP well, patient still complains of shortness of breath, multiple episodes of cough with on and off chest pain  Patient seen and examined at bedside today a.m. persistent cough, shortness of breath  Records reviewed.         Temp (24hrs), Av.8 °F (36.6 °C), Min:97 °F (36.1 °C), Max:98.6 °F (37 °C)    DIET: ADULT DIET; Regular; 3 carb choices (45 gm/meal)  CODE: Full Code    Intake/Output Summary (Last 24 hours) at 2024 1058  Last data filed at 2024 0601  Gross per 24 hour   Intake 170 ml   Output 800 ml   Net -630 ml       Review of Systems  All bolded are positive; please see HPI  General:  Fever, chills, diaphoresis, fatigue, malaise, night sweats, weight loss  Psychological:  Anxiety, disorientation, hallucinations.  ENT:  Epistaxis, headaches, vertigo, visual changes.  Cardiovascular:  Chest pain, irregular heartbeats, palpitations, paroxysmal nocturnal dyspnea.  Respiratory:  Shortness of breath, coughing, sputum

## 2024-12-29 NOTE — PLAN OF CARE
Problem: Chronic Conditions and Co-morbidities  Goal: Patient's chronic conditions and co-morbidity symptoms are monitored and maintained or improved  12/29/2024 0941 by Yadira Aguilar RN  Outcome: Progressing  12/28/2024 2032 by Jaince Epps RN  Outcome: Progressing     Problem: Discharge Planning  Goal: Discharge to home or other facility with appropriate resources  12/29/2024 0941 by Yadira Aguilar RN  Outcome: Progressing  12/28/2024 2032 by Janice Epps RN  Outcome: Progressing     Problem: Safety - Adult  Goal: Free from fall injury  12/29/2024 0941 by Yadira Aguilar RN  Outcome: Progressing  12/28/2024 2032 by Janice Epps RN  Outcome: Progressing     Problem: Pain  Goal: Verbalizes/displays adequate comfort level or baseline comfort level  12/29/2024 0941 by Yadira Aguilar RN  Outcome: Progressing  12/28/2024 2032 by Janice Epps RN  Outcome: Progressing

## 2024-12-29 NOTE — PROGRESS NOTES
Date: 12/28/2024    Time: 11:56 PM    Patient Placed On BIPAP/CPAP/ Non-Invasive Ventilation?  Yes    If no must comment.  Facial area red/color change? No           If YES are Blister/Lesion present?No   If yes must notify nursing staff  BIPAP/CPAP skin barrier?  Yes    Skin barrier type:mepilexlite       Comments:        Mohan Carey RCP

## 2024-12-29 NOTE — CONSULTS
ENDOCRINOLOGY INITIAL CONSULTATION NOTE      Date of admission: 12/27/2024  Date of service: 12/29/2024  Admitting physician: Tracee Smith DO   Primary Care Physician: Ernestina Quinn MD  Consultant physician: Roni Garza MD     Reason for the consultation:  Uncontrolled DM    History of Present Illness:  The history is provided by the patient. Accuracy of the patient data is excellent    Kim Zarco is a very pleasant 74 y.o. old female with PMH of obesity, poorly controlled diabetes, hypertension, hyperlipidemia, coronary artery disease status post CABG and other listed below admitted [facility] on 12/27/2024 because of shortness of breath and found to have a community-acquired pneumonia, endocrine service was consulted for diabetes management.  The patient was in her usual state of health until few days prior to the admission was started complaining of persistent cough and worsening shortness of breath.  Her symptoms progressed and on the day of the presentation the patient was found by EMS to be hypoxic with oxygen saturation in the 60s.  Chest x-ray showed multifocal infiltration predominantly in the left lung.  Her temperature on arrival was found to be 103.2  Blood work on admission showed a glucose level of 441, anion gap 13, bicarb 22, creatinine 1.6, potassium 4.6 and an A1c of 8.5%      Prior to admission  The patient was diagnosed with type 2 diabetes many years ago.she was on Humalog 75/25 insulin on variable doses.  She at 1 point she was in the 70 units twice daily then the dose was changed by her doctor to 30 units twice daily and then eventually switch it to basal bolus insulin regimen recently.  Right before this admission the patient was Lantus 34 units daily, Humalog per sliding scale.  The patient reports sugar consistently running high.  She denies any hypoglycemic episodes.  The admits poor compliance with following diabetic diet prior to the admission.  She is overdue with a diabetic  eye exam the patient reports both microvascular and macrovascular diabetes complications  Lab Results   Component Value Date/Time    LABA1C 8.5 2024 04:47 AM       Inpatient diet:   Carb Restricted diet     Point of care glucose monitoring   (Independently reviewed)   Recent Labs     24  1937 24  0556 24  1048 24  1533 24  2019 24  0555 24  1027   POCGLU 393* 423* 351* 255* 277* 342* 322*       Past medical history:   Past Medical History:   Diagnosis Date    ASHD (arteriosclerotic heart disease)     Breast cancer (HCC)     rt     CAD (coronary artery disease)     Cancer (HCC)     breast ca, right    Charcot foot due to diabetes mellitus (HCC) 2012    Chronic diastolic congestive heart failure (HCC) 2017    Chronic kidney disease     sees nephrology    Depression     Diabetic foot ulcer with osteomyelitis (HCC)     Diabetic peripheral neuropathy (HCC)     Diabetic ulcer of left foot associated with type 2 diabetes mellitus (HCC)     Diarrhea     Essential hypertension 2018    GERD (gastroesophageal reflux disease)     History of UTI     Hyperlipidemia     Hypertension     Lactose intolerance     Mass of left axilla     Thyroid disease     Type II or unspecified type diabetes mellitus without mention of complication, not stated as uncontrolled     Urinary incontinence        Past surgical history:  Past Surgical History:   Procedure Laterality Date    BREAST BIOPSY          BREAST LUMPECTOMY      right breast , with lymph nodes.     SECTION  1979    COLONOSCOPY      COLONOSCOPY N/A 2015    COLONOSCOPY    COLONOSCOPY N/A 2022    COLONOSCOPY POLYPECTOMY SNARE/COLD BIOPSY performed by Tarah Hamm MD at [facility] ENDOSCOPY    CORONARY ARTERY BYPASS GRAFT  10/23/2017    x2   calvin    DIAGNOSTIC CARDIAC CATH LAB PROCEDURE      EYE SURGERY      several ,cataracts,legally blind    EYE SURGERY      FOOT SURGERY  2016

## 2024-12-29 NOTE — PLAN OF CARE
Problem: Chronic Conditions and Co-morbidities  Goal: Patient's chronic conditions and co-morbidity symptoms are monitored and maintained or improved  12/28/2024 2032 by Janice Epps RN  Outcome: Progressing  12/28/2024 0929 by Yadira Aguilar RN  Outcome: Progressing     Problem: Discharge Planning  Goal: Discharge to home or other facility with appropriate resources  12/28/2024 2032 by Janice Epps RN  Outcome: Progressing  12/28/2024 0929 by Yadira Aguilar RN  Outcome: Progressing     Problem: Safety - Adult  Goal: Free from fall injury  12/28/2024 2032 by Janice Epps RN  Outcome: Progressing  12/28/2024 0929 by Yadira Aguilar RN  Outcome: Progressing     Problem: Pain  Goal: Verbalizes/displays adequate comfort level or baseline comfort level  12/28/2024 2032 by Janice Epps RN  Outcome: Progressing  12/28/2024 0929 by Yadira Aguilar RN  Outcome: Progressing

## 2024-12-29 NOTE — PATIENT CARE CONFERENCE
[facility] Quality Flow/Interdisciplinary Rounds Progress Note        Quality Flow Rounds held on December 29, 2024    Disciplines Attending:  Bedside Nurse and Nursing Unit Leadership    Kim Zarco was admitted on 12/27/2024  3:01 PM    Anticipated Discharge Date:       Disposition:    Darwin Score:  Darwin Scale Score: 19    [facility] RISK OF UNPLANNED READMISSION 2.0             16.8 Total Score        Discussed patient goal for the day, patient clinical progression, and barriers to discharge.  The following Goal(s) of the Day/Commitment(s) have been identified:  Diagnostics - Report Results and Labs - Report Results and keep her informed      Irene Dodson RN  December 29, 2024

## 2024-12-30 LAB
ANION GAP SERPL CALCULATED.3IONS-SCNC: 11 MMOL/L (ref 7–16)
BUN SERPL-MCNC: 49 MG/DL (ref 6–23)
CALCIUM SERPL-MCNC: 9 MG/DL (ref 8.6–10.2)
CHLORIDE SERPL-SCNC: 105 MMOL/L (ref 98–107)
CO2 SERPL-SCNC: 23 MMOL/L (ref 22–29)
CREAT SERPL-MCNC: 1.3 MG/DL (ref 0.5–1)
EKG ATRIAL RATE: 103 BPM
EKG P AXIS: 62 DEGREES
EKG P-R INTERVAL: 152 MS
EKG Q-T INTERVAL: 346 MS
EKG QRS DURATION: 90 MS
EKG QTC CALCULATION (BAZETT): 453 MS
EKG R AXIS: 93 DEGREES
EKG T AXIS: 77 DEGREES
EKG VENTRICULAR RATE: 103 BPM
GFR, ESTIMATED: 43 ML/MIN/1.73M2
GLUCOSE BLD-MCNC: 132 MG/DL (ref 74–99)
GLUCOSE BLD-MCNC: 145 MG/DL (ref 74–99)
GLUCOSE BLD-MCNC: 214 MG/DL (ref 74–99)
GLUCOSE BLD-MCNC: 284 MG/DL (ref 74–99)
GLUCOSE BLD-MCNC: 47 MG/DL (ref 74–99)
GLUCOSE SERPL-MCNC: 153 MG/DL (ref 74–99)
LACTATE BLDV-SCNC: 1.5 MMOL/L (ref 0.5–2.2)
LACTATE BLDV-SCNC: 2.5 MMOL/L (ref 0.5–2.2)
MAGNESIUM SERPL-MCNC: 1.7 MG/DL (ref 1.6–2.6)
POTASSIUM SERPL-SCNC: 4 MMOL/L (ref 3.5–5)
SODIUM SERPL-SCNC: 139 MMOL/L (ref 132–146)

## 2024-12-30 PROCEDURE — 80048 BASIC METABOLIC PNL TOTAL CA: CPT

## 2024-12-30 PROCEDURE — 99232 SBSQ HOSP IP/OBS MODERATE 35: CPT | Performed by: INTERNAL MEDICINE

## 2024-12-30 PROCEDURE — 82947 ASSAY GLUCOSE BLOOD QUANT: CPT

## 2024-12-30 PROCEDURE — 2500000003 HC RX 250 WO HCPCS: Performed by: INTERNAL MEDICINE

## 2024-12-30 PROCEDURE — 93010 ELECTROCARDIOGRAM REPORT: CPT | Performed by: INTERNAL MEDICINE

## 2024-12-30 PROCEDURE — 36415 COLL VENOUS BLD VENIPUNCTURE: CPT

## 2024-12-30 PROCEDURE — 2140000000 HC CCU INTERMEDIATE R&B

## 2024-12-30 PROCEDURE — 99233 SBSQ HOSP IP/OBS HIGH 50: CPT | Performed by: INTERNAL MEDICINE

## 2024-12-30 PROCEDURE — 83735 ASSAY OF MAGNESIUM: CPT

## 2024-12-30 PROCEDURE — 6360000002 HC RX W HCPCS: Performed by: INTERNAL MEDICINE

## 2024-12-30 PROCEDURE — 6370000000 HC RX 637 (ALT 250 FOR IP): Performed by: STUDENT IN AN ORGANIZED HEALTH CARE EDUCATION/TRAINING PROGRAM

## 2024-12-30 PROCEDURE — 2700000000 HC OXYGEN THERAPY PER DAY

## 2024-12-30 PROCEDURE — 2580000003 HC RX 258: Performed by: INTERNAL MEDICINE

## 2024-12-30 PROCEDURE — 94660 CPAP INITIATION&MGMT: CPT

## 2024-12-30 PROCEDURE — 6370000000 HC RX 637 (ALT 250 FOR IP): Performed by: INTERNAL MEDICINE

## 2024-12-30 PROCEDURE — 83605 ASSAY OF LACTIC ACID: CPT

## 2024-12-30 PROCEDURE — 99232 SBSQ HOSP IP/OBS MODERATE 35: CPT | Performed by: STUDENT IN AN ORGANIZED HEALTH CARE EDUCATION/TRAINING PROGRAM

## 2024-12-30 PROCEDURE — 2500000003 HC RX 250 WO HCPCS: Performed by: STUDENT IN AN ORGANIZED HEALTH CARE EDUCATION/TRAINING PROGRAM

## 2024-12-30 PROCEDURE — 94640 AIRWAY INHALATION TREATMENT: CPT

## 2024-12-30 PROCEDURE — 6360000002 HC RX W HCPCS: Performed by: STUDENT IN AN ORGANIZED HEALTH CARE EDUCATION/TRAINING PROGRAM

## 2024-12-30 RX ORDER — INSULIN LISPRO 100 [IU]/ML
0-12 INJECTION, SOLUTION INTRAVENOUS; SUBCUTANEOUS
Status: DISCONTINUED | OUTPATIENT
Start: 2024-12-30 | End: 2024-12-31

## 2024-12-30 RX ORDER — INSULIN LISPRO 100 [IU]/ML
10 INJECTION, SOLUTION INTRAVENOUS; SUBCUTANEOUS
Status: DISCONTINUED | OUTPATIENT
Start: 2024-12-30 | End: 2024-12-31

## 2024-12-30 RX ORDER — INSULIN GLARGINE 100 [IU]/ML
40 INJECTION, SOLUTION SUBCUTANEOUS NIGHTLY
Status: DISCONTINUED | OUTPATIENT
Start: 2024-12-30 | End: 2024-12-31

## 2024-12-30 RX ADMIN — INSULIN LISPRO 10 UNITS: 100 INJECTION, SOLUTION INTRAVENOUS; SUBCUTANEOUS at 17:20

## 2024-12-30 RX ADMIN — EZETIMIBE 10 MG: 10 TABLET ORAL at 08:18

## 2024-12-30 RX ADMIN — Medication 500 MG: at 08:19

## 2024-12-30 RX ADMIN — TROSPIUM CHLORIDE 20 MG: 20 TABLET, FILM COATED ORAL at 15:54

## 2024-12-30 RX ADMIN — ASPIRIN 81 MG: 81 TABLET, COATED ORAL at 20:58

## 2024-12-30 RX ADMIN — DOXYCYCLINE 100 MG: 100 INJECTION, POWDER, LYOPHILIZED, FOR SOLUTION INTRAVENOUS at 18:36

## 2024-12-30 RX ADMIN — BENZONATATE 100 MG: 100 CAPSULE ORAL at 20:58

## 2024-12-30 RX ADMIN — METOPROLOL TARTRATE 50 MG: 50 TABLET, FILM COATED ORAL at 08:19

## 2024-12-30 RX ADMIN — INSULIN LISPRO 6 UNITS: 100 INJECTION, SOLUTION INTRAVENOUS; SUBCUTANEOUS at 20:57

## 2024-12-30 RX ADMIN — DOXYCYCLINE 100 MG: 100 INJECTION, POWDER, LYOPHILIZED, FOR SOLUTION INTRAVENOUS at 05:38

## 2024-12-30 RX ADMIN — GUAIFENESIN SYRUP AND DEXTROMETHORPHAN 10 ML: 100; 10 SYRUP ORAL at 16:04

## 2024-12-30 RX ADMIN — HYDROCODONE BITARTRATE AND ACETAMINOPHEN 1 TABLET: 5; 325 TABLET ORAL at 05:42

## 2024-12-30 RX ADMIN — GUAIFENESIN SYRUP AND DEXTROMETHORPHAN 10 ML: 100; 10 SYRUP ORAL at 20:58

## 2024-12-30 RX ADMIN — Medication: at 15:52

## 2024-12-30 RX ADMIN — Medication 500 MG: at 20:59

## 2024-12-30 RX ADMIN — SODIUM CHLORIDE, PRESERVATIVE FREE 10 ML: 5 INJECTION INTRAVENOUS at 17:33

## 2024-12-30 RX ADMIN — BENZONATATE 100 MG: 100 CAPSULE ORAL at 14:58

## 2024-12-30 RX ADMIN — FAMOTIDINE 20 MG: 20 TABLET, FILM COATED ORAL at 08:21

## 2024-12-30 RX ADMIN — BUDESONIDE 500 MCG: 0.5 SUSPENSION RESPIRATORY (INHALATION) at 22:33

## 2024-12-30 RX ADMIN — ARFORMOTEROL TARTRATE 15 MCG: 15 SOLUTION RESPIRATORY (INHALATION) at 22:33

## 2024-12-30 RX ADMIN — CALCIUM CARBONATE 500 MG: 500 TABLET, CHEWABLE ORAL at 05:29

## 2024-12-30 RX ADMIN — PRAVASTATIN SODIUM 40 MG: 20 TABLET ORAL at 20:58

## 2024-12-30 RX ADMIN — HYDROCODONE BITARTRATE AND ACETAMINOPHEN 1 TABLET: 5; 325 TABLET ORAL at 17:31

## 2024-12-30 RX ADMIN — INSULIN LISPRO 16 UNITS: 100 INJECTION, SOLUTION INTRAVENOUS; SUBCUTANEOUS at 08:24

## 2024-12-30 RX ADMIN — Medication 1000 UNITS: at 08:19

## 2024-12-30 RX ADMIN — ENOXAPARIN SODIUM 30 MG: 100 INJECTION SUBCUTANEOUS at 08:17

## 2024-12-30 RX ADMIN — ARFORMOTEROL TARTRATE 15 MCG: 15 SOLUTION RESPIRATORY (INHALATION) at 09:22

## 2024-12-30 RX ADMIN — INSULIN GLARGINE 40 UNITS: 100 INJECTION, SOLUTION SUBCUTANEOUS at 20:58

## 2024-12-30 RX ADMIN — GUAIFENESIN SYRUP AND DEXTROMETHORPHAN 10 ML: 100; 10 SYRUP ORAL at 05:29

## 2024-12-30 RX ADMIN — ENOXAPARIN SODIUM 30 MG: 100 INJECTION SUBCUTANEOUS at 20:58

## 2024-12-30 RX ADMIN — LETROZOLE 2.5 MG: 2.5 TABLET ORAL at 08:20

## 2024-12-30 RX ADMIN — LISINOPRIL 40 MG: 20 TABLET ORAL at 08:19

## 2024-12-30 RX ADMIN — PREGABALIN 50 MG: 50 CAPSULE ORAL at 20:58

## 2024-12-30 RX ADMIN — POTASSIUM CHLORIDE 10 MEQ: 750 TABLET, EXTENDED RELEASE ORAL at 08:18

## 2024-12-30 RX ADMIN — IPRATROPIUM BROMIDE AND ALBUTEROL SULFATE 1 DOSE: 2.5; .5 SOLUTION RESPIRATORY (INHALATION) at 22:33

## 2024-12-30 RX ADMIN — Medication 16 G: at 11:21

## 2024-12-30 RX ADMIN — SODIUM CHLORIDE, PRESERVATIVE FREE 10 ML: 5 INJECTION INTRAVENOUS at 08:17

## 2024-12-30 RX ADMIN — BUDESONIDE 500 MCG: 0.5 SUSPENSION RESPIRATORY (INHALATION) at 09:22

## 2024-12-30 RX ADMIN — IPRATROPIUM BROMIDE AND ALBUTEROL SULFATE 1 DOSE: 2.5; .5 SOLUTION RESPIRATORY (INHALATION) at 09:22

## 2024-12-30 RX ADMIN — INSULIN LISPRO 4 UNITS: 100 INJECTION, SOLUTION INTRAVENOUS; SUBCUTANEOUS at 17:25

## 2024-12-30 RX ADMIN — DEXAMETHASONE 6 MG: 4 TABLET ORAL at 13:06

## 2024-12-30 RX ADMIN — METOPROLOL TARTRATE 50 MG: 50 TABLET, FILM COATED ORAL at 20:59

## 2024-12-30 RX ADMIN — IPRATROPIUM BROMIDE AND ALBUTEROL SULFATE 1 DOSE: 2.5; .5 SOLUTION RESPIRATORY (INHALATION) at 17:58

## 2024-12-30 RX ADMIN — Medication: at 20:58

## 2024-12-30 RX ADMIN — WATER 1000 MG: 1 INJECTION INTRAMUSCULAR; INTRAVENOUS; SUBCUTANEOUS at 17:33

## 2024-12-30 RX ADMIN — SODIUM CHLORIDE, PRESERVATIVE FREE 10 ML: 5 INJECTION INTRAVENOUS at 20:09

## 2024-12-30 RX ADMIN — TROSPIUM CHLORIDE 20 MG: 20 TABLET, FILM COATED ORAL at 05:29

## 2024-12-30 RX ADMIN — BENZONATATE 100 MG: 100 CAPSULE ORAL at 08:20

## 2024-12-30 RX ADMIN — CITALOPRAM HYDROBROMIDE 40 MG: 20 TABLET ORAL at 08:18

## 2024-12-30 RX ADMIN — PREGABALIN 50 MG: 50 CAPSULE ORAL at 08:18

## 2024-12-30 RX ADMIN — PANTOPRAZOLE SODIUM 40 MG: 40 TABLET, DELAYED RELEASE ORAL at 08:20

## 2024-12-30 RX ADMIN — GUAIFENESIN SYRUP AND DEXTROMETHORPHAN 10 ML: 100; 10 SYRUP ORAL at 10:19

## 2024-12-30 RX ADMIN — ALLOPURINOL 100 MG: 100 TABLET ORAL at 08:18

## 2024-12-30 RX ADMIN — Medication 1 TABLET: at 08:19

## 2024-12-30 ASSESSMENT — PAIN DESCRIPTION - DESCRIPTORS
DESCRIPTORS: SORE;TENDER;THROBBING
DESCRIPTORS: DISCOMFORT;SORE;BURNING
DESCRIPTORS: ACHING;DISCOMFORT;SORE
DESCRIPTORS: DISCOMFORT;BURNING;NAGGING

## 2024-12-30 ASSESSMENT — PAIN SCALES - GENERAL
PAINLEVEL_OUTOF10: 8
PAINLEVEL_OUTOF10: 0
PAINLEVEL_OUTOF10: 3
PAINLEVEL_OUTOF10: 6
PAINLEVEL_OUTOF10: 7
PAINLEVEL_OUTOF10: 9
PAINLEVEL_OUTOF10: 8
PAINLEVEL_OUTOF10: 8

## 2024-12-30 ASSESSMENT — PAIN DESCRIPTION - ORIENTATION
ORIENTATION: MID
ORIENTATION: UPPER
ORIENTATION: MID
ORIENTATION: ANTERIOR;MID;POSTERIOR

## 2024-12-30 ASSESSMENT — PAIN DESCRIPTION - LOCATION
LOCATION: MOUTH
LOCATION: MOUTH
LOCATION: BACK;CHEST;HEAD
LOCATION: BACK;CHEST
LOCATION: MOUTH
LOCATION: BACK

## 2024-12-30 ASSESSMENT — PAIN DESCRIPTION - PAIN TYPE: TYPE: ACUTE PAIN

## 2024-12-30 ASSESSMENT — PAIN - FUNCTIONAL ASSESSMENT
PAIN_FUNCTIONAL_ASSESSMENT: PREVENTS OR INTERFERES SOME ACTIVE ACTIVITIES AND ADLS
PAIN_FUNCTIONAL_ASSESSMENT: PREVENTS OR INTERFERES SOME ACTIVE ACTIVITIES AND ADLS

## 2024-12-30 ASSESSMENT — PAIN DESCRIPTION - FREQUENCY: FREQUENCY: INTERMITTENT

## 2024-12-30 ASSESSMENT — PAIN DESCRIPTION - ONSET: ONSET: ON-GOING

## 2024-12-30 NOTE — PLAN OF CARE
Problem: Chronic Conditions and Co-morbidities  Goal: Patient's chronic conditions and co-morbidity symptoms are monitored and maintained or improved  12/29/2024 2230 by Janice Epps RN  Outcome: Progressing  12/29/2024 0941 by Yadira Aguilar RN  Outcome: Progressing     Problem: Discharge Planning  Goal: Discharge to home or other facility with appropriate resources  12/29/2024 2230 by Janice Epps RN  Outcome: Progressing  12/29/2024 0941 by Yadira Aguilar RN  Outcome: Progressing     Problem: Safety - Adult  Goal: Free from fall injury  12/29/2024 2230 by Janice Epps RN  Outcome: Progressing  12/29/2024 0941 by Yadira Aguilar RN  Outcome: Progressing     Problem: Pain  Goal: Verbalizes/displays adequate comfort level or baseline comfort level  12/29/2024 2230 by Janice Epps RN  Outcome: Progressing  12/29/2024 0941 by Yadira Aguilar RN  Outcome: Progressing

## 2024-12-30 NOTE — CARE COORDINATION
Transition of care: Admitted with acute hypoxic respiratory failure. Pulmonary, cardiology and endocrinology consulted. O2 at 6l/nc. Labs and orders noted. Met with pt and pt's family on room. Pt lives with her , Hema, and grandson in a 2 story home. Has 4 stairs with rails on both sides to enter home. Pt said she has recently been staying on the 1st level of home. Pt has a wheelchair lift to enter home which is broke and they are working with their contact at [facility] to have it repaired. Independent with ADLs. Hema provides transportation. DME- wheelchair, electric scooter, shower chair and bilateral foot braces. Hx of jean at [facility]. Plan is to return home when medically ready. Pt had no preference for dme agency if home o2 is needed. PCP si Dr CELY Quinn and pharmacy is [facility] in on [facility]. Cm/sw will follow.       Case Management Assessment  Initial Evaluation    Date/Time of Evaluation: 12/30/2024 4:19 PM  Assessment Completed by: Lorie Almaguer RN    If patient is discharged prior to next notation, then this note serves as note for discharge by case management.    Patient Name: Kim Zarco                   YOB: 1950  Diagnosis: Acute hypoxic respiratory failure [J96.01]                   Date / Time: 12/27/2024  3:01 PM    Patient Admission Status: Inpatient   Readmission Risk (Low < 19, Mod (19-27), High > 27): Readmission Risk Score: 17.9    Current PCP: Ernestina Quinn MD  PCP verified by CM? Yes    Chart Reviewed: Yes      History Provided by: Patient, Child/Family  Patient Orientation: Alert and Oriented    Patient Cognition: Alert    Hospitalization in the last 30 days (Readmission):  No    If yes, Readmission Assessment in CM Navigator will be completed.    Advance Directives:      Code Status: Full Code       Discharge Planning:    Patient lives with: Children Type of Home: House  Primary Care Giver: Self  Patient Support Systems include: Children

## 2024-12-30 NOTE — PROGRESS NOTES
Attempted to place patient on her BiPAP for the evening, Patient stated she doesn't want to wear it tonight and would like to try just keeping the nasal cannula in. Advised patient if she wants to go on at any time to let RN know and RT will be up to place patient on.

## 2024-12-30 NOTE — PROGRESS NOTES
BID AC    letrozole  2.5 mg Oral Daily    lisinopril  40 mg Oral Daily    metoprolol tartrate  50 mg Oral BID    niacin  500 mg Oral Nightly    pantoprazole  40 mg Oral Daily    potassium chloride  10 mEq Oral Every Other Day    pravastatin  40 mg Oral Nightly    pregabalin  50 mg Oral BID    therapeutic multivitamin-minerals  1 tablet Oral Daily    vitamin C  500 mg Oral Daily    ipratropium 0.5 mg-albuterol 2.5 mg  1 Dose Inhalation Q4H While awake    doxycycline (VIBRAMYCIN) IV  100 mg IntraVENous Q12H    arformoterol tartrate  15 mcg Nebulization BID RT    budesonide  0.5 mg Nebulization BID RT    sodium chloride flush  5-40 mL IntraVENous 2 times per day    enoxaparin  30 mg SubCUTAneous BID     PRN Meds: HYDROcodone homatropine, HYDROcodone-acetaminophen, glucose, dextrose bolus **OR** dextrose bolus, glucagon (rDNA), dextrose, sodium chloride flush, sodium chloride, potassium chloride **OR** potassium alternative oral replacement **OR** potassium chloride, magnesium sulfate, ondansetron **OR** ondansetron, polyethylene glycol, acetaminophen **OR** acetaminophen, benzocaine-menthol, benzonatate, calcium carbonate, hydrALAZINE, melatonin, Polyvinyl Alcohol-Povidone PF, sodium chloride    Labs:     Recent Labs     12/27/24  1510 12/28/24  0446   WBC 10.6 12.2*   HGB 12.6 10.7*   HCT 39.3 33.5*    150       Recent Labs     12/27/24  1510 12/28/24  0446 12/30/24  0711    138 139   K 4.6 4.8 4.0   CL 98 106 105   CO2 22 20* 23   BUN 36* 45* 49*   CREATININE 1.6* 1.5* 1.3*   CALCIUM 9.3 8.6 9.0   PHOS  --  2.1*  --        Recent Labs     12/27/24  1510 12/28/24  0446   ALKPHOS 52 40   ALT 11 9   AST 17 17   BILITOT 1.1 0.5       No results for input(s): \"INR\" in the last 72 hours.    No results for input(s): \"CKTOTAL\", \"TROPONINI\" in the last 72 hours.    Chronic labs:    Lab Results   Component Value Date    CHOL 86 12/28/2024    TRIG 98 12/28/2024    HDL 44 12/28/2024    TSH 0.22 (L) 12/28/2024    INR  1.2 10/23/2017    LABA1C 8.5 (H) 12/28/2024       Radiology: REVIEWED DAILY    +++++++++++++++++++++++++++++++++++++++++++++++++  Sterling Cao MD   Hospitalist  [place], [place]  +++++++++++++++++++++++++++++++++++++++++++++++++  NOTE: This report was transcribed using voice recognition software. Every effort was made to ensure accuracy; however, inadvertent computerized transcription errors may be present.

## 2024-12-31 LAB
ANION GAP SERPL CALCULATED.3IONS-SCNC: 13 MMOL/L (ref 7–16)
BASOPHILS # BLD: 0.04 K/UL (ref 0–0.2)
BASOPHILS NFR BLD: 0 % (ref 0–2)
BNP SERPL-MCNC: 3134 PG/ML (ref 0–125)
BUN SERPL-MCNC: 38 MG/DL (ref 6–23)
CALCIUM SERPL-MCNC: 9.6 MG/DL (ref 8.6–10.2)
CHLORIDE SERPL-SCNC: 104 MMOL/L (ref 98–107)
CO2 SERPL-SCNC: 23 MMOL/L (ref 22–29)
CREAT SERPL-MCNC: 1.1 MG/DL (ref 0.5–1)
EOSINOPHIL # BLD: 0 K/UL (ref 0.05–0.5)
EOSINOPHILS RELATIVE PERCENT: 0 % (ref 0–6)
ERYTHROCYTE [DISTWIDTH] IN BLOOD BY AUTOMATED COUNT: 13.3 % (ref 11.5–15)
GFR, ESTIMATED: 56 ML/MIN/1.73M2
GLUCOSE BLD-MCNC: 261 MG/DL (ref 74–99)
GLUCOSE BLD-MCNC: 294 MG/DL (ref 74–99)
GLUCOSE BLD-MCNC: 299 MG/DL (ref 74–99)
GLUCOSE BLD-MCNC: 301 MG/DL (ref 74–99)
GLUCOSE SERPL-MCNC: 296 MG/DL (ref 74–99)
HCT VFR BLD AUTO: 33.7 % (ref 34–48)
HGB BLD-MCNC: 10.7 G/DL (ref 11.5–15.5)
IMM GRANULOCYTES # BLD AUTO: 0.32 K/UL (ref 0–0.58)
IMM GRANULOCYTES NFR BLD: 2 % (ref 0–5)
LYMPHOCYTES NFR BLD: 0.34 K/UL (ref 1.5–4)
LYMPHOCYTES RELATIVE PERCENT: 3 % (ref 20–42)
MAGNESIUM SERPL-MCNC: 1.7 MG/DL (ref 1.6–2.6)
MCH RBC QN AUTO: 28.6 PG (ref 26–35)
MCHC RBC AUTO-ENTMCNC: 31.8 G/DL (ref 32–34.5)
MCV RBC AUTO: 90.1 FL (ref 80–99.9)
MONOCYTES NFR BLD: 0.51 K/UL (ref 0.1–0.95)
MONOCYTES NFR BLD: 4 % (ref 2–12)
NEUTROPHILS NFR BLD: 91 % (ref 43–80)
NEUTS SEG NFR BLD: 12.5 K/UL (ref 1.8–7.3)
PLATELET # BLD AUTO: 198 K/UL (ref 130–450)
PMV BLD AUTO: 11.2 FL (ref 7–12)
POTASSIUM SERPL-SCNC: 4.9 MMOL/L (ref 3.5–5)
RBC # BLD AUTO: 3.74 M/UL (ref 3.5–5.5)
RBC # BLD: ABNORMAL 10*6/UL
SODIUM SERPL-SCNC: 140 MMOL/L (ref 132–146)
WBC OTHER # BLD: 13.7 K/UL (ref 4.5–11.5)

## 2024-12-31 PROCEDURE — 80048 BASIC METABOLIC PNL TOTAL CA: CPT

## 2024-12-31 PROCEDURE — 94660 CPAP INITIATION&MGMT: CPT

## 2024-12-31 PROCEDURE — 94640 AIRWAY INHALATION TREATMENT: CPT

## 2024-12-31 PROCEDURE — 82962 GLUCOSE BLOOD TEST: CPT

## 2024-12-31 PROCEDURE — 6360000002 HC RX W HCPCS: Performed by: INTERNAL MEDICINE

## 2024-12-31 PROCEDURE — 85025 COMPLETE CBC W/AUTO DIFF WBC: CPT

## 2024-12-31 PROCEDURE — 6370000000 HC RX 637 (ALT 250 FOR IP): Performed by: INTERNAL MEDICINE

## 2024-12-31 PROCEDURE — 83735 ASSAY OF MAGNESIUM: CPT

## 2024-12-31 PROCEDURE — 2500000003 HC RX 250 WO HCPCS: Performed by: STUDENT IN AN ORGANIZED HEALTH CARE EDUCATION/TRAINING PROGRAM

## 2024-12-31 PROCEDURE — 94669 MECHANICAL CHEST WALL OSCILL: CPT

## 2024-12-31 PROCEDURE — 36415 COLL VENOUS BLD VENIPUNCTURE: CPT

## 2024-12-31 PROCEDURE — 99233 SBSQ HOSP IP/OBS HIGH 50: CPT | Performed by: STUDENT IN AN ORGANIZED HEALTH CARE EDUCATION/TRAINING PROGRAM

## 2024-12-31 PROCEDURE — 2580000003 HC RX 258: Performed by: INTERNAL MEDICINE

## 2024-12-31 PROCEDURE — 2140000000 HC CCU INTERMEDIATE R&B

## 2024-12-31 PROCEDURE — 99233 SBSQ HOSP IP/OBS HIGH 50: CPT | Performed by: INTERNAL MEDICINE

## 2024-12-31 PROCEDURE — 83880 ASSAY OF NATRIURETIC PEPTIDE: CPT

## 2024-12-31 PROCEDURE — 6370000000 HC RX 637 (ALT 250 FOR IP): Performed by: STUDENT IN AN ORGANIZED HEALTH CARE EDUCATION/TRAINING PROGRAM

## 2024-12-31 PROCEDURE — 99232 SBSQ HOSP IP/OBS MODERATE 35: CPT | Performed by: INTERNAL MEDICINE

## 2024-12-31 PROCEDURE — 6360000002 HC RX W HCPCS: Performed by: STUDENT IN AN ORGANIZED HEALTH CARE EDUCATION/TRAINING PROGRAM

## 2024-12-31 PROCEDURE — 2500000003 HC RX 250 WO HCPCS: Performed by: INTERNAL MEDICINE

## 2024-12-31 RX ORDER — INSULIN GLARGINE 100 [IU]/ML
55 INJECTION, SOLUTION SUBCUTANEOUS NIGHTLY
Status: DISCONTINUED | OUTPATIENT
Start: 2024-12-31 | End: 2025-01-02

## 2024-12-31 RX ORDER — CHLORHEXIDINE GLUCONATE ORAL RINSE 1.2 MG/ML
15 SOLUTION DENTAL 2 TIMES DAILY
Status: DISCONTINUED | OUTPATIENT
Start: 2024-12-31 | End: 2025-01-04 | Stop reason: HOSPADM

## 2024-12-31 RX ORDER — INSULIN LISPRO 100 [IU]/ML
15 INJECTION, SOLUTION INTRAVENOUS; SUBCUTANEOUS
Status: DISCONTINUED | OUTPATIENT
Start: 2024-12-31 | End: 2025-01-02

## 2024-12-31 RX ORDER — NYSTATIN 100000 [USP'U]/ML
5 SUSPENSION ORAL 4 TIMES DAILY
Status: DISCONTINUED | OUTPATIENT
Start: 2024-12-31 | End: 2025-01-04 | Stop reason: HOSPADM

## 2024-12-31 RX ORDER — INSULIN LISPRO 100 [IU]/ML
0-18 INJECTION, SOLUTION INTRAVENOUS; SUBCUTANEOUS
Status: DISCONTINUED | OUTPATIENT
Start: 2024-12-31 | End: 2025-01-04 | Stop reason: HOSPADM

## 2024-12-31 RX ADMIN — TROSPIUM CHLORIDE 20 MG: 20 TABLET, FILM COATED ORAL at 05:56

## 2024-12-31 RX ADMIN — BENZONATATE 100 MG: 100 CAPSULE ORAL at 09:05

## 2024-12-31 RX ADMIN — INSULIN LISPRO 10 UNITS: 100 INJECTION, SOLUTION INTRAVENOUS; SUBCUTANEOUS at 09:10

## 2024-12-31 RX ADMIN — INSULIN LISPRO 6 UNITS: 100 INJECTION, SOLUTION INTRAVENOUS; SUBCUTANEOUS at 11:24

## 2024-12-31 RX ADMIN — DOXYCYCLINE 100 MG: 100 INJECTION, POWDER, LYOPHILIZED, FOR SOLUTION INTRAVENOUS at 05:56

## 2024-12-31 RX ADMIN — PRAVASTATIN SODIUM 40 MG: 20 TABLET ORAL at 19:47

## 2024-12-31 RX ADMIN — Medication: at 19:49

## 2024-12-31 RX ADMIN — IPRATROPIUM BROMIDE AND ALBUTEROL SULFATE 1 DOSE: 2.5; .5 SOLUTION RESPIRATORY (INHALATION) at 13:31

## 2024-12-31 RX ADMIN — ARFORMOTEROL TARTRATE 15 MCG: 15 SOLUTION RESPIRATORY (INHALATION) at 19:10

## 2024-12-31 RX ADMIN — PREGABALIN 50 MG: 50 CAPSULE ORAL at 19:47

## 2024-12-31 RX ADMIN — Medication 500 MG: at 09:04

## 2024-12-31 RX ADMIN — IPRATROPIUM BROMIDE AND ALBUTEROL SULFATE 1 DOSE: 2.5; .5 SOLUTION RESPIRATORY (INHALATION) at 10:36

## 2024-12-31 RX ADMIN — IPRATROPIUM BROMIDE AND ALBUTEROL SULFATE 1 DOSE: 2.5; .5 SOLUTION RESPIRATORY (INHALATION) at 19:10

## 2024-12-31 RX ADMIN — TROSPIUM CHLORIDE 20 MG: 20 TABLET, FILM COATED ORAL at 17:16

## 2024-12-31 RX ADMIN — Medication 1 TABLET: at 09:05

## 2024-12-31 RX ADMIN — NYSTATIN 500000 UNITS: 100000 SUSPENSION ORAL at 19:47

## 2024-12-31 RX ADMIN — INSULIN LISPRO 9 UNITS: 100 INJECTION, SOLUTION INTRAVENOUS; SUBCUTANEOUS at 17:14

## 2024-12-31 RX ADMIN — INSULIN GLARGINE 55 UNITS: 100 INJECTION, SOLUTION SUBCUTANEOUS at 19:47

## 2024-12-31 RX ADMIN — BUDESONIDE 500 MCG: 0.5 SUSPENSION RESPIRATORY (INHALATION) at 19:10

## 2024-12-31 RX ADMIN — CHLORHEXIDINE GLUCONATE, 0.12% ORAL RINSE 15 ML: 1.2 SOLUTION DENTAL at 10:24

## 2024-12-31 RX ADMIN — INSULIN LISPRO 8 UNITS: 100 INJECTION, SOLUTION INTRAVENOUS; SUBCUTANEOUS at 09:11

## 2024-12-31 RX ADMIN — BENZONATATE 100 MG: 100 CAPSULE ORAL at 16:01

## 2024-12-31 RX ADMIN — METOPROLOL TARTRATE 50 MG: 50 TABLET, FILM COATED ORAL at 09:04

## 2024-12-31 RX ADMIN — METOPROLOL TARTRATE 50 MG: 50 TABLET, FILM COATED ORAL at 19:48

## 2024-12-31 RX ADMIN — ENOXAPARIN SODIUM 30 MG: 100 INJECTION SUBCUTANEOUS at 09:06

## 2024-12-31 RX ADMIN — Medication 1000 UNITS: at 09:05

## 2024-12-31 RX ADMIN — BENZONATATE 100 MG: 100 CAPSULE ORAL at 19:48

## 2024-12-31 RX ADMIN — LETROZOLE 2.5 MG: 2.5 TABLET ORAL at 09:05

## 2024-12-31 RX ADMIN — IPRATROPIUM BROMIDE AND ALBUTEROL SULFATE 1 DOSE: 2.5; .5 SOLUTION RESPIRATORY (INHALATION) at 15:39

## 2024-12-31 RX ADMIN — DEXAMETHASONE 6 MG: 4 TABLET ORAL at 11:13

## 2024-12-31 RX ADMIN — NYSTATIN 500000 UNITS: 100000 SUSPENSION ORAL at 17:15

## 2024-12-31 RX ADMIN — BUDESONIDE 500 MCG: 0.5 SUSPENSION RESPIRATORY (INHALATION) at 10:36

## 2024-12-31 RX ADMIN — SODIUM CHLORIDE, PRESERVATIVE FREE 10 ML: 5 INJECTION INTRAVENOUS at 09:13

## 2024-12-31 RX ADMIN — FAMOTIDINE 20 MG: 20 TABLET, FILM COATED ORAL at 09:04

## 2024-12-31 RX ADMIN — NYSTATIN 500000 UNITS: 100000 SUSPENSION ORAL at 11:24

## 2024-12-31 RX ADMIN — INSULIN LISPRO 9 UNITS: 100 INJECTION, SOLUTION INTRAVENOUS; SUBCUTANEOUS at 19:47

## 2024-12-31 RX ADMIN — Medication 500 MG: at 19:47

## 2024-12-31 RX ADMIN — SODIUM CHLORIDE, PRESERVATIVE FREE 10 ML: 5 INJECTION INTRAVENOUS at 19:40

## 2024-12-31 RX ADMIN — INSULIN LISPRO 15 UNITS: 100 INJECTION, SOLUTION INTRAVENOUS; SUBCUTANEOUS at 16:02

## 2024-12-31 RX ADMIN — CHLORHEXIDINE GLUCONATE, 0.12% ORAL RINSE 15 ML: 1.2 SOLUTION DENTAL at 19:47

## 2024-12-31 RX ADMIN — ASPIRIN 81 MG: 81 TABLET, COATED ORAL at 19:47

## 2024-12-31 RX ADMIN — LISINOPRIL 40 MG: 20 TABLET ORAL at 09:04

## 2024-12-31 RX ADMIN — INSULIN LISPRO 10 UNITS: 100 INJECTION, SOLUTION INTRAVENOUS; SUBCUTANEOUS at 11:23

## 2024-12-31 RX ADMIN — CITALOPRAM HYDROBROMIDE 40 MG: 20 TABLET ORAL at 09:05

## 2024-12-31 RX ADMIN — DOXYCYCLINE 100 MG: 100 INJECTION, POWDER, LYOPHILIZED, FOR SOLUTION INTRAVENOUS at 18:04

## 2024-12-31 RX ADMIN — WATER 1000 MG: 1 INJECTION INTRAMUSCULAR; INTRAVENOUS; SUBCUTANEOUS at 17:00

## 2024-12-31 RX ADMIN — ALLOPURINOL 100 MG: 100 TABLET ORAL at 09:05

## 2024-12-31 RX ADMIN — ARFORMOTEROL TARTRATE 15 MCG: 15 SOLUTION RESPIRATORY (INHALATION) at 10:36

## 2024-12-31 RX ADMIN — PANTOPRAZOLE SODIUM 40 MG: 40 TABLET, DELAYED RELEASE ORAL at 09:04

## 2024-12-31 RX ADMIN — PREGABALIN 50 MG: 50 CAPSULE ORAL at 09:04

## 2024-12-31 RX ADMIN — ENOXAPARIN SODIUM 30 MG: 100 INJECTION SUBCUTANEOUS at 19:47

## 2024-12-31 RX ADMIN — HYDROCODONE BITARTRATE AND ACETAMINOPHEN 1 TABLET: 5; 325 TABLET ORAL at 19:47

## 2024-12-31 RX ADMIN — EZETIMIBE 10 MG: 10 TABLET ORAL at 09:09

## 2024-12-31 RX ADMIN — DIPHENHYDRAMINE HYDROCHLORIDE 5 ML: 12.5 LIQUID ORAL at 21:28

## 2024-12-31 ASSESSMENT — PAIN SCALES - GENERAL
PAINLEVEL_OUTOF10: 7
PAINLEVEL_OUTOF10: 0

## 2024-12-31 ASSESSMENT — PAIN DESCRIPTION - LOCATION: LOCATION: MOUTH

## 2024-12-31 ASSESSMENT — PAIN DESCRIPTION - ONSET: ONSET: ON-GOING

## 2024-12-31 ASSESSMENT — PAIN DESCRIPTION - DESCRIPTORS: DESCRIPTORS: ACHING;DISCOMFORT;SORE

## 2024-12-31 ASSESSMENT — PAIN DESCRIPTION - PAIN TYPE: TYPE: ACUTE PAIN

## 2024-12-31 ASSESSMENT — PAIN DESCRIPTION - FREQUENCY: FREQUENCY: CONTINUOUS

## 2024-12-31 ASSESSMENT — PAIN DESCRIPTION - ORIENTATION: ORIENTATION: UPPER

## 2024-12-31 ASSESSMENT — PAIN - FUNCTIONAL ASSESSMENT: PAIN_FUNCTIONAL_ASSESSMENT: PREVENTS OR INTERFERES SOME ACTIVE ACTIVITIES AND ADLS

## 2024-12-31 NOTE — PROGRESS NOTES
complete resolution.   2. Cardiomegaly status post median sternotomy and CABG with coronary and   valvular calcifications.   3. 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         XR CHEST PORTABLE   Final Result   1. Hazy multifocal infiltrates predominantly in the left lung with previous   open heart surgery.   2. Mild cardiomegaly with small pleural effusions.             Medical Records/Labs/Images review:   I personally reviewed and summarized previous records   All labs and imaging were reviewed independently     ASSESSMENT & PLAN   Kim Zraco, a 74 y.o.-old female seen today for inpatient diabetes management    Diabetes Mellitus type 2  Patient's diabetes is uncontrolled  Glucose level improving.  Patient had 1 episodes of hypoglycemia this afternoon  We recommend the following DM regimen  Lantus 40 units nightly  Humalog 10 units 3 times daily with meals  Medium-dose sliding scale ACHS  Continue glucose check with meals and at bedtime  Will titrate insulin dose based on the blood glucose trend & insulin requirement  Upon discharge, I will arrange for the patient to be seen in the endocrinology clinic for routine diabetes maintenance and prevention    Dietary noncompliance  Discussed with patient the importance of eating consistent carbohydrate meals, avoiding high glycemic index food. Also, discussed with patient the risk and negative consequences of dietary noncompliance on blood glucose control, blood pressure and weight    Hyperlipidemia  Pravastatin 40 mg daily    Interdisciplinary plan for communication with healthcare providers:   Consult recommendations were discussed with the Primary Service/Nursing staff      The above issues were reviewed with the patient who understood and agreed with the plan.    Thank you for allowing us to participate in the care of this patient. Please do not hesitate to contact us with any additional questions.     Roni Garza MD  Endocrinologist, [facility] and

## 2024-12-31 NOTE — PROGRESS NOTES
[facility]  Department of Pulmonary, Critical Care and Sleep Medicine  [facility]  Department of Internal Medicine  Progress Note    Levi HART        [facility]    SUBJECTIVE:     Patient seen and examined. C/O sore throat and cough. Denies increased shortness of breath. Currently on 6L NC.     OBJECTIVE:  Vitals:    12/30/24 1514 12/30/24 1731 12/30/24 1759 12/30/24 2033   BP: (!) 139/59   (!) 135/54   Pulse: 83   90   Resp: 18 16  18   Temp: 98.7 °F (37.1 °C)   98.1 °F (36.7 °C)   TempSrc: Oral   Oral   SpO2: 95%  94% 94%   Weight:         Constitutional: Alert,     EENT: EOMI KATIA. MMM. No icterus. No thrush.     Neck:  Trachea was midline.   Respiratory: Rales mid to bases.   Cardiovascular: Regular, No murmur. No rubs.      Pulses:  Equal bilaterally.    Abdomen: Soft without organomegaly. No rebound, rigidity.  No guarding.  Lymphatic: No lymphadenopathy.  Musculoskeletal: Without weakness or gross deficits  Extremities:  No lower extremity edema. Reflexes appear adequate.   Skin:  Warm and dry.  No skin rashes.   Neurological/Psychiatric: No acute psychosis. Cranial nerves are intact.        DATA:    Monitor Strips:  Reviewed & discusses with technical team. No changes noted.    RADIOLOGY:  CT chest reviewed with bilateral pulmonary opacifications,       CBC with Differential:    Lab Results   Component Value Date/Time    WBC 12.2 12/28/2024 04:46 AM    RBC 3.68 12/28/2024 04:46 AM    HGB 10.7 12/28/2024 04:46 AM    HCT 33.5 12/28/2024 04:46 AM     12/28/2024 04:46 AM    MCV 91.0 12/28/2024 04:46 AM    MCH 29.1 12/28/2024 04:46 AM    MCHC 31.9 12/28/2024 04:46 AM    RDW 13.6 12/28/2024 04:46 AM    LYMPHOPCT 6 12/27/2024 03:10 PM    MONOPCT 3 12/27/2024 03:10 PM    EOSPCT 0  12/27/2024 03:10 PM    BASOPCT 1 12/27/2024 03:10 PM    MONOSABS 0.33 12/27/2024 03:10 PM    LYMPHSABS 0.62 12/27/2024 03:10 PM    EOSABS 0.00 12/27/2024 03:10 PM    BASOSABS 0.05 12/27/2024 03:10 PM     CMP:    Lab Results   Component Value Date/Time     12/30/2024 07:11 AM    K 4.0 12/30/2024 07:11 AM    K 5.2 06/22/2023 03:07 AM     12/30/2024 07:11 AM    CO2 23 12/30/2024 07:11 AM    BUN 49 12/30/2024 07:11 AM    CREATININE 1.3 12/30/2024 07:11 AM    GFRAA 54 08/30/2022 09:50 AM    LABGLOM 43 12/30/2024 07:11 AM    LABGLOM 50 01/11/2024 09:45 AM    GLUCOSE 153 12/30/2024 07:11 AM    GLUCOSE 113 03/07/2012 10:26 AM    CALCIUM 9.0 12/30/2024 07:11 AM    BILITOT 0.5 12/28/2024 04:46 AM    ALKPHOS 40 12/28/2024 04:46 AM    AST 17 12/28/2024 04:46 AM    ALT 9 12/28/2024 04:46 AM     Assessment:   Acute hypoxic respiratory failure multifactorial in etiology, bilateral community-acquired pneumonia, component of congestive heart failure with preserved ejection fraction, obesity hypoventilation syndrome   Acute hypoxic respiratory failure- wean O2 as tolerated. Now down to 6L   Community acquired pneumonia- rocephin, doxy, neb breathing treatments.   Sepsis due to above  Coronary artery disease, s/p CABG  Insulin dependent diabetes mellitus- check A1c. Lantus + SSI.  Elevated trop- likely due to hypoxia. Denies CPCAD  S/P CABG  UTI  DM  HTN  Charcot  feet   Acute on chronic renal failure   Hyperlipidemia  Acute hypoxic respiratory failure          Plan:   AVAPS at night  Bronchodilators for support  Dexamethasone x 4 doses  Repiratory panel negative  Sleep study as outpatient  Continue IV Rocephin 1 g every 24 hours, doxycycline 100 mg twice daily, proBNP elevated  Check transthoracic echocardiogram, reviewed proBNP elevated 9000  Continue breathing treatment  Legionella and strep antigens negative   Dextromethorphan every 6 hours as needed        Allie Charles DO

## 2024-12-31 NOTE — PROGRESS NOTES
[facility]  Department of Pulmonary, Critical Care and Sleep Medicine  [facility]  Department of Internal Medicine  Progress Note    Levi HART        Patients Primary Pulmonologist is: [facility]    SUBJECTIVE:    Patient seen and examined.  For me today her main complaint is difficulty with eating due to \"sores in her mouth.  She does feel that her breathing has improved since admission.    OBJECTIVE:  Vitals:    12/31/24 1227 12/31/24 1331 12/31/24 1510 12/31/24 1542   BP: (!) 140/60  (!) 149/53    Pulse:  75 84    Resp:  18 18    Temp:   97.4 °F (36.3 °C)    TempSrc:   Temporal    SpO2:  93% 93% 90%   Weight:       Height:         Constitutional: Alert,     EENT: EOMI KATIA. MMM. No icterus.  Thrush present on upper palate.  Neck:  Trachea was midline.   Respiratory: Rales mid to bases.   Cardiovascular: Regular, No murmur. No rubs.      Pulses:  Equal bilaterally.    Abdomen: Soft without organomegaly. No rebound, rigidity.  No guarding.  Lymphatic: No lymphadenopathy.  Musculoskeletal: Without weakness or gross deficits  Extremities:  No lower extremity edema. Reflexes appear adequate.   Skin:  Warm and dry.  No skin rashes.   Neurological/Psychiatric: No acute psychosis. Cranial nerves are intact.        DATA:    Monitor Strips:  Reviewed & discusses with technical team. No changes noted.    RADIOLOGY:  CT chest reviewed with bilateral pulmonary opacifications,       CBC with Differential:    Lab Results   Component Value Date/Time    WBC 13.7 12/31/2024 10:15 AM    RBC 3.74 12/31/2024 10:15 AM    HGB 10.7 12/31/2024 10:15 AM    HCT 33.7 12/31/2024 10:15 AM     12/31/2024 10:15 AM    MCV 90.1 12/31/2024 10:15 AM    MCH 28.6 12/31/2024 10:15 AM    MCHC 31.8 12/31/2024 10:15 AM    RDW 13.3 12/31/2024 10:15 AM

## 2024-12-31 NOTE — PROGRESS NOTES
Hospitalist Progress Note      SYNOPSIS: Patient admitted on 2024 for Acute respiratory failure with hypoxia  Kim Zarco has a past medical history that includes coronary artery disease status post CABG, diabetes, hypertension, CKD, hyperlipidemia  Kim presents ER for shortness of breath for 1 day.  Saturation runs around 60s patient was placed on CPAP received breathing treatment  ER Course  Upon presentation to the ER, routine labwork was performed which revealed BUN 36, creatinine 1.6, lactic acid 5.4, glucose 441, troponin 52.  Imaging results are as outlined below in the Imaging section of this note.     Upon arrival to the ER, patient was 153/65, febrile 103.2, tachycardic 104.  The patient received Tylenol, Rocephin, Doxy, Solu-Medrol, 2 L normal saline in the emergency room and was admitted to [facility].  Patient requiring 10 L of oxygen to maintain saturation CT chest without contrast sent which showed bilateral pulmonary opacification left greater than right near entire involvement of left lower lobe mixed with air bronchogram of acute airspace disease, pulmonology consulted    SUBJECTIVE:  Stable overnight. No other overnight issues reported.   Patient seen and examined at bedside today a.m. still has persistent shortness of breath, chest pain on coughing  Patient has multiple sores causing difficulty eating and swallowing  Records reviewed.         Temp (24hrs), Av.8 °F (36.6 °C), Min:96.9 °F (36.1 °C), Max:98.7 °F (37.1 °C)    DIET: ADULT DIET; Regular; 3 carb choices (45 gm/meal)  CODE: Full Code    Intake/Output Summary (Last 24 hours) at 2024 1032  Last data filed at 2024 0939  Gross per 24 hour   Intake 540 ml   Output 2100 ml   Net -1560 ml       Review of Systems  All bolded are positive; please see HPI  General:  Fever, chills, diaphoresis, fatigue, malaise, night sweats, weight loss  Psychological:  Anxiety, disorientation, hallucinations.  ENT:   98 12/28/2024    HDL 44 12/28/2024    TSH 0.22 (L) 12/28/2024    INR 1.2 10/23/2017    LABA1C 8.5 (H) 12/28/2024       Radiology: REVIEWED DAILY    +++++++++++++++++++++++++++++++++++++++++++++++++  Sterling Cao MD   Hospitalist  [place], [place]  +++++++++++++++++++++++++++++++++++++++++++++++++  NOTE: This report was transcribed using voice recognition software. Every effort was made to ensure accuracy; however, inadvertent computerized transcription errors may be present.

## 2024-12-31 NOTE — CARE COORDINATION
Transition of care update: IV rocephin 1000mg q 24 hrs. Labs and orders noted. Met with pt in room. Up in chair. O2 at 5l/nc. Plan is to remains to home when medically ready. Offered home health care and pt declined. No preference for dme agency if home o2 is needed. Referral was made to Ag[facility] with [facility] and they will follow pt. No other needs were voiced for home at present. Cm/sw will follow.

## 2024-12-31 NOTE — PLAN OF CARE
Problem: Chronic Conditions and Co-morbidities  Goal: Patient's chronic conditions and co-morbidity symptoms are monitored and maintained or improved  12/31/2024 1130 by Mayra Nash RN  Outcome: Progressing     Problem: Discharge Planning  Goal: Discharge to home or other facility with appropriate resources  12/31/2024 1130 by Mayra Nash RN  Outcome: Progressing     Problem: Safety - Adult  Goal: Free from fall injury  12/31/2024 1130 by Mayra Nash RN  Outcome: Progressing     Problem: Pain  Goal: Verbalizes/displays adequate comfort level or baseline comfort level  12/31/2024 1130 by Mayra Nash RN  Outcome: Progressing

## 2024-12-31 NOTE — PROGRESS NOTES
ENDOCRINOLOGY PROGRESS NOTE      Date of admission: 12/27/2024  Date of service: 12/31/2024  Admitting physician: Tracee Smith DO   Primary Care Physician: Ernestina Quinn MD  Consultant physician: Roni Garza MD     Reason for the consultation:  Uncontrolled DM    History of Present Illness:  The history is provided by the patient. Accuracy of the patient data is excellent    Kim Zarco is a very pleasant 74 y.o. old female with PMH of obesity, poorly controlled diabetes, hypertension, hyperlipidemia, coronary artery disease status post CABG and other listed below admitted to [facility] on 12/27/2024 because of shortness of breath and found to have a community-acquired pneumonia, endocrine service was consulted for diabetes management.      Subjective  The patient was seen this morning, no acute events overnight, BS running high, due to steroids       Inpatient diet:   Carb Restricted diet     Point of care glucose monitoring   (Independently reviewed)   Recent Labs     12/29/24 2007 12/30/24  0533 12/30/24  1106 12/30/24  1147 12/30/24  1711 12/30/24 2007 12/31/24  0557 12/31/24  1106   POCGLU 124* 145* 47* 132* 214* 284* 301* 294*     Scheduled Meds:   chlorhexidine  15 mL Mouth/Throat BID    nystatin  5 mL Oral 4x Daily    insulin glargine  55 Units SubCUTAneous Nightly    insulin lispro  0-18 Units SubCUTAneous 4x Daily AC & HS    insulin lispro  15 Units SubCUTAneous TID AC    dexAMETHasone  6 mg Oral Daily    benzonatate  100 mg Oral TID    cefTRIAXone (ROCEPHIN) IV  1,000 mg IntraVENous Q24H    allopurinol  100 mg Oral Daily    aspirin EC  81 mg Oral Nightly    Vitamin D  1 tablet Oral Daily    citalopram  40 mg Oral Daily    ezetimibe  10 mg Oral Daily    famotidine  20 mg Oral QAM    trospium  20 mg Oral BID AC    letrozole  2.5 mg Oral Daily    lisinopril  40 mg Oral Daily    metoprolol tartrate  50 mg Oral BID    niacin  500 mg Oral Nightly    pantoprazole  40 mg Oral Daily    potassium chloride  however follow-up to complete resolution.   2. Cardiomegaly status post median sternotomy and CABG with coronary and   valvular calcifications.   3. 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         XR CHEST PORTABLE   Final Result   1. Hazy multifocal infiltrates predominantly in the left lung with previous   open heart surgery.   2. Mild cardiomegaly with small pleural effusions.             Medical Records/Labs/Images review:   I personally reviewed and summarized previous records   All labs and imaging were reviewed independently     ASSESSMENT & PLAN   Kim Zarco, a 74 y.o.-old female seen today for inpatient diabetes management    Diabetes Mellitus type 2  Patient's diabetes is uncontrolled, currently worsened with steroids   The patient was started on decadron   Change DM regimen to the following   Lantus 55 units nightly  Humalog 15 units 3 times daily with meals  high-dose sliding scale ACHS  Continue glucose check with meals and at bedtime  Will titrate insulin dose based on the blood glucose trend & insulin requirement  Upon discharge, I will arrange for the patient to be seen in the endocrinology clinic for routine diabetes maintenance and prevention    Dietary noncompliance  Discussed with patient the importance of eating consistent carbohydrate meals, avoiding high glycemic index food. Also, discussed with patient the risk and negative consequences of dietary noncompliance on blood glucose control, blood pressure and weight    Hyperlipidemia  Pravastatin 40 mg daily    Interdisciplinary plan for communication with healthcare providers:   Consult recommendations were discussed with the Primary Service/Nursing staff      The above issues were reviewed with the patient who understood and agreed with the plan.    Thank you for allowing us to participate in the care of this patient. Please do not hesitate to contact us with any additional questions.     Roni Garza MD  Endocrinologist, [facility]

## 2024-12-31 NOTE — PROGRESS NOTES
Spiritual Health History and Assessment/Progress Note  [facility] Alena Luevano    (P) Initial Encounter,  ,  ,      Name: Kim Zarco MRN: [id number]    Age: 74 y.o.     Sex: female   Language: English   Protestant: Anglican   Acute respiratory failure with hypoxia     Date: 12/31/2024                           Spiritual Assessment began in [facility]        Referral/Consult From: (P) Rounding   Encounter Overview/Reason: (P) Initial Encounter  Service Provided For: (P) Patient    Opal, Belief, Meaning:   Patient identifies as spiritual  Family/Friends No family/friends present      Importance and Influence:  Patient has spiritual/personal beliefs that influence decisions regarding their health  Family/Friends No family/friends present    Community:  Patient feels well-supported. Support system includes: Spouse/Partner and Children  Family/Friends No family/friends present    Assessment and Plan of Care:     Patient Interventions include: Facilitated expression of thoughts and feelings, Explored spiritual coping/struggle/distress, Engaged in theological reflection, and Affirmed coping skills/support systems  Family/Friends Interventions include: No family/friends present    Patient Plan of Care: Spiritual Care available upon further referral  Family/Friends Plan of Care: No family/friends present    Electronically signed by ANA MARIA BURRIS on 12/31/2024 at 2:37 PM

## 2025-01-01 LAB
ANION GAP SERPL CALCULATED.3IONS-SCNC: 13 MMOL/L (ref 7–16)
BUN SERPL-MCNC: 35 MG/DL (ref 6–23)
CALCIUM SERPL-MCNC: 9.6 MG/DL (ref 8.6–10.2)
CHLORIDE SERPL-SCNC: 105 MMOL/L (ref 98–107)
CO2 SERPL-SCNC: 25 MMOL/L (ref 22–29)
CREAT SERPL-MCNC: 1 MG/DL (ref 0.5–1)
GFR, ESTIMATED: 61 ML/MIN/1.73M2
GLUCOSE BLD-MCNC: 163 MG/DL (ref 74–99)
GLUCOSE BLD-MCNC: 187 MG/DL (ref 74–99)
GLUCOSE BLD-MCNC: 195 MG/DL (ref 74–99)
GLUCOSE BLD-MCNC: 263 MG/DL (ref 74–99)
GLUCOSE SERPL-MCNC: 148 MG/DL (ref 74–99)
MAGNESIUM SERPL-MCNC: 1.5 MG/DL (ref 1.6–2.6)
MICROORGANISM SPEC CULT: NORMAL
MICROORGANISM SPEC CULT: NORMAL
POTASSIUM SERPL-SCNC: 4.3 MMOL/L (ref 3.5–5)
SERVICE CMNT-IMP: NORMAL
SERVICE CMNT-IMP: NORMAL
SODIUM SERPL-SCNC: 143 MMOL/L (ref 132–146)
SPECIMEN DESCRIPTION: NORMAL
SPECIMEN DESCRIPTION: NORMAL

## 2025-01-01 PROCEDURE — 83735 ASSAY OF MAGNESIUM: CPT

## 2025-01-01 PROCEDURE — 99232 SBSQ HOSP IP/OBS MODERATE 35: CPT | Performed by: STUDENT IN AN ORGANIZED HEALTH CARE EDUCATION/TRAINING PROGRAM

## 2025-01-01 PROCEDURE — 6370000000 HC RX 637 (ALT 250 FOR IP): Performed by: INTERNAL MEDICINE

## 2025-01-01 PROCEDURE — 6360000002 HC RX W HCPCS: Performed by: INTERNAL MEDICINE

## 2025-01-01 PROCEDURE — 99232 SBSQ HOSP IP/OBS MODERATE 35: CPT | Performed by: INTERNAL MEDICINE

## 2025-01-01 PROCEDURE — 99233 SBSQ HOSP IP/OBS HIGH 50: CPT | Performed by: INTERNAL MEDICINE

## 2025-01-01 PROCEDURE — 2500000003 HC RX 250 WO HCPCS: Performed by: INTERNAL MEDICINE

## 2025-01-01 PROCEDURE — 2500000003 HC RX 250 WO HCPCS: Performed by: STUDENT IN AN ORGANIZED HEALTH CARE EDUCATION/TRAINING PROGRAM

## 2025-01-01 PROCEDURE — 94640 AIRWAY INHALATION TREATMENT: CPT

## 2025-01-01 PROCEDURE — 2580000003 HC RX 258: Performed by: INTERNAL MEDICINE

## 2025-01-01 PROCEDURE — 2140000000 HC CCU INTERMEDIATE R&B

## 2025-01-01 PROCEDURE — 6360000002 HC RX W HCPCS: Performed by: STUDENT IN AN ORGANIZED HEALTH CARE EDUCATION/TRAINING PROGRAM

## 2025-01-01 PROCEDURE — 36415 COLL VENOUS BLD VENIPUNCTURE: CPT

## 2025-01-01 PROCEDURE — 82962 GLUCOSE BLOOD TEST: CPT

## 2025-01-01 PROCEDURE — 94660 CPAP INITIATION&MGMT: CPT

## 2025-01-01 PROCEDURE — 6370000000 HC RX 637 (ALT 250 FOR IP): Performed by: STUDENT IN AN ORGANIZED HEALTH CARE EDUCATION/TRAINING PROGRAM

## 2025-01-01 PROCEDURE — 80048 BASIC METABOLIC PNL TOTAL CA: CPT

## 2025-01-01 RX ORDER — BISACODYL 10 MG
10 SUPPOSITORY, RECTAL RECTAL ONCE
Status: COMPLETED | OUTPATIENT
Start: 2025-01-01 | End: 2025-01-01

## 2025-01-01 RX ORDER — LANOLIN ALCOHOL/MO/W.PET/CERES
400 CREAM (GRAM) TOPICAL DAILY
Status: DISCONTINUED | OUTPATIENT
Start: 2025-01-01 | End: 2025-01-04 | Stop reason: HOSPADM

## 2025-01-01 RX ADMIN — Medication 500 MG: at 20:30

## 2025-01-01 RX ADMIN — CHLORHEXIDINE GLUCONATE, 0.12% ORAL RINSE 15 ML: 1.2 SOLUTION DENTAL at 20:30

## 2025-01-01 RX ADMIN — BISACODYL 10 MG: 10 SUPPOSITORY RECTAL at 10:47

## 2025-01-01 RX ADMIN — POTASSIUM CHLORIDE 10 MEQ: 750 TABLET, EXTENDED RELEASE ORAL at 08:14

## 2025-01-01 RX ADMIN — BENZONATATE 100 MG: 100 CAPSULE ORAL at 15:55

## 2025-01-01 RX ADMIN — PRAVASTATIN SODIUM 40 MG: 20 TABLET ORAL at 20:30

## 2025-01-01 RX ADMIN — Medication 400 MG: at 10:48

## 2025-01-01 RX ADMIN — SODIUM CHLORIDE, PRESERVATIVE FREE 10 ML: 5 INJECTION INTRAVENOUS at 20:30

## 2025-01-01 RX ADMIN — PREGABALIN 50 MG: 50 CAPSULE ORAL at 08:13

## 2025-01-01 RX ADMIN — Medication 1000 UNITS: at 08:13

## 2025-01-01 RX ADMIN — IPRATROPIUM BROMIDE AND ALBUTEROL SULFATE 1 DOSE: 2.5; .5 SOLUTION RESPIRATORY (INHALATION) at 10:00

## 2025-01-01 RX ADMIN — DOXYCYCLINE 100 MG: 100 INJECTION, POWDER, LYOPHILIZED, FOR SOLUTION INTRAVENOUS at 05:42

## 2025-01-01 RX ADMIN — CHLORHEXIDINE GLUCONATE, 0.12% ORAL RINSE 15 ML: 1.2 SOLUTION DENTAL at 08:18

## 2025-01-01 RX ADMIN — NYSTATIN 500000 UNITS: 100000 SUSPENSION ORAL at 17:28

## 2025-01-01 RX ADMIN — SODIUM CHLORIDE, PRESERVATIVE FREE 10 ML: 5 INJECTION INTRAVENOUS at 08:22

## 2025-01-01 RX ADMIN — EZETIMIBE 10 MG: 10 TABLET ORAL at 08:18

## 2025-01-01 RX ADMIN — IPRATROPIUM BROMIDE AND ALBUTEROL SULFATE 1 DOSE: 2.5; .5 SOLUTION RESPIRATORY (INHALATION) at 20:49

## 2025-01-01 RX ADMIN — IPRATROPIUM BROMIDE AND ALBUTEROL SULFATE 1 DOSE: 2.5; .5 SOLUTION RESPIRATORY (INHALATION) at 16:05

## 2025-01-01 RX ADMIN — FAMOTIDINE 20 MG: 20 TABLET, FILM COATED ORAL at 08:13

## 2025-01-01 RX ADMIN — TROSPIUM CHLORIDE 20 MG: 20 TABLET, FILM COATED ORAL at 07:20

## 2025-01-01 RX ADMIN — INSULIN LISPRO 15 UNITS: 100 INJECTION, SOLUTION INTRAVENOUS; SUBCUTANEOUS at 11:41

## 2025-01-01 RX ADMIN — MAGNESIUM SULFATE HEPTAHYDRATE 2000 MG: 40 INJECTION, SOLUTION INTRAVENOUS at 09:34

## 2025-01-01 RX ADMIN — LISINOPRIL 40 MG: 20 TABLET ORAL at 08:13

## 2025-01-01 RX ADMIN — ENOXAPARIN SODIUM 30 MG: 100 INJECTION SUBCUTANEOUS at 20:29

## 2025-01-01 RX ADMIN — INSULIN LISPRO 3 UNITS: 100 INJECTION, SOLUTION INTRAVENOUS; SUBCUTANEOUS at 11:41

## 2025-01-01 RX ADMIN — ENOXAPARIN SODIUM 30 MG: 100 INJECTION SUBCUTANEOUS at 08:13

## 2025-01-01 RX ADMIN — INSULIN LISPRO 3 UNITS: 100 INJECTION, SOLUTION INTRAVENOUS; SUBCUTANEOUS at 15:57

## 2025-01-01 RX ADMIN — DOXYCYCLINE 100 MG: 100 INJECTION, POWDER, LYOPHILIZED, FOR SOLUTION INTRAVENOUS at 17:30

## 2025-01-01 RX ADMIN — INSULIN LISPRO 15 UNITS: 100 INJECTION, SOLUTION INTRAVENOUS; SUBCUTANEOUS at 15:56

## 2025-01-01 RX ADMIN — IPRATROPIUM BROMIDE AND ALBUTEROL SULFATE 1 DOSE: 2.5; .5 SOLUTION RESPIRATORY (INHALATION) at 12:24

## 2025-01-01 RX ADMIN — NYSTATIN 500000 UNITS: 100000 SUSPENSION ORAL at 08:18

## 2025-01-01 RX ADMIN — INSULIN GLARGINE 55 UNITS: 100 INJECTION, SOLUTION SUBCUTANEOUS at 20:29

## 2025-01-01 RX ADMIN — INSULIN LISPRO 15 UNITS: 100 INJECTION, SOLUTION INTRAVENOUS; SUBCUTANEOUS at 08:23

## 2025-01-01 RX ADMIN — ASPIRIN 81 MG: 81 TABLET, COATED ORAL at 20:30

## 2025-01-01 RX ADMIN — TROSPIUM CHLORIDE 20 MG: 20 TABLET, FILM COATED ORAL at 15:55

## 2025-01-01 RX ADMIN — BUDESONIDE 500 MCG: 0.5 SUSPENSION RESPIRATORY (INHALATION) at 10:00

## 2025-01-01 RX ADMIN — PANTOPRAZOLE SODIUM 40 MG: 40 TABLET, DELAYED RELEASE ORAL at 08:14

## 2025-01-01 RX ADMIN — NYSTATIN 500000 UNITS: 100000 SUSPENSION ORAL at 20:30

## 2025-01-01 RX ADMIN — HYDROCODONE BITARTRATE AND ACETAMINOPHEN 1 TABLET: 5; 325 TABLET ORAL at 18:08

## 2025-01-01 RX ADMIN — ARFORMOTEROL TARTRATE 15 MCG: 15 SOLUTION RESPIRATORY (INHALATION) at 10:00

## 2025-01-01 RX ADMIN — INSULIN LISPRO 9 UNITS: 100 INJECTION, SOLUTION INTRAVENOUS; SUBCUTANEOUS at 20:29

## 2025-01-01 RX ADMIN — INSULIN LISPRO 3 UNITS: 100 INJECTION, SOLUTION INTRAVENOUS; SUBCUTANEOUS at 08:22

## 2025-01-01 RX ADMIN — DIPHENHYDRAMINE HYDROCHLORIDE 5 ML: 12.5 LIQUID ORAL at 20:29

## 2025-01-01 RX ADMIN — ALLOPURINOL 100 MG: 100 TABLET ORAL at 08:14

## 2025-01-01 RX ADMIN — WATER 1000 MG: 1 INJECTION INTRAMUSCULAR; INTRAVENOUS; SUBCUTANEOUS at 17:19

## 2025-01-01 RX ADMIN — LETROZOLE 2.5 MG: 2.5 TABLET ORAL at 08:15

## 2025-01-01 RX ADMIN — DEXAMETHASONE 6 MG: 4 TABLET ORAL at 08:17

## 2025-01-01 RX ADMIN — BENZONATATE 100 MG: 100 CAPSULE ORAL at 20:30

## 2025-01-01 RX ADMIN — METOPROLOL TARTRATE 50 MG: 50 TABLET, FILM COATED ORAL at 20:30

## 2025-01-01 RX ADMIN — PREGABALIN 50 MG: 50 CAPSULE ORAL at 20:30

## 2025-01-01 RX ADMIN — Medication 500 MG: at 08:15

## 2025-01-01 RX ADMIN — NYSTATIN 500000 UNITS: 100000 SUSPENSION ORAL at 13:41

## 2025-01-01 RX ADMIN — CITALOPRAM HYDROBROMIDE 40 MG: 20 TABLET ORAL at 08:14

## 2025-01-01 RX ADMIN — ARFORMOTEROL TARTRATE 15 MCG: 15 SOLUTION RESPIRATORY (INHALATION) at 20:49

## 2025-01-01 RX ADMIN — BENZONATATE 100 MG: 100 CAPSULE ORAL at 08:14

## 2025-01-01 RX ADMIN — Medication 1 TABLET: at 08:14

## 2025-01-01 RX ADMIN — METOPROLOL TARTRATE 50 MG: 50 TABLET, FILM COATED ORAL at 08:14

## 2025-01-01 ASSESSMENT — PAIN SCALES - GENERAL
PAINLEVEL_OUTOF10: 0
PAINLEVEL_OUTOF10: 0
PAINLEVEL_OUTOF10: 8

## 2025-01-01 ASSESSMENT — PAIN DESCRIPTION - LOCATION: LOCATION: BACK;MOUTH

## 2025-01-01 ASSESSMENT — PAIN DESCRIPTION - DESCRIPTORS: DESCRIPTORS: SORE;DISCOMFORT;BURNING

## 2025-01-01 ASSESSMENT — PAIN DESCRIPTION - ORIENTATION: ORIENTATION: MID

## 2025-01-01 NOTE — PROGRESS NOTES
[facility] Quality Flow/Interdisciplinary Rounds Progress Note        Quality Flow Rounds held on January 1, 2025    Disciplines Attending:  Bedside Nurse, , , and Nursing Unit Leadership    Kim Zarco was admitted on 12/27/2024  3:01 PM    Anticipated Discharge Date:       Disposition:    Darwin Score:  Darwin Scale Score: 19    BS RISK OF UNPLANNED READMISSION 2.0             14.1 Total Score        Discussed patient goal for the day, patient clinical progression, and barriers to discharge.  The following Goal(s) of the Day/Commitment(s) have been identified:  Labs - Report Results      Mona Arellano RN  January 1, 2025

## 2025-01-01 NOTE — PLAN OF CARE
Problem: Discharge Planning  Goal: Discharge to home or other facility with appropriate resources  12/31/2024 2335 by Therese Tejada, RN  Outcome: Progressing     Problem: Chronic Conditions and Co-morbidities  Goal: Patient's chronic conditions and co-morbidity symptoms are monitored and maintained or improved  12/31/2024 2335 by Therese Tejada, RN  Outcome: Progressing     Problem: Safety - Adult  Goal: Free from fall injury  12/31/2024 2335 by Therese Tejada, RN  Outcome: Progressing     Problem: Pain  Goal: Verbalizes/displays adequate comfort level or baseline comfort level  12/31/2024 2335 by Therese Tejada, RN  Outcome: Progressing

## 2025-01-01 NOTE — PROGRESS NOTES
Hospitalist Progress Note      SYNOPSIS: Patient admitted on 2024 for Acute respiratory failure with hypoxia  Kim Zarco has a past medical history that includes coronary artery disease status post CABG, diabetes, hypertension, CKD, hyperlipidemia  Kim presents ER for shortness of breath for 1 day.  Saturation runs around 60s patient was placed on CPAP received breathing treatment  ER Course  Upon presentation to the ER, routine labwork was performed which revealed BUN 36, creatinine 1.6, lactic acid 5.4, glucose 441, troponin 52.  Imaging results are as outlined below in the Imaging section of this note.     Upon arrival to the ER, patient was 153/65, febrile 103.2, tachycardic 104.  The patient received Tylenol, Rocephin, Doxy, Solu-Medrol, 2 L normal saline in the emergency room and was admitted to [facility].  Patient requiring 10 L of oxygen to maintain saturation CT chest without contrast sent which showed bilateral pulmonary opacification left greater than right near entire involvement of left lower lobe mixed with air bronchogram of acute airspace disease, pulmonology consulted    SUBJECTIVE:  Stable overnight. No other overnight issues reported.   Patient seen and examined at bedside today a.m., significant improvement in her shortness of breath currently on 3 L of oxygen to maintain saturation.  Patient does complain of multiple sores causing her difficulty eating  Patient also gives history of constipation  Records reviewed.         Temp (24hrs), Av.6 °F (36.4 °C), Min:97 °F (36.1 °C), Max:98.3 °F (36.8 °C)    DIET: ADULT DIET; Regular; 3 carb choices (45 gm/meal)  CODE: Full Code    Intake/Output Summary (Last 24 hours) at 2025 1014  Last data filed at 2025 0812  Gross per 24 hour   Intake 1556.6 ml   Output 1650 ml   Net -93.4 ml       Review of Systems  All bolded are positive; please see HPI  General:  Fever, chills, diaphoresis, fatigue, malaise, night sweats,

## 2025-01-01 NOTE — PLAN OF CARE
Problem: Chronic Conditions and Co-morbidities  Goal: Patient's chronic conditions and co-morbidity symptoms are monitored and maintained or improved  1/1/2025 0740 by Mayra Nash RN  Outcome: Progressing     Problem: Discharge Planning  Goal: Discharge to home or other facility with appropriate resources  1/1/2025 0740 by Mayra Nash RN  Outcome: Progressing     Problem: Safety - Adult  Goal: Free from fall injury  1/1/2025 0740 by Mayra Nash RN  Outcome: Progressing     Problem: Pain  Goal: Verbalizes/displays adequate comfort level or baseline comfort level  1/1/2025 0740 by Mayra Nash RN  Outcome: Progressing

## 2025-01-01 NOTE — PROGRESS NOTES
disease bronchopneumonia with small left parapneumonic pleural effusion. No   isolated nodule or mass however follow-up to complete resolution.   2. Cardiomegaly status post median sternotomy and CABG with coronary and   valvular calcifications.   3. 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         XR CHEST PORTABLE   Final Result   1. Hazy multifocal infiltrates predominantly in the left lung with previous   open heart surgery.   2. Mild cardiomegaly with small pleural effusions.             Medical Records/Labs/Images review:   I personally reviewed and summarized previous records   All labs and imaging were reviewed independently     ASSESSMENT & PLAN   Kim Zarco, a 74 y.o.-old female seen today for inpatient diabetes management    Diabetes Mellitus type 2  Patient's diabetes is uncontrolled, currently worsened with steroids   Still on steroids  We recommend the following diabetes regimen  Lantus 55 units nightly  Humalog 15 units 3 times daily with meals  high-dose sliding scale ACHS  Continue glucose check with meals and at bedtime  Will titrate insulin dose based on the blood glucose trend & insulin requirement  Upon discharge, I will arrange for the patient to be seen in the endocrinology clinic for routine diabetes maintenance and prevention    Steroid-induced hyperglycemia  The patient on Decadron  Adjusted insulin regimen as per above.    Dietary noncompliance  Discussed with patient the importance of eating consistent carbohydrate meals, avoiding high glycemic index food. Also, discussed with patient the risk and negative consequences of dietary noncompliance on blood glucose control, blood pressure and weight    Hyperlipidemia  Pravastatin 40 mg daily    Interdisciplinary plan for communication with healthcare providers:   Consult recommendations were discussed with the Primary Service/Nursing staff      The above issues were reviewed with the patient who understood and agreed with the plan.    Thank  you for allowing us to participate in the care of this patient. Please do not hesitate to contact us with any additional questions.     Roni Garza MD  Endocrinologist, [facility] [place] and Endocrinology   [facility]  [address] [facility] [address]  [address] [address]  Dept: [phone]  Loc: [phone]   Phone: [phone]  Fax: [phone]  --------------------------------------------  An electronic signature was used to authenticate this note. Roni Garza MD on 1/1/2025 at 2:19 PM

## 2025-01-01 NOTE — PROGRESS NOTES
[facility]  Department of Pulmonary, Critical Care and Sleep Medicine  [facility]  Department of Internal Medicine  Progress Note    Levi HART        Patients Primary Pulmonologist is: [facility]    SUBJECTIVE:    Patient seen and examined.  Continues to have painful sores in her mouth.  Reports that her breathing has improved.    OBJECTIVE:  Vitals:    01/01/25 0740 01/01/25 1125 01/01/25 1501 01/01/25 1512   BP: (!) 143/70 (!) 143/60 (!) 160/64    Pulse: 73 67 74 71   Resp: 20 15 18    Temp: 98 °F (36.7 °C) 97.1 °F (36.2 °C) 96.8 °F (36 °C)    TempSrc: Temporal Temporal Temporal    SpO2: 90% 92% 93%    Weight:       Height:         Constitutional: Alert,     EENT: EOMI KATIA. MMM. No icterus.  Thrush present on upper palate and tongue.  Neck:  Trachea was midline.   Respiratory: Rales mid to bases.   Cardiovascular: Regular, No murmur. No rubs.      Pulses:  Equal bilaterally.    Abdomen: Soft without organomegaly. No rebound, rigidity.  No guarding.  Lymphatic: No lymphadenopathy.  Musculoskeletal: Without weakness or gross deficits  Extremities:  No lower extremity edema. Reflexes appear adequate.   Skin:  Warm and dry.  No skin rashes.   Neurological/Psychiatric: No acute psychosis. Cranial nerves are intact.        DATA:    Monitor Strips:  Reviewed & discusses with technical team. No changes noted.    RADIOLOGY:  CT chest reviewed with bilateral pulmonary opacifications,       CBC with Differential:    Lab Results   Component Value Date/Time    WBC 13.7 12/31/2024 10:15 AM    RBC 3.74 12/31/2024 10:15 AM    HGB 10.7 12/31/2024 10:15 AM    HCT 33.7 12/31/2024 10:15 AM     12/31/2024 10:15 AM    MCV 90.1 12/31/2024 10:15 AM    MCH 28.6 12/31/2024 10:15 AM    MCHC 31.8 12/31/2024 10:15 AM    RDW 13.3 12/31/2024 10:15

## 2025-01-02 LAB
ANION GAP SERPL CALCULATED.3IONS-SCNC: 13 MMOL/L (ref 7–16)
BUN SERPL-MCNC: 31 MG/DL (ref 6–23)
CALCIUM SERPL-MCNC: 10.3 MG/DL (ref 8.6–10.2)
CHLORIDE SERPL-SCNC: 101 MMOL/L (ref 98–107)
CO2 SERPL-SCNC: 26 MMOL/L (ref 22–29)
CREAT SERPL-MCNC: 1.1 MG/DL (ref 0.5–1)
EKG ATRIAL RATE: 74 BPM
EKG P AXIS: 57 DEGREES
EKG P-R INTERVAL: 152 MS
EKG Q-T INTERVAL: 432 MS
EKG QRS DURATION: 98 MS
EKG QTC CALCULATION (BAZETT): 479 MS
EKG R AXIS: 18 DEGREES
EKG T AXIS: 38 DEGREES
EKG VENTRICULAR RATE: 74 BPM
GFR, ESTIMATED: 53 ML/MIN/1.73M2
GLUCOSE BLD-MCNC: 126 MG/DL (ref 74–99)
GLUCOSE BLD-MCNC: 251 MG/DL (ref 74–99)
GLUCOSE BLD-MCNC: 328 MG/DL (ref 74–99)
GLUCOSE BLD-MCNC: 78 MG/DL (ref 74–99)
GLUCOSE SERPL-MCNC: 94 MG/DL (ref 74–99)
MAGNESIUM SERPL-MCNC: 1.6 MG/DL (ref 1.6–2.6)
POTASSIUM SERPL-SCNC: 4.2 MMOL/L (ref 3.5–5)
SODIUM SERPL-SCNC: 140 MMOL/L (ref 132–146)

## 2025-01-02 PROCEDURE — 97161 PT EVAL LOW COMPLEX 20 MIN: CPT

## 2025-01-02 PROCEDURE — 2500000003 HC RX 250 WO HCPCS: Performed by: STUDENT IN AN ORGANIZED HEALTH CARE EDUCATION/TRAINING PROGRAM

## 2025-01-02 PROCEDURE — 6360000002 HC RX W HCPCS: Performed by: STUDENT IN AN ORGANIZED HEALTH CARE EDUCATION/TRAINING PROGRAM

## 2025-01-02 PROCEDURE — 97165 OT EVAL LOW COMPLEX 30 MIN: CPT

## 2025-01-02 PROCEDURE — 6370000000 HC RX 637 (ALT 250 FOR IP): Performed by: INTERNAL MEDICINE

## 2025-01-02 PROCEDURE — 82962 GLUCOSE BLOOD TEST: CPT

## 2025-01-02 PROCEDURE — 92610 EVALUATE SWALLOWING FUNCTION: CPT

## 2025-01-02 PROCEDURE — 6360000002 HC RX W HCPCS: Performed by: INTERNAL MEDICINE

## 2025-01-02 PROCEDURE — 2500000003 HC RX 250 WO HCPCS: Performed by: INTERNAL MEDICINE

## 2025-01-02 PROCEDURE — 99232 SBSQ HOSP IP/OBS MODERATE 35: CPT | Performed by: STUDENT IN AN ORGANIZED HEALTH CARE EDUCATION/TRAINING PROGRAM

## 2025-01-02 PROCEDURE — 92526 ORAL FUNCTION THERAPY: CPT

## 2025-01-02 PROCEDURE — 94660 CPAP INITIATION&MGMT: CPT

## 2025-01-02 PROCEDURE — 80048 BASIC METABOLIC PNL TOTAL CA: CPT

## 2025-01-02 PROCEDURE — 36415 COLL VENOUS BLD VENIPUNCTURE: CPT

## 2025-01-02 PROCEDURE — 97535 SELF CARE MNGMENT TRAINING: CPT

## 2025-01-02 PROCEDURE — 97530 THERAPEUTIC ACTIVITIES: CPT

## 2025-01-02 PROCEDURE — 6370000000 HC RX 637 (ALT 250 FOR IP): Performed by: STUDENT IN AN ORGANIZED HEALTH CARE EDUCATION/TRAINING PROGRAM

## 2025-01-02 PROCEDURE — 1200000000 HC SEMI PRIVATE

## 2025-01-02 PROCEDURE — 83735 ASSAY OF MAGNESIUM: CPT

## 2025-01-02 PROCEDURE — 2580000003 HC RX 258: Performed by: INTERNAL MEDICINE

## 2025-01-02 PROCEDURE — 94640 AIRWAY INHALATION TREATMENT: CPT

## 2025-01-02 PROCEDURE — 99232 SBSQ HOSP IP/OBS MODERATE 35: CPT | Performed by: INTERNAL MEDICINE

## 2025-01-02 PROCEDURE — 99233 SBSQ HOSP IP/OBS HIGH 50: CPT | Performed by: INTERNAL MEDICINE

## 2025-01-02 RX ORDER — DOXYCYCLINE 100 MG/1
100 CAPSULE ORAL EVERY 12 HOURS SCHEDULED
Status: DISCONTINUED | OUTPATIENT
Start: 2025-01-02 | End: 2025-01-04 | Stop reason: HOSPADM

## 2025-01-02 RX ORDER — INSULIN GLARGINE 100 [IU]/ML
40 INJECTION, SOLUTION SUBCUTANEOUS NIGHTLY
Status: DISCONTINUED | OUTPATIENT
Start: 2025-01-02 | End: 2025-01-04 | Stop reason: HOSPADM

## 2025-01-02 RX ORDER — INSULIN LISPRO 100 [IU]/ML
10 INJECTION, SOLUTION INTRAVENOUS; SUBCUTANEOUS
Status: DISCONTINUED | OUTPATIENT
Start: 2025-01-02 | End: 2025-01-03

## 2025-01-02 RX ADMIN — PRAVASTATIN SODIUM 40 MG: 20 TABLET ORAL at 20:37

## 2025-01-02 RX ADMIN — METOPROLOL TARTRATE 50 MG: 50 TABLET, FILM COATED ORAL at 08:04

## 2025-01-02 RX ADMIN — ENOXAPARIN SODIUM 30 MG: 100 INJECTION SUBCUTANEOUS at 20:35

## 2025-01-02 RX ADMIN — ALLOPURINOL 100 MG: 100 TABLET ORAL at 08:05

## 2025-01-02 RX ADMIN — Medication 1 TABLET: at 08:04

## 2025-01-02 RX ADMIN — PREGABALIN 50 MG: 50 CAPSULE ORAL at 20:37

## 2025-01-02 RX ADMIN — Medication 500 MG: at 08:04

## 2025-01-02 RX ADMIN — Medication 1000 UNITS: at 08:04

## 2025-01-02 RX ADMIN — INSULIN LISPRO 12 UNITS: 100 INJECTION, SOLUTION INTRAVENOUS; SUBCUTANEOUS at 20:44

## 2025-01-02 RX ADMIN — INSULIN LISPRO 15 UNITS: 100 INJECTION, SOLUTION INTRAVENOUS; SUBCUTANEOUS at 08:03

## 2025-01-02 RX ADMIN — IPRATROPIUM BROMIDE AND ALBUTEROL SULFATE 1 DOSE: 2.5; .5 SOLUTION RESPIRATORY (INHALATION) at 12:58

## 2025-01-02 RX ADMIN — INSULIN GLARGINE 40 UNITS: 100 INJECTION, SOLUTION SUBCUTANEOUS at 20:44

## 2025-01-02 RX ADMIN — BENZONATATE 100 MG: 100 CAPSULE ORAL at 15:15

## 2025-01-02 RX ADMIN — MAGNESIUM SULFATE HEPTAHYDRATE 2000 MG: 40 INJECTION, SOLUTION INTRAVENOUS at 11:37

## 2025-01-02 RX ADMIN — NYSTATIN 500000 UNITS: 100000 SUSPENSION ORAL at 13:59

## 2025-01-02 RX ADMIN — DEXAMETHASONE 6 MG: 4 TABLET ORAL at 08:04

## 2025-01-02 RX ADMIN — DOXYCYCLINE 100 MG: 100 INJECTION, POWDER, LYOPHILIZED, FOR SOLUTION INTRAVENOUS at 06:01

## 2025-01-02 RX ADMIN — IPRATROPIUM BROMIDE AND ALBUTEROL SULFATE 1 DOSE: 2.5; .5 SOLUTION RESPIRATORY (INHALATION) at 19:54

## 2025-01-02 RX ADMIN — IPRATROPIUM BROMIDE AND ALBUTEROL SULFATE 1 DOSE: 2.5; .5 SOLUTION RESPIRATORY (INHALATION) at 16:39

## 2025-01-02 RX ADMIN — LETROZOLE 2.5 MG: 2.5 TABLET ORAL at 08:04

## 2025-01-02 RX ADMIN — ENOXAPARIN SODIUM 30 MG: 100 INJECTION SUBCUTANEOUS at 08:03

## 2025-01-02 RX ADMIN — SODIUM CHLORIDE, PRESERVATIVE FREE 10 ML: 5 INJECTION INTRAVENOUS at 08:05

## 2025-01-02 RX ADMIN — EZETIMIBE 10 MG: 10 TABLET ORAL at 08:05

## 2025-01-02 RX ADMIN — NYSTATIN 500000 UNITS: 100000 SUSPENSION ORAL at 08:03

## 2025-01-02 RX ADMIN — IPRATROPIUM BROMIDE AND ALBUTEROL SULFATE 1 DOSE: 2.5; .5 SOLUTION RESPIRATORY (INHALATION) at 09:04

## 2025-01-02 RX ADMIN — TROSPIUM CHLORIDE 20 MG: 20 TABLET, FILM COATED ORAL at 15:15

## 2025-01-02 RX ADMIN — BENZONATATE 100 MG: 100 CAPSULE ORAL at 08:05

## 2025-01-02 RX ADMIN — Medication 400 MG: at 08:05

## 2025-01-02 RX ADMIN — INSULIN LISPRO 10 UNITS: 100 INJECTION, SOLUTION INTRAVENOUS; SUBCUTANEOUS at 17:24

## 2025-01-02 RX ADMIN — TROSPIUM CHLORIDE 20 MG: 20 TABLET, FILM COATED ORAL at 06:00

## 2025-01-02 RX ADMIN — NYSTATIN 500000 UNITS: 100000 SUSPENSION ORAL at 17:23

## 2025-01-02 RX ADMIN — HYDROCODONE BITARTRATE AND ACETAMINOPHEN 1 TABLET: 5; 325 TABLET ORAL at 10:14

## 2025-01-02 RX ADMIN — Medication 500 MG: at 20:43

## 2025-01-02 RX ADMIN — NYSTATIN 500000 UNITS: 100000 SUSPENSION ORAL at 20:35

## 2025-01-02 RX ADMIN — DIPHENHYDRAMINE HYDROCHLORIDE 5 ML: 12.5 LIQUID ORAL at 16:29

## 2025-01-02 RX ADMIN — DOXYCYCLINE HYCLATE 100 MG: 100 CAPSULE ORAL at 20:37

## 2025-01-02 RX ADMIN — PANTOPRAZOLE SODIUM 40 MG: 40 TABLET, DELAYED RELEASE ORAL at 08:03

## 2025-01-02 RX ADMIN — ARFORMOTEROL TARTRATE 15 MCG: 15 SOLUTION RESPIRATORY (INHALATION) at 09:04

## 2025-01-02 RX ADMIN — PREGABALIN 50 MG: 50 CAPSULE ORAL at 08:04

## 2025-01-02 RX ADMIN — HYDROCODONE BITARTRATE AND ACETAMINOPHEN 1 TABLET: 5; 325 TABLET ORAL at 18:41

## 2025-01-02 RX ADMIN — ASPIRIN 81 MG: 81 TABLET, COATED ORAL at 20:37

## 2025-01-02 RX ADMIN — FAMOTIDINE 20 MG: 20 TABLET, FILM COATED ORAL at 08:04

## 2025-01-02 RX ADMIN — WATER 1000 MG: 1 INJECTION INTRAMUSCULAR; INTRAVENOUS; SUBCUTANEOUS at 17:24

## 2025-01-02 RX ADMIN — BENZONATATE 100 MG: 100 CAPSULE ORAL at 20:37

## 2025-01-02 RX ADMIN — INSULIN LISPRO 9 UNITS: 100 INJECTION, SOLUTION INTRAVENOUS; SUBCUTANEOUS at 17:25

## 2025-01-02 RX ADMIN — ARFORMOTEROL TARTRATE 15 MCG: 15 SOLUTION RESPIRATORY (INHALATION) at 19:54

## 2025-01-02 RX ADMIN — METOPROLOL TARTRATE 50 MG: 50 TABLET, FILM COATED ORAL at 20:37

## 2025-01-02 RX ADMIN — CHLORHEXIDINE GLUCONATE, 0.12% ORAL RINSE 15 ML: 1.2 SOLUTION DENTAL at 20:35

## 2025-01-02 RX ADMIN — CHLORHEXIDINE GLUCONATE, 0.12% ORAL RINSE 15 ML: 1.2 SOLUTION DENTAL at 08:03

## 2025-01-02 RX ADMIN — SODIUM CHLORIDE, PRESERVATIVE FREE 10 ML: 5 INJECTION INTRAVENOUS at 20:38

## 2025-01-02 RX ADMIN — CITALOPRAM HYDROBROMIDE 40 MG: 20 TABLET ORAL at 08:05

## 2025-01-02 RX ADMIN — LISINOPRIL 40 MG: 20 TABLET ORAL at 08:04

## 2025-01-02 ASSESSMENT — PAIN SCALES - GENERAL
PAINLEVEL_OUTOF10: 8
PAINLEVEL_OUTOF10: 9

## 2025-01-02 ASSESSMENT — PAIN - FUNCTIONAL ASSESSMENT: PAIN_FUNCTIONAL_ASSESSMENT: ACTIVITIES ARE NOT PREVENTED

## 2025-01-02 ASSESSMENT — PAIN DESCRIPTION - ORIENTATION: ORIENTATION: LEFT;LOWER

## 2025-01-02 ASSESSMENT — PAIN DESCRIPTION - LOCATION
LOCATION: BACK;FOOT
LOCATION: BACK

## 2025-01-02 ASSESSMENT — PAIN DESCRIPTION - DESCRIPTORS: DESCRIPTORS: ACHING;DISCOMFORT;SORE

## 2025-01-02 NOTE — PROGRESS NOTES
Physical Therapy  Initial Assessment     Name: Kim Zarco  : 1950  MRN: [id number]      Date of Service: 2025    Evaluating PT: Yong Griffin, PT, DPT [id number]      Room #:  6522/6522-A  Diagnosis:  Acute hypoxic respiratory failure [J96.01]  PMHx/PSHx:   has a past medical history of ASHD (arteriosclerotic heart disease), Breast cancer (HCC), CAD (coronary artery disease), Cancer (HCC), Charcot foot due to diabetes mellitus (HCC), Chronic diastolic congestive heart failure (HCC), Chronic kidney disease, Depression, Diabetic foot ulcer with osteomyelitis (HCC), Diabetic peripheral neuropathy (HCC), Diabetic ulcer of left foot associated with type 2 diabetes mellitus (HCC), Diarrhea, Essential hypertension, GERD (gastroesophageal reflux disease), History of UTI, Hyperlipidemia, Hypertension, Lactose intolerance, Mass of left axilla, Thyroid disease, Type II or unspecified type diabetes mellitus without mention of complication, not stated as uncontrolled, and Urinary incontinence.  Procedure/Surgery:  NA  Precautions:  Fall risk, O2, Continuous pulse ox, B charcot foot, B CROW boots  Equipment Needs:  TBD    SUBJECTIVE:    Pt lives with  in a 2 story house with 4 stair(s) and 2 rail(s) to enter. Pt has been sleeping in recliner on first floor. Full flight of stairs and 2 rails to second floor bath. Pt ambulated without AD within home prior to admission. Pt used electric scooter PRN in the community. Pt also owns WC.    OBJECTIVE:   Initial Evaluation  Date: 25 Treatment Date: Short Term/ Long Term   Goals   AM-PAC 6 Clicks      Was pt agreeable to Eval/treatment? Yes     Does pt have pain? 9-10/10 L flank pain     Bed Mobility  Rolling: NT  Supine to sit: NT  Sit to supine: NT  Scooting: NT  Rolling: Independent   Supine to sit: Independent   Sit to supine: Independent   Scooting: Independent    Transfers Sit to stand: SBA  Stand to sit: SBA  Stand pivot: SBA without AD  Sit to stand:  Independent   Stand to sit: Independent   Stand pivot: Independent    Ambulation   25, 25, 200 feet without AD with SBA  >400 feet without AD Independent    Stair negotiation: ascended and descended NT  >4 step(s) with 1 rail(s) Mod Independent    ROM BUE: Refer to OT note  BLE: WFL     Strength BUE: Refer to OT note  BLE: NT     Balance Sitting EOB: SBA  Dynamic Standing: SBA without AD  Sitting EOB: Independent   Dynamic Standing: Independent      Pt is A & O x: 4 to person, place, month/year, and situation.   Sensation: Pt denies numbness and tingling of extremities.   Edema: Unremarkable    Patient education  Pt educated on PT role in acute care setting.    Patient response to education:   Pt verbalized understanding Pt demonstrated skill Pt requires further education in this area   Yes NA No     ASSESSMENT:    Conditions Requiring Skilled Therapeutic Intervention:    [x]Decreased strength     []Decreased ROM  [x]Decreased functional mobility  [x]Decreased balance   [x]Decreased endurance   []Decreased posture  []Decreased sensation  []Decreased coordination   []Decreased vision  []Decreased safety awareness   [x]Increased pain       Comments:    Pt was seated EOB upon room entry; agreeable to PT evaluation. Pt completed all mobility noted above. Pt ambulated within room on RA. O2 sat dropped to 85% and required 5+ minutes of seated rest to recover to 95%. Pt was placed on 2 L O2/min for ambulation in hallway. Gait was slow but steady. No LOB occurred. Pt reported mild SOB. O2 sat dropped to 87% and recovered to 91% with seated rest (2 minutes). Pt was left in bed with all needs met at conclusion of session.    Treatment:  Patient practiced and was instructed in the following treatment:    Therapeutic activities:  Transfers: Pt was cued for hand placement during sit <> stand transfers. Pt completed multiple transfers from different surface heights (EOB x2, commode x1).  Ambulation: Pt was cued for safety when

## 2025-01-02 NOTE — PATIENT CARE CONFERENCE
P Quality Flow/Interdisciplinary Rounds Progress Note        Quality Flow Rounds held on January 2, 2025    Disciplines Attending:  Bedside Nurse, , , and Nursing Unit Leadership    Kim Zarco was admitted on 12/27/2024  3:01 PM    Anticipated Discharge Date:       Disposition:    Darwin Score:  Darwin Scale Score: 19    BS RISK OF UNPLANNED READMISSION 2.0             14.4 Total Score        Discussed patient goal for the day, patient clinical progression, and barriers to discharge.  The following Goal(s) of the Day/Commitment(s) have been identified:  downgrade/discharge planning       Kristen Gilbert RN  January 2, 2025

## 2025-01-02 NOTE — PROGRESS NOTES
Messaged Dr. Garza regarding blood sugar 78 and due for 15 units straight of humalog.    Ordered to hold 1100 humalog     Margarita Maurice RN

## 2025-01-02 NOTE — PROGRESS NOTES
SPEECH/LANGUAGE PATHOLOGY  CLINICAL ASSESSMENT OF SWALLOWING FUNCTION   and PLAN OF CARE      PATIENT NAME:  Kim Zarco  (female)     MRN:  [id number]    :  1950  (74 y.o.)  STATUS:  Inpatient: Room 6522/6522-A    TODAY'S DATE:  2025  ORDER DATE, DESCRIPTION AND REFERRING PROVIDER: 25 [email or web address]    SLP eval and treat  Start:  25,   End:  25 111,   ONE TIME,   Standing Count:  1 Occurrences,   R       Dennise Diamond, APRN - NP  REASON FOR REFERRAL: dysphagia   EVALUATING THERAPIST: Nneka Vasquez, SLP                 RESULTS:    DYSPHAGIA DIAGNOSIS:   Clinical indicators of mild  oropharyngeal phase dysphagia  and esophageal motility deficit suspected      DIET RECOMMENDATIONS:  Soft and bite size consistency solids (IDDSI level 6) with  thin liquids (IDDSI level 0)    MEDICATION ADMINISTRATION, and Per patient choice/nursing judgement    A GI consult is recommended per physician discretion d/t patient report of globus sensation during intake.      FEEDING RECOMMENDATIONS:     Assistance level:  Supervision is needed during all oral intake      Compensatory strategies recommended: Thorough oral care to prevent colonization of oral bacteria   Encourage oral clearing of bolus before next bite/sip is taken  Slow rate of intake   SINGLE cup sips  SINGLE straw sips   SMALL bites  Liquid wash after thicker items to assist with clearing pharyngeal residue       Discussed recommendations with:  patient nurse in person    SPEECH THERAPY  PLAN OF CARE   The dysphagia POC is established based on physician order, dysphagia diagnosis and results of clinical assessment     Skilled SLP intervention for dysphagia management on acute care up to 5 x per week until goals met, pt plateaus in function and/or discharged from hospital    Conditions Requiring Skilled Therapeutic Intervention for dysphagia:    Patient is performing below functional baseline d/t  current acute condition, respiratory  for speech production  Quality:   Within functional limits  Intensity: Within functional limits    Volitional Swallow: Present     Volitional Cough:  Present     Pain: No pain reported.    EDUCATION:   The Speech Language Pathologist (SLP) completed education regarding results of evaluation and that intervention is warranted at this time.  Learner: Patient  Education: Reviewed results and recommendations of this evaluation  Evaluation of Education:  Verbalizes understanding    This plan may be re-evaluated and revised as warranted.      Evaluation Time includes thorough review of current medical information, gathering information on past medical history/social history and prior level of function, completion of standardized testing/informal observation of tasks, assessment of data and education on plan of care and goals.    [x]The admitting diagnosis and active problem list, have been reviewed prior to initiation of this evaluation.        ACTIVE PROBLEM LIST:   Patient Active Problem List   Diagnosis    Hand pain, right    Hand pain, left    Chronic kidney disease, stage III (moderate) ([facility])    Chest pain    Coronary artery disease involving native coronary artery of native heart without angina pectoris    S/P CABG (coronary artery bypass graft)    Essential hypertension    Adenomatous polyp of descending colon    Type 2 diabetes mellitus with diabetic arthropathy, with long-term current use of insulin ([facility]ly [facility])    Hyperlipidemia    Gastroesophageal reflux disease without esophagitis    Charcot foot due to diabetes mellitus ([facility])    Persistent depressive disorder    Functional urinary incontinence    Right breast cancer with malignant cells in regional lymph nodes no greater than 0.2 mm and no more than 200 cells ([facility])    S/P bilateral mastectomy    Diabetic peripheral neuropathy ([facility])    Lumbar radiculopathy    Acute respiratory failure with hypoxia    Poorly controlled type 2 diabetes mellitus ([facility])    Dietary

## 2025-01-02 NOTE — PROGRESS NOTES
Hospitalist Progress Note      SYNOPSIS: Patient admitted on 2024 for Acute respiratory failure with hypoxia  Kim Zarco has a past medical history that includes coronary artery disease status post CABG, diabetes, hypertension, CKD, hyperlipidemia  Kim presents ER for shortness of breath for 1 day.  Saturation runs around 60s patient was placed on CPAP received breathing treatment  ER Course  Upon presentation to the ER, routine labwork was performed which revealed BUN 36, creatinine 1.6, lactic acid 5.4, glucose 441, troponin 52.  Imaging results are as outlined below in the Imaging section of this note.     Upon arrival to the ER, patient was 153/65, febrile 103.2, tachycardic 104.  The patient received Tylenol, Rocephin, Doxy, Solu-Medrol, 2 L normal saline in the emergency room and was admitted to [facility].  Patient requiring 10 L of oxygen to maintain saturation CT chest without contrast sent which showed bilateral pulmonary opacification left greater than right near entire involvement of left lower lobe mixed with air bronchogram of acute airspace disease, pulmonology consulted    SUBJECTIVE:  Stable overnight. No other overnight issues reported.   Patient seen and examined at bedside today a.m. significant improvement in her shortness of breath now maintaining saturation at room air, she still complains of pain in her mouth secondary to multiple oral ulcer  Records reviewed.         Temp (24hrs), Av.3 °F (36.3 °C), Min:96.8 °F (36 °C), Max:97.6 °F (36.4 °C)    DIET: ADULT DIET; Regular; 3 carb choices (45 gm/meal)  CODE: Full Code    Intake/Output Summary (Last 24 hours) at 2025 1028  Last data filed at 2025 0723  Gross per 24 hour   Intake 1140 ml   Output 3125 ml   Net -1985 ml       Review of Systems  All bolded are positive; please see HPI  General:  Fever, chills, diaphoresis, fatigue, malaise, night sweats, weight loss  Psychological:  Anxiety, disorientation,

## 2025-01-02 NOTE — PROGRESS NOTES
[facility]  Department of Pulmonary, Critical Care and Sleep Medicine  [facility]  Department of Internal Medicine  Progress Note    Levi HART        Patients [facility] Pulmon[facility]    SUBJECTIVE:    Patient seen and examined.  She was seen on 2 L nasal cannula, in no acute distress.  Patient reports sore mouth and having difficulty swallowing. Reports this has been an ongoing issue, prior to hospitalization.  States it is very painful to eat and drink.  Still with exertional dyspnea.     OBJECTIVE:  Vitals:    01/02/25 1044 01/02/25 1051 01/02/25 1258 01/02/25 1507   BP:  126/61  (!) 159/69   Pulse:  71  80   Resp: 18 20  18   Temp:  97.4 °F (36.3 °C)  97.3 °F (36.3 °C)   TempSrc:  Temporal  Temporal   SpO2:  90% 94% 94%   Weight:       Height:         Constitutional: Alert,     EENT: EOMI KATIA. MMM. No icterus.  Thrush present on upper palate and tongue.  Neck:  Trachea was midline.   Respiratory: Rales mid to bases.   Cardiovascular: Regular, No murmur. No rubs.      Pulses:  Equal bilaterally.    Abdomen: Soft without organomegaly. No rebound, rigidity.  No guarding.  Lymphatic: No lymphadenopathy.  Musculoskeletal: Without weakness or gross deficits  Extremities:  No lower extremity edema. Reflexes appear adequate.   Skin:  Warm and dry.  No skin rashes.   Neurological/Psychiatric: No acute psychosis. Cranial nerves are intact.        DATA:    Monitor Strips:  Reviewed & discusses with technical team. No changes noted.    RADIOLOGY:  CT chest reviewed with bilateral pulmonary opacifications,       CBC with Differential:    Lab Results   Component Value Date/Time    WBC 13.7 12/31/2024 10:15 AM    RBC 3.74 12/31/2024 10:15 AM    HGB 10.7 12/31/2024 10:15 AM    HCT 33.7 12/31/2024 10:15 AM      12/31/2024 10:15 AM    MCV 90.1 12/31/2024 10:15 AM    MCH 28.6 12/31/2024 10:15 AM    MCHC 31.8 12/31/2024 10:15 AM    RDW 13.3 12/31/2024 10:15 AM    LYMPHOPCT 3 12/31/2024 10:15 AM    MONOPCT 4 12/31/2024 10:15 AM    EOSPCT 0 12/31/2024 10:15 AM    BASOPCT 0 12/31/2024 10:15 AM    MONOSABS 0.51 12/31/2024 10:15 AM    LYMPHSABS 0.34 12/31/2024 10:15 AM    EOSABS 0.00 12/31/2024 10:15 AM    BASOSABS 0.04 12/31/2024 10:15 AM     CMP:    Lab Results   Component Value Date/Time     01/02/2025 09:13 AM    K 4.2 01/02/2025 09:13 AM    K 5.2 06/22/2023 03:07 AM     01/02/2025 09:13 AM    CO2 26 01/02/2025 09:13 AM    BUN 31 01/02/2025 09:13 AM    CREATININE 1.1 01/02/2025 09:13 AM    GFRAA 54 08/30/2022 09:50 AM    LABGLOM 53 01/02/2025 09:13 AM    LABGLOM 50 01/11/2024 09:45 AM    GLUCOSE 94 01/02/2025 09:13 AM    GLUCOSE 113 03/07/2012 10:26 AM    CALCIUM 10.3 01/02/2025 09:13 AM    BILITOT 0.5 12/28/2024 04:46 AM    ALKPHOS 40 12/28/2024 04:46 AM    AST 17 12/28/2024 04:46 AM    ALT 9 12/28/2024 04:46 AM     Assessment:   Acute hypoxic respiratory failure multifactorial in etiology, bilateral community-acquired pneumonia, component of congestive heart failure with preserved ejection fraction, obesity hypoventilation syndrome   Acute hypoxic respiratory failure- wean O2 as tolerated. Now down to 6L   Community acquired pneumonia- rocephin, doxy, neb breathing treatments.   Sepsis due to above  Coronary artery disease, s/p CABG  Insulin dependent diabetes mellitus- check A1c. Lantus + SSI.  Elevated trop- likely due to hypoxia. Denies CPCAD  S/P CABG  UTI  DM  HTN  Charcot  feet   Acute on chronic renal failure   Hyperlipidemia  Thrush  Hyperglycemia        Plan:   Wean FiO2 as tolerated  Check ambulatory pulse ox prior to discharge  Continue AVAPS at night  Bronchodilators for support, stop inhaled steroids   S/p dexamethasone x 4 doses  Continue IV Rocephin 1 g every 24 hours, doxycycline 100 mg twice

## 2025-01-02 NOTE — PROGRESS NOTES
OCCUPATIONAL THERAPY INITIAL EVALUATION    [facility]  [address] [place], [place]        Date:2025                                                  Patient Name: Kim Zarco    MRN: [id number]    : 1950    Room: [address] [place] [address]      Evaluating OT: Hermann Edward OTR/L; [id number]       Referring Provider: Sterling Cao MD     Specific Provider Orders/Date: OT Eval and Treat 25 0945       Diagnosis: Acute respiratory failure with hypoxia;  Type 2 diabetes mellitus with diabetic arthropathy, with long-term current use of insulin; Chronic kidney disease, stage III (moderate); S/P CABG (coronary artery bypass graft); Essential hypertension; Dietary noncompliance.    Surgery: None this admission.     Pertinent Medical History:  has a past medical history of ASHD (arteriosclerotic heart disease), Breast cancer (HCC), CAD (coronary artery disease), Cancer (HCC), Charcot foot due to diabetes mellitus (HCC), Chronic diastolic congestive heart failure (HCC), Chronic kidney disease, Depression, Diabetic foot ulcer with osteomyelitis (HCC), Diabetic peripheral neuropathy (HCC), Diabetic ulcer of left foot associated with type 2 diabetes mellitus (HCC), Diarrhea, Essential hypertension, GERD (gastroesophageal reflux disease), History of UTI, Hyperlipidemia, Hypertension, Lactose intolerance, Mass of left axilla, Thyroid disease, Type II or unspecified type diabetes mellitus without mention of complication, not stated as uncontrolled, and Urinary incontinence.     Recommended Adaptive Equipment: TBD     Precautions:  Fall Risk, O2, continuous pulse ox, BLE CROW boots (h/o ubaldo charcot foot), monitor SpO2     Assessment of current deficits    [x] Functional mobility  [x]ADLs  [x] Strength               []Cognition    [x] Functional transfers   [x] IADLs         [x] Safety Awareness   [x]Endurance    [] Fine Coordination              [x] Balance      []  4/4; Follows multi step directions. Pt pleasant & cooperative throughout session.   Memory:  good    Sequencing:  good    Problem solving:  good    Judgement/safety: fair +     Functional Assessment:  AM-PAC Daily Activity Raw Score: 19/24   Initial Eval Status  Date: 1/2/25 Treatment Status  Date: STGs = LTGs  Time frame: 10-14 days   Feeding Independent       Grooming Supervision   Standing at sink to wash hands  Independent    UB Dressing Stand by Assist   To tie gown posteriorly seated EOB  Independent    LB Dressing Minimal Assist   Independent    Bathing Minimal Assist  Simulated seated EOB  Independent    Toileting Stand by Assist   For clothing management seated on commode  Independent    Bed Mobility  Log Roll: NT   Supine to sit: NT   Sit to supine: NT  Pt seated EOB upon arrival.   Supine to sit: Independent   Sit to supine: Independent    Functional Transfers Sit to stand:Stand by Assist   Stand to sit:Stand by Assist   Stand pivot: Stand by Assist w/o AD  Commode: Stand by Assist   Sit to stand: Independent   Stand to sit: Independent   Stand pivot:  Independent   Commode:  Independent    Functional Mobility Stand by Assist   No use of AD to<>from hallway  Independent   Balance Sitting:     Static:  Independent     Dynamic:Independent     Standing:    Static:  Stand by Assist w/o AD    Dynamic:Stand by Assist w/o AD  Standing:    Static:  Independent     Dynamic:Independent    Activity Tolerance Fair+ w/ light activity  good  w/ light to mod activity   Visual/  Perceptual Glasses: Yes.  WFL     Safety fair +  good   during ADL completion   Vitals SpO2 85-95% on RA during functional mobility to<>from bathroom requiring ~5 min seated rest break for recovery.    SpO2 87-91% on 2L O2 throughout functional mobility to<>from hallway.    RN made aware.       Hand Dominance Right   AROM (PROM) Strength Additional Info:  Goal: (PRN)   RUE  WFL 4/5 grossly tested good   and good  FMC/dexterity noted during

## 2025-01-02 NOTE — PROGRESS NOTES
ENDOCRINOLOGY PROGRESS NOTE      Date of admission: 12/27/2024  Date of service: 1/2/2025  Admitting physician: Tracee Smith DO   Primary Care Physician: Ernestina Quinn MD  Consultant physician: Roni Garza MD     Reason for the consultation:  Uncontrolled DM    History of Present Illness:  The history is provided by the patient. Accuracy of the patient data is excellent    Kim Zarco is a very pleasant 74 y.o. old female with PMH of obesity, poorly controlled diabetes, hypertension, hyperlipidemia, coronary artery disease status post CABG and other listed below admitted to [facility] on 12/27/2024 because of shortness of breath and found to have a community-acquired pneumonia, endocrine service was consulted for diabetes management.      Subjective  The patient was seen and examined at bedside today, no acute events overnight, glucose level is variable.  No hypoglycemia      Inpatient diet:   Carb Restricted diet     Point of care glucose monitoring   (Independently reviewed)   Recent Labs     12/31/24  1618 12/31/24  1938 01/01/25  0632 01/01/25  1106 01/01/25  1547 01/01/25  2008 01/02/25  0601 01/02/25  1125   POCGLU 261* 299* 163* 187* 195* 263* 126* 78     Scheduled Meds:   doxycycline hyclate  100 mg Oral 2 times per day    insulin glargine  40 Units SubCUTAneous Nightly    insulin lispro  10 Units SubCUTAneous TID AC    magnesium oxide  400 mg Oral Daily    chlorhexidine  15 mL Mouth/Throat BID    nystatin  5 mL Oral 4x Daily    insulin lispro  0-18 Units SubCUTAneous 4x Daily AC & HS    benzonatate  100 mg Oral TID    cefTRIAXone (ROCEPHIN) IV  1,000 mg IntraVENous Q24H    allopurinol  100 mg Oral Daily    aspirin EC  81 mg Oral Nightly    Vitamin D  1 tablet Oral Daily    citalopram  40 mg Oral Daily    ezetimibe  10 mg Oral Daily    famotidine  20 mg Oral QAM    trospium  20 mg Oral BID AC    letrozole  2.5 mg Oral Daily    lisinopril  40 mg Oral Daily    metoprolol tartrate  50 mg Oral BID     care of this patient. Please do not hesitate to contact us with any additional questions.     Roni Garza MD  Endocrinologist, [facility]   [facility]  [address] [facility] [address]  [address] [facility] [address]  Dept: [phone]  Loc: [phone]   Phone: [phone]  Fax: [phone]  --------------------------------------------  An electronic signature was used to authenticate this note. Roin Garza MD on 1/2/2025 at 2:52 PM

## 2025-01-02 NOTE — CARE COORDINATION
Transition of care update: IV rocephin 1000mg q 24 hrs. Labs and orders noted. PT eval am-pac 17/24 and ambulated 25, 25, 200 ft without AD with SBA. Met with pt in room. O2 at 2l/nc. Plan remains to home with her family when medically ready. Continues to decline HHC. [facility] is following for home o2. No other needs were voiced for home. Cm/sw will follow.

## 2025-01-03 VITALS
WEIGHT: 221.6 LBS | HEART RATE: 60 BPM | TEMPERATURE: 97.8 F | BODY MASS INDEX: 35.62 KG/M2 | RESPIRATION RATE: 18 BRPM | HEIGHT: 66 IN | SYSTOLIC BLOOD PRESSURE: 164 MMHG | DIASTOLIC BLOOD PRESSURE: 78 MMHG | OXYGEN SATURATION: 91 %

## 2025-01-03 LAB
GLUCOSE BLD-MCNC: 123 MG/DL (ref 74–99)
GLUCOSE BLD-MCNC: 132 MG/DL (ref 74–99)
GLUCOSE BLD-MCNC: 134 MG/DL (ref 74–99)
GLUCOSE BLD-MCNC: 87 MG/DL (ref 74–99)
GLUCOSE BLD-MCNC: 93 MG/DL (ref 74–99)

## 2025-01-03 PROCEDURE — 6360000002 HC RX W HCPCS: Performed by: INTERNAL MEDICINE

## 2025-01-03 PROCEDURE — 94640 AIRWAY INHALATION TREATMENT: CPT

## 2025-01-03 PROCEDURE — 94660 CPAP INITIATION&MGMT: CPT

## 2025-01-03 PROCEDURE — 6370000000 HC RX 637 (ALT 250 FOR IP): Performed by: INTERNAL MEDICINE

## 2025-01-03 PROCEDURE — 82962 GLUCOSE BLOOD TEST: CPT

## 2025-01-03 PROCEDURE — 97112 NEUROMUSCULAR REEDUCATION: CPT

## 2025-01-03 PROCEDURE — 97535 SELF CARE MNGMENT TRAINING: CPT

## 2025-01-03 PROCEDURE — 6370000000 HC RX 637 (ALT 250 FOR IP): Performed by: STUDENT IN AN ORGANIZED HEALTH CARE EDUCATION/TRAINING PROGRAM

## 2025-01-03 PROCEDURE — 2700000000 HC OXYGEN THERAPY PER DAY

## 2025-01-03 PROCEDURE — 2500000003 HC RX 250 WO HCPCS: Performed by: INTERNAL MEDICINE

## 2025-01-03 PROCEDURE — 1200000000 HC SEMI PRIVATE

## 2025-01-03 PROCEDURE — 2500000003 HC RX 250 WO HCPCS: Performed by: STUDENT IN AN ORGANIZED HEALTH CARE EDUCATION/TRAINING PROGRAM

## 2025-01-03 PROCEDURE — 6360000002 HC RX W HCPCS: Performed by: STUDENT IN AN ORGANIZED HEALTH CARE EDUCATION/TRAINING PROGRAM

## 2025-01-03 RX ORDER — CLOTRIMAZOLE 10 MG/1
10 LOZENGE ORAL
Qty: 50 TABLET | Refills: 0 | Status: SHIPPED | OUTPATIENT
Start: 2025-01-03 | End: 2025-01-13

## 2025-01-03 RX ORDER — LANOLIN ALCOHOL/MO/W.PET/CERES
400 CREAM (GRAM) TOPICAL DAILY
Qty: 30 TABLET | Refills: 2 | Status: SHIPPED | OUTPATIENT
Start: 2025-01-04 | End: 2025-01-06

## 2025-01-03 RX ORDER — INSULIN GLARGINE 100 [IU]/ML
40 INJECTION, SOLUTION SUBCUTANEOUS NIGHTLY
Qty: 10 ML | Refills: 3
Start: 2025-01-03

## 2025-01-03 RX ORDER — INSULIN LISPRO 100 [IU]/ML
12 INJECTION, SOLUTION INTRAVENOUS; SUBCUTANEOUS
Status: DISCONTINUED | OUTPATIENT
Start: 2025-01-03 | End: 2025-01-04 | Stop reason: HOSPADM

## 2025-01-03 RX ADMIN — LETROZOLE 2.5 MG: 2.5 TABLET ORAL at 09:15

## 2025-01-03 RX ADMIN — CHLORHEXIDINE GLUCONATE, 0.12% ORAL RINSE 15 ML: 1.2 SOLUTION DENTAL at 09:06

## 2025-01-03 RX ADMIN — NYSTATIN 500000 UNITS: 100000 SUSPENSION ORAL at 14:39

## 2025-01-03 RX ADMIN — Medication 500 MG: at 09:10

## 2025-01-03 RX ADMIN — Medication 1000 UNITS: at 09:14

## 2025-01-03 RX ADMIN — NYSTATIN 500000 UNITS: 100000 SUSPENSION ORAL at 17:38

## 2025-01-03 RX ADMIN — TROSPIUM CHLORIDE 20 MG: 20 TABLET, FILM COATED ORAL at 17:38

## 2025-01-03 RX ADMIN — WATER 1000 MG: 1 INJECTION INTRAMUSCULAR; INTRAVENOUS; SUBCUTANEOUS at 17:37

## 2025-01-03 RX ADMIN — LISINOPRIL 40 MG: 20 TABLET ORAL at 09:12

## 2025-01-03 RX ADMIN — IPRATROPIUM BROMIDE AND ALBUTEROL SULFATE 1 DOSE: 2.5; .5 SOLUTION RESPIRATORY (INHALATION) at 09:49

## 2025-01-03 RX ADMIN — PANTOPRAZOLE SODIUM 40 MG: 40 TABLET, DELAYED RELEASE ORAL at 09:31

## 2025-01-03 RX ADMIN — TROSPIUM CHLORIDE 20 MG: 20 TABLET, FILM COATED ORAL at 05:54

## 2025-01-03 RX ADMIN — INSULIN LISPRO 12 UNITS: 100 INJECTION, SOLUTION INTRAVENOUS; SUBCUTANEOUS at 11:53

## 2025-01-03 RX ADMIN — DIPHENHYDRAMINE HYDROCHLORIDE 5 ML: 12.5 LIQUID ORAL at 11:53

## 2025-01-03 RX ADMIN — ARFORMOTEROL TARTRATE 15 MCG: 15 SOLUTION RESPIRATORY (INHALATION) at 09:49

## 2025-01-03 RX ADMIN — INSULIN LISPRO 12 UNITS: 100 INJECTION, SOLUTION INTRAVENOUS; SUBCUTANEOUS at 09:14

## 2025-01-03 RX ADMIN — FAMOTIDINE 20 MG: 20 TABLET, FILM COATED ORAL at 09:10

## 2025-01-03 RX ADMIN — ALLOPURINOL 100 MG: 100 TABLET ORAL at 09:10

## 2025-01-03 RX ADMIN — IPRATROPIUM BROMIDE AND ALBUTEROL SULFATE 1 DOSE: 2.5; .5 SOLUTION RESPIRATORY (INHALATION) at 13:19

## 2025-01-03 RX ADMIN — BENZONATATE 100 MG: 100 CAPSULE ORAL at 09:14

## 2025-01-03 RX ADMIN — METOPROLOL TARTRATE 50 MG: 50 TABLET, FILM COATED ORAL at 09:14

## 2025-01-03 RX ADMIN — PREGABALIN 50 MG: 50 CAPSULE ORAL at 09:14

## 2025-01-03 RX ADMIN — HYDROCODONE BITARTRATE AND ACETAMINOPHEN 1 TABLET: 5; 325 TABLET ORAL at 05:54

## 2025-01-03 RX ADMIN — EZETIMIBE 10 MG: 10 TABLET ORAL at 09:12

## 2025-01-03 RX ADMIN — POTASSIUM CHLORIDE 10 MEQ: 750 TABLET, EXTENDED RELEASE ORAL at 09:30

## 2025-01-03 RX ADMIN — SODIUM CHLORIDE, PRESERVATIVE FREE 10 ML: 5 INJECTION INTRAVENOUS at 09:15

## 2025-01-03 RX ADMIN — ENOXAPARIN SODIUM 30 MG: 100 INJECTION SUBCUTANEOUS at 09:09

## 2025-01-03 RX ADMIN — Medication 400 MG: at 09:14

## 2025-01-03 RX ADMIN — CITALOPRAM HYDROBROMIDE 40 MG: 20 TABLET ORAL at 09:10

## 2025-01-03 RX ADMIN — NYSTATIN 500000 UNITS: 100000 SUSPENSION ORAL at 09:06

## 2025-01-03 RX ADMIN — DOXYCYCLINE HYCLATE 100 MG: 100 CAPSULE ORAL at 09:12

## 2025-01-03 RX ADMIN — Medication 1 TABLET: at 09:15

## 2025-01-03 RX ADMIN — BENZONATATE 100 MG: 100 CAPSULE ORAL at 14:39

## 2025-01-03 ASSESSMENT — PAIN DESCRIPTION - FREQUENCY
FREQUENCY: CONTINUOUS
FREQUENCY: CONTINUOUS

## 2025-01-03 ASSESSMENT — PAIN DESCRIPTION - PAIN TYPE
TYPE: ACUTE PAIN
TYPE: ACUTE PAIN

## 2025-01-03 ASSESSMENT — PAIN DESCRIPTION - LOCATION
LOCATION: BACK
LOCATION: BACK

## 2025-01-03 ASSESSMENT — PAIN DESCRIPTION - ONSET
ONSET: ON-GOING
ONSET: ON-GOING

## 2025-01-03 ASSESSMENT — PAIN DESCRIPTION - ORIENTATION
ORIENTATION: MID
ORIENTATION: MID

## 2025-01-03 ASSESSMENT — PAIN SCALES - GENERAL
PAINLEVEL_OUTOF10: 0
PAINLEVEL_OUTOF10: 4
PAINLEVEL_OUTOF10: 5

## 2025-01-03 ASSESSMENT — PAIN DESCRIPTION - DESCRIPTORS
DESCRIPTORS: ACHING;DISCOMFORT;CRUSHING
DESCRIPTORS: ACHING;DISCOMFORT;CRUSHING

## 2025-01-03 NOTE — CARE COORDINATION
1/3/2025social work transition of care planning  Pt plan is home,once medically stable. [facility] following for home o2 needs. Will need pulse ox testing and home o2 orders prior to dc. Family to transport at dc. Pt on iv atb,ID not consulted at this time.  Electronically signed by VERN Parks on 1/3/2025 at 9:09 AM    Addendum: Sw made referral to [facility].  Electronically signed by VERN Parks on 1/3/2025 at 3:47 PM

## 2025-01-03 NOTE — PROGRESS NOTES
[facility]  Department of Pulmonary, Critical Care and Sleep Medicine  [facility]  Department of Internal Medicine  Progress Note    Levi HART        Patients [facility] Pulmon[facility]    SUBJECTIVE:    Patient seen and examined.  Overall reports that her breathing has significantly improved her main complaint continues to be discomfort when trying to put her dentures in and difficulty with eating secondary to the oral Candida infection.  She did require 2 L of oxygen on ambulatory pulse ox today.    OBJECTIVE:  Vitals:    01/03/25 0554 01/03/25 0624 01/03/25 0906 01/03/25 1320   BP:   (!) 164/78    Pulse:   60    Resp: 18 18     Temp:   97.8 °F (36.6 °C)    TempSrc:   Temporal    SpO2:   98% 91%   Weight:       Height:         Constitutional: Alert,     EENT: EOMI KATIA. MMM. No icterus.  Thrush present on upper palate and tongue.(Improving)  Neck:  Trachea was midline.   Respiratory: Rales mid to bases.   Cardiovascular: Regular, No murmur. No rubs.      Pulses:  Equal bilaterally.    Abdomen: Soft without organomegaly. No rebound, rigidity.  No guarding.  Lymphatic: No lymphadenopathy.  Musculoskeletal: Without weakness or gross deficits  Extremities:  No lower extremity edema. Reflexes appear adequate.   Skin:  Warm and dry.  No skin rashes.   Neurological/Psychiatric: No acute psychosis. Cranial nerves are intact.        DATA:    Monitor Strips:  Reviewed & discusses with technical team. No changes noted.    RADIOLOGY:  CT chest reviewed with bilateral pulmonary opacifications,       CBC with Differential:    Lab Results   Component Value Date/Time    WBC 13.7 12/31/2024 10:15 AM    RBC 3.74 12/31/2024 10:15 AM    HGB 10.7 12/31/2024 10:15 AM    HCT 33.7 12/31/2024 10:15 AM     12/31/2024 10:15 AM    MCV

## 2025-01-03 NOTE — PROGRESS NOTES
This nurse  spoke to Dr Charles on the phone and she is ok with Kim going home today once she gets her Home O2.

## 2025-01-03 NOTE — PLAN OF CARE
Problem: Chronic Conditions and Co-morbidities  Goal: Patient's chronic conditions and co-morbidity symptoms are monitored and maintained or improved  1/3/2025 0129 by Binta Ruffin RN  Outcome: Progressing  1/2/2025 1523 by Brenda Walters RN  Outcome: Progressing     Problem: Discharge Planning  Goal: Discharge to home or other facility with appropriate resources  1/3/2025 0129 by Binta Ruffin RN  Outcome: Progressing  1/2/2025 1523 by Brenda Walters RN  Outcome: Progressing     Problem: Safety - Adult  Goal: Free from fall injury  1/3/2025 0129 by Binta Ruffin RN  Outcome: Progressing  1/2/2025 1523 by Brenda Walters RN  Outcome: Progressing     Problem: Pain  Goal: Verbalizes/displays adequate comfort level or baseline comfort level  1/3/2025 0129 by Binta Ruffin RN  Outcome: Progressing  1/2/2025 1523 by Brenda Walters RN  Outcome: Progressing     Problem: Neurosensory - Adult  Goal: Achieves stable or improved neurological status  Outcome: Progressing  Goal: Achieves maximal functionality and self care  Outcome: Progressing     Problem: Respiratory - Adult  Goal: Achieves optimal ventilation and oxygenation  Outcome: Progressing     Problem: Cardiovascular - Adult  Goal: Maintains optimal cardiac output and hemodynamic stability  Outcome: Progressing     Problem: Skin/Tissue Integrity - Adult  Goal: Skin integrity remains intact  Outcome: Progressing     Problem: Musculoskeletal - Adult  Goal: Return mobility to safest level of function  Outcome: Progressing  Goal: Maintain proper alignment of affected body part  Outcome: Progressing     Problem: Gastrointestinal - Adult  Goal: Minimal or absence of nausea and vomiting  Outcome: Progressing     Problem: Infection - Adult  Goal: Absence of infection at discharge  Outcome: Progressing

## 2025-01-03 NOTE — PLAN OF CARE
Problem: Chronic Conditions and Co-morbidities  Goal: Patient's chronic conditions and co-morbidity symptoms are monitored and maintained or improved  1/3/2025 1546 by Kaylie Glover RN  Outcome: Progressing  1/3/2025 1252 by Kaylie Glover RN  Outcome: Progressing     Problem: Discharge Planning  Goal: Discharge to home or other facility with appropriate resources  1/3/2025 1546 by Kaylie Glover RN  Outcome: Progressing  1/3/2025 1252 by Kaylie Glover RN  Outcome: Progressing     Problem: Safety - Adult  Goal: Free from fall injury  1/3/2025 1546 by Kaylie Glover RN  Outcome: Progressing  1/3/2025 1252 by Kaylie Glover RN  Outcome: Progressing     Problem: Pain  Goal: Verbalizes/displays adequate comfort level or baseline comfort level  Outcome: Progressing

## 2025-01-03 NOTE — PROGRESS NOTES
contrast reviewed  Discussed with pulmonology, started on Decadron 6 mg p.o. for 4 doses  Dextromethorphan every 6 hours as needed  Plan to do ambulatory oxygen requirement, PT OT evaluation before discharge     Chest pain with elevated troponin  Patient does have risk factor of coronary disease  I did review patient's last EKG does show T wave abnormality, troponin was elevated at the time of admission  Continue aspirin, lisinopril, metoprolol  Cardiology consulted, recommended no intervention at present     Lactic acidosis  Resolved        Uncontrolled insulin-dependent diabetes mellitus  Improving  Blood sugar uncontrolled likely secondary to steroid  Continue Lantus 55 units nightly with medium dose sliding scale insulin with Humalog 15 units 3 times daily with hypoglycemia protocol  Discussed with endocrinology     Aphthous ulcer  Continue Orajel  Continue Peridex mouthwash twice daily  Magic mouthwash        CKD 3 b  Avoid nephrotoxin  Monitor BMP daily, improving      Ampa 17  Home today  Add diflucan      DVT Prophylaxis [] Lovenox, []  Heparin, [] SCDs, [] Ambulation   GI Prophylaxis [] PPI,  [] H2 Blocker,  [] Carafate,  [] Diet/Tube Feeds   Disposition Home today   MDM [] Low, [] Moderate,[]  High  Patient's risk as above due to        Medications:  REVIEWED DAILY    Infusion Medications    dextrose      sodium chloride       Scheduled Medications    insulin lispro  12 Units SubCUTAneous TID WC    doxycycline hyclate  100 mg Oral 2 times per day    insulin glargine  40 Units SubCUTAneous Nightly    magnesium oxide  400 mg Oral Daily    chlorhexidine  15 mL Mouth/Throat BID    nystatin  5 mL Oral 4x Daily    insulin lispro  0-18 Units SubCUTAneous 4x Daily AC & HS    benzonatate  100 mg Oral TID    cefTRIAXone (ROCEPHIN) IV  1,000 mg IntraVENous Q24H    allopurinol  100 mg Oral Daily    aspirin EC  81 mg Oral Nightly    Vitamin D  1 tablet Oral Daily    citalopram  40 mg Oral Daily    ezetimibe  10 mg

## 2025-01-03 NOTE — FLOWSHEET NOTE
PULSE OX ON ROOM AIR SITTING _94___%  PULSE OX ON ROOM AIR AMBULATING __86_%  PULSE OX ON _2 LITERS SITTING RECOVERY _97__%  PULSE OX ON _2  LITERS AMBULATING RECOVERY 93_%

## 2025-01-03 NOTE — PROGRESS NOTES
Balance      [] Vision/perception   []Sensation      []Gross Motor Coordination             [] ROM           [] Delirium                   [] Motor Control      OT PLAN OF CARE   OT POC based on physician orders, patient diagnosis and results of clinical assessment     Frequency/Duration 1-3 days/wk for 2 weeks PRN   Specific OT Treatment Interventions to include:   * Instruction/training on adapted ADL techniques and AE recommendations to increase functional independence within precautions       * Training on energy conservation strategies, correct breathing pattern and techniques to improve independence/tolerance for self-care routine  * Functional transfer/mobility training/DME recommendations for increased independence, safety, and fall prevention  * Patient/Family education to increase follow through with safety techniques and functional independence  * Recommendation of environmental modifications for increased safety with functional transfers/mobility and ADLs  * Therapeutic exercise to improve motor endurance, ROM, and functional strength for ADLs/functional transfers  * Therapeutic activities to facilitate/challenge dynamic balance, stand tolerance for increased safety and independence with ADLs  * Positioning to improve skin integrity, interaction with environment and functional independence     Home Living: Pt lives with  & grandson  in a 2 story home with 4 step(s) to enter and 2 rail(s); pt reports sleeping in recliner on 1st floor. Bath on 2nd floor w/ full flight of steps & 2 handrails.  Bathroom setup: Walk-in shower w/ shower cair  Equipment owned: Shower chair, w/c, electric scooter, cane, ubaldo CROW boots     Prior Level of Function: IND with ADLs;  IND with IADLs. Use of ubaldo CROW boots w/o AD in home & CROW boots + electric scooter in community for functional mobility.   Driving: No  Occupation: None reported     Pain Level: Pt c/o 9-10/10 L flank pain this session; therapist facilitated  Static:  Stand by Assist w/o AD    Dynamic:Stand by Assist w/o AD Sit   I  Standing   SBA  Standing:    Static:  Independent     Dynamic:Independent    Activity Tolerance Fair+ w/ light activity F+  good  w/ light to mod activity   Visual/  Perceptual Glasses: Yes.  WFL WFL      Safety fair +  F+ good   during ADL completion   Vitals SpO2 85-95% on RA during functional mobility to<>from bathroom requiring ~5 min seated rest break for recovery.     SpO2 87-91% on 2L O2 throughout functional mobility to<>from hallway.     RN made aware.  SPO2 90-93 % on 2L with light activity         Hand Dominance Right    AROM (PROM) Strength Additional Info:  Goal: (PRN)   RUE  WFL 4/5 grossly tested good   and good  FMC/dexterity noted during ADL tasks Improve overall RUE strength WFL for participation in functional tasks         LUE WFL 4/5 grossly tested good   and good  FMC/dexterity noted during ADL tasks Improve overall LUE strength WFL for participation in functional tasks             Education:  Pt education on pacing and task segmentation during tasks    Comments: Collaboration with nursing pt ok for therapy/Upon arrival pt seated at EOB. At end of session pt seated in chair /chair alarm on /all lines and tubes intact, call light within reach.     Pt has made F+ progress towards set goals.   Continue with current plan of care      Treatment Time In:815            Treatment Time Out: 840           Treatment Charges: Mins Units   Ther Ex  08678     Manual Therapy 95005     Thera Activities 45871     ADL/Home Mgt 43039 10 1   Neuro Re-ed 41975 15 1   Group Therapy      Orthotic manage/training  94892     Non-Billable Time     Total Timed Treatment 25 2       [place] BRYAN/SANTIAGO [id number]

## 2025-01-03 NOTE — PROGRESS NOTES
4 Eyes Skin Assessment     NAME:  Kim Zarco  YOB: 1950  MEDICAL RECORD NUMBER:  [id number]    The patient is being assessed for  Transfer to New Unit    I agree that at least one RN has performed a thorough Head to Toe Skin Assessment on the patient. ALL assessment sites listed below have been assessed.      Areas assessed by both nurses:    Head, Face, Ears, Shoulders, Back, Chest, Arms, Elbows, Hands, Sacrum. Buttock, Coccyx, Ischium, Legs. Feet and Heels, and Under Medical Devices         Does the Patient have a Wound? No noted wound(s)       Darwin Prevention initiated by RN: Yes  Wound Care Orders initiated by RN: No    Pressure Injury (Stage 3,4, Unstageable, DTI, NWPT, and Complex wounds) if present, place Wound referral order by RN under : No    New Ostomies, if present place, Ostomy referral order under : No     Nurse 1 eSignature: Electronically signed by Binta Ruffin RN on 1/2/25 at 11:56 PM EST    **SHARE this note so that the co-signing nurse can place an eSignature**    Nurse 2 eSignature: Electronically signed by Karen Castillo RN on 1/3/25 at 12:02 AM EST

## 2025-01-03 NOTE — PLAN OF CARE
Problem: Chronic Conditions and Co-morbidities  Goal: Patient's chronic conditions and co-morbidity symptoms are monitored and maintained or improved  1/3/2025 1252 by Kaylie Glover RN  Outcome: Progressing  1/3/2025 0129 by Binta Ruffin RN  Outcome: Progressing     Problem: Discharge Planning  Goal: Discharge to home or other facility with appropriate resources  1/3/2025 1252 by Kaylie Glover RN  Outcome: Progressing  1/3/2025 0129 by Binta Ruffin RN  Outcome: Progressing     Problem: Safety - Adult  Goal: Free from fall injury  1/3/2025 1252 by Kaylie Glover RN  Outcome: Progressing  1/3/2025 0129 by Binta Ruffin RN  Outcome: Progressing     Problem: Pain  Goal: Verbalizes/displays adequate comfort level or baseline comfort level  1/3/2025 0129 by Binta Ruffin RN  Outcome: Progressing     Problem: Neurosensory - Adult  Goal: Achieves stable or improved neurological status  1/3/2025 0129 by Binta Ruffin RN  Outcome: Progressing  Goal: Achieves maximal functionality and self care  1/3/2025 0129 by Binta Ruffin RN  Outcome: Progressing     Problem: Respiratory - Adult  Goal: Achieves optimal ventilation and oxygenation  1/3/2025 0129 by Binta Ruffin RN  Outcome: Progressing     Problem: Cardiovascular - Adult  Goal: Maintains optimal cardiac output and hemodynamic stability  1/3/2025 0129 by Bitna Ruffin RN  Outcome: Progressing     Problem: Skin/Tissue Integrity - Adult  Goal: Skin integrity remains intact  1/3/2025 0129 by Binta Ruffin RN  Outcome: Progressing     Problem: Musculoskeletal - Adult  Goal: Return mobility to safest level of function  1/3/2025 0129 by Binta Ruffin RN  Outcome: Progressing  Goal: Maintain proper alignment of affected body part  1/3/2025 0129 by Binta Ruffin RN  Outcome: Progressing     Problem: Gastrointestinal - Adult  Goal: Minimal or absence of nausea and vomiting  1/3/2025 0129 by

## 2025-01-03 NOTE — FLOWSHEET NOTE
Pt felt blood sugar dropped, sugar was checked. It was 93. Pt asked for orange juice and this was provided to pt.

## 2025-01-03 NOTE — PROGRESS NOTES
Dr Johnson is discharging [facility] on Mycelex for her sore mouth. Antibiotic completed today so not needed at discharge.

## 2025-01-03 NOTE — PROGRESS NOTES
ENDOCRINOLOGY PROGRESS NOTE      Date of admission: 12/27/2024  Date of service: 1/3/2025  Admitting physician: Tracee Smith DO   Primary Care Physician: Ernestina Quinn MD  Consultant physician: Roni Garza MD     Reason for the consultation:  Uncontrolled DM    History of Present Illness:  The history is provided by the patient. Accuracy of the patient data is excellent    Kim Zarco is a very pleasant 74 y.o. old female with PMH of obesity, poorly controlled diabetes, hypertension, hyperlipidemia, coronary artery disease status post CABG and other listed below admitted to [facility] on 12/27/2024 because of shortness of breath and found to have a community-acquired pneumonia, endocrine service was consulted for diabetes management.      Subjective  The patient was seen and examined at bedside this AM, no acute events, BS improving     Inpatient diet:   Carb Restricted diet     Point of care glucose monitoring   (Independently reviewed)   Recent Labs     01/01/25  1106 01/01/25  1547 01/01/25  2008 01/02/25  0601 01/02/25  1125 01/02/25  1625 01/02/25 2024 01/03/25  0553   POCGLU 187* 195* 263* 126* 78 251* 328* 134*     Scheduled Meds:   doxycycline hyclate  100 mg Oral 2 times per day    insulin glargine  40 Units SubCUTAneous Nightly    insulin lispro  10 Units SubCUTAneous TID AC    magnesium oxide  400 mg Oral Daily    chlorhexidine  15 mL Mouth/Throat BID    nystatin  5 mL Oral 4x Daily    insulin lispro  0-18 Units SubCUTAneous 4x Daily AC & HS    benzonatate  100 mg Oral TID    cefTRIAXone (ROCEPHIN) IV  1,000 mg IntraVENous Q24H    allopurinol  100 mg Oral Daily    aspirin EC  81 mg Oral Nightly    Vitamin D  1 tablet Oral Daily    citalopram  40 mg Oral Daily    ezetimibe  10 mg Oral Daily    famotidine  20 mg Oral QAM    trospium  20 mg Oral BID AC    letrozole  2.5 mg Oral Daily    lisinopril  40 mg Oral Daily    metoprolol tartrate  50 mg Oral BID    niacin  500 mg Oral Nightly    pantoprazole

## 2025-01-04 DIAGNOSIS — E11.9 DIABETES MELLITUS WITHOUT COMPLICATION (HCC): ICD-10-CM

## 2025-01-04 NOTE — PROGRESS NOTES
CLINICAL PHARMACY NOTE: MEDS TO BEDS    Total # of Prescriptions Filled: 1   The following medications were delivered to the patient:  CLOTRIMAZOLE 10 MG    Additional Documentation:   DELIVERED TO PT

## 2025-01-06 DIAGNOSIS — R79.89 LOW SERUM VITAMIN D: ICD-10-CM

## 2025-01-06 RX ORDER — CLOTRIMAZOLE 10 MG/1
10 LOZENGE ORAL
Qty: 50 TABLET | Refills: 0 | Status: SHIPPED | OUTPATIENT
Start: 2025-01-06 | End: 2025-01-16

## 2025-01-06 RX ORDER — FLURBIPROFEN SODIUM 0.3 MG/ML
SOLUTION/ DROPS OPHTHALMIC
Qty: 400 EACH | Refills: 2 | Status: SHIPPED | OUTPATIENT
Start: 2025-01-06

## 2025-01-06 NOTE — TELEPHONE ENCOUNTER
Patient also has thrush from o2 and being in hospital for pneumonia. Would like lozenges sent into [facility] on [address]

## 2025-01-06 NOTE — TELEPHONE ENCOUNTER
Name of Medication(s) Requested:  Requested Prescriptions     Pending Prescriptions Disp Refills    VITAMIN D3 25 MCG (1000 UT) CAPS [Pharmacy Med Name: VITAMIN D3 1,000 UNIT SOFTGEL] 90 capsule 0     Sig: TAKE 1 CAPSULE BY MOUTH EVERY DAY       Medication is on current medication list Yes    Dosage and directions were verified? Yes    Quantity verified: 90 day supply     Pharmacy Verified?  Yes    Last Appointment:  11/20/2024    Future appts:  Future Appointments   Date Time Provider Department Center   1/15/2025  2:30 PM Ernestina Quinn MD CBURG [facility] ECC DEP   2/18/2025  3:00 PM SEY YNG VAS US 1 SEYZ CARDIO [facility] Rad/Car   2/18/2025  3:30 PM Clifford Szymanski MD VASC/MED [facility]   6/12/2025  3:00 PM Colin Gary, APRN - CNS BDM ENDO [facility]   6/20/2025 11:00 AM Ernestina Quinn MD CBURG [facility] ECC DEP        (If no appt send self scheduling link. .REFILLAPPT)  Scheduling request sent?     [] Yes  [x] No    Does patient need updated?  [] Yes  [x] No

## 2025-01-06 NOTE — TELEPHONE ENCOUNTER
Name of Medication(s) Requested:  Requested Prescriptions     Pending Prescriptions Disp Refills    Insulin Pen Needle (B-D UF III MINI PEN NEEDLES) 31G X 5 MM MISC [Pharmacy Med Name: BD UF MINI PEN NEEDLE [id number]] 400 each 1     Sig: USE 1 PEN NEEDLE TO INJECT MEDICATION SUBCUTANEOUSLY ONCE DAILY TO FOUR TIMES DAILY       Medication is on current medication list Yes    Dosage and directions were verified? Yes    Quantity verified: 90 day supply     Pharmacy Verified?  Yes    Last Appointment:  11/20/2024    Future appts:  Future Appointments   Date Time Provider Department Center   1/15/2025  2:30 PM Ernestina Quinn MD CBURG [facility] ECC DEP   2/18/2025  3:00 PM SEY YNG VAS US 1 SEYZ CARDIO [facility] Rad/Car   2/18/2025  3:30 PM Clifford Szymanski MD VASC/MED [facility]   6/12/2025  3:00 PM Colin Gary, APRN - CNS BDM ENDO [facility]   6/20/2025 11:00 AM Ernestina Quinn MD CBURG [facility] ECC DEP        (If no appt send self scheduling link. .REFILLAPPT)  Scheduling request sent?     [] Yes  [x] No    Does patient need updated?  [] Yes  [x] No

## 2025-01-09 ENCOUNTER — TELEPHONE (OUTPATIENT)
Dept: PULMONOLOGY | Age: 75
End: 2025-01-09

## 2025-01-09 NOTE — TELEPHONE ENCOUNTER
Patient was notified that she has been scheduled with Dr. Pinedo for her hospital follow up on Jan 28th @1000.  Patient was agreeable.

## 2025-01-13 ENCOUNTER — OFFICE VISIT (OUTPATIENT)
Dept: ENDOCRINOLOGY | Age: 75
End: 2025-01-13
Payer: COMMERCIAL

## 2025-01-13 ENCOUNTER — FOLLOWUP TELEPHONE ENCOUNTER (OUTPATIENT)
Dept: ENDOCRINOLOGY | Age: 75
End: 2025-01-13

## 2025-01-13 VITALS
BODY MASS INDEX: 45.75 KG/M2 | OXYGEN SATURATION: 97 % | HEART RATE: 69 BPM | WEIGHT: 233 LBS | SYSTOLIC BLOOD PRESSURE: 117 MMHG | DIASTOLIC BLOOD PRESSURE: 70 MMHG | HEIGHT: 60 IN

## 2025-01-13 DIAGNOSIS — E78.5 HYPERLIPIDEMIA, UNSPECIFIED HYPERLIPIDEMIA TYPE: ICD-10-CM

## 2025-01-13 DIAGNOSIS — E11.65 TYPE 2 DIABETES MELLITUS WITH HYPERGLYCEMIA, WITH LONG-TERM CURRENT USE OF INSULIN (HCC): Primary | ICD-10-CM

## 2025-01-13 DIAGNOSIS — Z79.4 TYPE 2 DIABETES MELLITUS WITH STAGE 3A CHRONIC KIDNEY DISEASE, WITH LONG-TERM CURRENT USE OF INSULIN (HCC): ICD-10-CM

## 2025-01-13 DIAGNOSIS — R79.89 ABNORMAL TSH: ICD-10-CM

## 2025-01-13 DIAGNOSIS — Z79.4 TYPE 2 DIABETES MELLITUS WITH HYPERGLYCEMIA, WITH LONG-TERM CURRENT USE OF INSULIN (HCC): Primary | ICD-10-CM

## 2025-01-13 DIAGNOSIS — Z91.119 DIETARY NONCOMPLIANCE: ICD-10-CM

## 2025-01-13 DIAGNOSIS — N18.31 TYPE 2 DIABETES MELLITUS WITH STAGE 3A CHRONIC KIDNEY DISEASE, WITH LONG-TERM CURRENT USE OF INSULIN (HCC): ICD-10-CM

## 2025-01-13 DIAGNOSIS — E11.22 TYPE 2 DIABETES MELLITUS WITH STAGE 3A CHRONIC KIDNEY DISEASE, WITH LONG-TERM CURRENT USE OF INSULIN (HCC): ICD-10-CM

## 2025-01-13 PROCEDURE — 1090F PRES/ABSN URINE INCON ASSESS: CPT | Performed by: CLINICAL NURSE SPECIALIST

## 2025-01-13 PROCEDURE — 3074F SYST BP LT 130 MM HG: CPT | Performed by: CLINICAL NURSE SPECIALIST

## 2025-01-13 PROCEDURE — G8417 CALC BMI ABV UP PARAM F/U: HCPCS | Performed by: CLINICAL NURSE SPECIALIST

## 2025-01-13 PROCEDURE — 3078F DIAST BP <80 MM HG: CPT | Performed by: CLINICAL NURSE SPECIALIST

## 2025-01-13 PROCEDURE — 95251 CONT GLUC MNTR ANALYSIS I&R: CPT | Performed by: CLINICAL NURSE SPECIALIST

## 2025-01-13 PROCEDURE — 99214 OFFICE O/P EST MOD 30 MIN: CPT | Performed by: CLINICAL NURSE SPECIALIST

## 2025-01-13 PROCEDURE — 1111F DSCHRG MED/CURRENT MED MERGE: CPT | Performed by: CLINICAL NURSE SPECIALIST

## 2025-01-13 PROCEDURE — 1123F ACP DISCUSS/DSCN MKR DOCD: CPT | Performed by: CLINICAL NURSE SPECIALIST

## 2025-01-13 PROCEDURE — 1159F MED LIST DOCD IN RCRD: CPT | Performed by: CLINICAL NURSE SPECIALIST

## 2025-01-13 PROCEDURE — 2022F DILAT RTA XM EVC RTNOPTHY: CPT | Performed by: CLINICAL NURSE SPECIALIST

## 2025-01-13 PROCEDURE — G8427 DOCREV CUR MEDS BY ELIG CLIN: HCPCS | Performed by: CLINICAL NURSE SPECIALIST

## 2025-01-13 PROCEDURE — 3046F HEMOGLOBIN A1C LEVEL >9.0%: CPT | Performed by: CLINICAL NURSE SPECIALIST

## 2025-01-13 PROCEDURE — G8400 PT W/DXA NO RESULTS DOC: HCPCS | Performed by: CLINICAL NURSE SPECIALIST

## 2025-01-13 PROCEDURE — 3017F COLORECTAL CA SCREEN DOC REV: CPT | Performed by: CLINICAL NURSE SPECIALIST

## 2025-01-13 PROCEDURE — 1036F TOBACCO NON-USER: CPT | Performed by: CLINICAL NURSE SPECIALIST

## 2025-01-13 NOTE — PROGRESS NOTES
[employer]  [facility] Department of Endocrinology Diabetes and Metabolism   [address], [place], [place], [address]  Phone: [phone]  Fax: [phone]    Date of Service: 1/13/2025    Primary Care Physician: Ernestina Quinn MD  Referring physician: Ernestina Quinn MD  Provider: HAILEY Villareal CNS     Reason for the visit:      History of Present Illness:  The history is provided by the patient. No  was used. Accuracy of the patient data is excellent.  Kim Zarco is a very pleasant 74 y.o. female seen today for diabetes management     Kim Zarco was diagnosed with diabetes several years ago and currently on: humalog 18 units TID, Lantus 35 units at night   Trulicity 1.5 mg once weekly   She was on jardiance but stopped d/t UTI   Recently seen by us in the hospital for diabetic management.  She was admitted with pneumonia  The patient has been checking blood sugar  uses karli 2+  Ambulatory continuous glucose monitoring of interstitial tissue fluid via a subcutaneous sensor for a minimum of 72 hours; analysis, interpretation and report  Average sensor glucose 152   Time in range 71%  Hyperglycemia 27%  Hypoglycemia 2%    .    Most recent A1c results summarized below  Lab Results   Component Value Date/Time    LABA1C 8.5 12/28/2024 04:47 AM    LABA1C 7.8 11/20/2024 11:44 AM    LABA1C 6.1 09/03/2024 12:17 PM     Patient reported hypoglycemic episodes  Patient has had no hypoglycemic episodes   The patient has been mindful of what has been eating and following diabetic diet as encouraged  The patient hasn't been mindful of what has been eating and wasn't following diabetes diet    I reviewed current medications and the patient has no issues with diabetes medications  Kim Zarco is up to date with eye exam and +history of diabetic retinopathy     The patient seeing podiatrist every 3 months   And also performs  own feet care  Microvascular

## 2025-01-13 NOTE — TELEPHONE ENCOUNTER
Met with patient in endo office for diabetes education. She has had DM many years, attended DE classes in the past. Plans to start [employer] insulin pump soon. Reviewed basics of carb counting using visual carb guide. Per recall, she eats 2-3 meals/day, blood sugar varies depending on carb intake which also varies- some meals very low carb while others very high. Reviewed sources of carbohydrate foods and importance of portion control. Recommended 30-45 grams CHO/meal and 15 grams/snack. Patient agreeable. F/u as needed.

## 2025-01-15 ENCOUNTER — OFFICE VISIT (OUTPATIENT)
Dept: PRIMARY CARE CLINIC | Age: 75
End: 2025-01-15
Payer: COMMERCIAL

## 2025-01-15 VITALS
WEIGHT: 220 LBS | DIASTOLIC BLOOD PRESSURE: 38 MMHG | SYSTOLIC BLOOD PRESSURE: 98 MMHG | TEMPERATURE: 97.8 F | HEART RATE: 68 BPM | OXYGEN SATURATION: 95 % | BODY MASS INDEX: 43.19 KG/M2 | HEIGHT: 60 IN

## 2025-01-15 DIAGNOSIS — N18.30 STAGE 3 CHRONIC KIDNEY DISEASE, UNSPECIFIED WHETHER STAGE 3A OR 3B CKD (HCC): ICD-10-CM

## 2025-01-15 DIAGNOSIS — J18.9 PNEUMONIA OF BOTH LUNGS DUE TO INFECTIOUS ORGANISM, UNSPECIFIED PART OF LUNG: Primary | ICD-10-CM

## 2025-01-15 DIAGNOSIS — Z79.4 TYPE 2 DIABETES MELLITUS WITH DIABETIC NEUROPATHIC ARTHROPATHY, WITH LONG-TERM CURRENT USE OF INSULIN (HCC): ICD-10-CM

## 2025-01-15 DIAGNOSIS — E11.610 TYPE 2 DIABETES MELLITUS WITH DIABETIC NEUROPATHIC ARTHROPATHY, WITH LONG-TERM CURRENT USE OF INSULIN (HCC): ICD-10-CM

## 2025-01-15 DIAGNOSIS — R79.89 LOW SERUM VITAMIN D: ICD-10-CM

## 2025-01-15 PROCEDURE — 2022F DILAT RTA XM EVC RTNOPTHY: CPT | Performed by: INTERNAL MEDICINE

## 2025-01-15 PROCEDURE — 1090F PRES/ABSN URINE INCON ASSESS: CPT | Performed by: INTERNAL MEDICINE

## 2025-01-15 PROCEDURE — 3017F COLORECTAL CA SCREEN DOC REV: CPT | Performed by: INTERNAL MEDICINE

## 2025-01-15 PROCEDURE — 1123F ACP DISCUSS/DSCN MKR DOCD: CPT | Performed by: INTERNAL MEDICINE

## 2025-01-15 PROCEDURE — G8417 CALC BMI ABV UP PARAM F/U: HCPCS | Performed by: INTERNAL MEDICINE

## 2025-01-15 PROCEDURE — 1159F MED LIST DOCD IN RCRD: CPT | Performed by: INTERNAL MEDICINE

## 2025-01-15 PROCEDURE — 3074F SYST BP LT 130 MM HG: CPT | Performed by: INTERNAL MEDICINE

## 2025-01-15 PROCEDURE — 3078F DIAST BP <80 MM HG: CPT | Performed by: INTERNAL MEDICINE

## 2025-01-15 PROCEDURE — 3046F HEMOGLOBIN A1C LEVEL >9.0%: CPT | Performed by: INTERNAL MEDICINE

## 2025-01-15 PROCEDURE — 1036F TOBACCO NON-USER: CPT | Performed by: INTERNAL MEDICINE

## 2025-01-15 PROCEDURE — 99214 OFFICE O/P EST MOD 30 MIN: CPT | Performed by: INTERNAL MEDICINE

## 2025-01-15 PROCEDURE — 1111F DSCHRG MED/CURRENT MED MERGE: CPT | Performed by: INTERNAL MEDICINE

## 2025-01-15 PROCEDURE — G8400 PT W/DXA NO RESULTS DOC: HCPCS | Performed by: INTERNAL MEDICINE

## 2025-01-15 PROCEDURE — G8427 DOCREV CUR MEDS BY ELIG CLIN: HCPCS | Performed by: INTERNAL MEDICINE

## 2025-01-15 RX ORDER — ALLOPURINOL 100 MG/1
100 TABLET ORAL DAILY
Qty: 90 TABLET | Refills: 0 | Status: SHIPPED | OUTPATIENT
Start: 2025-01-15

## 2025-01-15 RX ORDER — LIDOCAINE 50 MG/G
PATCH TOPICAL
COMMUNITY
Start: 2024-12-20

## 2025-01-15 RX ORDER — POTASSIUM CHLORIDE 750 MG/1
10 TABLET, EXTENDED RELEASE ORAL EVERY OTHER DAY
Qty: 45 TABLET | Refills: 0 | Status: SHIPPED | OUTPATIENT
Start: 2025-01-15

## 2025-01-15 RX ORDER — PRAVASTATIN SODIUM 40 MG
40 TABLET ORAL NIGHTLY
Qty: 90 TABLET | Refills: 0 | Status: SHIPPED | OUTPATIENT
Start: 2025-01-15

## 2025-01-15 RX ORDER — ALBUTEROL SULFATE 90 UG/1
2 INHALANT RESPIRATORY (INHALATION) EVERY 4 HOURS PRN
Qty: 8.5 EACH | Refills: 2 | Status: SHIPPED | OUTPATIENT
Start: 2025-01-15

## 2025-01-15 RX ORDER — CITALOPRAM HYDROBROMIDE 40 MG/1
40 TABLET ORAL DAILY
Qty: 90 TABLET | Refills: 0 | Status: SHIPPED | OUTPATIENT
Start: 2025-01-15

## 2025-01-15 RX ORDER — FAMOTIDINE 40 MG/1
40 TABLET, FILM COATED ORAL EVERY MORNING
Qty: 90 TABLET | Refills: 0 | Status: SHIPPED | OUTPATIENT
Start: 2025-01-15

## 2025-01-15 RX ORDER — PANTOPRAZOLE SODIUM 40 MG/1
40 TABLET, DELAYED RELEASE ORAL DAILY
Qty: 90 TABLET | Refills: 0 | Status: SHIPPED | OUTPATIENT
Start: 2025-01-15

## 2025-01-15 RX ORDER — METOPROLOL TARTRATE 50 MG
50 TABLET ORAL 2 TIMES DAILY
Qty: 180 TABLET | Refills: 0 | Status: SHIPPED | OUTPATIENT
Start: 2025-01-15

## 2025-01-15 RX ORDER — LISINOPRIL 40 MG/1
40 TABLET ORAL DAILY
Qty: 90 TABLET | Refills: 0 | Status: SHIPPED | OUTPATIENT
Start: 2025-01-15

## 2025-01-15 RX ORDER — EZETIMIBE 10 MG/1
10 TABLET ORAL DAILY
Qty: 90 TABLET | Refills: 0 | Status: SHIPPED | OUTPATIENT
Start: 2025-01-15

## 2025-01-15 RX ORDER — ASPIRIN 81 MG/1
81 TABLET ORAL DAILY
Qty: 90 TABLET | Refills: 0 | Status: SHIPPED | OUTPATIENT
Start: 2025-01-15

## 2025-01-15 RX ORDER — NIACIN 500 MG/1
500 TABLET, EXTENDED RELEASE ORAL NIGHTLY
Qty: 90 TABLET | Refills: 0 | Status: SHIPPED | OUTPATIENT
Start: 2025-01-15

## 2025-01-15 RX ORDER — ASCORBIC ACID 500 MG
500 TABLET ORAL DAILY
Qty: 90 TABLET | Refills: 0 | Status: SHIPPED | OUTPATIENT
Start: 2025-01-15

## 2025-01-15 ASSESSMENT — ENCOUNTER SYMPTOMS
ABDOMINAL PAIN: 0
ALLERGIC/IMMUNOLOGIC NEGATIVE: 1
NAUSEA: 0
CONSTIPATION: 0
VOMITING: 0
SORE THROAT: 0
RHINORRHEA: 0
TROUBLE SWALLOWING: 0
DIARRHEA: 0
BLOOD IN STOOL: 0
BACK PAIN: 0
SINUS PRESSURE: 0
COLOR CHANGE: 0
ABDOMINAL DISTENTION: 0

## 2025-01-15 NOTE — PROGRESS NOTES
(PROTONIX) 40 MG tablet; Take 1 tablet by mouth daily  -     potassium chloride (KLOR-CON M) 10 MEQ extended release tablet; Take 1 tablet by mouth every other day  -     pravastatin (PRAVACHOL) 40 MG tablet; Take 1 tablet by mouth nightly  -     aspirin 81 MG EC tablet; Take 1 tablet by mouth daily

## 2025-01-17 RX ORDER — POTASSIUM CHLORIDE 750 MG/1
10 TABLET, EXTENDED RELEASE ORAL EVERY OTHER DAY
Qty: 45 TABLET | Refills: 1 | OUTPATIENT
Start: 2025-01-17

## 2025-01-23 ENCOUNTER — HOSPITAL ENCOUNTER (OUTPATIENT)
Age: 75
Discharge: HOME | End: 2025-01-23
Payer: COMMERCIAL

## 2025-01-23 DIAGNOSIS — E11.65 TYPE 2 DIABETES MELLITUS WITH HYPERGLYCEMIA, WITH LONG-TERM CURRENT USE OF INSULIN (HCC): ICD-10-CM

## 2025-01-23 DIAGNOSIS — Z79.4 TYPE 2 DIABETES MELLITUS WITH HYPERGLYCEMIA, WITH LONG-TERM CURRENT USE OF INSULIN (HCC): ICD-10-CM

## 2025-01-23 LAB
ALBUMIN SERPL-MCNC: 3.6 G/DL (ref 3.5–5.2)
ALP SERPL-CCNC: 58 U/L (ref 35–104)
ALT SERPL-CCNC: 12 U/L (ref 0–32)
ANION GAP SERPL CALCULATED.3IONS-SCNC: 13 MMOL/L (ref 7–16)
AST SERPL-CCNC: 16 U/L (ref 0–31)
BASOPHILS # BLD: 0.04 K/UL (ref 0–0.2)
BASOPHILS NFR BLD: 1 % (ref 0–2)
BILIRUB SERPL-MCNC: 0.3 MG/DL (ref 0–1.2)
BUN SERPL-MCNC: 24 MG/DL (ref 6–23)
CALCIUM SERPL-MCNC: 9.4 MG/DL (ref 8.6–10.2)
CHLORIDE SERPL-SCNC: 102 MMOL/L (ref 98–107)
CO2 SERPL-SCNC: 25 MMOL/L (ref 22–29)
CREAT SERPL-MCNC: 1.3 MG/DL (ref 0.5–1)
EOSINOPHIL # BLD: 0.14 K/UL (ref 0.05–0.5)
EOSINOPHILS RELATIVE PERCENT: 2 % (ref 0–6)
ERYTHROCYTE [DISTWIDTH] IN BLOOD BY AUTOMATED COUNT: 14 % (ref 11.5–15)
GFR, ESTIMATED: 43 ML/MIN/1.73M2
GLUCOSE SERPL-MCNC: 252 MG/DL (ref 74–99)
HBA1C MFR BLD: 7.8 % (ref 4–5.6)
HCT VFR BLD AUTO: 35.6 % (ref 34–48)
HGB BLD-MCNC: 10.9 G/DL (ref 11.5–15.5)
IMM GRANULOCYTES # BLD AUTO: 0.05 K/UL (ref 0–0.58)
IMM GRANULOCYTES NFR BLD: 1 % (ref 0–5)
LYMPHOCYTES NFR BLD: 1.01 K/UL (ref 1.5–4)
LYMPHOCYTES RELATIVE PERCENT: 16 % (ref 20–42)
MCH RBC QN AUTO: 28.5 PG (ref 26–35)
MCHC RBC AUTO-ENTMCNC: 30.6 G/DL (ref 32–34.5)
MCV RBC AUTO: 93 FL (ref 80–99.9)
MONOCYTES NFR BLD: 0.69 K/UL (ref 0.1–0.95)
MONOCYTES NFR BLD: 11 % (ref 2–12)
NEUTROPHILS NFR BLD: 69 % (ref 43–80)
NEUTS SEG NFR BLD: 4.28 K/UL (ref 1.8–7.3)
PHOSPHATE SERPL-MCNC: 2.4 MG/DL (ref 2.5–4.5)
PLATELET # BLD AUTO: 196 K/UL (ref 130–450)
PMV BLD AUTO: 9.9 FL (ref 7–12)
POTASSIUM SERPL-SCNC: 5 MMOL/L (ref 3.5–5)
PROT SERPL-MCNC: 6.1 G/DL (ref 6.4–8.3)
RBC # BLD AUTO: 3.83 M/UL (ref 3.5–5.5)
SODIUM SERPL-SCNC: 140 MMOL/L (ref 132–146)
WBC OTHER # BLD: 6.2 K/UL (ref 4.5–11.5)

## 2025-01-23 PROCEDURE — 85025 COMPLETE CBC W/AUTO DIFF WBC: CPT

## 2025-01-23 PROCEDURE — 36415 COLL VENOUS BLD VENIPUNCTURE: CPT

## 2025-01-23 PROCEDURE — 80053 COMPREHEN METABOLIC PANEL: CPT

## 2025-01-23 PROCEDURE — 84100 ASSAY OF PHOSPHORUS: CPT

## 2025-01-23 PROCEDURE — 84681 ASSAY OF C-PEPTIDE: CPT

## 2025-01-23 PROCEDURE — 83036 HEMOGLOBIN GLYCOSYLATED A1C: CPT

## 2025-01-24 LAB — C PEPTIDE SERPL-MCNC: 3.6 NG/ML (ref 1.1–4.4)

## 2025-01-28 ENCOUNTER — OFFICE VISIT (OUTPATIENT)
Dept: PULMONOLOGY | Age: 75
End: 2025-01-28
Payer: COMMERCIAL

## 2025-01-28 DIAGNOSIS — Z87.09: Primary | ICD-10-CM

## 2025-01-28 DIAGNOSIS — J96.11 CHRONIC HYPOXEMIC RESPIRATORY FAILURE: ICD-10-CM

## 2025-01-28 DIAGNOSIS — J96.01 ACUTE RESPIRATORY FAILURE WITH HYPOXIA: Primary | ICD-10-CM

## 2025-01-28 PROCEDURE — 1090F PRES/ABSN URINE INCON ASSESS: CPT | Performed by: INTERNAL MEDICINE

## 2025-01-28 PROCEDURE — 99214 OFFICE O/P EST MOD 30 MIN: CPT | Performed by: INTERNAL MEDICINE

## 2025-01-28 PROCEDURE — G8428 CUR MEDS NOT DOCUMENT: HCPCS | Performed by: INTERNAL MEDICINE

## 2025-01-28 PROCEDURE — G8417 CALC BMI ABV UP PARAM F/U: HCPCS | Performed by: INTERNAL MEDICINE

## 2025-01-28 PROCEDURE — G8400 PT W/DXA NO RESULTS DOC: HCPCS | Performed by: INTERNAL MEDICINE

## 2025-01-28 PROCEDURE — 1036F TOBACCO NON-USER: CPT | Performed by: INTERNAL MEDICINE

## 2025-01-28 PROCEDURE — 1111F DSCHRG MED/CURRENT MED MERGE: CPT | Performed by: INTERNAL MEDICINE

## 2025-01-28 PROCEDURE — 1123F ACP DISCUSS/DSCN MKR DOCD: CPT | Performed by: INTERNAL MEDICINE

## 2025-01-28 PROCEDURE — 3017F COLORECTAL CA SCREEN DOC REV: CPT | Performed by: INTERNAL MEDICINE

## 2025-01-28 NOTE — PROGRESS NOTES
Progress Note    Kim Zarco  1950    CC:Follow-Up from Hospital       HPI : 74 year old female recently admitted and treated for CAP, hypoxemic respiratory failure, treated with Antibiotics, Supplemental oxygen and NIV. She was discharged home with oxygen 2 lit., day time and during sleep. Sob is less , no cough or wheezing. No prior history of lung disease, former smoker but quit smoking 40 years ago, she smoked for 20 years, 1 pack a day, about 20 pack years of smoking.History of CAD, CABG, DM and had Right lumpectomy followed by radiation and had recurrence of cancer, then had bilateral mastectomy, she has been on hormonal therapy.     Past Medical History:   Diagnosis Date    ASHD (arteriosclerotic heart disease)     Breast cancer (HCC)     rt     CAD (coronary artery disease)     Cancer (HCC)     breast ca, right    Charcot foot due to diabetes mellitus (HCC) 2012    Chronic diastolic congestive heart failure (HCC) 2017    Chronic kidney disease     sees nephrology    Depression     Diabetic foot ulcer with osteomyelitis (HCC)     Diabetic peripheral neuropathy (HCC)     Diabetic ulcer of left foot associated with type 2 diabetes mellitus (HCC)     Diarrhea     Essential hypertension 2018    GERD (gastroesophageal reflux disease)     History of UTI     Hyperlipidemia     Hypertension     Lactose intolerance     Mass of left axilla     Thyroid disease     Type II or unspecified type diabetes mellitus without mention of complication, not stated as uncontrolled     Urinary incontinence       Past Surgical History:   Procedure Laterality Date    BREAST BIOPSY          BREAST LUMPECTOMY      right breast , with lymph nodes.     SECTION  1979    COLONOSCOPY      COLONOSCOPY N/A 2015    COLONOSCOPY    COLONOSCOPY N/A 2022    COLONOSCOPY POLYPECTOMY SNARE/COLD BIOPSY performed by Tarah Hamm MD at [facility] ENDOSCOPY    CORONARY ARTERY BYPASS GRAFT  10/23/2017

## 2025-01-28 NOTE — PROGRESS NOTES
Patient to follow up with physician in 3 months.  Patient will have CT Chest , full PFTs and 6 minute walk.

## 2025-02-07 DIAGNOSIS — I65.23 BILATERAL CAROTID ARTERY STENOSIS: Primary | ICD-10-CM

## 2025-02-13 ENCOUNTER — TELEPHONE (OUTPATIENT)
Dept: PULMONOLOGY | Age: 75
End: 2025-02-13

## 2025-02-13 NOTE — TELEPHONE ENCOUNTER
Mailed letter to patient to inform her of the CT of the Chest, PFT & 6 Minute Wlk Test that is scheduled for her  at [address] [facility] . This test is scheduled on Wednesday, April 23, 2025 at  8:30 am. Please arrive 30 minutes prior to appointment time. The prep for this test is to not have any caffeine for 24 hours prior to testing and no respiratory medications for at least 4 hours prior to testing time.

## 2025-02-17 DIAGNOSIS — E11.65 TYPE 2 DIABETES MELLITUS WITH HYPERGLYCEMIA, WITH LONG-TERM CURRENT USE OF INSULIN (HCC): Primary | ICD-10-CM

## 2025-02-17 DIAGNOSIS — Z79.4 TYPE 2 DIABETES MELLITUS WITH HYPERGLYCEMIA, WITH LONG-TERM CURRENT USE OF INSULIN (HCC): Primary | ICD-10-CM

## 2025-02-17 RX ORDER — INSULIN LISPRO 100 [IU]/ML
INJECTION, SOLUTION INTRAVENOUS; SUBCUTANEOUS
Qty: 40 ML | Refills: 5 | Status: SHIPPED | OUTPATIENT
Start: 2025-02-17

## 2025-02-18 ENCOUNTER — HOSPITAL ENCOUNTER (OUTPATIENT)
Dept: CARDIOLOGY | Age: 75
Discharge: HOME | End: 2025-02-20
Attending: SURGERY
Payer: COMMERCIAL

## 2025-02-18 ENCOUNTER — OFFICE VISIT (OUTPATIENT)
Dept: VASCULAR SURGERY | Age: 75
End: 2025-02-18
Payer: COMMERCIAL

## 2025-02-18 VITALS — WEIGHT: 219 LBS | BODY MASS INDEX: 42.77 KG/M2

## 2025-02-18 DIAGNOSIS — I65.21 CAROTID STENOSIS, RIGHT: Primary | ICD-10-CM

## 2025-02-18 DIAGNOSIS — I65.23 BILATERAL CAROTID ARTERY STENOSIS: ICD-10-CM

## 2025-02-18 LAB
VAS LEFT CCA DIST EDV: 14.8 CM/S
VAS LEFT CCA DIST PSV: 78.3 CM/S
VAS LEFT CCA PROX EDV: 23.6 CM/S
VAS LEFT CCA PROX PSV: 149.2 CM/S
VAS LEFT ECA EDV: 8.9 CM/S
VAS LEFT ECA PSV: 115.9 CM/S
VAS LEFT ICA DIST EDV: 30.7 CM/S
VAS LEFT ICA DIST PSV: 123.2 CM/S
VAS LEFT ICA MID EDV: 27 CM/S
VAS LEFT ICA MID PSV: 117.8 CM/S
VAS LEFT ICA PROX EDV: 18.2 CM/S
VAS LEFT ICA PROX PSV: 68.5 CM/S
VAS LEFT ICA/CCA PSV: 0.8 NO UNITS
VAS LEFT VERTEBRAL EDV: 13.1 CM/S
VAS LEFT VERTEBRAL PSV: 47 CM/S
VAS RIGHT CCA DIST EDV: 14.2 CM/S
VAS RIGHT CCA DIST PSV: 70 CM/S
VAS RIGHT CCA PROX EDV: 15.5 CM/S
VAS RIGHT CCA PROX PSV: 66.1 CM/S
VAS RIGHT ECA EDV: 0 CM/S
VAS RIGHT ECA PSV: 208.1 CM/S
VAS RIGHT ICA DIST EDV: 27 CM/S
VAS RIGHT ICA DIST PSV: 92.4 CM/S
VAS RIGHT ICA MID EDV: 38.3 CM/S
VAS RIGHT ICA MID PSV: 159.2 CM/S
VAS RIGHT ICA PROX EDV: 51.2 CM/S
VAS RIGHT ICA PROX PSV: 220.9 CM/S
VAS RIGHT ICA/CCA PSV: 3.2 NO UNITS
VAS RIGHT VERTEBRAL EDV: 13.1 CM/S
VAS RIGHT VERTEBRAL PSV: 48.9 CM/S

## 2025-02-18 PROCEDURE — 1090F PRES/ABSN URINE INCON ASSESS: CPT

## 2025-02-18 PROCEDURE — 93880 EXTRACRANIAL BILAT STUDY: CPT

## 2025-02-18 PROCEDURE — 3017F COLORECTAL CA SCREEN DOC REV: CPT

## 2025-02-18 PROCEDURE — 1123F ACP DISCUSS/DSCN MKR DOCD: CPT

## 2025-02-18 PROCEDURE — G8400 PT W/DXA NO RESULTS DOC: HCPCS

## 2025-02-18 PROCEDURE — 1159F MED LIST DOCD IN RCRD: CPT

## 2025-02-18 PROCEDURE — G8427 DOCREV CUR MEDS BY ELIG CLIN: HCPCS

## 2025-02-18 PROCEDURE — 99213 OFFICE O/P EST LOW 20 MIN: CPT

## 2025-02-18 PROCEDURE — 93880 EXTRACRANIAL BILAT STUDY: CPT | Performed by: SURGERY

## 2025-02-18 PROCEDURE — G8417 CALC BMI ABV UP PARAM F/U: HCPCS

## 2025-02-18 PROCEDURE — 1036F TOBACCO NON-USER: CPT

## 2025-02-18 NOTE — PROGRESS NOTES
letrozole (FEMARA) 2.5 MG tablet Take 1 tablet by mouth daily 23  Yes Orville Beyer MD   SYSTANE 0.4-0.3 % GEL ophthalmic gel Place 1 drop into both eyes 3 times daily 23  Yes ProviderOrville MD   cycloSPORINE, PF, (CEQUA) 0.09 % SOLN Apply 1 drop to eye in the morning and at bedtime   Yes ProviderOrville MD   HYDROcodone-acetaminophen (NORCO) 5-325 MG per tablet Take 1 tablet by mouth 3 times daily as needed for Pain. 3/29/23  Yes Orville Beyer MD   aspirin EC 81 MG EC tablet Take 1 tablet by mouth nightly 3/30/23  Yes Ernestina Quinn MD   therapeutic multivitamin-minerals (THERAGRAN-M) tablet Take 1 tablet by mouth daily   Yes ProviderOrville MD   magnesium oxide (MAG-OX) 400 (240 Mg) MG tablet Take 1 tablet by mouth daily for 2 doses  Patient not taking: Reported on 1/15/2025 1/4/25 1/6/25  Tray Johnson MD     Allergies:  Penicillins    Social History     Socioeconomic History    Marital status:      Spouse name: Hema    Number of children: 1    Years of education: 12    Highest education level: Not on file   Occupational History    Not on file   Tobacco Use    Smoking status: Former     Current packs/day: 0.00     Average packs/day: 1 pack/day for 15.9 years (15.9 ttl pk-yrs)     Types: Cigarettes     Start date: 1972     Quit date: 1987     Years since quittin.2     Passive exposure: Never    Smokeless tobacco: Never   Vaping Use    Vaping status: Never Used   Substance and Sexual Activity    Alcohol use: Not Currently     Comment: occasional glass of wine    Drug use: No    Sexual activity: Not Currently   Other Topics Concern    Not on file   Social History Narrative    2 cups coffee/day; no pop     Social Determinants of Health     Financial Resource Strain: Low Risk  (3/8/2024)    Overall Financial Resource Strain (CARDIA)     Difficulty of Paying Living Expenses: Not very hard   Food Insecurity: No Food Insecurity

## 2025-02-24 DIAGNOSIS — Z79.4 TYPE 2 DIABETES MELLITUS WITH HYPERGLYCEMIA, WITH LONG-TERM CURRENT USE OF INSULIN (HCC): Primary | ICD-10-CM

## 2025-02-24 DIAGNOSIS — E11.65 TYPE 2 DIABETES MELLITUS WITH HYPERGLYCEMIA, WITH LONG-TERM CURRENT USE OF INSULIN (HCC): Primary | ICD-10-CM

## 2025-02-24 RX ORDER — INSULIN LISPRO-AABC 100 [IU]/ML
INJECTION, SOLUTION INTRAVENOUS; SUBCUTANEOUS
Qty: 40 ML | Refills: 5 | Status: SHIPPED | OUTPATIENT
Start: 2025-02-24

## 2025-03-05 RX ORDER — POTASSIUM CHLORIDE 750 MG/1
10 TABLET, EXTENDED RELEASE ORAL EVERY OTHER DAY
Qty: 45 TABLET | Refills: 1 | Status: SHIPPED | OUTPATIENT
Start: 2025-03-05

## 2025-03-05 NOTE — TELEPHONE ENCOUNTER
Name of Medication(s) Requested:  Requested Prescriptions     Pending Prescriptions Disp Refills    potassium chloride (KLOR-CON) 10 MEQ extended release tablet [Pharmacy Med Name: POTASSIUM CL ER 10 MEQ TABLET] 45 tablet 1     Sig: TAKE 1 TABLET BY MOUTH EVERY OTHER DAY       Medication is on current medication list Yes    Dosage and directions were verified? Yes    Quantity verified: 90 day supply     Pharmacy Verified?  Yes    Last Appointment:  1/15/2025    Future appts:  Future Appointments   Date Time Provider Department Center   3/12/2025 10:30 AM Tarah Hamm MD BDM GEN SURG [facility]   3/17/2025  2:15 PM Yong Escobar MD ROSA SLEEP [facility]   3/21/2025  2:00 PM Ernestina Quinn MD CBURG [facility] ECC DEP   4/17/2025 12:00 PM Roni Garza MD BDM ENDO [facility]   4/23/2025  8:30 AM SEYZ PFT STRESS LAB ROOM [facility] PFT [facility]   4/23/2025  9:30 AM SEYZ PFT STRESS LAB ROOM [facility]YZ PFT [facility]   4/23/2025 10:30 AM SEB CT2 BD SEBZ CT SEB Radiolog   4/29/2025 10:30 AM Jose Barrett MD ACC Pulm [facility]   5/13/2025 10:40 AM Cori Miller MD YTOWN CARDIO [facility]   6/20/2025 11:00 AM Ernestina Quinn MD CBURG [facility] ECC DEP   3/3/2026  2:30 PM Y YDOUG VAS US 1 SEYZ CARDIO [facility] Rad/Car   3/3/2026  3:00 PM Clifford Szymanski MD VASC/MED [facility]        (If no appt send self scheduling link. .REFILLAPPT)  Scheduling request sent?     [] Yes  [x] No    Does patient need updated?  [] Yes  [x] No

## 2025-03-12 ENCOUNTER — OFFICE VISIT (OUTPATIENT)
Dept: SURGERY | Age: 75
End: 2025-03-12
Payer: COMMERCIAL

## 2025-03-12 VITALS
HEART RATE: 81 BPM | BODY MASS INDEX: 36.54 KG/M2 | DIASTOLIC BLOOD PRESSURE: 56 MMHG | WEIGHT: 214 LBS | SYSTOLIC BLOOD PRESSURE: 118 MMHG | TEMPERATURE: 97.2 F | RESPIRATION RATE: 96 BRPM | HEIGHT: 64 IN

## 2025-03-12 DIAGNOSIS — Z86.0100 HX OF COLONIC POLYPS: ICD-10-CM

## 2025-03-12 DIAGNOSIS — R19.7 DIARRHEA, UNSPECIFIED TYPE: Primary | ICD-10-CM

## 2025-03-12 DIAGNOSIS — Z86.0101 HX OF ADENOMATOUS COLONIC POLYPS: ICD-10-CM

## 2025-03-12 PROCEDURE — 1036F TOBACCO NON-USER: CPT | Performed by: SURGERY

## 2025-03-12 PROCEDURE — 99213 OFFICE O/P EST LOW 20 MIN: CPT | Performed by: SURGERY

## 2025-03-12 PROCEDURE — G8427 DOCREV CUR MEDS BY ELIG CLIN: HCPCS | Performed by: SURGERY

## 2025-03-12 PROCEDURE — 1160F RVW MEDS BY RX/DR IN RCRD: CPT | Performed by: SURGERY

## 2025-03-12 PROCEDURE — 3074F SYST BP LT 130 MM HG: CPT | Performed by: SURGERY

## 2025-03-12 PROCEDURE — G8417 CALC BMI ABV UP PARAM F/U: HCPCS | Performed by: SURGERY

## 2025-03-12 PROCEDURE — 1123F ACP DISCUSS/DSCN MKR DOCD: CPT | Performed by: SURGERY

## 2025-03-12 PROCEDURE — 3078F DIAST BP <80 MM HG: CPT | Performed by: SURGERY

## 2025-03-12 PROCEDURE — G8400 PT W/DXA NO RESULTS DOC: HCPCS | Performed by: SURGERY

## 2025-03-12 PROCEDURE — 3017F COLORECTAL CA SCREEN DOC REV: CPT | Performed by: SURGERY

## 2025-03-12 PROCEDURE — 1159F MED LIST DOCD IN RCRD: CPT | Performed by: SURGERY

## 2025-03-12 PROCEDURE — 1126F AMNT PAIN NOTED NONE PRSNT: CPT | Performed by: SURGERY

## 2025-03-12 PROCEDURE — 1090F PRES/ABSN URINE INCON ASSESS: CPT | Performed by: SURGERY

## 2025-03-12 NOTE — PROGRESS NOTES
Patient's Name/Date of Birth: Kim Zarco / 1950    Date: 3/12/2025    PCP: Ernestina Quinn MD    Chief Complaint   Patient presents with    Colonoscopy      3 YR RECALL COLONOCOPY       HPI:  Patient seen for evaluation for colonoscopy. Recent onset of diarreha. No rectal bleeding. Previous hx of adenomatous colon polyps. Denies abdominal pain. Also Hx of breast CA.    Patient's medications, allergies, past medical, surgical, social and family histories were reviewed and updated as appropriate.    Allergies   Allergen Reactions    Penicillins      Unknown reaction tolerates cefazolin       Past Medical History:   Diagnosis Date    ASHD (arteriosclerotic heart disease)     Breast cancer (HCC)     rt     CAD (coronary artery disease)     Cancer (HCC)     breast ca, right    Charcot foot due to diabetes mellitus (HCC) 2012    Chronic diastolic congestive heart failure (HCC) 2017    Chronic kidney disease     sees nephrology    Depression     Diabetic foot ulcer with osteomyelitis (HCC)     Diabetic peripheral neuropathy (HCC)     Diabetic ulcer of left foot associated with type 2 diabetes mellitus (HCC)     Diarrhea     Essential hypertension 2018    GERD (gastroesophageal reflux disease)     History of UTI     Hyperlipidemia     Hypertension     Lactose intolerance     Mass of left axilla     Thyroid disease     Type II or unspecified type diabetes mellitus without mention of complication, not stated as uncontrolled     Urinary incontinence         Past Surgical History:   Procedure Laterality Date    BREAST BIOPSY          BREAST LUMPECTOMY      right breast , with lymph nodes.     SECTION  1979    COLONOSCOPY      COLONOSCOPY N/A 2015    COLONOSCOPY    COLONOSCOPY N/A 2022    COLONOSCOPY POLYPECTOMY SNARE/COLD BIOPSY performed by Tarah Hamm MD at [facility] ENDOSCOPY    CORONARY ARTERY BYPASS GRAFT  10/23/2017    x2   calvin    DIAGNOSTIC CARDIAC CATH LAB  Walk in

## 2025-03-12 NOTE — H&P (VIEW-ONLY)
Patient's Name/Date of Birth: Kim Zarco / 1950    Date: 3/12/2025    PCP: Ernestina Quinn MD    Chief Complaint   Patient presents with    Colonoscopy      3 YR RECALL COLONOCOPY       HPI:  Patient seen for evaluation for colonoscopy. Recent onset of diarreha. No rectal bleeding. Previous hx of adenomatous colon polyps. Denies abdominal pain. Also Hx of breast CA.    Patient's medications, allergies, past medical, surgical, social and family histories were reviewed and updated as appropriate.    Allergies   Allergen Reactions    Penicillins      Unknown reaction tolerates cefazolin       Past Medical History:   Diagnosis Date    ASHD (arteriosclerotic heart disease)     Breast cancer (HCC)     rt     CAD (coronary artery disease)     Cancer (HCC)     breast ca, right    Charcot foot due to diabetes mellitus (HCC) 2012    Chronic diastolic congestive heart failure (HCC) 2017    Chronic kidney disease     sees nephrology    Depression     Diabetic foot ulcer with osteomyelitis (HCC)     Diabetic peripheral neuropathy (HCC)     Diabetic ulcer of left foot associated with type 2 diabetes mellitus (HCC)     Diarrhea     Essential hypertension 2018    GERD (gastroesophageal reflux disease)     History of UTI     Hyperlipidemia     Hypertension     Lactose intolerance     Mass of left axilla     Thyroid disease     Type II or unspecified type diabetes mellitus without mention of complication, not stated as uncontrolled     Urinary incontinence         Past Surgical History:   Procedure Laterality Date    BREAST BIOPSY          BREAST LUMPECTOMY      right breast , with lymph nodes.     SECTION  1979    COLONOSCOPY      COLONOSCOPY N/A 2015    COLONOSCOPY    COLONOSCOPY N/A 2022    COLONOSCOPY POLYPECTOMY SNARE/COLD BIOPSY performed by Tarah Hamm MD at [facility] ENDOSCOPY    CORONARY ARTERY BYPASS GRAFT  10/23/2017    x2   [facility]    DIAGNOSTIC CARDIAC CATH LAB

## 2025-03-13 RX ORDER — GLUCOSAMINE HCL/CHONDROITIN SU 500-400 MG
CAPSULE ORAL
Qty: 200 STRIP | Refills: 4 | Status: SHIPPED | OUTPATIENT
Start: 2025-03-13

## 2025-03-13 RX ORDER — GLUCOSAMINE HCL/CHONDROITIN SU 500-400 MG
1 CAPSULE ORAL
COMMUNITY
End: 2025-03-13 | Stop reason: SDUPTHER

## 2025-03-17 ENCOUNTER — TELEPHONE (OUTPATIENT)
Dept: ENDOCRINOLOGY | Age: 75
End: 2025-03-17

## 2025-03-17 DIAGNOSIS — Z79.4 TYPE 2 DIABETES MELLITUS WITH HYPERGLYCEMIA, WITH LONG-TERM CURRENT USE OF INSULIN (HCC): ICD-10-CM

## 2025-03-17 DIAGNOSIS — E11.65 TYPE 2 DIABETES MELLITUS WITH HYPERGLYCEMIA, WITH LONG-TERM CURRENT USE OF INSULIN (HCC): ICD-10-CM

## 2025-03-17 RX ORDER — INSULIN GLARGINE 100 [IU]/ML
40 INJECTION, SOLUTION SUBCUTANEOUS NIGHTLY
Qty: 4 EACH | Refills: 3 | Status: SHIPPED
Start: 2025-03-17 | End: 2025-03-17

## 2025-03-17 RX ORDER — INSULIN LISPRO 100 [IU]/ML
INJECTION, SOLUTION INTRAVENOUS; SUBCUTANEOUS
Qty: 40 ML | Refills: 5 | Status: SHIPPED | OUTPATIENT
Start: 2025-03-17

## 2025-03-17 NOTE — TELEPHONE ENCOUNTER
She is using approximately 60 units on average per day and insulin pump.  Prescription was sent for up to 125 units/day.  Prescription sent also I accidentally sent in Lantus to her pharmacy please cancel prescription

## 2025-03-17 NOTE — TELEPHONE ENCOUNTER
Pt states  said she needs her insulin dosage upped for pump, medtronic attached. Has been out for 2 days

## 2025-03-18 DIAGNOSIS — E11.65 TYPE 2 DIABETES MELLITUS WITH HYPERGLYCEMIA, WITH LONG-TERM CURRENT USE OF INSULIN (HCC): Primary | ICD-10-CM

## 2025-03-18 DIAGNOSIS — Z79.4 TYPE 2 DIABETES MELLITUS WITH HYPERGLYCEMIA, WITH LONG-TERM CURRENT USE OF INSULIN (HCC): Primary | ICD-10-CM

## 2025-03-18 RX ORDER — GLUCOSAMINE HCL/CHONDROITIN SU 500-400 MG
CAPSULE ORAL
Qty: 300 STRIP | Refills: 0 | Status: SHIPPED | OUTPATIENT
Start: 2025-03-18

## 2025-03-18 RX ORDER — AVOBENZONE, HOMOSALATE, OCTISALATE, OCTOCRYLENE 30; 40; 45; 26 MG/ML; MG/ML; MG/ML; MG/ML
1 CREAM TOPICAL 3 TIMES DAILY
Qty: 300 EACH | Refills: 3 | Status: SHIPPED | OUTPATIENT
Start: 2025-03-18

## 2025-03-18 RX ORDER — BLOOD-GLUCOSE METER
1 KIT MISCELLANEOUS DAILY
Qty: 1 KIT | Refills: 0 | Status: SHIPPED | OUTPATIENT
Start: 2025-03-18

## 2025-03-21 ENCOUNTER — OFFICE VISIT (OUTPATIENT)
Dept: PRIMARY CARE CLINIC | Age: 75
End: 2025-03-21
Payer: COMMERCIAL

## 2025-03-21 VITALS
HEIGHT: 64 IN | OXYGEN SATURATION: 97 % | HEART RATE: 75 BPM | DIASTOLIC BLOOD PRESSURE: 52 MMHG | BODY MASS INDEX: 35 KG/M2 | WEIGHT: 205 LBS | SYSTOLIC BLOOD PRESSURE: 92 MMHG

## 2025-03-21 DIAGNOSIS — J18.9 PNEUMONIA OF BOTH LUNGS DUE TO INFECTIOUS ORGANISM, UNSPECIFIED PART OF LUNG: Primary | ICD-10-CM

## 2025-03-21 DIAGNOSIS — R09.02 HYPOXIA: ICD-10-CM

## 2025-03-21 DIAGNOSIS — N18.30 STAGE 3 CHRONIC KIDNEY DISEASE, UNSPECIFIED WHETHER STAGE 3A OR 3B CKD (HCC): ICD-10-CM

## 2025-03-21 PROCEDURE — 3017F COLORECTAL CA SCREEN DOC REV: CPT | Performed by: INTERNAL MEDICINE

## 2025-03-21 PROCEDURE — 1159F MED LIST DOCD IN RCRD: CPT | Performed by: INTERNAL MEDICINE

## 2025-03-21 PROCEDURE — 99214 OFFICE O/P EST MOD 30 MIN: CPT | Performed by: INTERNAL MEDICINE

## 2025-03-21 PROCEDURE — G8400 PT W/DXA NO RESULTS DOC: HCPCS | Performed by: INTERNAL MEDICINE

## 2025-03-21 PROCEDURE — 3074F SYST BP LT 130 MM HG: CPT | Performed by: INTERNAL MEDICINE

## 2025-03-21 PROCEDURE — 3078F DIAST BP <80 MM HG: CPT | Performed by: INTERNAL MEDICINE

## 2025-03-21 PROCEDURE — 1123F ACP DISCUSS/DSCN MKR DOCD: CPT | Performed by: INTERNAL MEDICINE

## 2025-03-21 PROCEDURE — 1036F TOBACCO NON-USER: CPT | Performed by: INTERNAL MEDICINE

## 2025-03-21 PROCEDURE — G8427 DOCREV CUR MEDS BY ELIG CLIN: HCPCS | Performed by: INTERNAL MEDICINE

## 2025-03-21 PROCEDURE — G8417 CALC BMI ABV UP PARAM F/U: HCPCS | Performed by: INTERNAL MEDICINE

## 2025-03-21 PROCEDURE — 1090F PRES/ABSN URINE INCON ASSESS: CPT | Performed by: INTERNAL MEDICINE

## 2025-03-21 RX ORDER — ASCORBIC ACID 500 MG
500 TABLET ORAL DAILY
Qty: 90 TABLET | Refills: 0 | Status: SHIPPED | OUTPATIENT
Start: 2025-03-21

## 2025-03-21 RX ORDER — PANTOPRAZOLE SODIUM 40 MG/1
40 TABLET, DELAYED RELEASE ORAL DAILY
Qty: 90 TABLET | Refills: 0 | Status: SHIPPED | OUTPATIENT
Start: 2025-03-21

## 2025-03-21 RX ORDER — PRAVASTATIN SODIUM 40 MG
40 TABLET ORAL NIGHTLY
Qty: 90 TABLET | Refills: 0 | Status: SHIPPED | OUTPATIENT
Start: 2025-03-21

## 2025-03-21 RX ORDER — METOPROLOL TARTRATE 50 MG
50 TABLET ORAL 2 TIMES DAILY
Qty: 180 TABLET | Refills: 0 | Status: SHIPPED | OUTPATIENT
Start: 2025-03-21

## 2025-03-21 RX ORDER — ASPIRIN 81 MG/1
81 TABLET ORAL DAILY
Qty: 90 TABLET | Refills: 0 | Status: SHIPPED | OUTPATIENT
Start: 2025-03-21

## 2025-03-21 RX ORDER — NIACIN 500 MG/1
500 TABLET, EXTENDED RELEASE ORAL NIGHTLY
Qty: 90 TABLET | Refills: 0 | Status: SHIPPED | OUTPATIENT
Start: 2025-03-21

## 2025-03-21 RX ORDER — EZETIMIBE 10 MG/1
10 TABLET ORAL DAILY
Qty: 90 TABLET | Refills: 0 | Status: SHIPPED | OUTPATIENT
Start: 2025-03-21

## 2025-03-21 RX ORDER — OMEGA-3S/DHA/EPA/FISH OIL/D3 300MG-1000
CAPSULE ORAL
COMMUNITY
Start: 2025-01-15

## 2025-03-21 RX ORDER — ALLOPURINOL 100 MG/1
100 TABLET ORAL DAILY
Qty: 90 TABLET | Refills: 0 | Status: SHIPPED | OUTPATIENT
Start: 2025-03-21

## 2025-03-21 RX ORDER — POTASSIUM CHLORIDE 750 MG/1
10 TABLET, EXTENDED RELEASE ORAL EVERY OTHER DAY
Qty: 45 TABLET | Refills: 0 | Status: SHIPPED | OUTPATIENT
Start: 2025-03-21

## 2025-03-21 RX ORDER — CITALOPRAM HYDROBROMIDE 40 MG/1
40 TABLET ORAL DAILY
Qty: 90 TABLET | Refills: 0 | Status: SHIPPED | OUTPATIENT
Start: 2025-03-21

## 2025-03-21 RX ORDER — LISINOPRIL 40 MG/1
40 TABLET ORAL DAILY
Qty: 90 TABLET | Refills: 0 | Status: SHIPPED | OUTPATIENT
Start: 2025-03-21

## 2025-03-21 ASSESSMENT — PATIENT HEALTH QUESTIONNAIRE - PHQ9: DEPRESSION UNABLE TO ASSESS: PT REFUSES

## 2025-03-21 NOTE — PROGRESS NOTES
Kim Zarco presents today for follow up of Pneumonia, Diabetes, HTN    Current Outpatient Medications   Medication Sig Dispense Refill    VITAMIN D3 10 MCG (400 UNIT) TABS tablet TAKE 2 TABLETS (800 UNITS) BY MOUTH ONCE DAILY      allopurinol (ZYLOPRIM) 100 MG tablet Take 1 tablet by mouth daily 90 tablet 0    aspirin 81 MG EC tablet Take 1 tablet by mouth daily 90 tablet 0    citalopram (CELEXA) 40 MG tablet Take 1 tablet by mouth daily 90 tablet 0    ezetimibe (ZETIA) 10 MG tablet Take 1 tablet by mouth daily 90 tablet 0    lisinopril (PRINIVIL;ZESTRIL) 40 MG tablet Take 1 tablet by mouth daily 90 tablet 0    metoprolol tartrate (LOPRESSOR) 50 MG tablet Take 1 tablet by mouth 2 times daily 180 tablet 0    niacin (NIASPAN) 500 MG extended release tablet Take 1 tablet by mouth nightly at bedtime. 90 tablet 0    pantoprazole (PROTONIX) 40 MG tablet Take 1 tablet by mouth daily 90 tablet 0    potassium chloride (KLOR-CON M) 10 MEQ extended release tablet Take 1 tablet by mouth every other day 45 tablet 0    pravastatin (PRAVACHOL) 40 MG tablet Take 1 tablet by mouth nightly 90 tablet 0    vitamin C (ASCORBIC ACID) 500 MG tablet Take 1 tablet by mouth daily 90 tablet 0    insulin lispro (HUMALOG,ADMELOG) 100 UNIT/ML SOLN injection vial Via insulin pump max dose 125 units daily 40 mL 5    albuterol sulfate HFA (PROVENTIL;VENTOLIN;PROAIR) 108 (90 Base) MCG/ACT inhaler Inhale 2 puffs into the lungs every 4 hours as needed for Wheezing or Shortness of Breath 8.5 each 2    famotidine (PEPCID) 40 MG tablet Take 1 tablet by mouth every morning 90 tablet 0    Semaglutide, 1 MG/DOSE, (OZEMPIC) 4 MG/3ML SOPN sc injection Inject 1 mg into the skin every 7 days 9 mL 1    pregabalin (LYRICA) 50 MG capsule take 1 capsule by mouth twice a day for pain for 30 DAYS      Fesoterodine Fumarate ER 8 MG TB24 Take 1 tablet by mouth daily      letrozole (FEMARA) 2.5 MG tablet Take 1 tablet by mouth daily      SYSTANE 0.4-0.3 % GEL

## 2025-03-26 ASSESSMENT — ENCOUNTER SYMPTOMS
BLOOD IN STOOL: 0
SORE THROAT: 0
NAUSEA: 0
CONSTIPATION: 0
ALLERGIC/IMMUNOLOGIC NEGATIVE: 1
TROUBLE SWALLOWING: 0
ABDOMINAL DISTENTION: 0
COLOR CHANGE: 0
ABDOMINAL PAIN: 0
VOMITING: 0
DIARRHEA: 0
BACK PAIN: 0
SINUS PRESSURE: 0
RHINORRHEA: 0

## 2025-03-28 ENCOUNTER — TELEPHONE (OUTPATIENT)
Dept: ENDOCRINOLOGY | Age: 75
End: 2025-03-28

## 2025-03-28 NOTE — PROGRESS NOTES
[facility] PRE-ADMISSION TESTING INSTRUCTIONS    The Preadmission Testing patient is instructed accordingly using the following criteria (check applicable):    ARRIVAL INSTRUCTIONS:  [x] Parking the day of Surgery is located in the Main Entrance lot.  Upon entering the door, make an immediate right to the surgery reception desk    [x] Bring photo ID and insurance card    [] Bring in a copy of Living will or Durable Power of  papers.    [x] Please be sure to arrange for a responsible adult to provide transportation to and from the hospital    [x] Please arrange for a responsible adult to be with you for the 24 hour period post procedure due to having anesthesia    [x] If you awake am of surgery not feeling well or have temperature >100 please call [phone]    GENERAL INSTRUCTIONS:    [x] No solid foods after midnight, may have up to 8oz of water until 4 hours prior to surgery. No gum, no candy, no mints. NPO time:       [x] You may brush your teeth, do not swallow any toothpaste    [x] Take medications as instructed     [x] Stop herbal supplements and vitamins 5 days prior to procedure    [x] Follow preop dosing of blood thinners per physician instructions    [] Take 1/2 dose of evening insulin, but no insulin after midnight    [x] No oral diabetic medications after midnight    [x] If diabetic and have low blood sugar or feel symptomatic, take 1-2oz apple juice only    [] Bring inhalers day of surgery    [] Bring urine specimen day of surgery    [x] Shower or bath with soap, lather and rinse well, AM of Surgery, no lotion, powders or creams to surgical site    [x] Follow bowel prep as instructed per surgeon    [x] No tobacco products within 24 hours of surgery     [x] No alcohol or illegal drug use, marijuana included, within 24 hours of surgery.    [x] Jewelry, body piercing's, eyeglasses, contact lenses and dentures are not permitted into surgery (bring cases)      [x] Please  do not wear any nail polish, make up or hair products on the day of surgery    [x] You can expect a call the business day prior to procedure to notify you if your arrival time changes    [x] If you receive a survey after surgery we would greatly appreciate your comments    [] Parent/guardian of a minor must accompany their child and remain on the premises  the entire time they are under our care     [] Pediatric patients may bring favorite toy, blanket or comfort item with them    [] A caregiver or family member must remain with the patient during their stay if they are mentally handicapped, have dementia, disoriented or unable to use a call light or would be a safety concern if left unattended    [x] Please notify surgeon if you develop any illness between now and time of surgery (cold, cough, sore throat, fever, nausea, vomiting) or any signs of infections  including skin, wounds, and dental.    []  The Outpatient Pharmacy is available to fill your prescription here on your day of surgery, ask your preop nurse for details    [] Other instructions     EDUCATIONAL MATERIALS PROVIDED:    [] PAT Preoperative Education Packet/Booklet     [] Medication List    [] Transfusion bracelet applied with instructions    [] Shower with soap, lather and rinse well, and use CHG wipes provided the evening before surgery as instructed    [] Incentive spirometer with instructions

## 2025-03-28 NOTE — TELEPHONE ENCOUNTER
Pt getting a Endoscopy on Monday and needs to know what to do with her pump. Please advise. Was informed that we don't have providers in office today

## 2025-03-28 NOTE — PROGRESS NOTES
Pt with hx CAD CABG x2 10/2017 follows yearly with Dr Miller last seen 5/13/25. Pt denies any cardiac complaints/symptoms. . Pt has insulin pump and instructed to notify endocrinologist regarding insulin pump orders due to will be doing colon prep 3/30/25 with npo after midnight and TOA 0730 on 3/31/25 pt verbalized understanding.  Pt reports to avoid use of left arm due to hx breast Ca

## 2025-03-31 ENCOUNTER — HOSPITAL ENCOUNTER (OUTPATIENT)
Age: 75
Setting detail: OUTPATIENT SURGERY
Discharge: HOME | End: 2025-03-31
Attending: SURGERY | Admitting: SURGERY
Payer: COMMERCIAL

## 2025-03-31 ENCOUNTER — ANESTHESIA (OUTPATIENT)
Dept: ENDOSCOPY | Age: 75
End: 2025-03-31
Payer: COMMERCIAL

## 2025-03-31 ENCOUNTER — ANESTHESIA EVENT (OUTPATIENT)
Dept: ENDOSCOPY | Age: 75
End: 2025-03-31
Payer: COMMERCIAL

## 2025-03-31 VITALS
DIASTOLIC BLOOD PRESSURE: 65 MMHG | HEART RATE: 62 BPM | SYSTOLIC BLOOD PRESSURE: 142 MMHG | BODY MASS INDEX: 35 KG/M2 | OXYGEN SATURATION: 96 % | TEMPERATURE: 97.3 F | HEIGHT: 64 IN | RESPIRATION RATE: 16 BRPM | WEIGHT: 205 LBS

## 2025-03-31 DIAGNOSIS — Z86.0100 HX OF COLONIC POLYPS: ICD-10-CM

## 2025-03-31 LAB — GLUCOSE BLD-MCNC: 110 MG/DL (ref 74–99)

## 2025-03-31 PROCEDURE — 88305 TISSUE EXAM BY PATHOLOGIST: CPT

## 2025-03-31 PROCEDURE — 3700000001 HC ADD 15 MINUTES (ANESTHESIA): Performed by: SURGERY

## 2025-03-31 PROCEDURE — 7100000010 HC PHASE II RECOVERY - FIRST 15 MIN: Performed by: SURGERY

## 2025-03-31 PROCEDURE — 7100000011 HC PHASE II RECOVERY - ADDTL 15 MIN: Performed by: SURGERY

## 2025-03-31 PROCEDURE — 82962 GLUCOSE BLOOD TEST: CPT

## 2025-03-31 PROCEDURE — 6360000002 HC RX W HCPCS: Performed by: NURSE ANESTHETIST, CERTIFIED REGISTERED

## 2025-03-31 PROCEDURE — 2580000003 HC RX 258: Performed by: SURGERY

## 2025-03-31 PROCEDURE — 2709999900 HC NON-CHARGEABLE SUPPLY: Performed by: SURGERY

## 2025-03-31 PROCEDURE — 3700000000 HC ANESTHESIA ATTENDED CARE: Performed by: SURGERY

## 2025-03-31 PROCEDURE — 45380 COLONOSCOPY AND BIOPSY: CPT | Performed by: SURGERY

## 2025-03-31 PROCEDURE — 3609010300 HC COLONOSCOPY W/BIOPSY SINGLE/MULTIPLE: Performed by: SURGERY

## 2025-03-31 RX ORDER — SODIUM CHLORIDE 9 MG/ML
INJECTION, SOLUTION INTRAVENOUS CONTINUOUS
Status: DISCONTINUED | OUTPATIENT
Start: 2025-03-31 | End: 2025-03-31 | Stop reason: HOSPADM

## 2025-03-31 RX ORDER — SODIUM CHLORIDE 0.9 % (FLUSH) 0.9 %
5-40 SYRINGE (ML) INJECTION EVERY 12 HOURS SCHEDULED
Status: DISCONTINUED | OUTPATIENT
Start: 2025-03-31 | End: 2025-03-31 | Stop reason: HOSPADM

## 2025-03-31 RX ORDER — PROPOFOL 10 MG/ML
INJECTION, EMULSION INTRAVENOUS
Status: DISCONTINUED | OUTPATIENT
Start: 2025-03-31 | End: 2025-03-31 | Stop reason: SDUPTHER

## 2025-03-31 RX ORDER — SODIUM CHLORIDE 9 MG/ML
INJECTION, SOLUTION INTRAVENOUS PRN
Status: DISCONTINUED | OUTPATIENT
Start: 2025-03-31 | End: 2025-03-31 | Stop reason: HOSPADM

## 2025-03-31 RX ORDER — LIDOCAINE HYDROCHLORIDE 20 MG/ML
INJECTION, SOLUTION EPIDURAL; INFILTRATION; INTRACAUDAL; PERINEURAL
Status: DISCONTINUED | OUTPATIENT
Start: 2025-03-31 | End: 2025-03-31 | Stop reason: SDUPTHER

## 2025-03-31 RX ORDER — SODIUM CHLORIDE 0.9 % (FLUSH) 0.9 %
5-40 SYRINGE (ML) INJECTION PRN
Status: DISCONTINUED | OUTPATIENT
Start: 2025-03-31 | End: 2025-03-31 | Stop reason: HOSPADM

## 2025-03-31 RX ADMIN — SODIUM CHLORIDE: 9 INJECTION, SOLUTION INTRAVENOUS at 08:14

## 2025-03-31 RX ADMIN — PROPOFOL 200 MG: 10 INJECTION, EMULSION INTRAVENOUS at 08:38

## 2025-03-31 RX ADMIN — LIDOCAINE HYDROCHLORIDE 5 ML: 20 INJECTION, SOLUTION EPIDURAL; INFILTRATION; INTRACAUDAL; PERINEURAL at 08:38

## 2025-03-31 ASSESSMENT — PAIN - FUNCTIONAL ASSESSMENT
PAIN_FUNCTIONAL_ASSESSMENT: 0-10
PAIN_FUNCTIONAL_ASSESSMENT: NONE - DENIES PAIN

## 2025-03-31 NOTE — OP NOTE
[place] [address]                            OPERATIVE REPORT      PATIENT NAME: PERFECTO RIVERA                 : 1950  MED REC NO: [id number]                        ROOM: [facility] ROOM  ACCOUNT NO: [id number]                       ADMIT DATE: 2025  PROVIDER: Tarah Hamm MD      DATE OF PROCEDURE:  2025    SURGEON:  Tarah Hamm MD    PREOPERATIVE DIAGNOSES:    1. Diarrhea.  2. Abdominal pain.    POSTOPERATIVE DIAGNOSIS:  Polyp, mid transverse colon, at 80 cm.    PROCEDURES PERFORMED:  Total colonoscopy to cecum with biopsy forceps polypectomy, mid transverse colon.    ESTIMATED BLOOD LOSS:  Minimal.    OPERATIVE FINDINGS:    1. A 5 mm flat benign-looking polyp in the mid transverse colon at 80 cm.  2. Suboptimal bowel prep.    DESCRIPTION OF PROCEDURE:  With the patient in the left lateral position on the operating table, after adequate sedation was administered by Anesthesia, a digital rectal examination and dilatation were performed, showed evidence of good sphincter tone.  There were no palpable masses and no blood on examining finger.  A standard Olympus colonoscope was introduced through the anal opening, advanced into the rectosigmoid.  The anus and rectum were visualized by retroflexing the end of the scope and were normal.  Sigmoid colon revealed retained liquid stools.  The visualized portion of the bowel showed no significant pathology.  Remainder of the left colon visualized and again showed some retained stool, otherwise normal.  Transverse colon, ascending colon, and cecum as well as ileocecal valve and appendiceal opening were each visualized.  Again, retained liquid stools were found throughout the colon.  The visualized portions of the colon were normal.  Scope was slowly withdrawn.  Further observation of the colon on the way out revealed a 5 mm flat benign-looking polyp in the mid

## 2025-03-31 NOTE — ANESTHESIA PRE PROCEDURE
Department of Anesthesiology  Preprocedure Note       Name:  Kim Zarco   Age:  74 y.o.  :  1950                                          MRN:  [id number]         Date:  3/31/2025      Surgeon: Surgeon(s):  Tarah Hamm MD    Procedure: Procedure(s):  COLONOSCOPY DIAGNOSTIC    Medications prior to admission:   Prior to Admission medications    Medication Sig Start Date End Date Taking? Authorizing Provider   ALPHA LIPOIC ACID PO Take 1 tablet by mouth daily   Yes Orville Beyer MD   VITAMIN D3 10 MCG (400 UNIT) TABS tablet TAKE 2 TABLETS (800 UNITS) BY MOUTH ONCE DAILY 1/15/25  Yes Orville Beyer MD   niacin (NIASPAN) 500 MG extended release tablet Take 1 tablet by mouth nightly at bedtime. 3/21/25  Yes Ernestina Quinn MD   vitamin C (ASCORBIC ACID) 500 MG tablet Take 1 tablet by mouth daily 3/21/25  Yes Ernestina Quinn MD   therapeutic multivitamin-minerals (THERAGRAN-M) tablet Take 1 tablet by mouth daily   Yes Orville Beyer MD   allopurinol (ZYLOPRIM) 100 MG tablet Take 1 tablet by mouth daily 3/21/25   Ernestina Quinn MD   aspirin 81 MG EC tablet Take 1 tablet by mouth daily  Patient taking differently: Take 1 tablet by mouth at bedtime 3/21/25   Ernestina Quinn MD   citalopram (CELEXA) 40 MG tablet Take 1 tablet by mouth daily 3/21/25   Ernestina Quinn MD   ezetimibe (ZETIA) 10 MG tablet Take 1 tablet by mouth daily 3/21/25   Ernestina Quinn MD   lisinopril (PRINIVIL;ZESTRIL) 40 MG tablet Take 1 tablet by mouth daily 3/21/25   Ernestina Quinn MD   metoprolol tartrate (LOPRESSOR) 50 MG tablet Take 1 tablet by mouth 2 times daily 3/21/25   Ernestina Quinn MD   pantoprazole (PROTONIX) 40 MG tablet Take 1 tablet by mouth daily 3/21/25   Ernestina Quinn MD   potassium chloride (KLOR-CON M) 10 MEQ extended release tablet Take 1 tablet by mouth every other day 3/21/25   Ernestina Quinn MD   pravastatin (PRAVACHOL) 40 MG tablet Take 1 tablet by mouth

## 2025-03-31 NOTE — DISCHARGE INSTRUCTIONS
[facility] AMBULATORY PROCEDURE DISCHARGE INSTRUCTIONS  You may be drowsy or lightheaded after receiving sedation or anesthesia.    A responsible person should be with you for the next 24 hours.    Please follow the instructions checked below:    DIET INSTRUCTIONS:  [x]Start with light diet and progress to your normal diet as you feel like eating. If you experience nausea or repeated episodes of vomiting which persist beyond 12-24 hours, notify your doctor.  []Other     ACTIVITY INSTRUCTIONS:  [x]Rest today. Increase activity as tolerated    []No heavy lifting or strenuous activity     [x]No driving for today  []Other      MEDICATION INSTRUCTIONS:    [x]Prescriptions sent with you.  Use as directed.  When taking pain medications, you may experience dizziness or drowsiness.  Do not drink alcohol or drive when taking these medications.  [x]Continue preop medications                               Post-procedure Care   If any tissue was removed:   It will be sent to a lab to be examined. It may take 1-2 weeks for results. The doctor will usually give an initial report after the scope is removed. Other tests may be recommended.   A small amount of bleeding may occur during the first few days after the procedure.     When you return home after the procedure, be sure to follow your doctor's instructions, which may include:   Resume medicines as instructed by your doctor.   Resume normal diet, unless directed otherwise by your doctor.   The sedative will make you drowsy. Avoid driving, operating machinery, or making important decisions for the rest of the day.   Rest for the remainder of the day.     After arriving home, contact your doctor if any of the following occurs:   Bleeding from your rectum, notify your doctor if you pass a teaspoonful of blood or more.   Black, tarry stools   Severe abdominal pain   Hard, swollen abdomen   Signs of infection, including fever or chills   Inability to pass gas or  stool   Coughing, shortness of breath, chest pain, severe nausea or vomiting     In case of an emergency, CALL 911 .        FOLLOW-UP CARE:  [x]Call the office at [phone] for follow-up appointment in 1 week

## 2025-03-31 NOTE — BRIEF OP NOTE
Brief Postoperative Note      Patient: Kim Zarco  YOB: 1950  MRN: [id number]    Date of Procedure: 3/31/2025    Pre-Op Diagnosis Codes:      * Hx of colonic polyps [Z86.0100]    Post-Op Diagnosis: Same       Procedure(s):  COLONOSCOPY BIOPSY    Surgeon(s):  Tarah Hamm MD    Assistant:  * No surgical staff found *    Anesthesia: Monitor Anesthesia Care    Estimated Blood Loss (mL): Minimal    Complications: None    Specimens:   ID Type Source Tests Collected by Time Destination   A : transverse colon polyp bx Tissue Tissue SURGICAL PATHOLOGY Tarah Hamm MD 3/31/2025 0844        Implants:  * No implants in log *      Drains: * No LDAs found *    Findings:  Infection Present At Time Of Surgery (PATOS) (choose all levels that have infection present):  No infection present  Other Findings:     Electronically signed by Tarah Hamm MD on 3/31/2025 at 8:53 AM

## 2025-03-31 NOTE — INTERVAL H&P NOTE
Update History & Physical    The patient's History and Physical of March 12, 2025 was reviewed with the patient and I examined the patient. There was no change. The surgical site was confirmed by the patient and me.     Plan: The risks, benefits, expected outcome, and alternative to the recommended procedure have been discussed with the patient. Patient understands and wants to proceed with the procedure.     Electronically signed by Tarah Hamm MD on 3/31/2025 at 8:31 AM

## 2025-03-31 NOTE — ANESTHESIA POSTPROCEDURE EVALUATION
Department of Anesthesiology  Postprocedure Note    Patient: Kim Zarco  MRN: [id number]  YOB: 1950  Date of evaluation: 3/31/2025    Procedure Summary       Date: 03/31/25 Room / Location: [place] [id number] / [facility]    Anesthesia Start: 0836 Anesthesia Stop: 0853    Procedure: COLONOSCOPY BIOPSY Diagnosis:       Hx of colonic polyps      (Hx of colonic polyps [Z86.0100])    Surgeons: Tarah Hamm MD Responsible Provider: Zander Tirado MD    Anesthesia Type: MAC ASA Status: 4            Anesthesia Type: MAC    Asher Phase I: Asher Score: 10    Asher Phase II:      Anesthesia Post Evaluation    Patient location during evaluation: bedside  Patient participation: complete - patient participated  Level of consciousness: awake  Pain score: 0  Airway patency: patent  Nausea & Vomiting: no nausea and no vomiting  Cardiovascular status: blood pressure returned to baseline and hemodynamically stable  Respiratory status: acceptable  Hydration status: stable  Pain management: adequate and satisfactory to patient        No notable events documented.

## 2025-03-31 NOTE — ANESTHESIA PRE PROCEDURE
Department of Anesthesiology  Preprocedure Note       Name:  Kim Zarco   Age:  74 y.o.  :  1950                                          MRN:  [id number]         Date:  3/31/2025      Surgeon: Surgeon(s):  Tarah Hamm MD    Procedure: Procedure(s):  COLONOSCOPY DIAGNOSTIC    Medications prior to admission:   Prior to Admission medications    Medication Sig Start Date End Date Taking? Authorizing Provider   ALPHA LIPOIC ACID PO Take 1 tablet by mouth daily   Yes Orville Beyer MD   VITAMIN D3 10 MCG (400 UNIT) TABS tablet TAKE 2 TABLETS (800 UNITS) BY MOUTH ONCE DAILY 1/15/25  Yes Orville Beyer MD   citalopram (CELEXA) 40 MG tablet Take 1 tablet by mouth daily 3/21/25  Yes Ernestina Quinn MD   metoprolol tartrate (LOPRESSOR) 50 MG tablet Take 1 tablet by mouth 2 times daily 3/21/25  Yes Ernestina Quinn MD   niacin (NIASPAN) 500 MG extended release tablet Take 1 tablet by mouth nightly at bedtime. 3/21/25  Yes Ernestina Quinn MD   pantoprazole (PROTONIX) 40 MG tablet Take 1 tablet by mouth daily 3/21/25  Yes Ernestina Quinn MD   vitamin C (ASCORBIC ACID) 500 MG tablet Take 1 tablet by mouth daily 3/21/25  Yes Ernestina Quinn MD   pregabalin (LYRICA) 50 MG capsule take 1 capsule by mouth twice a day for pain for 30 DAYS 6/3/24  Yes Orville Beyer MD   HYDROcodone-acetaminophen (NORCO) 5-325 MG per tablet Take 1 tablet by mouth 3 times daily as needed for Pain. 3/29/23  Yes Orville Beyer MD   therapeutic multivitamin-minerals (THERAGRAN-M) tablet Take 1 tablet by mouth daily   Yes Orville Beyer MD   allopurinol (ZYLOPRIM) 100 MG tablet Take 1 tablet by mouth daily 3/21/25   Ernestina Quinn MD   aspirin 81 MG EC tablet Take 1 tablet by mouth daily  Patient taking differently: Take 1 tablet by mouth at bedtime 3/21/25   Ernestina Quinn MD   ezetimibe (ZETIA) 10 MG tablet Take 1 tablet by mouth daily 3/21/25   Ernestina Quinn MD   lisinopril

## 2025-04-04 LAB — SURGICAL PATHOLOGY REPORT: NORMAL

## 2025-04-07 DIAGNOSIS — R79.89 OTHER SPECIFIED ABNORMAL FINDINGS OF BLOOD CHEMISTRY: ICD-10-CM

## 2025-04-07 RX ORDER — OMEGA-3S/DHA/EPA/FISH OIL/D3 300MG-1000
CAPSULE ORAL
Qty: 180 TABLET | OUTPATIENT
Start: 2025-04-07

## 2025-04-08 DIAGNOSIS — N18.30 STAGE 3 CHRONIC KIDNEY DISEASE, UNSPECIFIED WHETHER STAGE 3A OR 3B CKD (HCC): ICD-10-CM

## 2025-04-08 RX ORDER — LISINOPRIL 40 MG/1
40 TABLET ORAL DAILY
Qty: 90 TABLET | Refills: 1 | Status: SHIPPED | OUTPATIENT
Start: 2025-04-08

## 2025-04-08 RX ORDER — PANTOPRAZOLE SODIUM 40 MG/1
40 TABLET, DELAYED RELEASE ORAL DAILY
Qty: 90 TABLET | Refills: 1 | Status: SHIPPED | OUTPATIENT
Start: 2025-04-08

## 2025-04-08 RX ORDER — METOPROLOL TARTRATE 50 MG
50 TABLET ORAL 2 TIMES DAILY
Qty: 180 TABLET | Refills: 1 | Status: SHIPPED | OUTPATIENT
Start: 2025-04-08

## 2025-04-08 RX ORDER — CITALOPRAM HYDROBROMIDE 40 MG/1
40 TABLET ORAL DAILY
Qty: 90 TABLET | Refills: 0 | Status: SHIPPED | OUTPATIENT
Start: 2025-04-08

## 2025-04-08 RX ORDER — POTASSIUM CHLORIDE 750 MG/1
10 TABLET, EXTENDED RELEASE ORAL EVERY OTHER DAY
Qty: 45 TABLET | Refills: 1 | Status: SHIPPED | OUTPATIENT
Start: 2025-04-08

## 2025-04-08 RX ORDER — ALBUTEROL SULFATE 90 UG/1
2 INHALANT RESPIRATORY (INHALATION) EVERY 4 HOURS PRN
Qty: 8.5 EACH | Refills: 2 | Status: SHIPPED | OUTPATIENT
Start: 2025-04-08

## 2025-04-08 RX ORDER — PRAVASTATIN SODIUM 40 MG
40 TABLET ORAL NIGHTLY
Qty: 90 TABLET | Refills: 1 | Status: SHIPPED | OUTPATIENT
Start: 2025-04-08

## 2025-04-08 RX ORDER — NIACIN 500 MG/1
500 TABLET, EXTENDED RELEASE ORAL NIGHTLY
Qty: 90 TABLET | Refills: 1 | Status: SHIPPED | OUTPATIENT
Start: 2025-04-08

## 2025-04-08 RX ORDER — ASCORBIC ACID 500 MG
500 TABLET ORAL DAILY
Qty: 90 TABLET | Refills: 1 | Status: SHIPPED | OUTPATIENT
Start: 2025-04-08

## 2025-04-08 RX ORDER — EZETIMIBE 10 MG/1
10 TABLET ORAL DAILY
Qty: 90 TABLET | Refills: 1 | Status: SHIPPED | OUTPATIENT
Start: 2025-04-08

## 2025-04-08 NOTE — TELEPHONE ENCOUNTER
citalopram (CELEXA) 40 MG   niacin (NIASPAN) 500 MG   metoprolol tartrate (LOPRESSOR) 50   lisinopril (PRINIVIL;ZESTRIL) 40 MG   ezetimibe (ZETIA) 10 MG   potassium chloride   pantoprazole   niacin (NIASPAN) 500    pravastatin   albuterol sulfate HFA   vitamin C      
2:30 PM MARITZA CAUSEY VAS US 1 SEYZ CARD[facility] Rad/Car   3/3/2026  3:00 PM Clifford Szymanski MD VAS/MED [facility]        (If no appt send self scheduling link. .REFILLAPPT)  Scheduling request sent?     [] Yes  [x] No    Does patient need updated?  [] Yes  [x] No    
IV intact

## 2025-04-09 ENCOUNTER — OFFICE VISIT (OUTPATIENT)
Dept: SURGERY | Age: 75
End: 2025-04-09
Payer: COMMERCIAL

## 2025-04-09 VITALS
DIASTOLIC BLOOD PRESSURE: 56 MMHG | TEMPERATURE: 97 F | OXYGEN SATURATION: 98 % | HEIGHT: 64 IN | WEIGHT: 216 LBS | BODY MASS INDEX: 36.88 KG/M2 | RESPIRATION RATE: 18 BRPM | HEART RATE: 70 BPM | SYSTOLIC BLOOD PRESSURE: 101 MMHG

## 2025-04-09 DIAGNOSIS — R22.32 MASS OF LEFT AXILLA: ICD-10-CM

## 2025-04-09 DIAGNOSIS — R22.32 MASS OF LEFT AXILLA: Primary | ICD-10-CM

## 2025-04-09 PROCEDURE — 1123F ACP DISCUSS/DSCN MKR DOCD: CPT | Performed by: SURGERY

## 2025-04-09 PROCEDURE — G8427 DOCREV CUR MEDS BY ELIG CLIN: HCPCS | Performed by: SURGERY

## 2025-04-09 PROCEDURE — 1159F MED LIST DOCD IN RCRD: CPT | Performed by: SURGERY

## 2025-04-09 PROCEDURE — 1090F PRES/ABSN URINE INCON ASSESS: CPT | Performed by: SURGERY

## 2025-04-09 PROCEDURE — 3078F DIAST BP <80 MM HG: CPT | Performed by: SURGERY

## 2025-04-09 PROCEDURE — 1160F RVW MEDS BY RX/DR IN RCRD: CPT | Performed by: SURGERY

## 2025-04-09 PROCEDURE — 99213 OFFICE O/P EST LOW 20 MIN: CPT | Performed by: SURGERY

## 2025-04-09 PROCEDURE — 1036F TOBACCO NON-USER: CPT | Performed by: SURGERY

## 2025-04-09 PROCEDURE — 3074F SYST BP LT 130 MM HG: CPT | Performed by: SURGERY

## 2025-04-09 PROCEDURE — 3017F COLORECTAL CA SCREEN DOC REV: CPT | Performed by: SURGERY

## 2025-04-09 PROCEDURE — G8400 PT W/DXA NO RESULTS DOC: HCPCS | Performed by: SURGERY

## 2025-04-09 PROCEDURE — G8417 CALC BMI ABV UP PARAM F/U: HCPCS | Performed by: SURGERY

## 2025-04-09 NOTE — PROGRESS NOTES
tablet Take 1 tablet by mouth every other day 45 tablet 1    pravastatin (PRAVACHOL) 40 MG tablet Take 1 tablet by mouth nightly 90 tablet 1    vitamin C (ASCORBIC ACID) 500 MG tablet Take 1 tablet by mouth daily 90 tablet 1    ALPHA LIPOIC ACID PO Take 1 tablet by mouth daily      VITAMIN D3 10 MCG (400 UNIT) TABS tablet TAKE 2 TABLETS (800 UNITS) BY MOUTH ONCE DAILY      allopurinol (ZYLOPRIM) 100 MG tablet Take 1 tablet by mouth daily 90 tablet 0    aspirin 81 MG EC tablet Take 1 tablet by mouth daily (Patient taking differently: Take 1 tablet by mouth at bedtime) 90 tablet 0    glucose monitoring kit 1 kit by Does not apply route daily 1 kit 0    blood glucose monitor strips Test 3 times a day 300 strip 0    Lancets MISC 1 each by Does not apply route 3 times daily 300 each 3    insulin lispro (HUMALOG,ADMELOG) 100 UNIT/ML SOLN injection vial Via insulin pump max dose 125 units daily 40 mL 5    blood glucose monitor strips Test 3 times a day & as needed for symptoms of irregular blood glucose. Dispense sufficient amount for indicated testing frequency plus additional to accommodate PRN testing needs. 200 strip 4    Continuous Glucose Sensor (FREESTYLE MAGALIS 2 SENSOR) MISC 1 each by Does not apply route every 14 days 6 each 0    Semaglutide, 1 MG/DOSE, (OZEMPIC) 4 MG/3ML SOPN sc injection Inject 1 mg into the skin every 7 days (Patient taking differently: Inject 1 mg into the skin every 7 days Indications: every saturday. pt aware of 7 day hold prior to suregery) 9 mL 1    Insulin Pen Needle (B-D UF III MINI PEN NEEDLES) 31G X 5 MM MISC USE 1 PEN NEEDLE TO INJECT MEDICATION SUBCUTANEOUSLY ONCE DAILY TO FOUR TIMES DAILY 400 each 2    pregabalin (LYRICA) 50 MG capsule take 1 capsule by mouth twice a day for pain for 30 DAYS      Fesoterodine Fumarate ER 8 MG TB24 Take 1 tablet by mouth daily      letrozole (FEMARA) 2.5 MG tablet Take 1 tablet by mouth daily      SYSTANE 0.4-0.3 % GEL ophthalmic gel Place 1 drop

## 2025-04-17 ENCOUNTER — OFFICE VISIT (OUTPATIENT)
Dept: ENDOCRINOLOGY | Age: 75
End: 2025-04-17
Payer: COMMERCIAL

## 2025-04-17 VITALS
DIASTOLIC BLOOD PRESSURE: 67 MMHG | BODY MASS INDEX: 34.32 KG/M2 | TEMPERATURE: 97 F | WEIGHT: 206 LBS | HEIGHT: 65 IN | SYSTOLIC BLOOD PRESSURE: 130 MMHG | HEART RATE: 72 BPM | RESPIRATION RATE: 18 BRPM | OXYGEN SATURATION: 97 %

## 2025-04-17 DIAGNOSIS — Z97.8 USES SELF-APPLIED CONTINUOUS GLUCOSE MONITORING DEVICE: ICD-10-CM

## 2025-04-17 DIAGNOSIS — E11.65 TYPE 2 DIABETES MELLITUS WITH HYPERGLYCEMIA, WITH LONG-TERM CURRENT USE OF INSULIN (HCC): Primary | ICD-10-CM

## 2025-04-17 DIAGNOSIS — Z96.41 INSULIN PUMP IN PLACE: ICD-10-CM

## 2025-04-17 DIAGNOSIS — Z91.119 DIETARY NONCOMPLIANCE: ICD-10-CM

## 2025-04-17 DIAGNOSIS — Z79.4 TYPE 2 DIABETES MELLITUS WITH HYPERGLYCEMIA, WITH LONG-TERM CURRENT USE OF INSULIN (HCC): Primary | ICD-10-CM

## 2025-04-17 DIAGNOSIS — E78.5 HYPERLIPIDEMIA, UNSPECIFIED HYPERLIPIDEMIA TYPE: ICD-10-CM

## 2025-04-17 LAB — HBA1C MFR BLD: 6.8 %

## 2025-04-17 PROCEDURE — G8417 CALC BMI ABV UP PARAM F/U: HCPCS | Performed by: INTERNAL MEDICINE

## 2025-04-17 PROCEDURE — 3075F SYST BP GE 130 - 139MM HG: CPT | Performed by: INTERNAL MEDICINE

## 2025-04-17 PROCEDURE — 95251 CONT GLUC MNTR ANALYSIS I&R: CPT | Performed by: INTERNAL MEDICINE

## 2025-04-17 PROCEDURE — 83036 HEMOGLOBIN GLYCOSYLATED A1C: CPT | Performed by: INTERNAL MEDICINE

## 2025-04-17 PROCEDURE — 3044F HG A1C LEVEL LT 7.0%: CPT | Performed by: INTERNAL MEDICINE

## 2025-04-17 PROCEDURE — 1159F MED LIST DOCD IN RCRD: CPT | Performed by: INTERNAL MEDICINE

## 2025-04-17 PROCEDURE — 1090F PRES/ABSN URINE INCON ASSESS: CPT | Performed by: INTERNAL MEDICINE

## 2025-04-17 PROCEDURE — 3078F DIAST BP <80 MM HG: CPT | Performed by: INTERNAL MEDICINE

## 2025-04-17 PROCEDURE — 3017F COLORECTAL CA SCREEN DOC REV: CPT | Performed by: INTERNAL MEDICINE

## 2025-04-17 PROCEDURE — 99214 OFFICE O/P EST MOD 30 MIN: CPT | Performed by: INTERNAL MEDICINE

## 2025-04-17 PROCEDURE — 2022F DILAT RTA XM EVC RTNOPTHY: CPT | Performed by: INTERNAL MEDICINE

## 2025-04-17 PROCEDURE — 1123F ACP DISCUSS/DSCN MKR DOCD: CPT | Performed by: INTERNAL MEDICINE

## 2025-04-17 PROCEDURE — G8427 DOCREV CUR MEDS BY ELIG CLIN: HCPCS | Performed by: INTERNAL MEDICINE

## 2025-04-17 PROCEDURE — 1036F TOBACCO NON-USER: CPT | Performed by: INTERNAL MEDICINE

## 2025-04-17 PROCEDURE — G8400 PT W/DXA NO RESULTS DOC: HCPCS | Performed by: INTERNAL MEDICINE

## 2025-04-17 NOTE — PROGRESS NOTES
(ZYLOPRIM) 100 MG tablet Take 1 tablet by mouth daily 90 tablet 0    aspirin 81 MG EC tablet Take 1 tablet by mouth daily (Patient taking differently: Take 1 tablet by mouth at bedtime) 90 tablet 0    glucose monitoring kit 1 kit by Does not apply route daily 1 kit 0    blood glucose monitor strips Test 3 times a day 300 strip 0    Lancets MISC 1 each by Does not apply route 3 times daily 300 each 3    insulin lispro (HUMALOG,ADMELOG) 100 UNIT/ML SOLN injection vial Via insulin pump max dose 125 units daily 40 mL 5    blood glucose monitor strips Test 3 times a day & as needed for symptoms of irregular blood glucose. Dispense sufficient amount for indicated testing frequency plus additional to accommodate PRN testing needs. 200 strip 4    Continuous Glucose Sensor (FREESTYLE MAGALIS 2 SENSOR) MISC 1 each by Does not apply route every 14 days 6 each 0    Semaglutide, 1 MG/DOSE, (OZEMPIC) 4 MG/3ML SOPN sc injection Inject 1 mg into the skin every 7 days (Patient taking differently: Inject 1 mg into the skin every 7 days Indications: every saturday. pt aware of 7 day hold prior to suregery) 9 mL 1    Insulin Pen Needle (B-D UF III MINI PEN NEEDLES) 31G X 5 MM MISC USE 1 PEN NEEDLE TO INJECT MEDICATION SUBCUTANEOUSLY ONCE DAILY TO FOUR TIMES DAILY 400 each 2    pregabalin (LYRICA) 50 MG capsule take 1 capsule by mouth twice a day for pain for 30 DAYS      Fesoterodine Fumarate ER 8 MG TB24 Take 1 tablet by mouth daily      letrozole (FEMARA) 2.5 MG tablet Take 1 tablet by mouth daily      SYSTANE 0.4-0.3 % GEL ophthalmic gel Place 1 drop into both eyes 3 times daily      HYDROcodone-acetaminophen (NORCO) 5-325 MG per tablet Take 1 tablet by mouth 3 times daily as needed for Pain.      therapeutic multivitamin-minerals (THERAGRAN-M) tablet Take 1 tablet by mouth daily       No current facility-administered medications for this visit.       Review of Systems  Constitutional: No fever, no chills, no diaphoresis, no

## 2025-04-29 ENCOUNTER — OFFICE VISIT (OUTPATIENT)
Dept: PULMONOLOGY | Age: 75
End: 2025-04-29
Payer: COMMERCIAL

## 2025-04-29 VITALS
WEIGHT: 202 LBS | DIASTOLIC BLOOD PRESSURE: 67 MMHG | SYSTOLIC BLOOD PRESSURE: 130 MMHG | BODY MASS INDEX: 32.47 KG/M2 | OXYGEN SATURATION: 98 % | HEIGHT: 66 IN | TEMPERATURE: 98.4 F | HEART RATE: 72 BPM

## 2025-04-29 DIAGNOSIS — J44.9 CHRONIC OBSTRUCTIVE PULMONARY DISEASE, UNSPECIFIED COPD TYPE (HCC): Primary | ICD-10-CM

## 2025-04-29 DIAGNOSIS — Z87.01 HISTORY OF PNEUMONIA: ICD-10-CM

## 2025-04-29 PROCEDURE — 1159F MED LIST DOCD IN RCRD: CPT | Performed by: INTERNAL MEDICINE

## 2025-04-29 PROCEDURE — G8417 CALC BMI ABV UP PARAM F/U: HCPCS | Performed by: INTERNAL MEDICINE

## 2025-04-29 PROCEDURE — 3023F SPIROM DOC REV: CPT | Performed by: INTERNAL MEDICINE

## 2025-04-29 PROCEDURE — G8427 DOCREV CUR MEDS BY ELIG CLIN: HCPCS | Performed by: INTERNAL MEDICINE

## 2025-04-29 PROCEDURE — 1090F PRES/ABSN URINE INCON ASSESS: CPT | Performed by: INTERNAL MEDICINE

## 2025-04-29 PROCEDURE — 1123F ACP DISCUSS/DSCN MKR DOCD: CPT | Performed by: INTERNAL MEDICINE

## 2025-04-29 PROCEDURE — 1036F TOBACCO NON-USER: CPT | Performed by: INTERNAL MEDICINE

## 2025-04-29 PROCEDURE — 3078F DIAST BP <80 MM HG: CPT | Performed by: INTERNAL MEDICINE

## 2025-04-29 PROCEDURE — 3017F COLORECTAL CA SCREEN DOC REV: CPT | Performed by: INTERNAL MEDICINE

## 2025-04-29 PROCEDURE — 3075F SYST BP GE 130 - 139MM HG: CPT | Performed by: INTERNAL MEDICINE

## 2025-04-29 PROCEDURE — G8400 PT W/DXA NO RESULTS DOC: HCPCS | Performed by: INTERNAL MEDICINE

## 2025-04-29 PROCEDURE — 99214 OFFICE O/P EST MOD 30 MIN: CPT | Performed by: INTERNAL MEDICINE

## 2025-04-29 RX ORDER — LIDOCAINE 50 MG/G
1 PATCH TOPICAL EVERY 24 HOURS
COMMUNITY
Start: 2024-12-20

## 2025-04-29 NOTE — PROGRESS NOTES
COLONOSCOPY N/A 2015    COLONOSCOPY    COLONOSCOPY N/A 2022    COLONOSCOPY POLYPECTOMY SNARE/COLD BIOPSY performed by Tarah Hamm MD at [facility] ENDOSCOPY    COLONOSCOPY N/A 3/31/2025    COLONOSCOPY BIOPSY performed by Tarah Hamm MD at [facility] ENDOSCOPY    CORONARY ARTERY BYPASS GRAFT  10/23/2017    x2   calvin    DIAGNOSTIC CARDIAC CATH LAB PROCEDURE      EYE SURGERY  2009    several ,cataracts,legally blind    EYE SURGERY      FOOT SURGERY  2016    ulcer, bone debridement    MASTECTOMY Bilateral 2023    BILATERAL  MASTECTOMIES performed by Tarah Hamm MD at [facility] OR    OTHER SURGICAL HISTORY Left 2016    bone biopsy left foot    US BREAST BIOPSY W LOC DEVICE 1ST LESION RIGHT Right 2023    US BREAST BIOPSY W LOC DEVICE 1ST LESION RIGHT 2023 [facility]    US BREAST BIOPSY W LOC DEVICE EACH ADDL LESION RIGHT Right 2023    US BREAST BIOPSY NEEDLE ADDITIONAL RIGHT 2023 [facility]      Family History   Problem Relation Age of Onset    Diabetes Mother     High Blood Pressure Mother     High Cholesterol Mother     Heart Attack Father     Diabetes Father     High Blood Pressure Father     High Cholesterol Father     High Blood Pressure Maternal Aunt     High Blood Pressure Maternal Uncle     Breast Cancer Paternal Cousin       Social History     Socioeconomic History    Marital status:      Spouse name: Hema    Number of children: 1    Years of education: 12    Highest education level: None   Tobacco Use    Smoking status: Former     Current packs/day: 0.00     Average packs/day: 1 pack/day for 15.9 years (15.9 ttl pk-yrs)     Types: Cigarettes     Start date: 1972     Quit date: 1987     Years since quittin.4     Passive exposure: Never    Smokeless tobacco: Never   Vaping Use    Vaping status: Never Used   Substance and Sexual Activity    Alcohol use: Not Currently     Comment: occasional glass of wine    Drug use: No    Sexual activity:

## 2025-05-09 RX ORDER — ALBUTEROL SULFATE 90 UG/1
INHALANT RESPIRATORY (INHALATION)
COMMUNITY
Start: 2025-05-01

## 2025-05-09 RX ORDER — ESTRADIOL 0.1 MG/G
CREAM VAGINAL
COMMUNITY
Start: 2025-05-06

## 2025-05-09 NOTE — PROGRESS NOTES
Dr. Christie informed that Pulmonary workup has not been done,pt sada peters will reassess when here.

## 2025-05-09 NOTE — PROGRESS NOTES
[facility] PRE-ADMISSION TESTING INSTRUCTIONS    The Preadmission Testing patient is instructed accordingly using the following criteria (check applicable):    ARRIVAL INSTRUCTIONS:  [x] Parking the day of Surgery is located in the Main Entrance lot.  Upon entering the door, make an immediate right to the surgery reception desk    [x] Bring photo ID and insurance card    [x] Please be sure to arrange for responsible adult to provide transportation to and from the hospital    [x] Please arrange for responsible adult to be with you for the 24 hour period post procedure due to having anesthesia    [x] If you awake am of surgery not feeling well or have temperature >100 please call [phone]    GENERAL INSTRUCTIONS:    [x] May have clear liquids until 4 hours prior to surgery. Examples include water, fruit juices (no pulp), jello, popsicles, black coffee or tea, beef or chicken broth.               No gum, candy or mints.    [x] You may brush your teeth, but do not swallow any water    [x] Take medications as instructed with 1-2 oz of water    [x] Stop herbal supplements and vitamins 5 days prior to procedure    [x] Follow preop dosing of blood thinners per physician instructions    [x] Take 1/2 dose of evening insulin, but no insulin after midnight    [x] No oral diabetic medications after midnight    [x] If diabetic and have low blood sugar or feel symptomatic, take 1-2oz apple juice only    [x] Bring inhalers day of surgery    [] Bring C-PAP/ Bi-Pap day of surgery    [x] Shower or bath with soap, lather and rinse well, AM of Surgery, no lotion, powders or creams to surgical site      [x] No tobacco products within 24 hours of surgery     [x] No alcohol or illegal drug use within 24 hours of surgery.    [x] Jewelry, body piercing's, eyeglasses, contact lenses and dentures are not permitted into surgery (bring cases)      [x] Please do not wear any nail polish, make up or hair products on  the day of surgery    [x] You can expect a call the business day prior to procedure to notify you if your arrival time changes    [x] If you receive a survey after surgery we would greatly appreciate your comments    [x] A caregiver or family member must remain with the patient during their stay if they are mentally handicapped, have dementia, disoriented or unable to use a call light or would be a safety concern if left unattended    [x] Please notify surgeon if you develop any illness between now and time of surgery (cold, cough, sore throat, fever, nausea, vomiting) or any signs of infections  including skin, wounds, and dental.    [x]  The Outpatient Pharmacy is available to fill your prescription here on your day of surgery, ask your preop nurse for details

## 2025-05-12 ENCOUNTER — TELEPHONE (OUTPATIENT)
Dept: ENDOCRINOLOGY | Age: 75
End: 2025-05-12

## 2025-05-12 RX ORDER — FAMOTIDINE 40 MG/1
40 TABLET, FILM COATED ORAL EVERY MORNING
Qty: 90 TABLET | Refills: 0 | OUTPATIENT
Start: 2025-05-12

## 2025-05-12 NOTE — TELEPHONE ENCOUNTER
Pt called stating that she is having a procedure done tomorrow and wants to know what to do with her pump?  Stated it is a \"little\" procedure and didn't think she needed to do anything but wanted to double check.

## 2025-05-13 ENCOUNTER — ANESTHESIA EVENT (OUTPATIENT)
Dept: OPERATING ROOM | Age: 75
End: 2025-05-13
Payer: COMMERCIAL

## 2025-05-13 ENCOUNTER — ANESTHESIA (OUTPATIENT)
Dept: OPERATING ROOM | Age: 75
End: 2025-05-13
Payer: COMMERCIAL

## 2025-05-13 ENCOUNTER — HOSPITAL ENCOUNTER (OUTPATIENT)
Age: 75
Setting detail: OUTPATIENT SURGERY
Discharge: HOME | End: 2025-05-13
Attending: SURGERY | Admitting: SURGERY
Payer: COMMERCIAL

## 2025-05-13 VITALS
HEIGHT: 65 IN | SYSTOLIC BLOOD PRESSURE: 131 MMHG | HEART RATE: 68 BPM | RESPIRATION RATE: 50 BRPM | OXYGEN SATURATION: 96 % | WEIGHT: 209 LBS | BODY MASS INDEX: 34.82 KG/M2 | TEMPERATURE: 98 F | DIASTOLIC BLOOD PRESSURE: 63 MMHG

## 2025-05-13 PROBLEM — R09.02 HYPOXEMIA: Status: ACTIVE | Noted: 2025-05-13

## 2025-05-13 LAB — GLUCOSE BLD-MCNC: 125 MG/DL (ref 74–99)

## 2025-05-13 PROCEDURE — 99213 OFFICE O/P EST LOW 20 MIN: CPT | Performed by: SURGERY

## 2025-05-13 PROCEDURE — 6360000002 HC RX W HCPCS

## 2025-05-13 PROCEDURE — 3700000001 HC ADD 15 MINUTES (ANESTHESIA): Performed by: SURGERY

## 2025-05-13 PROCEDURE — 3600000002 HC SURGERY LEVEL 2 BASE: Performed by: SURGERY

## 2025-05-13 PROCEDURE — 2580000003 HC RX 258

## 2025-05-13 PROCEDURE — 3600000012 HC SURGERY LEVEL 2 ADDTL 15MIN: Performed by: SURGERY

## 2025-05-13 PROCEDURE — 2709999900 HC NON-CHARGEABLE SUPPLY: Performed by: SURGERY

## 2025-05-13 PROCEDURE — 3700000000 HC ANESTHESIA ATTENDED CARE: Performed by: SURGERY

## 2025-05-13 PROCEDURE — 82962 GLUCOSE BLOOD TEST: CPT

## 2025-05-13 PROCEDURE — 7100000001 HC PACU RECOVERY - ADDTL 15 MIN: Performed by: SURGERY

## 2025-05-13 PROCEDURE — 7100000000 HC PACU RECOVERY - FIRST 15 MIN: Performed by: SURGERY

## 2025-05-13 RX ORDER — SODIUM CHLORIDE 0.9 % (FLUSH) 0.9 %
5-40 SYRINGE (ML) INJECTION PRN
Status: DISCONTINUED | OUTPATIENT
Start: 2025-05-13 | End: 2025-05-13 | Stop reason: HOSPADM

## 2025-05-13 RX ORDER — SODIUM CHLORIDE 9 MG/ML
INJECTION, SOLUTION INTRAVENOUS
Status: DISCONTINUED | OUTPATIENT
Start: 2025-05-13 | End: 2025-05-13 | Stop reason: SDUPTHER

## 2025-05-13 RX ORDER — SODIUM CHLORIDE 0.9 % (FLUSH) 0.9 %
5-40 SYRINGE (ML) INJECTION PRN
Status: CANCELLED | OUTPATIENT
Start: 2025-05-13

## 2025-05-13 RX ORDER — MIDAZOLAM HYDROCHLORIDE 1 MG/ML
INJECTION, SOLUTION INTRAMUSCULAR; INTRAVENOUS
Status: DISCONTINUED | OUTPATIENT
Start: 2025-05-13 | End: 2025-05-13 | Stop reason: SDUPTHER

## 2025-05-13 RX ORDER — TRAMADOL HYDROCHLORIDE 50 MG/1
50 TABLET ORAL PRN
Status: CANCELLED | OUTPATIENT
Start: 2025-05-13 | End: 2025-05-13

## 2025-05-13 RX ORDER — NALOXONE HYDROCHLORIDE 0.4 MG/ML
INJECTION, SOLUTION INTRAMUSCULAR; INTRAVENOUS; SUBCUTANEOUS PRN
Status: CANCELLED | OUTPATIENT
Start: 2025-05-13

## 2025-05-13 RX ORDER — SODIUM CHLORIDE 9 MG/ML
INJECTION, SOLUTION INTRAVENOUS PRN
Status: DISCONTINUED | OUTPATIENT
Start: 2025-05-13 | End: 2025-05-13 | Stop reason: HOSPADM

## 2025-05-13 RX ORDER — SODIUM CHLORIDE 9 MG/ML
INJECTION, SOLUTION INTRAVENOUS PRN
Status: CANCELLED | OUTPATIENT
Start: 2025-05-13

## 2025-05-13 RX ORDER — SODIUM CHLORIDE 0.9 % (FLUSH) 0.9 %
5-40 SYRINGE (ML) INJECTION EVERY 12 HOURS SCHEDULED
Status: CANCELLED | OUTPATIENT
Start: 2025-05-13

## 2025-05-13 RX ORDER — SODIUM CHLORIDE 0.9 % (FLUSH) 0.9 %
5-40 SYRINGE (ML) INJECTION EVERY 12 HOURS SCHEDULED
Status: DISCONTINUED | OUTPATIENT
Start: 2025-05-13 | End: 2025-05-13 | Stop reason: HOSPADM

## 2025-05-13 RX ORDER — TRAMADOL HYDROCHLORIDE 50 MG/1
100 TABLET ORAL PRN
Status: CANCELLED | OUTPATIENT
Start: 2025-05-13 | End: 2025-05-13

## 2025-05-13 RX ORDER — SODIUM CHLORIDE 9 MG/ML
INJECTION, SOLUTION INTRAVENOUS CONTINUOUS
Status: DISCONTINUED | OUTPATIENT
Start: 2025-05-13 | End: 2025-05-13 | Stop reason: HOSPADM

## 2025-05-13 RX ADMIN — SODIUM CHLORIDE: 9 INJECTION, SOLUTION INTRAVENOUS at 08:20

## 2025-05-13 RX ADMIN — MIDAZOLAM 1 MG: 1 INJECTION INTRAMUSCULAR; INTRAVENOUS at 08:20

## 2025-05-13 ASSESSMENT — PAIN - FUNCTIONAL ASSESSMENT: PAIN_FUNCTIONAL_ASSESSMENT: 0-10

## 2025-05-13 NOTE — PROGRESS NOTES
Case cancelled due to hypoxia. Patient will be taken to the ED for further workup. Discussed with Dr. Hamm.    Sherley Mehta MD  Surgery Resident PGY1

## 2025-05-13 NOTE — H&P
GENERAL SURGERY  HISTORY AND PHYSICAL  2025    No chief complaint on file.    AQUILES Zarco is a 75 y.o. female who presents for excisions of left axillary mass. Patient has a history of bilateral mastectomies in  by Dr. Hamm and Dr. Mayo due to right breast cancer. Since that time patient has had discomfort in her left axilla due to excess tissue in the area and wants it removed. She has been feeling in her usual state of health.     Past Medical History:   Diagnosis Date    ASHD (arteriosclerotic heart disease)     Asthma     Breast cancer (HCC)     No BP or IV Right arm    CAD (coronary artery disease)     Cancer (HCC)     breast ca, right    Charcot foot due to diabetes mellitus (HCC) 2012    Chronic diastolic congestive heart failure (HCC) 2017    Chronic kidney disease     sees nephrology    Depression     Diabetic foot ulcer with osteomyelitis (HCC)     Diabetic peripheral neuropathy (HCC)     Diabetic ulcer of left foot associated with type 2 diabetes mellitus (HCC)     Diarrhea     Essential hypertension 2018    GERD (gastroesophageal reflux disease)     History of colon polyps     History of UTI     Hyperlipidemia     Hypertension     Lactose intolerance     Mass of left axilla     Oxygen dependent     2 liters nasal cannula    Pneumonia 2024    resolved . pulse ox  97-98%    Thyroid disease     Type II or unspecified type diabetes mellitus without mention of complication, not stated as uncontrolled     Urinary incontinence        Past Surgical History:   Procedure Laterality Date    BREAST BIOPSY          BREAST LUMPECTOMY      right breast , with lymph nodes.    CARDIAC SURGERY       SECTION  1979    COLONOSCOPY      COLONOSCOPY N/A 2015    COLONOSCOPY    COLONOSCOPY N/A 2022    COLONOSCOPY POLYPECTOMY SNARE/COLD BIOPSY performed by Tarah Hamm MD at [facility] ENDOSCOPY    COLONOSCOPY N/A 2025    COLONOSCOPY BIOPSY performed by  Tarah Hamm MD at [facility] ENDOSCOPY    CORONARY ARTERY BYPASS GRAFT  10/23/2017    x2   [place]    DIAGNOSTIC CARDIAC CATH LAB PROCEDURE      EYE SURGERY  2009    several ,cataracts,legally blind    EYE SURGERY      FOOT SURGERY  03/05/2016    ulcer, bone debridement    MASTECTOMY Bilateral 06/20/2023    BILATERAL  MASTECTOMIES performed by Tarah Hamm MD at [facility] OR    OTHER SURGICAL HISTORY Left 03/02/2016    bone biopsy left foot    US BREAST BIOPSY W LOC DEVICE 1ST LESION RIGHT Right 04/12/2023    US BREAST BIOPSY W LOC DEVICE 1ST LESION RIGHT 4/12/2023 [facility]    US BREAST BIOPSY W LOC DEVICE EACH ADDL LESION RIGHT Right 04/12/2023    US BREAST BIOPSY NEEDLE ADDITIONAL RIGHT 4/12/2023 [facility]       Prior to Admission medications    Medication Sig Start Date End Date Taking? Authorizing Provider   albuterol sulfate HFA (PROVENTIL;VENTOLIN;PROAIR) 108 (90 Base) MCG/ACT inhaler  5/1/25  Yes ProviderOrville MD   lidocaine (LIDODERM) 5 % Place 1 patch onto the skin every 24 hours 12/20/24  Yes Provider, MD Orville   citalopram (CELEXA) 40 MG tablet Take 1 tablet by mouth daily 4/8/25  Yes Ernestina Quinn MD   ezetimibe (ZETIA) 10 MG tablet Take 1 tablet by mouth daily 4/8/25  Yes Ernestina Quinn MD   lisinopril (PRINIVIL;ZESTRIL) 40 MG tablet Take 1 tablet by mouth daily 4/8/25  Yes Ernestina Quinn MD   metoprolol tartrate (LOPRESSOR) 50 MG tablet Take 1 tablet by mouth 2 times daily 4/8/25  Yes Ernestina Quinn MD   pantoprazole (PROTONIX) 40 MG tablet Take 1 tablet by mouth daily 4/8/25  Yes Ernestina Quinn MD   pravastatin (PRAVACHOL) 40 MG tablet Take 1 tablet by mouth nightly 4/8/25  Yes Ernestina Quinn MD   allopurinol (ZYLOPRIM) 100 MG tablet Take 1 tablet by mouth daily 3/21/25  Yes Ernestina Quinn MD   insulin lispro (HUMALOG,ADMELOG) 100 UNIT/ML SOLN injection vial Via insulin pump max dose 125 units daily 3/17/25  Yes Colin Gary, APRN - CNS   pregabalin

## 2025-05-13 NOTE — PROGRESS NOTES
Dr Tirado updated on patient status in PACU- patient awake and alert with O2 saturation maintaining 92-98%  Dr Hamm at bedside to evaluate patient    Decision to discharge home to follow up with PCP

## 2025-05-13 NOTE — ANESTHESIA POSTPROCEDURE EVALUATION
Department of Anesthesiology  Postprocedure Note    Patient: Kim Zarco  MRN: [id number]  YOB: 1950  Date of evaluation: 5/13/2025    Procedure Summary       Date: 05/13/25 Room / Location: [address]    Anesthesia Start: 0820 Anesthesia Stop: 0906    Procedure: EXCISION MASS LEFT AXILLA (Left) Diagnosis:       Mass of left axilla      (Mass of left axilla [R22.32])    Surgeons: Tarah Hamm MD Responsible Provider: Zander Tirado MD    Anesthesia Type: other ASA Status: 4            Anesthesia Type: other    Asher Phase I: Asher Score: 10    Asher Phase II: Asher Score: 9    Anesthesia Post Evaluation    Patient location during evaluation: PACU  Patient participation: complete - patient participated  Level of consciousness: awake and alert  Airway patency: patent  Nausea & Vomiting: no nausea and no vomiting  Cardiovascular status: hemodynamically stable  Respiratory status: acceptable  Hydration status: euvolemic  Comments: Pt in OR had SpO2 77% on RA. This quickly addie to 100% on FM, but dropped again after 7 min on RA. Surgery cancelled for further evaluation.  Pain management: adequate    No notable events documented.

## 2025-05-13 NOTE — ANESTHESIA PRE PROCEDURE
Department of Anesthesiology  Preprocedure Note       Name:  Kim Zarco   Age:  75 y.o.  :  1950                                          MRN:  [id number]         Date:  2025      Surgeon: Surgeon(s):  Tarah Hamm MD    Procedure: Procedure(s):  EXCISION MASS LEFT AXILLA    Medications prior to admission:   Prior to Admission medications    Medication Sig Start Date End Date Taking? Authorizing Provider   albuterol sulfate HFA (PROVENTIL;VENTOLIN;PROAIR) 108 (90 Base) MCG/ACT inhaler  25  Yes Orville Beyer MD   estradiol (ESTRACE) 0.1 MG/GM vaginal cream use as directed topically in the vagina each night for 2 weeks and then 2-3x/week at night 25  Yes Orville Beyer MD   Insulin Syringes, Disposable, U-100 1 ML MISC 1 each by Does not apply route daily Use 3 times daily 25   Colin Gary, APRN - CNS   lidocaine (LIDODERM) 5 % Place 1 patch onto the skin every 24 hours 24   Orville Beyer MD   citalopram (CELEXA) 40 MG tablet Take 1 tablet by mouth daily 25   Ernestina Quinn MD   ezetimibe (ZETIA) 10 MG tablet Take 1 tablet by mouth daily 25   Ernestina Quinn MD   lisinopril (PRINIVIL;ZESTRIL) 40 MG tablet Take 1 tablet by mouth daily 25   Ernestina Quinn MD   metoprolol tartrate (LOPRESSOR) 50 MG tablet Take 1 tablet by mouth 2 times daily 25   Ernestina Quinn MD   niacin (NIASPAN) 500 MG extended release tablet Take 1 tablet by mouth nightly at bedtime. 25   Ernestina Quinn MD   pantoprazole (PROTONIX) 40 MG tablet Take 1 tablet by mouth daily 25   Ernestina Quinn MD   potassium chloride (KLOR-CON M) 10 MEQ extended release tablet Take 1 tablet by mouth every other day 25   Ernestina Quinn MD   pravastatin (PRAVACHOL) 40 MG tablet Take 1 tablet by mouth nightly 25   Ernestina Quinn MD   vitamin C (ASCORBIC ACID) 500 MG tablet Take 1 tablet by mouth daily 25   Ernestina Quinn MD   ALPHA

## 2025-05-13 NOTE — PROGRESS NOTES
Sister Estefania updated in waiting room. Called back to venancio to have Dr. Hamm talk to family and update as well.

## 2025-05-15 ENCOUNTER — TELEPHONE (OUTPATIENT)
Dept: ENDOCRINOLOGY | Age: 75
End: 2025-05-15

## 2025-05-15 DIAGNOSIS — Z79.4 TYPE 2 DIABETES MELLITUS WITH HYPERGLYCEMIA, WITH LONG-TERM CURRENT USE OF INSULIN (HCC): Primary | ICD-10-CM

## 2025-05-15 DIAGNOSIS — E11.65 TYPE 2 DIABETES MELLITUS WITH HYPERGLYCEMIA, WITH LONG-TERM CURRENT USE OF INSULIN (HCC): Primary | ICD-10-CM

## 2025-05-15 NOTE — TELEPHONE ENCOUNTER
Pt states she believes her Ozempic needs increased. She states she has been on same dose since February Can you increase dose, states her food craving has returned. Blood sugar has increased.[facility] [place]

## 2025-05-19 DIAGNOSIS — Z79.4 TYPE 2 DIABETES MELLITUS WITH HYPERGLYCEMIA, WITH LONG-TERM CURRENT USE OF INSULIN (HCC): ICD-10-CM

## 2025-05-19 DIAGNOSIS — E11.65 TYPE 2 DIABETES MELLITUS WITH HYPERGLYCEMIA, WITH LONG-TERM CURRENT USE OF INSULIN (HCC): ICD-10-CM

## 2025-06-11 DIAGNOSIS — E11.65 TYPE 2 DIABETES MELLITUS WITH HYPERGLYCEMIA, WITH LONG-TERM CURRENT USE OF INSULIN (HCC): ICD-10-CM

## 2025-06-11 DIAGNOSIS — Z79.4 TYPE 2 DIABETES MELLITUS WITH HYPERGLYCEMIA, WITH LONG-TERM CURRENT USE OF INSULIN (HCC): ICD-10-CM

## 2025-06-11 DIAGNOSIS — N18.30 STAGE 3 CHRONIC KIDNEY DISEASE, UNSPECIFIED WHETHER STAGE 3A OR 3B CKD (HCC): ICD-10-CM

## 2025-06-11 NOTE — TELEPHONE ENCOUNTER
Patient called in to cancel an appointment and said she found someone closer to home. She asked if Dr. Kemp could send in a refill on her current medications because she can not get into the other providers office until July and she knows she will need refills by then.     [facility]/pharmacy #[id number] - [place], [place] - [address]

## 2025-06-12 NOTE — TELEPHONE ENCOUNTER
Name of Medication(s) Requested:  Requested Prescriptions     Pending Prescriptions Disp Refills    allopurinol (ZYLOPRIM) 100 MG tablet 90 tablet 0     Sig: Take 1 tablet by mouth daily    aspirin 81 MG EC tablet 90 tablet 0     Sig: Take 1 tablet by mouth daily    citalopram (CELEXA) 40 MG tablet 90 tablet 0     Sig: Take 1 tablet by mouth daily    ezetimibe (ZETIA) 10 MG tablet 90 tablet 1     Sig: Take 1 tablet by mouth daily    insulin lispro (HUMALOG,ADMELOG) 100 UNIT/ML SOLN injection vial 40 mL 5     Sig: Via insulin pump max dose 125 units daily    lisinopril (PRINIVIL;ZESTRIL) 40 MG tablet 90 tablet 1     Sig: Take 1 tablet by mouth daily    metoprolol tartrate (LOPRESSOR) 50 MG tablet 180 tablet 1     Sig: Take 1 tablet by mouth 2 times daily    niacin (NIASPAN) 500 MG extended release tablet 90 tablet 1     Sig: Take 1 tablet by mouth nightly at bedtime.    pantoprazole (PROTONIX) 40 MG tablet 90 tablet 1     Sig: Take 1 tablet by mouth daily    potassium chloride (KLOR-CON M) 10 MEQ extended release tablet 45 tablet 1     Sig: Take 1 tablet by mouth every other day    pravastatin (PRAVACHOL) 40 MG tablet 90 tablet 1     Sig: Take 1 tablet by mouth nightly    vitamin C (ASCORBIC ACID) 500 MG tablet 90 tablet 1     Sig: Take 1 tablet by mouth daily       Medication is on current medication list Yes    Dosage and directions were verified? Yes    Quantity verified: 90 day supply     Pharmacy Verified?  Yes    Last Appointment:  Visit date not found    Future appts:  Future Appointments   Date Time Provider Department Center   6/30/2025 11:00 AM Yong Escobar MD ROSA SLEEP [facility]   7/1/2025  1:45 PM Jose Barrett MD ACC Pulm [facility]   7/14/2025  1:40 PM Cori Miller MD [facility] C[facility]IO [facility]   7/24/2025 10:30 AM Tiffanie Sexton DO [facility] ECC DEP   8/28/2025 11:15 AM Roni Garza MD BDM ENDO [facility]   3/3/2026  2:30 PM SEY YNG VAS US 1 [facility] C[facility]IO SE Rad/Car   3/3/2026  3:00 PM  DM (diabetes mellitus)    Kidney stones    Sarcoidosis

## 2025-06-13 RX ORDER — CITALOPRAM HYDROBROMIDE 40 MG/1
40 TABLET ORAL DAILY
Qty: 90 TABLET | Refills: 0 | Status: SHIPPED | OUTPATIENT
Start: 2025-06-13

## 2025-06-13 RX ORDER — ASPIRIN 81 MG/1
81 TABLET ORAL DAILY
Qty: 90 TABLET | Refills: 0 | Status: SHIPPED | OUTPATIENT
Start: 2025-06-13

## 2025-06-13 RX ORDER — ALLOPURINOL 100 MG/1
100 TABLET ORAL DAILY
Qty: 90 TABLET | Refills: 0 | Status: SHIPPED | OUTPATIENT
Start: 2025-06-13

## 2025-06-13 RX ORDER — METOPROLOL TARTRATE 50 MG
50 TABLET ORAL 2 TIMES DAILY
Qty: 180 TABLET | Refills: 0 | Status: SHIPPED | OUTPATIENT
Start: 2025-06-13

## 2025-06-13 RX ORDER — POTASSIUM CHLORIDE 750 MG/1
10 TABLET, EXTENDED RELEASE ORAL EVERY OTHER DAY
Qty: 45 TABLET | Refills: 0 | Status: SHIPPED | OUTPATIENT
Start: 2025-06-13

## 2025-06-13 RX ORDER — LISINOPRIL 40 MG/1
40 TABLET ORAL DAILY
Qty: 90 TABLET | Refills: 0 | Status: SHIPPED | OUTPATIENT
Start: 2025-06-13

## 2025-06-13 RX ORDER — ASCORBIC ACID 500 MG
500 TABLET ORAL DAILY
Qty: 90 TABLET | Refills: 0 | Status: SHIPPED | OUTPATIENT
Start: 2025-06-13

## 2025-06-13 RX ORDER — PRAVASTATIN SODIUM 40 MG
40 TABLET ORAL NIGHTLY
Qty: 90 TABLET | Refills: 0 | Status: SHIPPED | OUTPATIENT
Start: 2025-06-13

## 2025-06-13 RX ORDER — NIACIN 500 MG/1
500 TABLET, EXTENDED RELEASE ORAL NIGHTLY
Qty: 90 TABLET | Refills: 0 | Status: SHIPPED | OUTPATIENT
Start: 2025-06-13

## 2025-06-13 RX ORDER — PANTOPRAZOLE SODIUM 40 MG/1
40 TABLET, DELAYED RELEASE ORAL DAILY
Qty: 90 TABLET | Refills: 0 | Status: SHIPPED | OUTPATIENT
Start: 2025-06-13

## 2025-06-13 RX ORDER — EZETIMIBE 10 MG/1
10 TABLET ORAL DAILY
Qty: 90 TABLET | Refills: 0 | Status: SHIPPED | OUTPATIENT
Start: 2025-06-13

## 2025-06-13 RX ORDER — INSULIN LISPRO 100 [IU]/ML
INJECTION, SOLUTION INTRAVENOUS; SUBCUTANEOUS
Qty: 40 ML | Refills: 1 | Status: SHIPPED | OUTPATIENT
Start: 2025-06-13

## 2025-06-25 ENCOUNTER — HOSPITAL ENCOUNTER (OUTPATIENT)
Age: 75
Discharge: HOME | End: 2025-06-25
Payer: COMMERCIAL

## 2025-06-25 LAB
ALBUMIN SERPL-MCNC: 3.5 G/DL (ref 3.5–5.2)
ALP SERPL-CCNC: 63 U/L (ref 35–104)
ALT SERPL-CCNC: 19 U/L (ref 0–35)
ANION GAP SERPL CALCULATED.3IONS-SCNC: 12 MMOL/L (ref 7–16)
AST SERPL-CCNC: 21 U/L (ref 0–35)
BASOPHILS # BLD: 0.04 K/UL (ref 0–0.2)
BASOPHILS NFR BLD: 0 % (ref 0–2)
BILIRUB SERPL-MCNC: 0.2 MG/DL (ref 0–1.2)
BUN SERPL-MCNC: 32 MG/DL (ref 8–23)
CALCIUM SERPL-MCNC: 9.5 MG/DL (ref 8.8–10.2)
CHLORIDE SERPL-SCNC: 108 MMOL/L (ref 98–107)
CO2 SERPL-SCNC: 21 MMOL/L (ref 22–29)
CREAT SERPL-MCNC: 1.5 MG/DL (ref 0.5–1)
EOSINOPHIL # BLD: 0.09 K/UL (ref 0.05–0.5)
EOSINOPHILS RELATIVE PERCENT: 1 % (ref 0–6)
ERYTHROCYTE [DISTWIDTH] IN BLOOD BY AUTOMATED COUNT: 14.7 % (ref 11.5–15)
GFR, ESTIMATED: 37 ML/MIN/1.73M2
GLUCOSE SERPL-MCNC: 70 MG/DL (ref 74–99)
HCT VFR BLD AUTO: 35 % (ref 34–48)
HGB BLD-MCNC: 11.6 G/DL (ref 11.5–15.5)
IMM GRANULOCYTES # BLD AUTO: 0.04 K/UL (ref 0–0.58)
IMM GRANULOCYTES NFR BLD: 0 % (ref 0–5)
LYMPHOCYTES NFR BLD: 1.35 K/UL (ref 1.5–4)
LYMPHOCYTES RELATIVE PERCENT: 14 % (ref 20–42)
MAGNESIUM SERPL-MCNC: 1.5 MG/DL (ref 1.6–2.4)
MCH RBC QN AUTO: 29.6 PG (ref 26–35)
MCHC RBC AUTO-ENTMCNC: 33.1 G/DL (ref 32–34.5)
MCV RBC AUTO: 89.3 FL (ref 80–99.9)
MONOCYTES NFR BLD: 0.75 K/UL (ref 0.1–0.95)
MONOCYTES NFR BLD: 8 % (ref 2–12)
NEUTROPHILS NFR BLD: 76 % (ref 43–80)
NEUTS SEG NFR BLD: 7.25 K/UL (ref 1.8–7.3)
PLATELET # BLD AUTO: 214 K/UL (ref 130–450)
PMV BLD AUTO: 10 FL (ref 7–12)
POTASSIUM SERPL-SCNC: 4.5 MMOL/L (ref 3.5–5.1)
PROT SERPL-MCNC: 6.2 G/DL (ref 6.4–8.3)
PTH-INTACT SERPL-MCNC: 28 PG/ML (ref 15–65)
RBC # BLD AUTO: 3.92 M/UL (ref 3.5–5.5)
SODIUM SERPL-SCNC: 141 MMOL/L (ref 136–145)
WBC OTHER # BLD: 9.5 K/UL (ref 4.5–11.5)

## 2025-06-25 PROCEDURE — 36415 COLL VENOUS BLD VENIPUNCTURE: CPT

## 2025-06-25 PROCEDURE — 83735 ASSAY OF MAGNESIUM: CPT

## 2025-06-25 PROCEDURE — 85025 COMPLETE CBC W/AUTO DIFF WBC: CPT

## 2025-06-25 PROCEDURE — 80053 COMPREHEN METABOLIC PANEL: CPT

## 2025-06-25 PROCEDURE — 83970 ASSAY OF PARATHORMONE: CPT

## 2025-06-27 NOTE — PROGRESS NOTES
[facility] Sleep Medicine    Patient Name: Kim Zarco  Age: 75 y.o.   : 1950  Date of Visit: 25    Assessment and Plan:     1. JAIRO (obstructive sleep apnea)  high pre-test probability of JAIRO.We will pursue sleep testing for further evaluation given symptoms listed below.      2. Obesity (BMI 30-39.9)  Rec'd 10-20% weight loss of total body weight (if feasible). Patient instructed on proper nutrition and estimated total daily caloric expenditure for their height and weight. Discussed that JAIRO may improve with weight loss, but there is no guarantee of reversal.       History:    AQUILES Zarco is a 75 y.o. female with  has a past medical history of ASHD (arteriosclerotic heart disease), Asthma, Breast cancer (HCC), CAD (coronary artery disease), Cancer (HCC), Charcot foot due to diabetes mellitus (HCC) (2012), Chronic diastolic congestive heart failure (HCC) (2017), Chronic kidney disease, Depression, Diabetic foot ulcer with osteomyelitis (HCC), Diabetic peripheral neuropathy (HCC), Diabetic ulcer of left foot associated with type 2 diabetes mellitus (HCC), Diarrhea, Essential hypertension (2018), GERD (gastroesophageal reflux disease), History of colon polyps, History of UTI, Hyperlipidemia, Hypertension, Lactose intolerance, Mass of left axilla, Oxygen dependent, Pneumonia (2024), Thyroid disease, Type II or unspecified type diabetes mellitus without mention of complication, not stated as uncontrolled, and Urinary incontinence. who presents as a new patient to Sleep Clinic, referred by Dr. Amin, for Sleep Apnea (No prior sleep study c/o snoring. Was supposed to have surgery a few weeks ago and they refused to due the surgery d/t low O2. Had double pneumonia in late December.)    - Had double mastectomy one year ago and was going to have skin reconstruction  - She was stopped prior to the procedure because she desaturated to 88%.  - She had pneumonia and was discharged on

## 2025-06-30 ENCOUNTER — OFFICE VISIT (OUTPATIENT)
Dept: SLEEP MEDICINE | Age: 75
End: 2025-06-30
Payer: COMMERCIAL

## 2025-06-30 ENCOUNTER — TELEPHONE (OUTPATIENT)
Age: 75
End: 2025-06-30

## 2025-06-30 VITALS
DIASTOLIC BLOOD PRESSURE: 52 MMHG | SYSTOLIC BLOOD PRESSURE: 91 MMHG | BODY MASS INDEX: 33.49 KG/M2 | OXYGEN SATURATION: 98 % | TEMPERATURE: 97.8 F | RESPIRATION RATE: 16 BRPM | HEART RATE: 76 BPM | WEIGHT: 201 LBS | HEIGHT: 65 IN

## 2025-06-30 DIAGNOSIS — G47.33 OSA (OBSTRUCTIVE SLEEP APNEA): Primary | ICD-10-CM

## 2025-06-30 DIAGNOSIS — E66.9 OBESITY (BMI 30-39.9): ICD-10-CM

## 2025-06-30 PROCEDURE — 99204 OFFICE O/P NEW MOD 45 MIN: CPT | Performed by: STUDENT IN AN ORGANIZED HEALTH CARE EDUCATION/TRAINING PROGRAM

## 2025-06-30 PROCEDURE — 1090F PRES/ABSN URINE INCON ASSESS: CPT | Performed by: STUDENT IN AN ORGANIZED HEALTH CARE EDUCATION/TRAINING PROGRAM

## 2025-06-30 PROCEDURE — G8428 CUR MEDS NOT DOCUMENT: HCPCS | Performed by: STUDENT IN AN ORGANIZED HEALTH CARE EDUCATION/TRAINING PROGRAM

## 2025-06-30 PROCEDURE — 1036F TOBACCO NON-USER: CPT | Performed by: STUDENT IN AN ORGANIZED HEALTH CARE EDUCATION/TRAINING PROGRAM

## 2025-06-30 PROCEDURE — G8417 CALC BMI ABV UP PARAM F/U: HCPCS | Performed by: STUDENT IN AN ORGANIZED HEALTH CARE EDUCATION/TRAINING PROGRAM

## 2025-06-30 PROCEDURE — G8400 PT W/DXA NO RESULTS DOC: HCPCS | Performed by: STUDENT IN AN ORGANIZED HEALTH CARE EDUCATION/TRAINING PROGRAM

## 2025-06-30 PROCEDURE — 1123F ACP DISCUSS/DSCN MKR DOCD: CPT | Performed by: STUDENT IN AN ORGANIZED HEALTH CARE EDUCATION/TRAINING PROGRAM

## 2025-06-30 PROCEDURE — 3074F SYST BP LT 130 MM HG: CPT | Performed by: STUDENT IN AN ORGANIZED HEALTH CARE EDUCATION/TRAINING PROGRAM

## 2025-06-30 PROCEDURE — 3017F COLORECTAL CA SCREEN DOC REV: CPT | Performed by: STUDENT IN AN ORGANIZED HEALTH CARE EDUCATION/TRAINING PROGRAM

## 2025-06-30 PROCEDURE — 3078F DIAST BP <80 MM HG: CPT | Performed by: STUDENT IN AN ORGANIZED HEALTH CARE EDUCATION/TRAINING PROGRAM

## 2025-06-30 ASSESSMENT — SLEEP AND FATIGUE QUESTIONNAIRES
HOW LIKELY ARE YOU TO NOD OFF OR FALL ASLEEP WHILE LYING DOWN TO REST IN THE AFTERNOON WHEN CIRCUMSTANCES PERMIT: SLIGHT CHANCE OF DOZING
HOW LIKELY ARE YOU TO NOD OFF OR FALL ASLEEP WHEN YOU ARE A PASSENGER IN A CAR FOR AN HOUR WITHOUT A BREAK: WOULD NEVER DOZE
HOW LIKELY ARE YOU TO NOD OFF OR FALL ASLEEP WHILE WATCHING TV: SLIGHT CHANCE OF DOZING
HOW LIKELY ARE YOU TO NOD OFF OR FALL ASLEEP WHILE SITTING AND TALKING TO SOMEONE: WOULD NEVER DOZE
ESS TOTAL SCORE: 3
HOW LIKELY ARE YOU TO NOD OFF OR FALL ASLEEP WHILE SITTING INACTIVE IN A PUBLIC PLACE: WOULD NEVER DOZE
HOW LIKELY ARE YOU TO NOD OFF OR FALL ASLEEP WHILE SITTING AND READING: SLIGHT CHANCE OF DOZING
HOW LIKELY ARE YOU TO NOD OFF OR FALL ASLEEP WHILE SITTING QUIETLY AFTER LUNCH WITHOUT ALCOHOL: WOULD NEVER DOZE
HOW LIKELY ARE YOU TO NOD OFF OR FALL ASLEEP IN A CAR, WHILE STOPPED FOR A FEW MINUTES IN TRAFFIC: WOULD NEVER DOZE

## 2025-06-30 NOTE — TELEPHONE ENCOUNTER
Called patient to let her know we need to reschedule her appointment. Last time she was here Dr. Pinedo wanted to her to reschedule her CHEST CT, PFT, and 6 MINUTE WALK and then follow up in 2 months. None of them were completed which were need for the appointment. Once the test are rescheduled then we will set her up a follow up appointment.

## 2025-07-11 LAB — DIABETIC RETINOPATHY: POSITIVE

## 2025-07-14 ENCOUNTER — OFFICE VISIT (OUTPATIENT)
Dept: CARDIOLOGY CLINIC | Age: 75
End: 2025-07-14
Payer: COMMERCIAL

## 2025-07-14 VITALS
RESPIRATION RATE: 20 BRPM | SYSTOLIC BLOOD PRESSURE: 112 MMHG | BODY MASS INDEX: 32.65 KG/M2 | HEART RATE: 79 BPM | WEIGHT: 196 LBS | HEIGHT: 65 IN | DIASTOLIC BLOOD PRESSURE: 52 MMHG

## 2025-07-14 DIAGNOSIS — I25.10 CORONARY ARTERY DISEASE INVOLVING NATIVE CORONARY ARTERY OF NATIVE HEART WITHOUT ANGINA PECTORIS: Primary | ICD-10-CM

## 2025-07-14 PROCEDURE — 1036F TOBACCO NON-USER: CPT | Performed by: INTERNAL MEDICINE

## 2025-07-14 PROCEDURE — 3074F SYST BP LT 130 MM HG: CPT | Performed by: INTERNAL MEDICINE

## 2025-07-14 PROCEDURE — G8427 DOCREV CUR MEDS BY ELIG CLIN: HCPCS | Performed by: INTERNAL MEDICINE

## 2025-07-14 PROCEDURE — 1159F MED LIST DOCD IN RCRD: CPT | Performed by: INTERNAL MEDICINE

## 2025-07-14 PROCEDURE — 3078F DIAST BP <80 MM HG: CPT | Performed by: INTERNAL MEDICINE

## 2025-07-14 PROCEDURE — G8400 PT W/DXA NO RESULTS DOC: HCPCS | Performed by: INTERNAL MEDICINE

## 2025-07-14 PROCEDURE — 1123F ACP DISCUSS/DSCN MKR DOCD: CPT | Performed by: INTERNAL MEDICINE

## 2025-07-14 PROCEDURE — 3017F COLORECTAL CA SCREEN DOC REV: CPT | Performed by: INTERNAL MEDICINE

## 2025-07-14 PROCEDURE — 1090F PRES/ABSN URINE INCON ASSESS: CPT | Performed by: INTERNAL MEDICINE

## 2025-07-14 PROCEDURE — 1160F RVW MEDS BY RX/DR IN RCRD: CPT | Performed by: INTERNAL MEDICINE

## 2025-07-14 PROCEDURE — G8417 CALC BMI ABV UP PARAM F/U: HCPCS | Performed by: INTERNAL MEDICINE

## 2025-07-14 PROCEDURE — 93000 ELECTROCARDIOGRAM COMPLETE: CPT | Performed by: INTERNAL MEDICINE

## 2025-07-14 PROCEDURE — 99214 OFFICE O/P EST MOD 30 MIN: CPT | Performed by: INTERNAL MEDICINE

## 2025-07-14 RX ORDER — LISINOPRIL 5 MG/1
5 TABLET ORAL DAILY
Qty: 60 TABLET | Refills: 5 | Status: SHIPPED | OUTPATIENT
Start: 2025-07-14

## 2025-07-14 ASSESSMENT — ENCOUNTER SYMPTOMS
DIARRHEA: 0
SHORTNESS OF BREATH: 0
COUGH: 0
VOMITING: 0
BACK PAIN: 0
ABDOMINAL PAIN: 0
WHEEZING: 0
NAUSEA: 0
CONSTIPATION: 0
BLOOD IN STOOL: 0

## 2025-07-14 NOTE — PROGRESS NOTES
OUTPATIENT CARDIOLOGY FOLLOW-UP    HPI:    Name: Kim Zarco    Age: 75 y.o.    Primary Care Physician: Tiffanie Sexton DO    Date of Service: 7/14/2025    Chief Complaint:   Chief Complaint   Patient presents with    Coronary Artery Disease     Ov follow up..the patient has no c/c        History of present illness :   75-year-old morbidly obese ex-smoker female comes today for 1 year 1 year follow-up visit.  She is accompanied by her .  she has history of CAD, status post two-vessel CABG in October 2017 with LIMA to LAD and SVG to obtuse marginal branch, surgery complicated by acute kidney injury and respiratory failure, chronic diastolic CHF, hypertension, diabetes, chronic kidney disease, diabetic ulcer of the left foot, depression, legally blind, history of breast cancer, status post lumpectomy, lymph node resection and XRT in 2015, and status post bilateral mastectomy in June 2023 due to recurrent cancer, hypothyroidism and bilateral Charcot foot.      Patient has not been active due to her bilateral Charcot foot.  She she can go up 12-15 steps holding on the rail with no chest discomfort or dyspnea on exertion.  She has lost 30 pounds over the past several months.  She denies palpitations, dizziness or syncope.  She denies lower extremity edema.  Her lisinopril was decreased from 40 to 10 mg by her nephrologist due to low blood pressure    EKG done today revealed sinus rhythm at 79 bpm, left atrial enlargement, cannot exclude anterior      Review of Systems:   Review of Systems   Constitutional:  Negative for chills, fatigue and fever.   HENT:  Negative for nosebleeds.    Respiratory:  Negative for cough, shortness of breath and wheezing.    Gastrointestinal:  Negative for abdominal pain, blood in stool, constipation, diarrhea, nausea and vomiting.   Genitourinary:  Negative for dysuria and hematuria.   Musculoskeletal:  Negative for back pain, joint swelling and myalgias.        Aching.

## 2025-07-21 SDOH — HEALTH STABILITY: PHYSICAL HEALTH: ON AVERAGE, HOW MANY MINUTES DO YOU ENGAGE IN EXERCISE AT THIS LEVEL?: 20 MIN

## 2025-07-21 SDOH — HEALTH STABILITY: PHYSICAL HEALTH: ON AVERAGE, HOW MANY DAYS PER WEEK DO YOU ENGAGE IN MODERATE TO STRENUOUS EXERCISE (LIKE A BRISK WALK)?: 1 DAY

## 2025-07-24 ENCOUNTER — OFFICE VISIT (OUTPATIENT)
Dept: FAMILY MEDICINE CLINIC | Age: 75
End: 2025-07-24
Payer: COMMERCIAL

## 2025-07-24 VITALS
OXYGEN SATURATION: 95 % | TEMPERATURE: 97.4 F | DIASTOLIC BLOOD PRESSURE: 50 MMHG | HEART RATE: 79 BPM | SYSTOLIC BLOOD PRESSURE: 92 MMHG | RESPIRATION RATE: 20 BRPM | WEIGHT: 196 LBS | HEIGHT: 65 IN | BODY MASS INDEX: 32.65 KG/M2

## 2025-07-24 DIAGNOSIS — Z78.0 POST-MENOPAUSAL: ICD-10-CM

## 2025-07-24 DIAGNOSIS — Z79.4 TYPE 2 DIABETES MELLITUS WITH DIABETIC NEUROPATHIC ARTHROPATHY, WITH LONG-TERM CURRENT USE OF INSULIN (HCC): ICD-10-CM

## 2025-07-24 DIAGNOSIS — Z91.81 AT HIGH RISK FOR FALLS: ICD-10-CM

## 2025-07-24 DIAGNOSIS — I25.10 CORONARY ARTERY DISEASE INVOLVING NATIVE CORONARY ARTERY OF NATIVE HEART WITHOUT ANGINA PECTORIS: ICD-10-CM

## 2025-07-24 DIAGNOSIS — Z00.00 ENCOUNTER FOR MEDICAL EXAMINATION TO ESTABLISH CARE: Primary | ICD-10-CM

## 2025-07-24 DIAGNOSIS — E11.610 TYPE 2 DIABETES MELLITUS WITH DIABETIC NEUROPATHIC ARTHROPATHY, WITH LONG-TERM CURRENT USE OF INSULIN (HCC): ICD-10-CM

## 2025-07-24 DIAGNOSIS — I10 ESSENTIAL HYPERTENSION: ICD-10-CM

## 2025-07-24 LAB
CREATININE URINE: 191 MG/DL (ref 29–226)
MICROALBUMIN/CREAT 24H UR: 212 MG/L (ref 0–20)
MICROALBUMIN/CREAT UR-RTO: 111 MCG/MG CREAT (ref 0–30)

## 2025-07-24 PROCEDURE — G8417 CALC BMI ABV UP PARAM F/U: HCPCS | Performed by: FAMILY MEDICINE

## 2025-07-24 PROCEDURE — 99214 OFFICE O/P EST MOD 30 MIN: CPT | Performed by: FAMILY MEDICINE

## 2025-07-24 PROCEDURE — G8427 DOCREV CUR MEDS BY ELIG CLIN: HCPCS | Performed by: FAMILY MEDICINE

## 2025-07-24 PROCEDURE — 3078F DIAST BP <80 MM HG: CPT | Performed by: FAMILY MEDICINE

## 2025-07-24 PROCEDURE — 3017F COLORECTAL CA SCREEN DOC REV: CPT | Performed by: FAMILY MEDICINE

## 2025-07-24 PROCEDURE — 1036F TOBACCO NON-USER: CPT | Performed by: FAMILY MEDICINE

## 2025-07-24 PROCEDURE — 1160F RVW MEDS BY RX/DR IN RCRD: CPT | Performed by: FAMILY MEDICINE

## 2025-07-24 PROCEDURE — 2022F DILAT RTA XM EVC RTNOPTHY: CPT | Performed by: FAMILY MEDICINE

## 2025-07-24 PROCEDURE — 1123F ACP DISCUSS/DSCN MKR DOCD: CPT | Performed by: FAMILY MEDICINE

## 2025-07-24 PROCEDURE — G8400 PT W/DXA NO RESULTS DOC: HCPCS | Performed by: FAMILY MEDICINE

## 2025-07-24 PROCEDURE — 3044F HG A1C LEVEL LT 7.0%: CPT | Performed by: FAMILY MEDICINE

## 2025-07-24 PROCEDURE — 1090F PRES/ABSN URINE INCON ASSESS: CPT | Performed by: FAMILY MEDICINE

## 2025-07-24 PROCEDURE — 3074F SYST BP LT 130 MM HG: CPT | Performed by: FAMILY MEDICINE

## 2025-07-24 PROCEDURE — 1159F MED LIST DOCD IN RCRD: CPT | Performed by: FAMILY MEDICINE

## 2025-07-24 RX ORDER — DOXYCYCLINE HYCLATE 100 MG
100 TABLET ORAL 2 TIMES DAILY
COMMUNITY
Start: 2025-07-21

## 2025-07-24 RX ORDER — LISINOPRIL 2.5 MG/1
2.5 TABLET ORAL DAILY
Qty: 30 TABLET | Refills: 1 | Status: SHIPPED | OUTPATIENT
Start: 2025-07-24

## 2025-07-24 ASSESSMENT — PATIENT HEALTH QUESTIONNAIRE - PHQ9
SUM OF ALL RESPONSES TO PHQ QUESTIONS 1-9: 3
SUM OF ALL RESPONSES TO PHQ QUESTIONS 1-9: 3
10. IF YOU CHECKED OFF ANY PROBLEMS, HOW DIFFICULT HAVE THESE PROBLEMS MADE IT FOR YOU TO DO YOUR WORK, TAKE CARE OF THINGS AT HOME, OR GET ALONG WITH OTHER PEOPLE: NOT DIFFICULT AT ALL
4. FEELING TIRED OR HAVING LITTLE ENERGY: NOT AT ALL
9. THOUGHTS THAT YOU WOULD BE BETTER OFF DEAD, OR OF HURTING YOURSELF: NOT AT ALL
3. TROUBLE FALLING OR STAYING ASLEEP: NOT AT ALL
6. FEELING BAD ABOUT YOURSELF - OR THAT YOU ARE A FAILURE OR HAVE LET YOURSELF OR YOUR FAMILY DOWN: SEVERAL DAYS
SUM OF ALL RESPONSES TO PHQ QUESTIONS 1-9: 3
1. LITTLE INTEREST OR PLEASURE IN DOING THINGS: NOT AT ALL
8. MOVING OR SPEAKING SO SLOWLY THAT OTHER PEOPLE COULD HAVE NOTICED. OR THE OPPOSITE, BEING SO FIGETY OR RESTLESS THAT YOU HAVE BEEN MOVING AROUND A LOT MORE THAN USUAL: NOT AT ALL
5. POOR APPETITE OR OVEREATING: NOT AT ALL
SUM OF ALL RESPONSES TO PHQ QUESTIONS 1-9: 3
2. FEELING DOWN, DEPRESSED OR HOPELESS: MORE THAN HALF THE DAYS
7. TROUBLE CONCENTRATING ON THINGS, SUCH AS READING THE NEWSPAPER OR WATCHING TELEVISION: NOT AT ALL

## 2025-07-24 ASSESSMENT — LIFESTYLE VARIABLES
HOW MANY STANDARD DRINKS CONTAINING ALCOHOL DO YOU HAVE ON A TYPICAL DAY: PATIENT DOES NOT DRINK
HOW OFTEN DO YOU HAVE A DRINK CONTAINING ALCOHOL: NEVER

## 2025-07-24 NOTE — PROGRESS NOTES
HPI:  The patient is a 75 y.o. female who presents today to establish care.  Previous PCP was Dr. Quinn.       The patient complains of issues with low blood pressure.  She states that her lisinopril was 40 mg and has been decreased to 5 mg due to low blood pressure by nephrology and cardiology.  She denies dizziness, lightheadedness.  She states that she does have headache occasionally when she wakes up.  She denies any chest pain, shortness of breath, nausea, vomiting, diarrhea, constipation.      DM2:   Patient is here to fu regarding DM2. Patient is  controlled.  Taking all medications and tolerating well.  Currently on insulin pump and ozempic.  Fasting sugars are running 120-130.  Patient is taking ASA and Ace Inhibitor/ARB. Patient is on appropriately-dosed statin.  LDL is not at goal.  BP is  controlled.  No hypoglycemic episodes. Patient does see Podiatry regularly.  Saw an Eye Dr with in 1 year. Patient is aware that it is necessary to see an Eye Dr yearly.  Patient does not smoke.  Most recent labs reviewed with patient.  Patient does have complaints or concerns today.        Lab Results   Component Value Date    LABA1C 6.8 04/17/2025     Lab Results   Component Value Date     01/14/2022       No results found for: \"LDLDIRECT\"      Past Medical History:   Diagnosis Date    ASHD (arteriosclerotic heart disease)     Breast cancer (HCC)     No BP or IV Right arm    CAD (coronary artery disease)     Cancer (HCC)     breast ca, right    Charcot foot due to diabetes mellitus (HCC) 01/29/2012    Chronic diastolic congestive heart failure (HCC) 11/27/2017    Chronic kidney disease     sees nephrology    Depression     Diabetic foot ulcer with osteomyelitis (HCC)     Diabetic peripheral neuropathy (HCC)     Diabetic ulcer of left foot associated with type 2 diabetes mellitus (HCC)     Diarrhea     Essential hypertension 05/24/2018    GERD (gastroesophageal reflux disease)     History of colon polyps

## 2025-07-31 ENCOUNTER — LAB (OUTPATIENT)
Dept: FAMILY MEDICINE CLINIC | Age: 75
End: 2025-07-31
Payer: COMMERCIAL

## 2025-07-31 VITALS — DIASTOLIC BLOOD PRESSURE: 70 MMHG | HEART RATE: 73 BPM | SYSTOLIC BLOOD PRESSURE: 120 MMHG

## 2025-07-31 DIAGNOSIS — I10 ESSENTIAL HYPERTENSION: Primary | ICD-10-CM

## 2025-07-31 PROCEDURE — 99211 OFF/OP EST MAY X REQ PHY/QHP: CPT | Performed by: FAMILY MEDICINE

## 2025-08-06 ENCOUNTER — OFFICE VISIT (OUTPATIENT)
Dept: FAMILY MEDICINE CLINIC | Age: 75
End: 2025-08-06
Payer: COMMERCIAL

## 2025-08-06 VITALS
RESPIRATION RATE: 20 BRPM | HEIGHT: 65 IN | HEART RATE: 66 BPM | SYSTOLIC BLOOD PRESSURE: 123 MMHG | BODY MASS INDEX: 32.65 KG/M2 | OXYGEN SATURATION: 95 % | DIASTOLIC BLOOD PRESSURE: 69 MMHG | TEMPERATURE: 97.1 F | WEIGHT: 196 LBS

## 2025-08-06 DIAGNOSIS — E11.610 CHARCOT FOOT DUE TO DIABETES MELLITUS (HCC): ICD-10-CM

## 2025-08-06 DIAGNOSIS — E11.42 DIABETIC PERIPHERAL NEUROPATHY (HCC): ICD-10-CM

## 2025-08-06 DIAGNOSIS — L81.9 CHANGING PIGMENTED SKIN LESION: Primary | ICD-10-CM

## 2025-08-06 DIAGNOSIS — N18.32 STAGE 3B CHRONIC KIDNEY DISEASE (HCC): ICD-10-CM

## 2025-08-06 DIAGNOSIS — J30.2 SEASONAL ALLERGIC RHINITIS, UNSPECIFIED TRIGGER: ICD-10-CM

## 2025-08-06 DIAGNOSIS — I25.10 CORONARY ARTERY DISEASE INVOLVING NATIVE CORONARY ARTERY OF NATIVE HEART WITHOUT ANGINA PECTORIS: ICD-10-CM

## 2025-08-06 PROCEDURE — 2022F DILAT RTA XM EVC RTNOPTHY: CPT | Performed by: FAMILY MEDICINE

## 2025-08-06 PROCEDURE — 3074F SYST BP LT 130 MM HG: CPT | Performed by: FAMILY MEDICINE

## 2025-08-06 PROCEDURE — 1123F ACP DISCUSS/DSCN MKR DOCD: CPT | Performed by: FAMILY MEDICINE

## 2025-08-06 PROCEDURE — G8400 PT W/DXA NO RESULTS DOC: HCPCS | Performed by: FAMILY MEDICINE

## 2025-08-06 PROCEDURE — 3078F DIAST BP <80 MM HG: CPT | Performed by: FAMILY MEDICINE

## 2025-08-06 PROCEDURE — 3017F COLORECTAL CA SCREEN DOC REV: CPT | Performed by: FAMILY MEDICINE

## 2025-08-06 PROCEDURE — G8427 DOCREV CUR MEDS BY ELIG CLIN: HCPCS | Performed by: FAMILY MEDICINE

## 2025-08-06 PROCEDURE — 1159F MED LIST DOCD IN RCRD: CPT | Performed by: FAMILY MEDICINE

## 2025-08-06 PROCEDURE — 1090F PRES/ABSN URINE INCON ASSESS: CPT | Performed by: FAMILY MEDICINE

## 2025-08-06 PROCEDURE — 3044F HG A1C LEVEL LT 7.0%: CPT | Performed by: FAMILY MEDICINE

## 2025-08-06 PROCEDURE — 1036F TOBACCO NON-USER: CPT | Performed by: FAMILY MEDICINE

## 2025-08-06 PROCEDURE — 99214 OFFICE O/P EST MOD 30 MIN: CPT | Performed by: FAMILY MEDICINE

## 2025-08-06 PROCEDURE — G8417 CALC BMI ABV UP PARAM F/U: HCPCS | Performed by: FAMILY MEDICINE

## 2025-08-06 PROCEDURE — 1160F RVW MEDS BY RX/DR IN RCRD: CPT | Performed by: FAMILY MEDICINE

## 2025-08-06 RX ORDER — FLUTICASONE PROPIONATE 50 MCG
1 SPRAY, SUSPENSION (ML) NASAL DAILY
Qty: 16 G | Refills: 2 | Status: SHIPPED | OUTPATIENT
Start: 2025-08-06

## 2025-08-13 ENCOUNTER — HOSPITAL ENCOUNTER (OUTPATIENT)
Dept: MAMMOGRAPHY | Age: 75
Discharge: HOME | End: 2025-08-15

## 2025-08-13 DIAGNOSIS — Z78.0 POST-MENOPAUSAL: ICD-10-CM

## 2025-08-15 DIAGNOSIS — I10 ESSENTIAL HYPERTENSION: ICD-10-CM

## 2025-08-15 RX ORDER — LISINOPRIL 2.5 MG/1
2.5 TABLET ORAL DAILY
Qty: 90 TABLET | Refills: 1 | Status: SHIPPED | OUTPATIENT
Start: 2025-08-15

## 2025-08-18 ENCOUNTER — TELEPHONE (OUTPATIENT)
Dept: FAMILY MEDICINE CLINIC | Age: 75
End: 2025-08-18

## 2025-08-18 DIAGNOSIS — L81.9 CHANGING PIGMENTED SKIN LESION: Primary | ICD-10-CM

## 2025-08-19 ENCOUNTER — HOSPITAL ENCOUNTER (OUTPATIENT)
Dept: SLEEP CENTER | Age: 75
Discharge: HOME | End: 2025-08-19
Payer: COMMERCIAL

## 2025-08-19 DIAGNOSIS — G47.33 OSA (OBSTRUCTIVE SLEEP APNEA): ICD-10-CM

## 2025-08-19 PROCEDURE — 95810 POLYSOM 6/> YRS 4/> PARAM: CPT

## 2025-08-21 ENCOUNTER — HOSPITAL ENCOUNTER (OUTPATIENT)
Dept: MAMMOGRAPHY | Age: 75
Discharge: HOME | End: 2025-08-23
Payer: COMMERCIAL

## 2025-08-21 PROCEDURE — 77080 DXA BONE DENSITY AXIAL: CPT

## 2025-08-28 ENCOUNTER — OFFICE VISIT (OUTPATIENT)
Dept: ENDOCRINOLOGY | Age: 75
End: 2025-08-28
Payer: COMMERCIAL

## 2025-08-28 VITALS
SYSTOLIC BLOOD PRESSURE: 123 MMHG | BODY MASS INDEX: 32.82 KG/M2 | OXYGEN SATURATION: 98 % | WEIGHT: 197 LBS | DIASTOLIC BLOOD PRESSURE: 63 MMHG | HEIGHT: 65 IN | TEMPERATURE: 97.6 F | HEART RATE: 77 BPM | RESPIRATION RATE: 18 BRPM

## 2025-08-28 DIAGNOSIS — Z96.41 INSULIN PUMP IN PLACE: ICD-10-CM

## 2025-08-28 DIAGNOSIS — Z79.4 TYPE 2 DIABETES MELLITUS WITH STAGE 3A CHRONIC KIDNEY DISEASE, WITH LONG-TERM CURRENT USE OF INSULIN (HCC): ICD-10-CM

## 2025-08-28 DIAGNOSIS — E78.5 HYPERLIPIDEMIA, UNSPECIFIED HYPERLIPIDEMIA TYPE: ICD-10-CM

## 2025-08-28 DIAGNOSIS — E11.22 TYPE 2 DIABETES MELLITUS WITH STAGE 3A CHRONIC KIDNEY DISEASE, WITH LONG-TERM CURRENT USE OF INSULIN (HCC): ICD-10-CM

## 2025-08-28 DIAGNOSIS — Z91.119 DIETARY NONCOMPLIANCE: ICD-10-CM

## 2025-08-28 DIAGNOSIS — E11.65 TYPE 2 DIABETES MELLITUS WITH HYPERGLYCEMIA, WITH LONG-TERM CURRENT USE OF INSULIN (HCC): Primary | ICD-10-CM

## 2025-08-28 DIAGNOSIS — N18.31 TYPE 2 DIABETES MELLITUS WITH STAGE 3A CHRONIC KIDNEY DISEASE, WITH LONG-TERM CURRENT USE OF INSULIN (HCC): ICD-10-CM

## 2025-08-28 DIAGNOSIS — Z97.8 USES SELF-APPLIED CONTINUOUS GLUCOSE MONITORING DEVICE: ICD-10-CM

## 2025-08-28 DIAGNOSIS — Z79.4 TYPE 2 DIABETES MELLITUS WITH HYPERGLYCEMIA, WITH LONG-TERM CURRENT USE OF INSULIN (HCC): Primary | ICD-10-CM

## 2025-08-28 LAB — HBA1C MFR BLD: 6.4 %

## 2025-08-28 PROCEDURE — 83036 HEMOGLOBIN GLYCOSYLATED A1C: CPT | Performed by: INTERNAL MEDICINE

## 2025-08-28 PROCEDURE — 3017F COLORECTAL CA SCREEN DOC REV: CPT | Performed by: INTERNAL MEDICINE

## 2025-08-28 PROCEDURE — G8400 PT W/DXA NO RESULTS DOC: HCPCS | Performed by: INTERNAL MEDICINE

## 2025-08-28 PROCEDURE — G8428 CUR MEDS NOT DOCUMENT: HCPCS | Performed by: INTERNAL MEDICINE

## 2025-08-28 PROCEDURE — 95251 CONT GLUC MNTR ANALYSIS I&R: CPT | Performed by: INTERNAL MEDICINE

## 2025-08-28 PROCEDURE — 3074F SYST BP LT 130 MM HG: CPT | Performed by: INTERNAL MEDICINE

## 2025-08-28 PROCEDURE — G8417 CALC BMI ABV UP PARAM F/U: HCPCS | Performed by: INTERNAL MEDICINE

## 2025-08-28 PROCEDURE — 1123F ACP DISCUSS/DSCN MKR DOCD: CPT | Performed by: INTERNAL MEDICINE

## 2025-08-28 PROCEDURE — 99214 OFFICE O/P EST MOD 30 MIN: CPT | Performed by: INTERNAL MEDICINE

## 2025-08-28 PROCEDURE — 1090F PRES/ABSN URINE INCON ASSESS: CPT | Performed by: INTERNAL MEDICINE

## 2025-08-28 PROCEDURE — 3044F HG A1C LEVEL LT 7.0%: CPT | Performed by: INTERNAL MEDICINE

## 2025-08-28 PROCEDURE — 3078F DIAST BP <80 MM HG: CPT | Performed by: INTERNAL MEDICINE

## 2025-08-28 PROCEDURE — 1036F TOBACCO NON-USER: CPT | Performed by: INTERNAL MEDICINE

## 2025-08-28 PROCEDURE — 2022F DILAT RTA XM EVC RTNOPTHY: CPT | Performed by: INTERNAL MEDICINE

## (undated) PROCEDURE — 0HTU0ZZ RESECTION OF LEFT BREAST, OPEN APPROACH: ICD-10-PCS

## (undated) PROCEDURE — 0HTT0ZZ RESECTION OF RIGHT BREAST, OPEN APPROACH: ICD-10-PCS

## (undated) DEVICE — ELECTRODE PT RET AD L9FT HI MOIST COND ADH HYDRGEL CORDED

## (undated) DEVICE — NEEDLE HYPO 25GA L1.5IN BLU POLYPR HUB S STL REG BVL STR

## (undated) DEVICE — DRAPE,CHEST,FENES,15X10,STERIL: Brand: MEDLINE

## (undated) DEVICE — SPONGE GZ W4XL4IN RAYON POLY FILL CVR W/ NONWOVEN FAB

## (undated) DEVICE — DRAPE,LAPAROTOMY,PCH,STERILE: Brand: MEDLINE

## (undated) DEVICE — Device

## (undated) DEVICE — GAUZE,SPONGE,4"X4",8PLY,STRL,LF,10/TRAY: Brand: MEDLINE

## (undated) DEVICE — SPONGE,DRAIN,NONWVN,4"X4",6PLY,STRL,LF: Brand: MEDLINE

## (undated) DEVICE — GLOVE ORANGE PI 7 1/2   MSG9075

## (undated) DEVICE — BANDAGE COMPR W6INXL10YD ST M E WHITE/BEIGE

## (undated) DEVICE — GLOVE ORANGE PI 7   MSG9070

## (undated) DEVICE — SNARE ENDOSCP L240CM LOOP W13MM SHTH DIA2.4MM SM OVL FLX

## (undated) DEVICE — APPLICATOR PREP 26ML 0.7% IOD POVACRYLEX 74% ISO ALC ST

## (undated) DEVICE — PAD,ABDOMINAL,5"X9",ST,LF,25/BX: Brand: MEDLINE INDUSTRIES, INC.

## (undated) DEVICE — TRAP POLYP ETRAP

## (undated) DEVICE — FORCEPS BX 240CM 2.4MM L NDL RAD JAW 4 M00513334

## (undated) DEVICE — GRADUATE TRIANG MEASURE 1000ML BLK PRNT

## (undated) DEVICE — 4-PORT MANIFOLD: Brand: NEPTUNE 2

## (undated) DEVICE — TAPE ADH CLTH SILK H2O REPELLENT CURAD

## (undated) DEVICE — BLADE,STAINLESS-STEEL,15,STRL,DISPOSABLE: Brand: MEDLINE

## (undated) DEVICE — MARKER,SKIN,WI/RULER AND LABELS: Brand: MEDLINE

## (undated) DEVICE — PACK PROCEDURE SURG GEN CUST

## (undated) DEVICE — LIQUIBAND RAPID ADHESIVE 36/CS 0.8ML: Brand: MEDLINE

## (undated) DEVICE — COVER HNDL LT DISP

## (undated) DEVICE — SOLUTION SURG PREP 26 CC PURPREP

## (undated) DEVICE — DRAIN SURG 15FR SIL RND CHN W/ TRCR FULL FLUT DBL WRP TRAD

## (undated) DEVICE — SYRINGE IRRIG 60ML SFT PLIABLE BLB EZ TO GRP 1 HND USE W/

## (undated) DEVICE — DOUBLE BASIN SET: Brand: MEDLINE INDUSTRIES, INC.

## (undated) DEVICE — TOWEL,OR,DSP,ST,BLUE,STD,6/PK,12PK/CS: Brand: MEDLINE

## (undated) DEVICE — BLADE,STAINLESS-STEEL,10,STRL,DISPOSABLE: Brand: MEDLINE

## (undated) DEVICE — SPONGE GZ W4XL4IN RAYON POLY CVR W/NONWOVEN FAB STRL 2/PK